# Patient Record
Sex: MALE | Race: WHITE | NOT HISPANIC OR LATINO | Employment: OTHER | URBAN - METROPOLITAN AREA
[De-identification: names, ages, dates, MRNs, and addresses within clinical notes are randomized per-mention and may not be internally consistent; named-entity substitution may affect disease eponyms.]

---

## 2017-01-09 ENCOUNTER — APPOINTMENT (EMERGENCY)
Dept: CT IMAGING | Facility: HOSPITAL | Age: 42
End: 2017-01-09
Payer: MEDICARE

## 2017-01-09 ENCOUNTER — HOSPITAL ENCOUNTER (EMERGENCY)
Facility: HOSPITAL | Age: 42
Discharge: HOME | End: 2017-01-09
Payer: MEDICARE

## 2017-01-09 VITALS
BODY MASS INDEX: 35.57 KG/M2 | OXYGEN SATURATION: 99 % | SYSTOLIC BLOOD PRESSURE: 170 MMHG | WEIGHT: 255 LBS | HEART RATE: 81 BPM | DIASTOLIC BLOOD PRESSURE: 79 MMHG | TEMPERATURE: 98.1 F | RESPIRATION RATE: 18 BRPM

## 2017-01-09 DIAGNOSIS — R19.7 DIARRHEA: ICD-10-CM

## 2017-01-09 DIAGNOSIS — R10.9 ABDOMINAL PAIN: Primary | ICD-10-CM

## 2017-01-09 LAB
ALBUMIN SERPL BCP-MCNC: 3.9 G/DL (ref 3.5–5)
ALP SERPL-CCNC: 49 U/L (ref 46–116)
ALT SERPL W P-5'-P-CCNC: 23 U/L (ref 12–78)
ANION GAP SERPL CALCULATED.3IONS-SCNC: 6 MMOL/L (ref 4–13)
AST SERPL W P-5'-P-CCNC: 12 U/L (ref 5–45)
BASOPHILS # BLD MANUAL: 0 THOUSAND/UL (ref 0–0.1)
BASOPHILS NFR MAR MANUAL: 0 % (ref 0–1)
BILIRUB SERPL-MCNC: 0.4 MG/DL (ref 0.2–1)
BILIRUB UR QL STRIP: NEGATIVE
BUN SERPL-MCNC: 13 MG/DL (ref 5–25)
CALCIUM SERPL-MCNC: 9.3 MG/DL (ref 8.3–10.1)
CHLORIDE SERPL-SCNC: 101 MMOL/L (ref 100–108)
CLARITY UR: CLEAR
CO2 SERPL-SCNC: 32 MMOL/L (ref 21–32)
COLOR UR: YELLOW
CREAT SERPL-MCNC: 1.02 MG/DL (ref 0.6–1.3)
EOSINOPHIL # BLD MANUAL: 0 THOUSAND/UL (ref 0–0.4)
EOSINOPHIL NFR BLD MANUAL: 0 % (ref 0–6)
ERYTHROCYTE [DISTWIDTH] IN BLOOD BY AUTOMATED COUNT: 12.6 % (ref 11.6–15.1)
GFR SERPL CREATININE-BSD FRML MDRD: >60 ML/MIN/1.73SQ M
GLUCOSE SERPL-MCNC: 93 MG/DL (ref 65–140)
GLUCOSE UR STRIP-MCNC: NEGATIVE MG/DL
HCT VFR BLD AUTO: 45.6 % (ref 36.5–49.3)
HGB BLD-MCNC: 15.5 G/DL (ref 12–17)
HGB UR QL STRIP.AUTO: NEGATIVE
KETONES UR STRIP-MCNC: NEGATIVE MG/DL
LEUKOCYTE ESTERASE UR QL STRIP: NEGATIVE
LIPASE SERPL-CCNC: 93 U/L (ref 73–393)
LYMPHOCYTES # BLD AUTO: 27 % (ref 14–44)
LYMPHOCYTES # BLD AUTO: 4.18 THOUSAND/UL (ref 0.6–4.47)
MCH RBC QN AUTO: 29.5 PG (ref 26.8–34.3)
MCHC RBC AUTO-ENTMCNC: 34 G/DL (ref 31.4–37.4)
MCV RBC AUTO: 87 FL (ref 82–98)
MONOCYTES # BLD AUTO: 0.77 THOUSAND/UL (ref 0–1.22)
MONOCYTES NFR BLD: 5 % (ref 4–12)
NEUTROPHILS # BLD MANUAL: 10.06 THOUSAND/UL (ref 1.85–7.62)
NEUTS BAND NFR BLD MANUAL: 1 % (ref 0–8)
NEUTS SEG NFR BLD AUTO: 64 % (ref 43–75)
NITRITE UR QL STRIP: NEGATIVE
PH UR STRIP.AUTO: 6 [PH] (ref 4.5–8)
PLATELET # BLD AUTO: 328 THOUSANDS/UL (ref 149–390)
PLATELET BLD QL SMEAR: ADEQUATE
PMV BLD AUTO: 8.8 FL (ref 8.9–12.7)
POTASSIUM SERPL-SCNC: 3.5 MMOL/L (ref 3.5–5.3)
PROT SERPL-MCNC: 8.7 G/DL (ref 6.4–8.2)
PROT UR STRIP-MCNC: NEGATIVE MG/DL
RBC # BLD AUTO: 5.26 MILLION/UL (ref 3.88–5.62)
RBC MORPH BLD: PRESENT
SODIUM SERPL-SCNC: 139 MMOL/L (ref 136–145)
SP GR UR STRIP.AUTO: 1.02 (ref 1–1.03)
TOTAL CELLS COUNTED SPEC: 100
UROBILINOGEN UR QL STRIP.AUTO: 0.2 E.U./DL
VARIANT LYMPHS # BLD AUTO: 3 %
WBC # BLD AUTO: 15.47 THOUSAND/UL (ref 4.31–10.16)

## 2017-01-09 PROCEDURE — 74177 CT ABD & PELVIS W/CONTRAST: CPT

## 2017-01-09 PROCEDURE — 87046 STOOL CULTR AEROBIC BACT EA: CPT | Performed by: PHYSICIAN ASSISTANT

## 2017-01-09 PROCEDURE — 87015 SPECIMEN INFECT AGNT CONCNTJ: CPT | Performed by: PHYSICIAN ASSISTANT

## 2017-01-09 PROCEDURE — 85007 BL SMEAR W/DIFF WBC COUNT: CPT | Performed by: PHYSICIAN ASSISTANT

## 2017-01-09 PROCEDURE — 80053 COMPREHEN METABOLIC PANEL: CPT | Performed by: PHYSICIAN ASSISTANT

## 2017-01-09 PROCEDURE — 96361 HYDRATE IV INFUSION ADD-ON: CPT

## 2017-01-09 PROCEDURE — 81003 URINALYSIS AUTO W/O SCOPE: CPT | Performed by: PHYSICIAN ASSISTANT

## 2017-01-09 PROCEDURE — 87045 FECES CULTURE AEROBIC BACT: CPT | Performed by: PHYSICIAN ASSISTANT

## 2017-01-09 PROCEDURE — 99284 EMERGENCY DEPT VISIT MOD MDM: CPT

## 2017-01-09 PROCEDURE — 96360 HYDRATION IV INFUSION INIT: CPT

## 2017-01-09 PROCEDURE — 87899 AGENT NOS ASSAY W/OPTIC: CPT | Performed by: PHYSICIAN ASSISTANT

## 2017-01-09 PROCEDURE — 85027 COMPLETE CBC AUTOMATED: CPT | Performed by: PHYSICIAN ASSISTANT

## 2017-01-09 PROCEDURE — 36415 COLL VENOUS BLD VENIPUNCTURE: CPT | Performed by: PHYSICIAN ASSISTANT

## 2017-01-09 PROCEDURE — 83690 ASSAY OF LIPASE: CPT | Performed by: PHYSICIAN ASSISTANT

## 2017-01-09 RX ADMIN — SODIUM CHLORIDE 1000 ML: 0.9 INJECTION, SOLUTION INTRAVENOUS at 18:10

## 2017-01-09 RX ADMIN — IOHEXOL 100 ML: 350 INJECTION, SOLUTION INTRAVENOUS at 18:49

## 2017-01-11 LAB
BACTERIA STL CULT: NORMAL
BACTERIA STL CULT: NORMAL

## 2017-01-16 ENCOUNTER — APPOINTMENT (EMERGENCY)
Dept: RADIOLOGY | Facility: HOSPITAL | Age: 42
End: 2017-01-16
Payer: MEDICARE

## 2017-01-16 ENCOUNTER — HOSPITAL ENCOUNTER (EMERGENCY)
Facility: HOSPITAL | Age: 42
Discharge: HOME | End: 2017-01-16
Admitting: EMERGENCY MEDICINE
Payer: MEDICARE

## 2017-01-16 VITALS
DIASTOLIC BLOOD PRESSURE: 80 MMHG | RESPIRATION RATE: 20 BRPM | BODY MASS INDEX: 35.7 KG/M2 | SYSTOLIC BLOOD PRESSURE: 156 MMHG | HEIGHT: 71 IN | WEIGHT: 255 LBS | OXYGEN SATURATION: 92 % | HEART RATE: 91 BPM | TEMPERATURE: 99.2 F
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
## 2017-01-16 DIAGNOSIS — S70.01XA CONTUSION OF RIGHT HIP: ICD-10-CM

## 2017-01-16 DIAGNOSIS — S93.401A SPRAIN OF RIGHT ANKLE: ICD-10-CM

## 2017-01-16 DIAGNOSIS — S83.91XA SPRAIN OF RIGHT KNEE: ICD-10-CM

## 2017-01-16 DIAGNOSIS — G89.29 CHRONIC LOW BACK PAIN: ICD-10-CM

## 2017-01-16 DIAGNOSIS — M54.50 CHRONIC LOW BACK PAIN: ICD-10-CM

## 2017-01-16 PROCEDURE — 73610 X-RAY EXAM OF ANKLE: CPT

## 2017-01-16 PROCEDURE — 96372 THER/PROPH/DIAG INJ SC/IM: CPT

## 2017-01-16 PROCEDURE — 73564 X-RAY EXAM KNEE 4 OR MORE: CPT

## 2017-01-16 PROCEDURE — 99283 EMERGENCY DEPT VISIT LOW MDM: CPT

## 2017-01-16 PROCEDURE — 73502 X-RAY EXAM HIP UNI 2-3 VIEWS: CPT

## 2017-01-16 RX ORDER — CYCLOBENZAPRINE HCL 10 MG
10 TABLET ORAL ONCE
Status: COMPLETED | OUTPATIENT
Start: 2017-01-16 | End: 2017-01-16

## 2017-01-16 RX ORDER — KETOROLAC TROMETHAMINE 30 MG/ML
30 INJECTION, SOLUTION INTRAMUSCULAR; INTRAVENOUS ONCE
Status: COMPLETED | OUTPATIENT
Start: 2017-01-16 | End: 2017-01-16

## 2017-01-16 RX ADMIN — KETOROLAC TROMETHAMINE 30 MG: 30 INJECTION, SOLUTION INTRAMUSCULAR at 13:31

## 2017-01-16 RX ADMIN — CYCLOBENZAPRINE HYDROCHLORIDE 10 MG: 10 TABLET, FILM COATED ORAL at 13:31

## 2017-02-09 ENCOUNTER — TRANSCRIBE ORDERS (OUTPATIENT)
Dept: ADMINISTRATIVE | Facility: HOSPITAL | Age: 42
End: 2017-02-09

## 2017-02-09 DIAGNOSIS — M54.16 LUMBAR RADICULOPATHY: Primary | ICD-10-CM

## 2017-02-15 ENCOUNTER — HOSPITAL ENCOUNTER (OUTPATIENT)
Dept: CT IMAGING | Facility: HOSPITAL | Age: 42
Discharge: HOME | End: 2017-02-15
Payer: MEDICARE

## 2017-02-15 DIAGNOSIS — M54.16 LUMBAR RADICULOPATHY: ICD-10-CM

## 2017-02-15 PROCEDURE — 72131 CT LUMBAR SPINE W/O DYE: CPT

## 2017-04-21 ENCOUNTER — TRANSCRIBE ORDERS (OUTPATIENT)
Dept: SLEEP CENTER | Facility: CLINIC | Age: 42
End: 2017-04-21

## 2017-04-21 ENCOUNTER — HOSPITAL ENCOUNTER (OUTPATIENT)
Dept: SLEEP CENTER | Facility: CLINIC | Age: 42
Discharge: HOME | End: 2017-04-21
Payer: MEDICARE

## 2017-04-21 DIAGNOSIS — G47.33 OBSTRUCTIVE SLEEP APNEA (ADULT) (PEDIATRIC): ICD-10-CM

## 2017-04-21 DIAGNOSIS — G47.33 OBSTRUCTIVE SLEEP APNEA (ADULT) (PEDIATRIC): Primary | ICD-10-CM

## 2017-05-08 ENCOUNTER — TRANSCRIBE ORDERS (OUTPATIENT)
Dept: ADMINISTRATIVE | Facility: HOSPITAL | Age: 42
End: 2017-05-08

## 2017-05-08 DIAGNOSIS — M54.14 THORACIC RADICULOPATHY: Primary | ICD-10-CM

## 2017-05-15 ENCOUNTER — HOSPITAL ENCOUNTER (OUTPATIENT)
Dept: CT IMAGING | Facility: HOSPITAL | Age: 42
Discharge: HOME | End: 2017-05-15
Payer: MEDICARE

## 2017-05-15 DIAGNOSIS — M54.14 THORACIC RADICULOPATHY: ICD-10-CM

## 2017-05-15 PROCEDURE — 72128 CT CHEST SPINE W/O DYE: CPT

## 2017-05-31 ENCOUNTER — TRANSCRIBE ORDERS (OUTPATIENT)
Dept: ADMINISTRATIVE | Facility: HOSPITAL | Age: 42
End: 2017-05-31

## 2017-05-31 DIAGNOSIS — R10.9 ABDOMINAL PAIN, UNSPECIFIED SITE: Primary | ICD-10-CM

## 2017-06-09 ENCOUNTER — HOSPITAL ENCOUNTER (OUTPATIENT)
Dept: RADIOLOGY | Facility: HOSPITAL | Age: 42
Discharge: HOME | End: 2017-06-09
Attending: INTERNAL MEDICINE
Payer: MEDICARE

## 2017-06-09 ENCOUNTER — HOSPITAL ENCOUNTER (OUTPATIENT)
Dept: ULTRASOUND IMAGING | Facility: HOSPITAL | Age: 42
Discharge: HOME | End: 2017-06-09
Attending: INTERNAL MEDICINE
Payer: MEDICARE

## 2017-06-09 DIAGNOSIS — R10.9 ABDOMINAL PAIN, UNSPECIFIED SITE: ICD-10-CM

## 2017-06-09 PROCEDURE — 74246 X-RAY XM UPR GI TRC 2CNTRST: CPT

## 2017-06-09 PROCEDURE — 76705 ECHO EXAM OF ABDOMEN: CPT

## 2017-07-12 ENCOUNTER — TRANSCRIBE ORDERS (OUTPATIENT)
Dept: ADMINISTRATIVE | Facility: HOSPITAL | Age: 42
End: 2017-07-12

## 2017-07-12 DIAGNOSIS — R10.9 ABDOMINAL PAIN, UNSPECIFIED SITE: Primary | ICD-10-CM

## 2017-07-24 ENCOUNTER — HOSPITAL ENCOUNTER (OUTPATIENT)
Dept: NUCLEAR MEDICINE | Facility: HOSPITAL | Age: 42
Discharge: HOME | End: 2017-07-24
Attending: INTERNAL MEDICINE
Payer: MEDICARE

## 2017-07-24 DIAGNOSIS — R10.9 ABDOMINAL PAIN, UNSPECIFIED SITE: ICD-10-CM

## 2017-07-24 PROCEDURE — 78264 GASTRIC EMPTYING IMG STUDY: CPT

## 2017-07-24 PROCEDURE — A9541 TC99M SULFUR COLLOID: HCPCS

## 2017-08-06 ENCOUNTER — HOSPITAL ENCOUNTER (EMERGENCY)
Facility: HOSPITAL | Age: 42
Discharge: HOME | End: 2017-08-06
Attending: EMERGENCY MEDICINE
Payer: MEDICARE

## 2017-08-06 ENCOUNTER — APPOINTMENT (EMERGENCY)
Dept: RADIOLOGY | Facility: HOSPITAL | Age: 42
End: 2017-08-06
Payer: MEDICARE

## 2017-08-06 VITALS
HEIGHT: 72 IN | DIASTOLIC BLOOD PRESSURE: 78 MMHG | OXYGEN SATURATION: 96 % | TEMPERATURE: 99.8 F | BODY MASS INDEX: 34.54 KG/M2 | RESPIRATION RATE: 18 BRPM | HEART RATE: 93 BPM | SYSTOLIC BLOOD PRESSURE: 126 MMHG | WEIGHT: 255 LBS

## 2017-08-06 DIAGNOSIS — S63.501A RIGHT WRIST SPRAIN: Primary | ICD-10-CM

## 2017-08-06 PROCEDURE — 99283 EMERGENCY DEPT VISIT LOW MDM: CPT

## 2017-08-06 PROCEDURE — 73110 X-RAY EXAM OF WRIST: CPT

## 2017-08-06 RX ORDER — TIZANIDINE 2 MG/1
4 TABLET ORAL 4 TIMES DAILY PRN
COMMUNITY
End: 2019-08-05 | Stop reason: SDUPTHER

## 2017-08-06 RX ORDER — LEVETIRACETAM 500 MG/1
500 TABLET ORAL
COMMUNITY
End: 2018-03-02 | Stop reason: DRUGHIGH

## 2017-11-03 ENCOUNTER — HOSPITAL ENCOUNTER (OUTPATIENT)
Dept: SLEEP CENTER | Facility: CLINIC | Age: 42
Discharge: HOME | End: 2017-11-03
Payer: MEDICARE

## 2017-11-03 ENCOUNTER — TRANSCRIBE ORDERS (OUTPATIENT)
Dept: SLEEP CENTER | Facility: CLINIC | Age: 42
End: 2017-11-03

## 2017-11-03 DIAGNOSIS — G47.33 OBSTRUCTIVE SLEEP APNEA SYNDROME: Primary | ICD-10-CM

## 2017-11-03 DIAGNOSIS — G47.33 OBSTRUCTIVE SLEEP APNEA: ICD-10-CM

## 2017-11-04 NOTE — PROGRESS NOTES
Progress Note - Sleep Center   Nathan Wilkerson 46 y o  male MRN: [id number]        Follow Up Evaluation / Problem:     1  Obstructive sleep apnea  2  Episodic episodes of altered consciousness  3  History of psoriatic arthritis  4  History of ankylosing spondylitis  5  Episodes of altered behavior with uncontrolled rate  6  History of multiple head trauma with concussion      HPI: Nathan Wilkerson is a 46y o  year old male  He returns to the Sleep [address] [place] [address] for re-evaluation of obstructive sleep apnea and recurrent episodes of altered consciousness and altered behavior  He has had an abnormal EEG in the past   He has a working diagnosis of possible epilepsy  This may account for some of his episodic behavioral changes  At his last visit he was started on 500 mg of levetiracetam to see whether or not some of this activity could be suppressed  He found that the medication was fairly effective however he felt as though he was in a fog and somewhat sleepy following morning  He reduce the dose from 500 to 250 mg  At this dose E had continued suppression of abnormal behavior activity and no evidence of fogginess upon awakening  ROS: A comprehensive review of systems revealed continued complaints of generalized pain due to diffuse arthritic degeneration  Historical Information     Past History Since Last Sleep Center Visit:     As noted above his previous episodes of abnormal behavior have improved significantly using 250 mg of levetiracetam   He was recently started on a new biologic medication for treatment of his psoriatic arthritis 1  This seems to have improved his pain and stiffness to some degree  He feels that he was obtaining better pain control using Remicade      ALLERGIES  Available on medication sheet in chart     MEDICATION  Available on medication sheet in chart    OBJECTIVE    Vital signs areavailable in patient's chart    PAP Pressure:  Nasal CPAP set to deliver 7 cm of water  DME Provider: [facility]    Physical Exam: No significant changes since previous examination      ASSESSMENT / PLAN    Assessment:   1  Obstructive sleep apnea  2  Episodes of altered consciousness and altered behavior  3  History of ankylosing spondylitis  4  History of psoriatic arthritis  5  History of multiple head trauma with concussion  6  Possible epilepsy    Plan:  1  Continue levetiracetam at 250 mg prior to bed  2  Continue CPAP at 8 cm of water  3  Provide prescription for new CPAP supplies over the next 12 months    Counseling / Coordination of Care  Total clinic time spent today 25 minutes  Greater than 50% of total time was spent with The patient and / or family counseling and / or coordination of care  A description of the counseling and / or coordination of care: We spent a significant amount of time talking about his episodes of altered behavior  Some of these may be motion lead based however some have seemed rather unusual in the past   Episodes of rage and altered consciousness seem to have improved using small doses of levetiracetam   It is not clear however this may be further indication that some of these are more physiological than psychological   He is obviously somewhat depressed due to his disabilities and this likely plays a part in some of his underlying anger and frustration  I will provide him with a prescription so that he might receive new CPAP supplies over the next 12 months  He will return in 1 year for routine re-evaluation  I have asked him to continue using levetiracetam as noted above  He will contact me at the Sleep [address] [place] [address] if he experiences any new difficulties  Armani Burdick DO    Board Certified in [place] [employer]

## 2018-03-02 DIAGNOSIS — G40.209 PARTIAL SYMPTOMATIC EPILEPSY WITH COMPLEX PARTIAL SEIZURES, NOT INTRACTABLE, WITHOUT STATUS EPILEPTICUS (HCC): Primary | ICD-10-CM

## 2018-03-02 RX ORDER — LEVETIRACETAM 250 MG/1
TABLET ORAL
Qty: 30 TABLET | Refills: 3 | Status: SHIPPED | OUTPATIENT
Start: 2018-03-02 | End: 2018-06-04 | Stop reason: SDUPTHER

## 2018-06-04 ENCOUNTER — TELEPHONE (OUTPATIENT)
Dept: SLEEP CENTER | Facility: CLINIC | Age: 43
End: 2018-06-04

## 2018-06-04 ENCOUNTER — OFFICE VISIT (OUTPATIENT)
Dept: SLEEP CENTER | Facility: CLINIC | Age: 43
End: 2018-06-04
Payer: MEDICARE

## 2018-06-04 VITALS
WEIGHT: 269 LBS | BODY MASS INDEX: 36.44 KG/M2 | SYSTOLIC BLOOD PRESSURE: 124 MMHG | HEART RATE: 98 BPM | HEIGHT: 72 IN | DIASTOLIC BLOOD PRESSURE: 78 MMHG

## 2018-06-04 DIAGNOSIS — Z99.89 OBSTRUCTIVE SLEEP APNEA ON CPAP: ICD-10-CM

## 2018-06-04 DIAGNOSIS — G40.209 PARTIAL SYMPTOMATIC EPILEPSY WITH COMPLEX PARTIAL SEIZURES, NOT INTRACTABLE, WITHOUT STATUS EPILEPTICUS (HCC): ICD-10-CM

## 2018-06-04 DIAGNOSIS — G47.11 IDIOPATHIC HYPERSOMNIA: Primary | ICD-10-CM

## 2018-06-04 DIAGNOSIS — G47.33 OBSTRUCTIVE SLEEP APNEA ON CPAP: ICD-10-CM

## 2018-06-04 PROCEDURE — 99214 OFFICE O/P EST MOD 30 MIN: CPT | Performed by: NURSE PRACTITIONER

## 2018-06-04 RX ORDER — LEVETIRACETAM 250 MG/1
TABLET ORAL
Qty: 90 TABLET | Refills: 1 | Status: SHIPPED | OUTPATIENT
Start: 2018-06-04 | End: 2019-06-12

## 2018-06-04 RX ORDER — GABAPENTIN 300 MG/1
CAPSULE ORAL
COMMUNITY
Start: 2011-09-09 | End: 2021-05-28 | Stop reason: ALTCHOICE

## 2018-06-04 NOTE — PROGRESS NOTES
Progress Note - [address] 38 y o  male   :1975, MRN: [id number]  2018          Follow Up Evaluation / Problem:     Obstructive Sleep Apnea  Idiopathic hypersomnia  Episodic episodes of altered consciousness and altered behavior  History of multiple head trauma with concussion        HPI: Kristie Elizabeth is a 38y o  year old male  He presents today with his mother for follow up of obstructive sleep apnea and recurrent episodes of altered consciousness and altered behavior  He continues to take leviteracitam with good results  He states that he has not experienced "blanking out" and altered behavior symptoms since taking the medication  He feels much less foggy the following morning on the lower dose        Review of Systems      Genitourinary none   Cardiology palpitations/fluttering feeling in the chest   Gastrointestinal none   Neurology need to move extremities, muscle weakness, numbness/tingling of an extremity, forgetfulness, poor concentration or confusion,  and balance problems   Constitutional fatigue and excessive sweating at night   Integumentary none   Psychiatry none   Musculoskeletal joint pain, muscle aches, back pain, legs twitching/jerking, sciatica and leg cramps   Pulmonary shortness of breath with activity   ENT throat clearing and ringing in ears   Endocrine none   Hematological none         Current Outpatient Prescriptions:     fluticasone (VERAMYST) 27 5 MCG/SPRAY nasal spray, into each nostril, Disp: , Rfl:     levETIRAcetam (KEPPRA) 250 mg tablet, Take before bedtime, Disp: 90 tablet, Rfl: 1    oxyCODONE (OXYCONTIN) 20 mg 12 hr tablet, 10 mg OxyCONTIN 20 MG TB12 take 1 Po TID  Refills: 0  Active , Disp: , Rfl:     oxyCODONE (ROXICODONE) 30 MG immediate release tablet, 15 mg 4 (four) times a day OxyCODONE HCl TABS (15 mg) take 1 tab po qid  Refills: 0  Active , Disp: , Rfl:     tiZANidine (ZANAFLEX) 2 mg tablet, Take 4 mg by mouth 4 (four) times a day as needed for muscle spasms, Disp: , Rfl:     gabapentin (NEURONTIN) 300 mg capsule, Take by mouth, Disp: , Rfl:     [place] Sleepiness Scale  Sitting and reading: Moderate chance of dozing  Watching TV: Moderate chance of dozing  Sitting, inactive in a public place (e g  a theatre or a meeting): Slight chance of dozing  As a passenger in a car for an hour without a break: Slight chance of dozing  Lying down to rest in the afternoon when circumstances permit: High chance of dozing  Sitting and talking to someone: Slight chance of dozing  Sitting quietly after a lunch without alcohol: Slight chance of dozing  In a car, while stopped for a few minutes in traffic: Would never doze  Total score: 11              Vitals:    06/04/18 1300   BP: 124/78   Pulse: 98   Weight: 122 kg (269 lb)   Height: 5' 11 5" (1 816 m)       Body mass index is 36 99 kg/m²  EPWORTH SLEEPINESS SCORE  Total score: 11      Past History Since Last Sleep Center Visit:     Since his last visit, he continues to have improvement in his abnormal behavior symptoms while taking 250mg of leviteracitam   He uses his CPAP every night, which improves his daytime wakefulness  He continues to see rheumatology and pain management for his chronic pain conditions  The review of systems and following portions of the patient's history were reviewed and updated as appropriate: allergies, current medications, past family history, past medical history, past social history, past surgical history, and problem list     OBJECTIVE    PAP Pressure: Nasal CPAP set to deliver 8 cm of water pressure  DME Provider:  [facility]    Physical Exam:     General Appearance:   Alert, cooperative, no distress, appears stated age, obese     Head:   Normocephalic, without obvious abnormality, atraumatic     Eyes:   PERRL, conjunctiva/corneas clear, EOM's intact          Nose:  Nares normal, septum midline, no drainage or sinus tenderness           Throat:  Lips, teeth and gums normal; tongue normal size and  shape and midline mucosa moist and redundant bilaterally, uvula thick and only partially visible, tonsils not visualized, Mallampati class 4       Neck:  Supple, symmetrical, trachea midline, no adenopathy; Thyroid: No enlargement, tenderness or nodules; no carotid bruit or JVD     Lungs:      Clear to auscultation bilaterally, respirations unlabored     Heart:   Regular rate and rhythm, S1 and S2 normal, no murmur, rub or gallop       Extremities:  Extremities normal, atraumatic, no cyanosis or edema     Pulses:  2+ and symmetric all extremities     Skin:  Skin color, texture, turgor normal, no rashes or lesions       Neurologic:  CNII-XII intact  Normal strength, sensation throughout       ASSESSMENT / PLAN    1  Idiopathic hypersomnia     2  Partial symptomatic epilepsy with complex partial seizures, not intractable, without status epilepticus (HCC)  levETIRAcetam (KEPPRA) 250 mg tablet   3  Obstructive sleep apnea on CPAP  PAP DME Resupply/Reorder           Counseling / Coordination of Care  Total clinic time spent today 25 minutes  Greater than 50% of total time was spent with the patient and / or family counseling and / or coordination of care  A description of the counseling / coordination of care:     Impressions, Prognosis, Instructions for management, Risks and benefits of treatment, Patient and family education, Risk factor reductions and Importance of compliance with treatment    Today I reviewed the patient's compliance data  he has been able to use the equipment 100% of all days recorded  Average usage was 4 or more hours 96 7% of all days recorded  The estimated AHI is 1 2 abnormal breathing events per hour  Response to treatment has been good  He will continue using this equipment at the settings noted above for the next 12 months  We will continue to reevaluate his CPAP usage and effectiveness   I have asked him to contact the [facility] if he encounters any difficulties prior to that time  He will continue leviteracitam 250mg at bedtime  He will report any changes in his behavior or alterations in consciousness  He will follow up in 6 months  The following instructions have been given to the patient today:    Patient Instructions   1  Continue use of CPAP equipment nightly  2  Continue to clean your equipment as discussed  3  Contact the [facility] with any questions or concerns prior to her next visit as needed  4    Schedule visit for follow-up in 1 year        Lola Penn, [address] [place] [address]

## 2018-06-29 DIAGNOSIS — G40.209 PARTIAL SYMPTOMATIC EPILEPSY WITH COMPLEX PARTIAL SEIZURES, NOT INTRACTABLE, WITHOUT STATUS EPILEPTICUS (HCC): ICD-10-CM

## 2018-06-29 RX ORDER — LEVETIRACETAM 250 MG/1
TABLET ORAL
Qty: 30 TABLET | Refills: 3 | Status: SHIPPED | OUTPATIENT
Start: 2018-06-29 | End: 2018-10-20 | Stop reason: SDUPTHER

## 2018-08-21 DIAGNOSIS — G47.33 OBSTRUCTIVE SLEEP APNEA: Primary | ICD-10-CM

## 2018-10-17 ENCOUNTER — TRANSCRIBE ORDERS (OUTPATIENT)
Dept: ADMINISTRATIVE | Facility: HOSPITAL | Age: 43
End: 2018-10-17

## 2018-10-17 ENCOUNTER — HOSPITAL ENCOUNTER (OUTPATIENT)
Dept: RADIOLOGY | Facility: HOSPITAL | Age: 43
Discharge: HOME | End: 2018-10-17
Payer: MEDICARE

## 2018-10-17 DIAGNOSIS — M25.552 LEFT HIP PAIN: ICD-10-CM

## 2018-10-17 DIAGNOSIS — M25.551 RIGHT HIP PAIN: ICD-10-CM

## 2018-10-17 DIAGNOSIS — M25.551 RIGHT HIP PAIN: Primary | ICD-10-CM

## 2018-10-17 PROCEDURE — 73502 X-RAY EXAM HIP UNI 2-3 VIEWS: CPT

## 2018-10-20 DIAGNOSIS — G40.209 PARTIAL SYMPTOMATIC EPILEPSY WITH COMPLEX PARTIAL SEIZURES, NOT INTRACTABLE, WITHOUT STATUS EPILEPTICUS (HCC): ICD-10-CM

## 2018-10-23 RX ORDER — LEVETIRACETAM 250 MG/1
TABLET ORAL
Qty: 30 TABLET | Refills: 3 | Status: SHIPPED | OUTPATIENT
Start: 2018-10-23 | End: 2019-01-08 | Stop reason: SDUPTHER

## 2018-12-20 ENCOUNTER — OFFICE VISIT (OUTPATIENT)
Dept: SLEEP CENTER | Facility: CLINIC | Age: 43
End: 2018-12-20
Payer: MEDICARE

## 2018-12-20 VITALS
WEIGHT: 255 LBS | BODY MASS INDEX: 34.54 KG/M2 | DIASTOLIC BLOOD PRESSURE: 80 MMHG | SYSTOLIC BLOOD PRESSURE: 118 MMHG | HEART RATE: 76 BPM | HEIGHT: 72 IN | OXYGEN SATURATION: 97 %

## 2018-12-20 DIAGNOSIS — G47.11 IDIOPATHIC HYPERSOMNIA: ICD-10-CM

## 2018-12-20 DIAGNOSIS — G47.411 NARCOLEPSY WITH CATAPLEXY: Primary | ICD-10-CM

## 2018-12-20 DIAGNOSIS — G47.33 OBSTRUCTIVE SLEEP APNEA ON CPAP: ICD-10-CM

## 2018-12-20 DIAGNOSIS — G40.209 PARTIAL SYMPTOMATIC EPILEPSY WITH COMPLEX PARTIAL SEIZURES, NOT INTRACTABLE, WITHOUT STATUS EPILEPTICUS (HCC): ICD-10-CM

## 2018-12-20 DIAGNOSIS — Z99.89 OBSTRUCTIVE SLEEP APNEA ON CPAP: ICD-10-CM

## 2018-12-20 PROBLEM — G89.29 CHRONIC PAIN: Status: ACTIVE | Noted: 2018-12-20

## 2018-12-20 PROCEDURE — 99215 OFFICE O/P EST HI 40 MIN: CPT | Performed by: PSYCHIATRY & NEUROLOGY

## 2018-12-20 RX ORDER — LINACLOTIDE 145 UG/1
CAPSULE, GELATIN COATED ORAL
Refills: 0 | COMMUNITY
Start: 2018-12-11

## 2018-12-20 RX ORDER — VENLAFAXINE HYDROCHLORIDE 37.5 MG/1
37.5 CAPSULE, EXTENDED RELEASE ORAL DAILY
Qty: 30 CAPSULE | Refills: 1 | Status: SHIPPED | OUTPATIENT
Start: 2018-12-20 | End: 2019-02-15 | Stop reason: SDUPTHER

## 2018-12-20 RX ORDER — OXYCODONE HYDROCHLORIDE 5 MG/1
5 TABLET ORAL 2 TIMES DAILY PRN
Refills: 0 | COMMUNITY
Start: 2018-12-11 | End: 2021-05-28 | Stop reason: ALTCHOICE

## 2018-12-20 RX ORDER — AMLODIPINE BESYLATE 2.5 MG/1
TABLET ORAL
COMMUNITY
Start: 2018-10-22 | End: 2021-05-28 | Stop reason: ALTCHOICE

## 2018-12-20 RX ORDER — CLONIDINE HYDROCHLORIDE 0.1 MG/1
TABLET ORAL
Refills: 0 | COMMUNITY
Start: 2018-12-05 | End: 2021-05-28 | Stop reason: ALTCHOICE

## 2018-12-20 NOTE — PROGRESS NOTES
Progress Note - [address] 41 y o  male   :1975, MRN: [id number]  2018          Follow Up Evaluation / Problem:     Obstructive Sleep Apnea  Narcolepsy with Cataplexy  Obesity  Episodic episodes of altered consciousness  History of psoriatic arthritis  History of ankylosing spondylitis  Episodes of altered behavior with uncontrolled rate   History of multiple head trauma with concussion        HPI: Lilia Mcconnell is a 41y o  year old male  He has had a long history of multiple illnesses  He was initially seen for treatment of obstructive sleep apnea and was later found to have episodes of altered behavior which were classified as partial seizures  He was then diagnosed with psoriatic arthritis and ankylosing spondylitis          Current Outpatient Prescriptions:     amLODIPine (NORVASC) 2 5 mg tablet, , Disp: , Rfl:     fluticasone (VERAMYST) 27 5 MCG/SPRAY nasal spray, into each nostril, Disp: , Rfl:     levETIRAcetam (KEPPRA) 250 mg tablet, Take before bedtime, Disp: 90 tablet, Rfl: 1    levETIRAcetam (KEPPRA) 250 mg tablet, TAKE 1 TABLET BY MOUTH AT BEDTIME, Disp: 30 tablet, Rfl: 3    LINZESS 145 MCG CAPS, 1 (one) Capsule daily on an empty stomach, at least 30 mn before first meal, Disp: , Rfl: 0    oxyCODONE (ROXICODONE) 5 mg immediate release tablet, Take 5 mg by mouth 2 (two) times a day as needed, Disp: , Rfl: 0    tiZANidine (ZANAFLEX) 2 mg tablet, Take 4 mg by mouth 4 (four) times a day as needed for muscle spasms, Disp: , Rfl:     cloNIDine (CATAPRES) 0 1 mg tablet, 1 (one) Tablet three times daily, as needed, Disp: , Rfl: 0    gabapentin (NEURONTIN) 300 mg capsule, Take by mouth, Disp: , Rfl:     oxyCODONE (OXYCONTIN) 20 mg 12 hr tablet, 10 mg OxyCONTIN 20 MG TB12 take 1 Po TID  Refills: 0  Active , Disp: , Rfl:     oxyCODONE (ROXICODONE) 30 MG immediate release tablet, 15 mg 4 (four) times a day OxyCODONE HCl TABS (15 mg) take 1 tab po qid  Refills: 0  Active , Disp: , Rfl:     venlafaxine (EFFEXOR-XR) 37 5 mg 24 hr capsule, Take 1 capsule (37 5 mg total) by mouth daily, Disp: 30 capsule, Rfl: 1    [place] Sleepiness Scale  Sitting and reading: Moderate chance of dozing  Watching TV: Moderate chance of dozing  Sitting, inactive in a public place (e g  a theatre or a meeting): Slight chance of dozing  As a passenger in a car for an hour without a break: Slight chance of dozing  Lying down to rest in the afternoon when circumstances permit: Moderate chance of dozing  Sitting and talking to someone: Moderate chance of dozing  Sitting quietly after a lunch without alcohol: Slight chance of dozing  In a car, while stopped for a few minutes in traffic: Would never doze  Total score: 11              Vitals:    12/20/18 1200   BP: 118/80   Pulse: 76   SpO2: 97%   Weight: 116 kg (255 lb)   Height: 5' 11 5" (1 816 m)       Body mass index is 35 07 kg/m²  EPWORTH SLEEPINESS SCORE  Total score: 11      Past History Since Last Sleep Center Visit:     He reports that he is using nasal CPAP at this time and feel somewhat better when he is using it on a regular basis  He is also taking levetiracetam 250 mg for the last 18 months  He was initially started on 500 mg however this was decreased to 250 mg when he described morning fogginess which seemed to disappear at a lower dose the medications  He now rarely has any alteration of consciousness      The review of systems and following portions of the patient's history were reviewed and updated as appropriate: allergies, current medications, past family history, past medical history, past social history, past surgical history, and problem list     Review of Systems      Genitourinary none   Cardiology none   Gastrointestinal none   Neurology need to move extremities, muscle weakness, numbness/tingling of an extremity, loss of consciousness, forgetfulness, poor concentration or confusion, , difficulty with memory and balance problems   Constitutional fatigue   Integumentary none   Psychiatry none   Musculoskeletal joint pain, muscle aches, back pain, legs twitching/jerking, sciatica and leg cramps   Pulmonary none   ENT none   Endocrine none   Hematological none       OBJECTIVE    PAP Pressure: Nasal CPAP set to deliver 7 cm of water pressure  DME Provider: [employer]    Physical Exam:     General Appearance:   Alert, cooperative, no distress, appears stated age, obese     Head:   Normocephalic, without obvious abnormality, atraumatic     Eyes:   PERRL, conjunctiva/corneas clear, EOM's intact          Nose:  Nares normal, septum midline, no drainage or sinus tenderness     Neck:  Supple, symmetrical, trachea midline, no adenopathy; Thyroid: No enlargement, tenderness or nodules; no carotid bruit or JVD     Lungs:      Clear to auscultation bilaterally, respirations unlabored     Heart:   Regular rate and rhythm, S1 and S2 normal, no murmur, rub or gallop       Extremities:  Extremities normal, atraumatic, no cyanosis or edema     Pulses:  2+ and symmetric all extremities     Skin:  Skin color, texture, turgor normal, no rashes or lesions       Neurologic:  CNII-XII intact  Normal strength, sensation throughout       ASSESSMENT / PLAN    1  Narcolepsy with cataplexy  venlafaxine (EFFEXOR-XR) 37 5 mg 24 hr capsule   2  Obstructive sleep apnea on CPAP     3  Partial symptomatic epilepsy with complex partial seizures, not intractable, without status epilepticus ([facility] [address] )     4  Idiopathic hypersomnia             Counseling / Coordination of Care  Total clinic time spent today 40 minutes  Greater than 50% of total time was spent with the patient and / or family counseling and / or coordination of care       A description of the counseling / coordination of care:     Impressions, Prognosis, Instructions for management, Risks and benefits of treatment, Patient and family education, Risk factor reductions and Importance of compliance with treatment    The following instructions have been given to the patient today:    Patient Instructions   1  Continue levetiracetam 250 mg each evening  2  Add venlafaxine XR 37 5 mg each morning  3  Return to Sleep [address] [place] [address] in 6 months for routine re-evaluation  4  Contact [facility] Disorder Center with a report of progression after starting venlafaxine  5  Contact [facility] Disorder Center if any other problems arise prior to that time     He has had some episodes of intermittent weakness in his face neck and arms possibly consistent with cataplexy  He has also begun to experience hypnagogic hallucinations and episodes consistent with sleep paralysis  It is possible that these are secondary to a disease such as narcolepsy  In order to suppress some of this unusual activity I have asked him to start taking venlafaxine XR 37 5 mg each morning  He will report back to the Sleep [address] [place] [address] in approximately 2 weeks with his progress       DavidForest Vanegas

## 2018-12-20 NOTE — PATIENT INSTRUCTIONS
1  Continue levetiracetam 250 mg each evening  2  Add venlafaxine XR 37 5 mg each morning  3  Return to Sleep [address] [place] [address] in 6 months for routine re-evaluation  4  Contact [facility] Center with a report of progression after starting venlafaxine  5   Contact [facility] Center if any other problems arise prior to that time

## 2018-12-20 NOTE — PROGRESS NOTES
Review of Systems      Genitourinary none   Cardiology none   Gastrointestinal none   Neurology need to move extremities, muscle weakness, numbness/tingling of an extremity, loss of consciousness, forgetfulness, poor concentration or confusion, , difficulty with memory and balance problems   Constitutional fatigue   Integumentary none   Psychiatry none   Musculoskeletal joint pain, muscle aches, back pain, legs twitching/jerking, sciatica and leg cramps   Pulmonary none   ENT none   Endocrine none   Hematological none

## 2019-01-08 ENCOUNTER — HOSPITAL ENCOUNTER (EMERGENCY)
Facility: HOSPITAL | Age: 44
Discharge: HOME | End: 2019-01-08
Attending: EMERGENCY MEDICINE
Payer: MEDICARE

## 2019-01-08 ENCOUNTER — APPOINTMENT (EMERGENCY)
Dept: CT IMAGING | Facility: HOSPITAL | Age: 44
End: 2019-01-08
Payer: MEDICARE

## 2019-01-08 VITALS
HEIGHT: 72 IN | OXYGEN SATURATION: 98 % | RESPIRATION RATE: 20 BRPM | DIASTOLIC BLOOD PRESSURE: 87 MMHG | WEIGHT: 255 LBS | TEMPERATURE: 98.3 F | BODY MASS INDEX: 34.54 KG/M2 | SYSTOLIC BLOOD PRESSURE: 149 MMHG | HEART RATE: 80 BPM

## 2019-01-08 DIAGNOSIS — M54.50 LOW BACK PAIN: ICD-10-CM
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
## 2019-01-08 PROCEDURE — 99284 EMERGENCY DEPT VISIT MOD MDM: CPT

## 2019-01-08 PROCEDURE — 96372 THER/PROPH/DIAG INJ SC/IM: CPT

## 2019-01-08 PROCEDURE — 72131 CT LUMBAR SPINE W/O DYE: CPT

## 2019-01-08 RX ORDER — HYDROCODONE BITARTRATE AND ACETAMINOPHEN 7.5; 325 MG/1; MG/1
1 TABLET ORAL EVERY 8 HOURS PRN
COMMUNITY
End: 2021-05-28 | Stop reason: ALTCHOICE

## 2019-01-08 RX ORDER — KETOROLAC TROMETHAMINE 30 MG/ML
30 INJECTION, SOLUTION INTRAMUSCULAR; INTRAVENOUS ONCE
Status: COMPLETED | OUTPATIENT
Start: 2019-01-08 | End: 2019-01-08

## 2019-01-08 RX ORDER — LIDOCAINE 50 MG/G
1 PATCH TOPICAL ONCE
Status: DISCONTINUED | OUTPATIENT
Start: 2019-01-08 | End: 2019-01-08 | Stop reason: HOSPADM

## 2019-01-08 RX ORDER — CYCLOBENZAPRINE HCL 10 MG
10 TABLET ORAL ONCE
Status: COMPLETED | OUTPATIENT
Start: 2019-01-08 | End: 2019-01-08

## 2019-01-08 RX ADMIN — KETOROLAC TROMETHAMINE 30 MG: 30 INJECTION, SOLUTION INTRAMUSCULAR; INTRAVENOUS at 17:50

## 2019-01-08 RX ADMIN — LIDOCAINE 1 PATCH: 50 PATCH TOPICAL at 19:25

## 2019-01-08 RX ADMIN — CYCLOBENZAPRINE HYDROCHLORIDE 10 MG: 10 TABLET, FILM COATED ORAL at 17:50

## 2019-01-09 NOTE — DISCHARGE INSTRUCTIONS
Continue your current medications  Ice to back x 2 days, then heating pad  Follow up with pain management for recheck  Back Pain   AMBULATORY CARE:   Back pain  is common  You may feel sore or stiff on one or both sides of your back  The pain may spread to your buttocks or thighs  Back pain may be caused by an injury, lack of exercise, or obesity  Repeated bending, lifting, twisting, or lifting heavy items can also cause back pain  Seek immediate care for the following symptoms:   · Pain, numbness, or weakness in one or both legs    · Pain that is so severe, you cannot walk    · Unable to control your urine or bowel movements    · Severe back pain with chest pain    · Severe back pain, nausea, and vomiting    · Severe back pain that spreads to your side or genital area  Contact your healthcare provider if:   · You have back pain that does not get better with rest and pain medicine  · You have a fever  · You have pain that worsens when you are on your back or when you rest     · You have pain that worsens when you cough or sneeze  · You lose weight without trying  · You have questions or concerns about your condition or care  Treatment for back pain  may include any of the following:  · NSAIDs , such as ibuprofen, help decrease swelling, pain, and fever  This medicine is available with or without a doctor's order  NSAIDs can cause stomach bleeding or kidney problems in certain people  If you take blood thinner medicine, always ask your healthcare provider if NSAIDs are safe for you  Always read the medicine label and follow directions  · Acetaminophen  decreases pain  It is available without a doctor's order  Ask how much to take and how often to take it  Follow directions  Acetaminophen can cause liver damage if not taken correctly  · Prescription pain medicine  may be given  Ask your healthcare provider how to take this medicine safely    Manage your back pain:   · Apply ice  on your back for 15 to 20 minutes every hour or as directed  Use an ice pack, or put crushed ice in a plastic bag  Cover it with a towel  Ice helps decrease swelling and pain  · Apply heat  on your back for 20 to 30 minutes every 2 hours for as many days as directed  Heat helps decrease pain and muscle spasms  You can alternate ice and heat  · Stay active  as much as you can without causing more pain  Bed rest could make your back pain worse  Avoid heavy lifting until your pain is gone  Follow up with your healthcare provider as directed:  Write down your questions so you remember to ask them during your visits  © 2017 [address]  Information is for End User's use only and may not be sold, redistributed or otherwise used for commercial purposes  All illustrations and images included in CareNotes® are the copyrighted property of [employer]  or Jesse Escobar  The above information is an  only  It is not intended as medical advice for individual conditions or treatments  Talk to your doctor, nurse or pharmacist before following any medical regimen to see if it is safe and effective for you

## 2019-01-31 ENCOUNTER — DOCUMENTATION (OUTPATIENT)
Dept: SLEEP CENTER | Facility: CLINIC | Age: 44
End: 2019-01-31

## 2019-01-31 ENCOUNTER — TELEPHONE (OUTPATIENT)
Dept: SLEEP CENTER | Facility: CLINIC | Age: 44
End: 2019-01-31

## 2019-01-31 NOTE — TELEPHONE ENCOUNTER
Pt called, states Dr Husam Burns is asking for a letter from Dr Ramon Phillips that it is ok for pt to take oxycodone  Can fax to [phone]  Pt also wanted Dr Ramon Phillips to know that the venlafaxine is making a world of difference!

## 2019-02-04 ENCOUNTER — HOSPITAL ENCOUNTER (OUTPATIENT)
Dept: RADIOLOGY | Facility: HOSPITAL | Age: 44
Discharge: HOME | End: 2019-02-04
Payer: MEDICARE

## 2019-02-04 ENCOUNTER — TRANSCRIBE ORDERS (OUTPATIENT)
Dept: ADMINISTRATIVE | Facility: HOSPITAL | Age: 44
End: 2019-02-04

## 2019-02-04 DIAGNOSIS — M53.3 DISORDER OF SACRUM: Primary | ICD-10-CM

## 2019-02-04 DIAGNOSIS — M53.3 DISORDER OF SACRUM: ICD-10-CM

## 2019-02-04 PROCEDURE — 72220 X-RAY EXAM SACRUM TAILBONE: CPT

## 2019-02-06 ENCOUNTER — TELEPHONE (OUTPATIENT)
Dept: SLEEP CENTER | Facility: CLINIC | Age: 44
End: 2019-02-06

## 2019-02-06 NOTE — TELEPHONE ENCOUNTER
Patient called asking for letter from Dr Kenyatta Daniel to his pain management physician   Letter is in EPIC will fax copy to Dr Lynetta Hamman at [phone]

## 2019-02-14 ENCOUNTER — TELEPHONE (OUTPATIENT)
Dept: SLEEP CENTER | Facility: CLINIC | Age: 44
End: 2019-02-14

## 2019-02-14 NOTE — TELEPHONE ENCOUNTER
Patient called, states Dr Meg Sykes is requesting that the letter allowing patient to take oxycodone needs to mention patients diagnosis of narcolepsy with cataplexy

## 2019-02-15 DIAGNOSIS — G47.411 NARCOLEPSY WITH CATAPLEXY: ICD-10-CM

## 2019-02-15 DIAGNOSIS — G40.209 PARTIAL SYMPTOMATIC EPILEPSY WITH COMPLEX PARTIAL SEIZURES, NOT INTRACTABLE, WITHOUT STATUS EPILEPTICUS (HCC): ICD-10-CM

## 2019-02-15 NOTE — TELEPHONE ENCOUNTER
Note sent to Dr Catracho Torres approving the usse of oxycodone in this patient  Advised to watch for signs of increased levels of daytime sleepiness

## 2019-02-18 RX ORDER — LEVETIRACETAM 250 MG/1
TABLET ORAL
Qty: 30 TABLET | Refills: 3 | Status: SHIPPED | OUTPATIENT
Start: 2019-02-18 | End: 2019-06-12

## 2019-02-18 RX ORDER — VENLAFAXINE HYDROCHLORIDE 37.5 MG/1
CAPSULE, EXTENDED RELEASE ORAL
Qty: 30 CAPSULE | Refills: 1 | Status: SHIPPED | OUTPATIENT
Start: 2019-02-18 | End: 2019-04-14 | Stop reason: SDUPTHER

## 2019-02-19 ENCOUNTER — TRANSCRIBE ORDERS (OUTPATIENT)
Dept: NEUROSURGERY | Facility: CLINIC | Age: 44
End: 2019-02-19

## 2019-02-19 DIAGNOSIS — G89.4 CHRONIC PAIN SYNDROME: Primary | ICD-10-CM

## 2019-02-22 ENCOUNTER — CONSULT (OUTPATIENT)
Dept: NEUROSURGERY | Facility: CLINIC | Age: 44
End: 2019-02-22
Payer: MEDICARE

## 2019-02-22 VITALS
SYSTOLIC BLOOD PRESSURE: 140 MMHG | HEIGHT: 72 IN | TEMPERATURE: 98.8 F | RESPIRATION RATE: 16 BRPM | DIASTOLIC BLOOD PRESSURE: 90 MMHG | HEART RATE: 96 BPM | BODY MASS INDEX: 34.4 KG/M2 | WEIGHT: 254 LBS

## 2019-02-22 DIAGNOSIS — G89.4 CHRONIC PAIN SYNDROME: ICD-10-CM

## 2019-02-22 PROCEDURE — 99203 OFFICE O/P NEW LOW 30 MIN: CPT | Performed by: PHYSICIAN ASSISTANT

## 2019-02-22 NOTE — PATIENT INSTRUCTIONS
Continue follow with pain management, Dr Molly Robertson and per his protocol  Continue to contact [facility] should you have issues with pain control/ coverage      Further follow-up with Neurosurgery on an as-needed basis

## 2019-02-22 NOTE — PROGRESS NOTES
Assessment/Plan:    Very pleasant 51-year-old male, presents with complaint of "warm sensation, burning sensation, painful sensation" at the site of his spinal cord stimulator generator  There is no recent imaging for review today  He has history of "thoracic spinal cord stimulator placement with right buttock IPG", 5/15/12 by Dr Purinma Estrada  He reports he has had the symptoms for 2 or 3 years infrequently (2 or 3 times with monthly, very short duration) however he has a history of fall 1/8/19) down stairs), with a change in character of the symptoms, frequency and duration  Specifically reports with the generator turned on he has a burning, painful sensation generator site, when the generator was turned off this decreases to about 3/4 of his level, and with the generator battery dead this symptoms completely resolved  He he reports he has never contacted a [address] [place] [address] Street, relative to this issue  He reports following the fall there was significant ecchymosis ("black and blue") about the area of the generator  He reports overall the SCS is effective in controlling his leg symptoms, burning, tingling and sharp pain generally very effectively  He continues to follow regularly with pain management, Dr Ranjan Gamboa and receives epidural steroid injections typically 3-4 times yearly times more than 4-5 years  He has met with the representative from [facility] today and after extensive interrogation there are no faults identified in the wires and or the IPG, and he is having the unit reprogrammed  The patient understands to continue to contact [facility] and his pain management provider, for further issues of this nature  Further follow-up with Neurosurgery will be on an as needed basis  These findings, impressions and recommendations are reviewed in great detail with the patient, he expressed understanding and agreement, his questions were answered completely and to his satisfaction  Diagnoses and all orders for this visit:    Chronic pain syndrome  -     Ambulatory referral to Neurosurgery          No follow-ups on file  Subjective:      Patient ID: Lilia Mcconnell is a 41 y o  male  HPI    The following portions of the patient's history were reviewed and updated as appropriate: allergies, current medications, past family history, past medical history, past social history, past surgical history and problem list     Review of Systems   Constitutional: Positive for chills, fatigue and fever  HENT: Positive for tinnitus (here and there)  Negative for ear pain, nosebleeds and sore throat  Eyes: Negative  Respiratory: Negative  Cardiovascular: Negative  Gastrointestinal: Negative  Endocrine: Negative  Genitourinary: Negative  Musculoskeletal: Positive for back pain (whole back), gait problem (uses cane), myalgias, neck pain and neck stiffness  Negative for arthralgias  Skin: Negative  Allergic/Immunologic: Negative  Neurological: Positive for seizures (seizure like activities), speech difficulty (slurring/getting words wrong), weakness (legs), numbness (both legs) and headaches (from the neck pain)  Negative for dizziness and tremors  Hematological: Negative  Psychiatric/Behavioral: Negative  Objective:    Physical Exam   Constitutional: He is oriented to person, place, and time  He appears well-developed and well-nourished  HENT:   Head: Normocephalic and atraumatic  Eyes: Pupils are equal, round, and reactive to light  EOM are normal    Cardiovascular: Normal rate  Pulmonary/Chest: Effort normal and breath sounds normal    Neurological: He is alert and oriented to person, place, and time  Skin: Skin is warm and dry  Psychiatric: He has a normal mood and affect  Neurologic Exam     Mental Status   Oriented to person, place, and time       Cranial Nerves     CN III, IV, VI   Pupils are equal, round, and reactive to light   Extraocular motions are normal

## 2019-02-22 NOTE — LETTER
February 22, 2019     Cosette Lent, MD [address] [place] [address]    Patient: Basia Cortez   YOB: 1975   Date of Visit: 2/22/2019       Dear Dr Lynette Ruelas: Thank you for referring Martinez Connor to me for evaluation  Below are my notes for this consultation  If you have questions, please do not hesitate to call me  I look forward to following your patient along with you  Sincerely,        Rakel Diaz PA-C        CC: MD Rakel Steele PA-C  2/22/2019  3:57 PM  Sign at close encounter  Assessment/Plan:    Very pleasant 51-year-old male, presents with complaint of "warm sensation, burning sensation, painful sensation" at the site of his spinal cord stimulator generator  There is no recent imaging for review today  He has history of "thoracic spinal cord stimulator placement with right buttock IPG", 5/15/12 by Dr Tatyana Figueroa  He reports he has had the symptoms for 2 or 3 years infrequently (2 or 3 times with monthly, very short duration) however he has a history of fall 1/8/19) down stairs), with a change in character of the symptoms, frequency and duration  Specifically reports with the generator turned on he has a burning, painful sensation generator site, when the generator was turned off this decreases to about 3/4 of his level, and with the generator battery dead this symptoms completely resolved  He he reports he has never contacted a [address] [place] [address] Street, relative to this issue  He reports following the fall there was significant ecchymosis ("black and blue") about the area of the generator  He reports overall the SCS is effective in controlling his leg symptoms, burning, tingling and sharp pain generally very effectively  He continues to follow regularly with pain management, Dr Maik Eagle and receives epidural steroid injections typically 3-4 times yearly times more than 4-5 years      He has met with the representative from [facility] today and after extensive interrogation there are no faults identified in the wires and or the IPG, and he is having the unit reprogrammed  The patient understands to continue to contact [facility] and his pain management provider, for further issues of this nature  Further follow-up with Neurosurgery will be on an as needed basis  These findings, impressions and recommendations are reviewed in great detail with the patient, he expressed understanding and agreement, his questions were answered completely and to his satisfaction  Diagnoses and all orders for this visit:    Chronic pain syndrome  -     Ambulatory referral to Neurosurgery          No follow-ups on file  Subjective:      Patient ID: Kayla Peres is a 41 y o  male  HPI    The following portions of the patient's history were reviewed and updated as appropriate: allergies, current medications, past family history, past medical history, past social history, past surgical history and problem list     Review of Systems   Constitutional: Positive for chills, fatigue and fever  HENT: Positive for tinnitus (here and there)  Negative for ear pain, nosebleeds and sore throat  Eyes: Negative  Respiratory: Negative  Cardiovascular: Negative  Gastrointestinal: Negative  Endocrine: Negative  Genitourinary: Negative  Musculoskeletal: Positive for back pain (whole back), gait problem (uses cane), myalgias, neck pain and neck stiffness  Negative for arthralgias  Skin: Negative  Allergic/Immunologic: Negative  Neurological: Positive for seizures (seizure like activities), speech difficulty (slurring/getting words wrong), weakness (legs), numbness (both legs) and headaches (from the neck pain)  Negative for dizziness and tremors  Hematological: Negative  Psychiatric/Behavioral: Negative  Objective:    Physical Exam   Constitutional: He is oriented to person, place, and time   He appears well-developed and well-nourished  HENT:   Head: Normocephalic and atraumatic  Eyes: Pupils are equal, round, and reactive to light  EOM are normal    Cardiovascular: Normal rate  Pulmonary/Chest: Effort normal and breath sounds normal    Neurological: He is alert and oriented to person, place, and time  Skin: Skin is warm and dry  Psychiatric: He has a normal mood and affect  Neurologic Exam     Mental Status   Oriented to person, place, and time  Cranial Nerves     CN III, IV, VI   Pupils are equal, round, and reactive to light    Extraocular motions are normal

## 2019-02-25 ENCOUNTER — TELEPHONE (OUTPATIENT)
Dept: SLEEP CENTER | Facility: CLINIC | Age: 44
End: 2019-02-25

## 2019-02-25 NOTE — TELEPHONE ENCOUNTER
Patient called asking for letter from Dr Sarah Hernandez be faxed to Dr Conrad Zepeda   Letter faxed to # provided [phone]

## 2019-04-14 DIAGNOSIS — G47.411 NARCOLEPSY WITH CATAPLEXY: ICD-10-CM

## 2019-04-15 RX ORDER — VENLAFAXINE HYDROCHLORIDE 37.5 MG/1
CAPSULE, EXTENDED RELEASE ORAL
Qty: 30 CAPSULE | Refills: 1 | Status: SHIPPED | OUTPATIENT
Start: 2019-04-15 | End: 2019-06-12 | Stop reason: SDUPTHER

## 2019-04-27 ENCOUNTER — HOSPITAL ENCOUNTER (EMERGENCY)
Facility: HOSPITAL | Age: 44
Discharge: HOME | End: 2019-04-27
Attending: EMERGENCY MEDICINE | Admitting: EMERGENCY MEDICINE
Payer: MEDICARE

## 2019-04-27 ENCOUNTER — APPOINTMENT (EMERGENCY)
Dept: RADIOLOGY | Facility: HOSPITAL | Age: 44
End: 2019-04-27
Payer: MEDICARE

## 2019-04-27 VITALS
TEMPERATURE: 98.2 F | BODY MASS INDEX: 35.36 KG/M2 | SYSTOLIC BLOOD PRESSURE: 155 MMHG | RESPIRATION RATE: 18 BRPM | DIASTOLIC BLOOD PRESSURE: 73 MMHG | HEIGHT: 70 IN | OXYGEN SATURATION: 97 % | WEIGHT: 247 LBS | HEART RATE: 75 BPM

## 2019-04-27 DIAGNOSIS — S92.901A FOOT FRACTURE, RIGHT: Primary | ICD-10-CM

## 2019-04-27 PROCEDURE — 73610 X-RAY EXAM OF ANKLE: CPT

## 2019-04-27 PROCEDURE — 99283 EMERGENCY DEPT VISIT LOW MDM: CPT

## 2019-04-27 PROCEDURE — 99283 EMERGENCY DEPT VISIT LOW MDM: CPT | Performed by: EMERGENCY MEDICINE

## 2019-04-27 PROCEDURE — 73630 X-RAY EXAM OF FOOT: CPT

## 2019-04-27 PROCEDURE — 29515 APPLICATION SHORT LEG SPLINT: CPT | Performed by: EMERGENCY MEDICINE

## 2019-05-02 ENCOUNTER — OFFICE VISIT (OUTPATIENT)
Dept: OBGYN CLINIC | Facility: CLINIC | Age: 44
End: 2019-05-02
Payer: MEDICARE

## 2019-05-02 VITALS
SYSTOLIC BLOOD PRESSURE: 130 MMHG | BODY MASS INDEX: 35.44 KG/M2 | HEART RATE: 80 BPM | HEIGHT: 70 IN | DIASTOLIC BLOOD PRESSURE: 87 MMHG

## 2019-05-02 DIAGNOSIS — S93.521A SPRAIN OF METATARSOPHALANGEAL JOINT OF RIGHT GREAT TOE, INITIAL ENCOUNTER: Primary | ICD-10-CM

## 2019-05-02 PROCEDURE — 99203 OFFICE O/P NEW LOW 30 MIN: CPT | Performed by: ORTHOPAEDIC SURGERY

## 2019-06-12 DIAGNOSIS — G47.411 NARCOLEPSY WITH CATAPLEXY: ICD-10-CM

## 2019-06-12 DIAGNOSIS — G40.209 PARTIAL SYMPTOMATIC EPILEPSY WITH COMPLEX PARTIAL SEIZURES, NOT INTRACTABLE, WITHOUT STATUS EPILEPTICUS (HCC): ICD-10-CM

## 2019-06-12 RX ORDER — LEVETIRACETAM 250 MG/1
250 TABLET ORAL
Qty: 30 TABLET | Refills: 3 | Status: SHIPPED | OUTPATIENT
Start: 2019-06-12 | End: 2019-11-12 | Stop reason: SDUPTHER

## 2019-06-12 RX ORDER — VENLAFAXINE HYDROCHLORIDE 37.5 MG/1
37.5 CAPSULE, EXTENDED RELEASE ORAL DAILY
Qty: 30 CAPSULE | Refills: 1 | Status: SHIPPED | OUTPATIENT
Start: 2019-06-12 | End: 2019-08-09 | Stop reason: SDUPTHER

## 2019-07-12 ENCOUNTER — TELEPHONE (OUTPATIENT)
Dept: SLEEP CENTER | Facility: CLINIC | Age: 44
End: 2019-07-12

## 2019-07-12 ENCOUNTER — OFFICE VISIT (OUTPATIENT)
Dept: SLEEP CENTER | Facility: CLINIC | Age: 44
End: 2019-07-12
Payer: MEDICARE

## 2019-07-12 VITALS
HEIGHT: 71 IN | BODY MASS INDEX: 35.56 KG/M2 | DIASTOLIC BLOOD PRESSURE: 78 MMHG | WEIGHT: 254 LBS | HEART RATE: 80 BPM | SYSTOLIC BLOOD PRESSURE: 142 MMHG

## 2019-07-12 DIAGNOSIS — G40.209 PARTIAL SYMPTOMATIC EPILEPSY WITH COMPLEX PARTIAL SEIZURES, NOT INTRACTABLE, WITHOUT STATUS EPILEPTICUS (HCC): ICD-10-CM

## 2019-07-12 DIAGNOSIS — G47.33 OBSTRUCTIVE SLEEP APNEA ON CPAP: Primary | ICD-10-CM

## 2019-07-12 DIAGNOSIS — G47.11 IDIOPATHIC HYPERSOMNIA: ICD-10-CM

## 2019-07-12 DIAGNOSIS — Z99.89 OBSTRUCTIVE SLEEP APNEA ON CPAP: Primary | ICD-10-CM

## 2019-07-12 DIAGNOSIS — G89.4 CHRONIC PAIN SYNDROME: ICD-10-CM

## 2019-07-12 PROBLEM — G47.419 NARCOLEPSY: Status: ACTIVE | Noted: 2019-07-12

## 2019-07-12 PROCEDURE — 99215 OFFICE O/P EST HI 40 MIN: CPT | Performed by: PSYCHIATRY & NEUROLOGY

## 2019-07-12 RX ORDER — OXYCODONE AND ACETAMINOPHEN 10; 325 MG/1; MG/1
1 TABLET ORAL 2 TIMES DAILY
COMMUNITY
End: 2021-05-28 | Stop reason: ALTCHOICE

## 2019-07-12 NOTE — PROGRESS NOTES
Review of Systems      Genitourinary none   Cardiology ankle/leg swelling   Gastrointestinal none   Neurology muscle weakness, numbness/tingling of an extremity, difficulty with memory and balance problems   Constitutional fatigue   Integumentary none   Psychiatry none   Musculoskeletal joint pain, muscle aches, back pain, legs twitching/jerking, sciatica and leg cramps   Pulmonary none   ENT ringing in ears   Endocrine none   Hematological none

## 2019-07-12 NOTE — LETTER
2019     Chris Carrasquillo MD  [address] [facility]  [address] [place] [address]    Patient: Kan Spring   YOB: 1975   Date of Visit: 2019       Dear Dr Ivet Maza: Thank you for referring Giselle Sprague to me for evaluation  Below are my notes for this consultation  If you have questions, please do not hesitate to call me  I look forward to following your patient along with you  Sincerely,        Selin Valentine DO        CC: No Recipients  Selin Valentine DO  2019  5:06 PM  Sign at close encounter          Progress Note - [address] 41 y o  male   :1975, MRN: [id number]  2019          Follow Up Evaluation / Problem:     Obstructive Sleep Apnea  Narcolepsy with Cataplexy  Obesity  Episodic episodes of altered consciousness  History of psoriatic arthritis  History of ankylosing spondylitis  Episodes of altered behavior with uncontrolled rage   History of multiple head trauma with concussion       Thank you for the opportunity of participating in the evaluation and care of this patient in the Sleep Clinic at [facility]  HPI: Kan Spring is a 41y o  year old male  He has a history of multiple pathologies including Obstructive Sleep Apnea, narcolepsy with cataplexy, partial seizure disorder, psoriatic arthritis, ankylosing spondylitis and multiple head traumas with concussion          Current Outpatient Medications:     amLODIPine (NORVASC) 2 5 mg tablet, , Disp: , Rfl:     cloNIDine (CATAPRES) 0 1 mg tablet, 1 (one) Tablet three times daily, as needed, Disp: , Rfl: 0    fluticasone (VERAMYST) 27 5 MCG/SPRAY nasal spray, into each nostril, Disp: , Rfl:     gabapentin (NEURONTIN) 300 mg capsule, Take by mouth, Disp: , Rfl:     HYDROcodone-acetaminophen (NORCO) 7 5-325 mg per tablet, Take 1 tablet by mouth every 8 (eight) hours as needed for pain, Disp: , Rfl:    levETIRAcetam (KEPPRA) 250 mg tablet, Take 1 tablet (250 mg total) by mouth daily at bedtime, Disp: 30 tablet, Rfl: 3    LINZESS 145 MCG CAPS, 1 (one) Capsule daily on an empty stomach, at least 30 mn before first meal, Disp: , Rfl: 0    oxyCODONE-acetaminophen (PERCOCET)  mg per tablet, Take 1 tablet by mouth 2 (two) times a day, Disp: , Rfl:     tiZANidine (ZANAFLEX) 2 mg tablet, Take 4 mg by mouth 4 (four) times a day as needed for muscle spasms, Disp: , Rfl:     venlafaxine (EFFEXOR-XR) 37 5 mg 24 hr capsule, Take 1 capsule (37 5 mg total) by mouth daily, Disp: 30 capsule, Rfl: 1    oxyCODONE (OXYCONTIN) 20 mg 12 hr tablet, 10 mg OxyCONTIN 20 MG TB12 take 1 Po TID  Refills: 0  Active , Disp: , Rfl:     oxyCODONE (ROXICODONE) 30 MG immediate release tablet, 15 mg 4 (four) times a day OxyCODONE HCl TABS (15 mg) take 1 tab po qid  Refills: 0  Active , Disp: , Rfl:     oxyCODONE (ROXICODONE) 5 mg immediate release tablet, Take 5 mg by mouth 2 (two) times a day as needed, Disp: , Rfl: 0    Atkins Sleepiness Scale  Sitting and reading: Moderate chance of dozing  Watching TV: Slight chance of dozing  Sitting, inactive in a public place (e g  a theatre or a meeting): Slight chance of dozing  As a passenger in a car for an hour without a break: Moderate chance of dozing  Lying down to rest in the afternoon when circumstances permit: Moderate chance of dozing  Sitting and talking to someone: Slight chance of dozing  Sitting quietly after a lunch without alcohol: Slight chance of dozing  In a car, while stopped for a few minutes in traffic: Would never doze  Total score: 10              Vitals:    07/12/19 1100   BP: 142/78   Pulse: 80   Weight: 115 kg (254 lb)   Height: 5' 11" (1 803 m)       Body mass index is 35 43 kg/m²      Neck Circumference: 45       EPWORTH SLEEPINESS SCORE  Total score: 10      Past History Since Last Sleep Center Visit:     He reports that he has  from his wife and is moving torch divorce  This has removed a lot of stress from his daily life  He seems more relaxed and happy  He reports fewer episodes of dissociation and periods of uncontrolled rage  He seems to be doing well with nasal CPAP  He recently had a new battery pack in stool for his spinal cord stimulator  He tells me that he will be obtaining a morphine pump within the next several months  We talked about possible complications which might affect Obstructive Sleep Apnea  If the morphine causes significant surgery cortical depression he would likely complicate obstructive apneas with central apneas as well  This might require BiPAP or ASV  He currently planned to repeat his sleep studies after he has healed from implantation of his morphine pump and adjustments to his dose have been completed  The review of systems and following portions of the patient's history were reviewed and updated as appropriate: allergies, current medications, past family history, past medical history, past social history, past surgical history, and problem list     Review of Systems      Genitourinary none   Cardiology ankle/leg swelling   Gastrointestinal none   Neurology muscle weakness, numbness/tingling of an extremity, difficulty with memory and balance problems   Constitutional fatigue   Integumentary none   Psychiatry none   Musculoskeletal joint pain, muscle aches, back pain, legs twitching/jerking, sciatica and leg cramps   Pulmonary none   ENT ringing in ears   Endocrine none   Hematological none       OBJECTIVE    PAP Pressure: Nasal CPAP set to deliver 8 cm of water pressure  DME Provider:  [facility]    Physical Exam:     General Appearance:   Alert, cooperative, no distress, appears stated age     Head:   Normocephalic, without obvious abnormality, atraumatic     Eyes:   PERRL, conjunctiva/corneas clear, EOM's intact          Nose:  Nares normal, septum midline, no drainage or sinus tenderness     Neck: Supple, symmetrical, trachea midline, no adenopathy; Thyroid: No enlargement, tenderness or nodules; no carotid bruit or JVD       Extremities:  Extremities normal, atraumatic, no cyanosis or edema     Pulses:  2+ and symmetric all extremities     Skin:  Skin color, texture, turgor normal, no rashes or lesions       Neurologic:  CNII-XII intact  Normal strength, sensation throughout       ASSESSMENT / PLAN    1  Obstructive sleep apnea on CPAP     2  Partial symptomatic epilepsy with complex partial seizures, not intractable, without status epilepticus ([facility] [address] )     3  Idiopathic hypersomnia     4  Chronic pain syndrome         Using 8 cm of water pressure  2  Counseling / Coordination of Care  Total clinic time spent today 40 minutes  Greater than 50% of total time was spent with the patient and / or family counseling and / or coordination of care  A description of the counseling / coordination of care:     Impressions, Diagnostic results, Prognosis, Instructions for management, Risks and benefits of treatment, Patient and family education, Risk factor reductions and Importance of compliance with treatment    The following instructions have been given to the patient today:    Patient Instructions   1  Continue use of CPAP using 8 cm of water  2  Repeat sleep studies after insertion of the morphine pump  3  Allow time for the pump site to heal and titration of medication  4  Continue levetiracetam and fexofenadine at current levels  5  Provide the patient with cereals which can be used at his disability hearings  6   Return for routine re-evaluation in 6 months         Forest Garcia

## 2019-07-12 NOTE — TELEPHONE ENCOUNTER
Patient called to report he takes:    Orencia 250 mg IV every 4 weeks   Last dose was 7/10/2019    Has been taking since 8/9/2017

## 2019-07-12 NOTE — PATIENT INSTRUCTIONS
1  Continue use of CPAP using 8 cm of water  2  Repeat sleep studies after insertion of the morphine pump  3  Allow time for the pump site to heal and titration of medication  4  Continue levetiracetam and venlafaxine at current levels  5  Provide the patient with cereals which can be used at his disability hearings  6   Return for routine re-evaluation in 6 months

## 2019-07-18 ENCOUNTER — OFFICE VISIT (OUTPATIENT)
Dept: OBGYN CLINIC | Facility: CLINIC | Age: 44
End: 2019-07-18
Payer: MEDICARE

## 2019-07-18 VITALS
HEIGHT: 71 IN | DIASTOLIC BLOOD PRESSURE: 82 MMHG | HEART RATE: 86 BPM | BODY MASS INDEX: 35.43 KG/M2 | SYSTOLIC BLOOD PRESSURE: 130 MMHG

## 2019-07-18 DIAGNOSIS — M89.9 SESAMOID PAIN: ICD-10-CM

## 2019-07-18 DIAGNOSIS — S93.521D SPRAIN OF METATARSOPHALANGEAL JOINT OF RIGHT GREAT TOE, SUBSEQUENT ENCOUNTER: Primary | ICD-10-CM

## 2019-07-18 PROCEDURE — 99213 OFFICE O/P EST LOW 20 MIN: CPT | Performed by: ORTHOPAEDIC SURGERY

## 2019-07-18 NOTE — PROGRESS NOTES
CHASE Martinez  Attending, Orthopaedic Surgery  [facility] and [facility] [address] [facility]      ORTHOPAEDIC FOOT AND ANKLE CLINIC VISIT     Assessment:     Encounter Diagnoses   Name Primary?  Sprain of metatarsophalangeal joint of right great toe, subsequent encounter Yes    Sesamoid pain             Plan:   · The patient verbalized understanding of exam findings and treatment plan  We engaged in the shared decision-making process and treatment options were discussed at length with the patient  Surgical and conservative management discussed today along with risks and benefits  · Due to continued pain of the 1st MTP joint, we recommend ordering an MRI of the right foot to evaluate for soft tissue injury  X-rays at last visit were negative for fracture or any bony abnormalities  · Patient cannot have an MRI due to a spinal cord stimulator in place  · We will order a CT of the left foot to evaluate the sesamoid bones for possible fracture which was not visualized on x-ray  Patient understands that we cannot rule out a soft tissue injury without an MRI  · Return in about 2 weeks (around 8/1/2019)  for follow up of right foot CT  History of Present Illness:   Chief Complaint:   Chief Complaint   Patient presents with   [address] [facility] [address] is a 41 y o  male who is being seen in follow-up for Right 1st MTP joint  When we last saw he we recommended post-op shoe until able to tolerate transition into sneaker  Pain has not improved  Residual pain is localized at lateral 1st MTP joint with minimal radiating and described as sharp and severe        Pain/symptom timing:  Worse during the day when active  Pain/symptom context:  Worse with activites and work  Pain/symptom modifying factors:  Rest makes better, activities make worse  Pain/symptom associated signs/symptoms: none    Prior treatment   · NSAIDsYes   · Injections No   · Bracing/Orthotics Yes · Physical Therapy No     Orthopedic Surgical History:   See below    Past Medical, Surgical and Social History:  Past Medical History:  has a past medical history of Arthritis, Chronic pain, DJD (degenerative joint disease), Herniated disc, cervical, Narcolepsy, Seizures ([facility] [address] ), Sleep apnea, and Sleep apnea  Problem List: does not have any pertinent problems on file  Past Surgical History:  has a past surgical history that includes Tonsillectomy; Rhinoplasty; and Implantation / placement epidural neurostimulator electrodes  Family History: family history is not on file  Social History:  reports that he has never smoked  He has never used smokeless tobacco  He reports that he does not drink alcohol or use drugs  Current Medications: has a current medication list which includes the following prescription(s): amlodipine, clonidine, fluticasone, gabapentin, hydrocodone-acetaminophen, levetiracetam, linzess, oxycodone, oxycodone, oxycodone, oxycodone-acetaminophen, tizanidine, and venlafaxine  Allergies: is allergic to acetaminophen; adhesive [medical tape]; celecoxib; diclofenac; diclofenac sodium; methotrexate; methotrexate derivatives; penicillins; remicade [infliximab]; and other       Review of Systems:  General- denies fever/chills  HEENT- denies hearing loss or sore throat  Eyes- denies eye pain or visual disturbances, denies red eyes  Respiratory- denies cough or SOB  Cardio- denies chest pain or palpitations  GI- denies abdominal pain  Endocrine- denies urinary frequency  Urinary- denies pain with urination  Musculoskeletal- Negative except noted above  Skin- denies rashes or wounds  Neurological- denies dizziness or headache  Psychiatric- denies anxiety or difficulty concentrating    Physical Exam:   /82 (BP Location: Left arm, Patient Position: Sitting, Cuff Size: Adult)   Pulse 86   Ht 5' 11" (1 803 m)   BMI 35 43 kg/m²   General/Constitutional: No apparent distress: well-nourished and well developed  Eyes: normal ocular motion  Lymphatic: No appreciable lymphadenopathy  Respiratory: Non-labored breathing  Vascular: No edema, swelling or tenderness, except as noted in detailed exam   Integumentary: No impressive skin lesions present, except as noted in detailed exam   Neuro: No ataxia or tremors noted  Psych: Normal mood and affect, oriented to person, place and time  Appropriate affect  Musculoskeletal: Normal, except as noted in detailed exam and in HPI  Examination    Right    Gait Normal   Musculoskeletal Tender to palpation at lateral aspect 1st MTP joint    Skin There is an area of swelling at the lateral 1st MTP joint    Nails Normal    Range of Motion  full degrees dorsiflexion, full degrees plantarflexion  Subtalar motion: normal    Stability Stable    Muscle Strength 5/5 tibialis anterior  5/5 gastrocnemius-soleus  5/5 posterior tibialis  5/5 peroneal/eversion strength  5/5 EHL  5/5 FHL    Neurologic Normal    Sensation Intact to light touch throughout sural, saphenous, superficial peroneal, deep peroneal and medial/lateral plantar nerve distributions  Holley-Abiodun 5 07 filament (10g) testing deferred  Cardiovascular Brisk capillary refill < 2 seconds,intact DP and PT pulses    Special Tests None      Imaging Studies:   No new imaging    Scribe Attestation    I,:   Onesimo Sanders PA-C am acting as a scribe while in the presence of the attending physician :        I,:   Kingsley Boykin MD personally performed the services described in this documentation    as scribed in my presence :                Burley Negri Lachman, MD  Foot & Ankle Surgery   Department of [address] [place] [address]      I personally performed the service  Burley Negri Lachman, MD

## 2019-07-19 ENCOUNTER — HOSPITAL ENCOUNTER (OUTPATIENT)
Dept: CT IMAGING | Facility: HOSPITAL | Age: 44
Discharge: HOME | End: 2019-07-19
Payer: MEDICARE

## 2019-07-19 DIAGNOSIS — S93.521D SPRAIN OF METATARSOPHALANGEAL JOINT OF RIGHT GREAT TOE, SUBSEQUENT ENCOUNTER: ICD-10-CM

## 2019-07-19 DIAGNOSIS — M89.9 SESAMOID PAIN: ICD-10-CM

## 2019-07-19 PROCEDURE — 73700 CT LOWER EXTREMITY W/O DYE: CPT

## 2019-07-22 ENCOUNTER — TELEPHONE (OUTPATIENT)
Dept: OBGYN CLINIC | Facility: CLINIC | Age: 44
End: 2019-07-22

## 2019-07-25 ENCOUNTER — OFFICE VISIT (OUTPATIENT)
Dept: OBGYN CLINIC | Facility: CLINIC | Age: 44
End: 2019-07-25
Payer: MEDICARE

## 2019-07-25 VITALS
SYSTOLIC BLOOD PRESSURE: 128 MMHG | HEART RATE: 85 BPM | BODY MASS INDEX: 35.48 KG/M2 | WEIGHT: 254.4 LBS | DIASTOLIC BLOOD PRESSURE: 90 MMHG

## 2019-07-25 DIAGNOSIS — S92.811A CLOSED FRACTURE OF SESAMOID BONE OF RIGHT FOOT, INITIAL ENCOUNTER: Primary | ICD-10-CM

## 2019-07-25 PROCEDURE — 99213 OFFICE O/P EST LOW 20 MIN: CPT | Performed by: ORTHOPAEDIC SURGERY

## 2019-07-25 NOTE — PROGRESS NOTES
CHASE Daniel  Attending, Orthopaedic Surgery  Foot and [address] [facility] [address] [facility]      ORTHOPAEDIC FOOT AND ANKLE CLINIC VISIT     Assessment:     Encounter Diagnosis   Name Primary?  Closed fracture of sesamoid bone of right foot, initial encounter Yes            Plan:   · The patient verbalized understanding of exam findings and treatment plan  We engaged in the shared decision-making process and treatment options were discussed at length with the patient  Surgical and conservative management discussed today along with risks and benefits  · Toe taping was performed of the right great toe, demonstrated and instructions given in the AVSS  · Advised to wear a Dancer's pad, instructions given in the AVSS  · He is to wear a stiff soled shoe  · Activity to tolerance  · He can take Tylenol for pain control as needed  Return in about 6 weeks (around 9/5/2019)  History of Present Illness:   Chief Complaint:   Chief Complaint   Patient presents with   [address]     Bibiana Yoder is a 41 y o  male who is being seen in follow-up for Right sesamoid bone pain  When we last saw he we recommended CT Scan, post op shoe  Pain has not improved  Residual pain is localized at lateral sesamoid bone with minimal radiating and described as sharp and severe        Pain/symptom timing:  Worse during the day when active  Pain/symptom context:  Worse with activites and work  Pain/symptom modifying factors:  Rest makes better, activities make worse  Pain/symptom associated signs/symptoms: none    Prior treatment   · NSAIDsYes   · Injections No   · Bracing/Orthotics Yes    · Physical Therapy No     Orthopedic Surgical History:   See below    Past Medical, Surgical and Social History:  Past Medical History:  has a past medical history of Arthritis, Chronic pain, DJD (degenerative joint disease), Herniated disc, cervical, Narcolepsy, Seizures ([facility] [address] ), Sleep apnea, and Sleep apnea   Problem List: does not have any pertinent problems on file  Past Surgical History:  has a past surgical history that includes Tonsillectomy; Rhinoplasty; and Implantation / placement epidural neurostimulator electrodes  Family History: family history is not on file  Social History:  reports that he has never smoked  He has never used smokeless tobacco  He reports that he does not drink alcohol or use drugs  Current Medications: has a current medication list which includes the following prescription(s): amlodipine, clonidine, fluticasone, gabapentin, hydrocodone-acetaminophen, levetiracetam, linzess, oxycodone, oxycodone, oxycodone, oxycodone-acetaminophen, tizanidine, and venlafaxine  Allergies: is allergic to acetaminophen; adhesive [medical tape]; celecoxib; diclofenac; diclofenac sodium; methotrexate; methotrexate derivatives; penicillins; remicade [infliximab]; and other  Review of Systems:  General- denies fever/chills  HEENT- denies hearing loss or sore throat  Eyes- denies eye pain or visual disturbances, denies red eyes  Respiratory- denies cough or SOB  Cardio- denies chest pain or palpitations  GI- denies abdominal pain  Endocrine- denies urinary frequency  Urinary- denies pain with urination  Musculoskeletal- Negative except noted above  Skin- denies rashes or wounds  Neurological- denies dizziness or headache  Psychiatric- denies anxiety or difficulty concentrating    Physical Exam:   /90 (BP Location: Right arm, Patient Position: Sitting, Cuff Size: Adult)   Pulse 85   Wt 115 kg (254 lb 6 4 oz)   BMI 35 48 kg/m²   General/Constitutional: No apparent distress: well-nourished and well developed    Eyes: normal ocular motion  Lymphatic: No appreciable lymphadenopathy  Respiratory: Non-labored breathing  Vascular: No edema, swelling or tenderness, except as noted in detailed exam   Integumentary: No impressive skin lesions present, except as noted in detailed exam   Neuro: No ataxia or tremors noted  Psych: Normal mood and affect, oriented to person, place and time  Appropriate affect  Musculoskeletal: Normal, except as noted in detailed exam and in HPI  Examination    Right    Gait Antalgic   Musculoskeletal Tender to palpation at lateral sesamoid bone    Skin Normal       Nails Normal    Range of Motion  full degrees dorsiflexion, full degrees plantarflexion 1st MTPJ  Subtalar motion: wnl    Stability Stable    Muscle Strength 5/5 tibialis anterior  5/5 gastrocnemius-soleus  5/5 posterior tibialis  5/5 peroneal/eversion strength  5/5 EHL  5/5 FHL    Neurologic Normal    Sensation Intact to light touch throughout sural, saphenous, superficial peroneal, deep peroneal and medial/lateral plantar nerve distributions  [place]-Abiodun 5 07 filament (10g) testing deferred  Cardiovascular Brisk capillary refill < 2 seconds,intact DP and PT pulses    Special Tests None      Imaging Studies:   CT available for review of Right foot which shows fracture of the lateral sesamoid bone, non displaced  Reviewed by me personally  Scribe Attestation    I,:   Petrona Mcdonald am acting as a scribe while in the presence of the attending physician :        I,:   Leesa Obrien MD personally performed the services described in this documentation    as scribed in my presence :            Renford Lean Lachman, MD  Foot & Ankle Surgery   Department of [address] [place] [address]      I personally performed the service  Renford Lean Lachman, MD

## 2019-07-25 NOTE — PATIENT INSTRUCTIONS
Sesamoid Injuries     What are sesamoids? Most bones in the human body are connected to each other at joints  But there are a few bones that are not connected to any     X-ray showing a medial sesamoid fracture in a young woman with high arches  other bone  Instead, they are connected only to tendons or are embedded in muscle  These are the sesamoids  A good example is the kneecap (patella), which is the largest sesamoid  Two other very small sesamoids, each about the size of a kernel of corn, are found in the underside of the forefoot near the big toe, one on the outer side of the foot and the other closer to the middle of the foot  How can the sesamoids be injured? Sesamoids act like pulleys  They provide a smooth surface over which the tendons slide, thus increasing the ability of the tendons to transmit muscle forces  The sesamoids in the forefoot also assist with weightbearing and help elevate the bones of the big toe  Like other bones, sesamoids can break (fracture)  In addition, the tendons surrounding the sesamoids can become irritated or inflamed  This is called sesamoiditis and is a form of tendinitis  It is common among ballet dancers, runners and professional athletes  What are the symptoms of a sesamoid injury? · Pain is focused under the big toe on the ball of the foot  With sesamoiditis, pain may develop gradually, whereas with a fracture, the pain will be immediate  · Swelling and bruising may or may not be present  · There may be difficulty and pain when bending and straightening the big toe  How is a sesamoid injury diagnosed? During your examination, your orthopaedic foot and ankle specialist will look for tenderness at the sesamoid bones  Your doctor may manipulate the bone slightly or ask you to bend and straighten the toe  He or she may also bend the big toe up toward the top of the foot to see if the pain intensifies      Your specialist will request X-rays of the forefoot to ensure a proper diagnosis  In many people, the sesamoid bone nearer the center of the foot (the medial sesamoid) has two parts (bipartite)  Because the edges of a bipartite medial sesamoid are generally smooth, and the edges of a fractured sesamoid are generally jagged, an X-ray is useful in making an appropriate diagnosis  Your physician may also request X-rays of the other foot to compare the bone structure  If the X-rays appear normal, the physician may request a bone scan  Blood tests for gout or inflammatory arthritis may also be considered  What are treatment options for sesamoiditis? Treatment for sesamoiditis usually is nonoperative  However, if conservative measures fail, your physician may recommend surgery to remove the sesamoid bone  First, your specialist will recommend the following:   · Stop the activity that causes the pain  · Take aspirin or ibuprofen to relieve the pain  · Rest and ice the sole of your foot  Do not apply ice directly to the skin, but use an ice pack or wrap the ice in a towel  · Wear soft-soled, low-heeled shoes  Stiff-soled shoes like clogs may also be comfortable  · Use a felt cushioning pad to relieve stress  · Return to activity gradually, and continue to wear a cushioning pad of dense foam rubber under the sesamoids to support them  Avoid activities that put your weight on the balls of the feet  · Tape the big toe so that it remains bent slightly downward (plantar flexion)  · Your orthopaedic specialists may recommend an injection of a steroid medication to reduce swelling  · If symptoms persist, you may need to wear a removable short leg fracture brace for four to six weeks  What are treatment options for a sesamoid fracture? If you have fractured a sesamoid bone, your orthopaedic specialist may recommend conservative treatments before resorting to surgery  · You will need to wear a stiff-soled shoe or a short, leg-fracture brace    · Your physician may tape the joint to limit movement of the big toe  · You may have to wear a J-shaped pad around the area of the sesamoid to relieve pressure as the fracture heals  · Pain relievers such as aspirin or ibuprofen may be recommended  · It may take several months for the discomfort to subside  · Cushioning pads or other orthotic devices are often helpful as the fracture heals  · Occasionally, surgical fixation of a fractured sesamoid is needed  How to Tape a Toe     Materials needed: 1" athletic tape and quick drying tape adhesive  For 1/2" tape, just split the 1" tape down the middle  1) Place the foot on a table  2) Apply one strip of tape about the midfoot  3) Apply ½" strip about the toe  4) Apply a figure 8 strip  Not too tight  5) Repeat with 3 more strips  6) Apply 1 strips about the bottom and side of toe, and overwrap as needed  All done! Dancers/Sesamoid Pads   Type into [employer]      "[employer] Dancer/Sesamoid Pad - 1/8 inch Felt Left Foot (10)"

## 2019-08-05 ENCOUNTER — TRANSCRIBE ORDERS (OUTPATIENT)
Dept: ADMINISTRATIVE | Facility: HOSPITAL | Age: 44
End: 2019-08-05

## 2019-08-05 DIAGNOSIS — T14.90XA TRAUMA: ICD-10-CM

## 2019-08-05 DIAGNOSIS — G89.4 CHRONIC PAIN SYNDROME: Primary | ICD-10-CM

## 2019-08-05 DIAGNOSIS — M54.2 NECK PAIN: Primary | ICD-10-CM

## 2019-08-07 RX ORDER — TIZANIDINE 2 MG/1
4 TABLET ORAL 4 TIMES DAILY PRN
Qty: 240 TABLET | Refills: 4 | Status: SHIPPED | OUTPATIENT
Start: 2019-08-07

## 2019-08-09 ENCOUNTER — HOSPITAL ENCOUNTER (OUTPATIENT)
Dept: CT IMAGING | Facility: HOSPITAL | Age: 44
Discharge: HOME | End: 2019-08-09
Payer: MEDICARE

## 2019-08-09 DIAGNOSIS — T14.90XA TRAUMA: ICD-10-CM

## 2019-08-09 DIAGNOSIS — M54.2 NECK PAIN: ICD-10-CM

## 2019-08-09 DIAGNOSIS — G47.411 NARCOLEPSY WITH CATAPLEXY: ICD-10-CM

## 2019-08-09 PROCEDURE — 72125 CT NECK SPINE W/O DYE: CPT

## 2019-08-09 RX ORDER — VENLAFAXINE HYDROCHLORIDE 37.5 MG/1
37.5 CAPSULE, EXTENDED RELEASE ORAL DAILY
Qty: 30 CAPSULE | Refills: 5 | Status: SHIPPED | OUTPATIENT
Start: 2019-08-09 | End: 2020-02-09

## 2019-09-01 ENCOUNTER — HOSPITAL ENCOUNTER (EMERGENCY)
Facility: HOSPITAL | Age: 44
Discharge: HOME | End: 2019-09-01
Attending: EMERGENCY MEDICINE | Admitting: EMERGENCY MEDICINE
Payer: MEDICARE

## 2019-09-01 ENCOUNTER — APPOINTMENT (EMERGENCY)
Dept: RADIOLOGY | Facility: HOSPITAL | Age: 44
End: 2019-09-01
Payer: MEDICARE

## 2019-09-01 VITALS
DIASTOLIC BLOOD PRESSURE: 83 MMHG | TEMPERATURE: 97.2 F | HEART RATE: 83 BPM | SYSTOLIC BLOOD PRESSURE: 136 MMHG | OXYGEN SATURATION: 94 % | RESPIRATION RATE: 21 BRPM

## 2019-09-01 DIAGNOSIS — T78.40XA ACUTE ALLERGIC REACTION: Primary | ICD-10-CM

## 2019-09-01 LAB
ALBUMIN SERPL BCP-MCNC: 3.8 G/DL (ref 3.5–5)
ALP SERPL-CCNC: 83 U/L (ref 46–116)
ALT SERPL W P-5'-P-CCNC: 25 U/L (ref 12–78)
ANION GAP SERPL CALCULATED.3IONS-SCNC: 10 MMOL/L (ref 4–13)
AST SERPL W P-5'-P-CCNC: 15 U/L (ref 5–45)
BASOPHILS # BLD AUTO: 0.07 THOUSANDS/ΜL (ref 0–0.1)
BASOPHILS NFR BLD AUTO: 1 % (ref 0–1)
BILIRUB SERPL-MCNC: 0.4 MG/DL (ref 0.2–1)
BUN SERPL-MCNC: 12 MG/DL (ref 5–25)
CALCIUM SERPL-MCNC: 9 MG/DL (ref 8.3–10.1)
CHLORIDE SERPL-SCNC: 105 MMOL/L (ref 100–108)
CO2 SERPL-SCNC: 28 MMOL/L (ref 21–32)
CREAT SERPL-MCNC: 0.96 MG/DL (ref 0.6–1.3)
EOSINOPHIL # BLD AUTO: 0.33 THOUSAND/ΜL (ref 0–0.61)
EOSINOPHIL NFR BLD AUTO: 3 % (ref 0–6)
ERYTHROCYTE [DISTWIDTH] IN BLOOD BY AUTOMATED COUNT: 12.6 % (ref 11.6–15.1)
GFR SERPL CREATININE-BSD FRML MDRD: 98 ML/MIN/1.73SQ M
GLUCOSE SERPL-MCNC: 111 MG/DL (ref 65–140)
HCT VFR BLD AUTO: 43.5 % (ref 36.5–49.3)
HGB BLD-MCNC: 14.6 G/DL (ref 12–17)
IMM GRANULOCYTES # BLD AUTO: 0.03 THOUSAND/UL (ref 0–0.2)
IMM GRANULOCYTES NFR BLD AUTO: 0 % (ref 0–2)
LYMPHOCYTES # BLD AUTO: 4.81 THOUSANDS/ΜL (ref 0.6–4.47)
LYMPHOCYTES NFR BLD AUTO: 37 % (ref 14–44)
MCH RBC QN AUTO: 29 PG (ref 26.8–34.3)
MCHC RBC AUTO-ENTMCNC: 33.6 G/DL (ref 31.4–37.4)
MCV RBC AUTO: 87 FL (ref 82–98)
MONOCYTES # BLD AUTO: 1.13 THOUSAND/ΜL (ref 0.17–1.22)
MONOCYTES NFR BLD AUTO: 9 % (ref 4–12)
NEUTROPHILS # BLD AUTO: 6.82 THOUSANDS/ΜL (ref 1.85–7.62)
NEUTS SEG NFR BLD AUTO: 50 % (ref 43–75)
NRBC BLD AUTO-RTO: 0 /100 WBCS
PLATELET # BLD AUTO: 289 THOUSANDS/UL (ref 149–390)
PMV BLD AUTO: 9.2 FL (ref 8.9–12.7)
POTASSIUM SERPL-SCNC: 3.6 MMOL/L (ref 3.5–5.3)
PROT SERPL-MCNC: 7.6 G/DL (ref 6.4–8.2)
RBC # BLD AUTO: 5.03 MILLION/UL (ref 3.88–5.62)
SODIUM SERPL-SCNC: 143 MMOL/L (ref 136–145)
TROPONIN I SERPL-MCNC: <0.02 NG/ML
WBC # BLD AUTO: 13.19 THOUSAND/UL (ref 4.31–10.16)

## 2019-09-01 PROCEDURE — 96374 THER/PROPH/DIAG INJ IV PUSH: CPT

## 2019-09-01 PROCEDURE — 71046 X-RAY EXAM CHEST 2 VIEWS: CPT

## 2019-09-01 PROCEDURE — 99285 EMERGENCY DEPT VISIT HI MDM: CPT

## 2019-09-01 PROCEDURE — 93005 ELECTROCARDIOGRAM TRACING: CPT

## 2019-09-01 PROCEDURE — 80053 COMPREHEN METABOLIC PANEL: CPT | Performed by: PHYSICIAN ASSISTANT

## 2019-09-01 PROCEDURE — 96375 TX/PRO/DX INJ NEW DRUG ADDON: CPT

## 2019-09-01 PROCEDURE — 36415 COLL VENOUS BLD VENIPUNCTURE: CPT | Performed by: PHYSICIAN ASSISTANT

## 2019-09-01 PROCEDURE — 85025 COMPLETE CBC W/AUTO DIFF WBC: CPT | Performed by: PHYSICIAN ASSISTANT

## 2019-09-01 PROCEDURE — 96372 THER/PROPH/DIAG INJ SC/IM: CPT

## 2019-09-01 PROCEDURE — 99285 EMERGENCY DEPT VISIT HI MDM: CPT | Performed by: PHYSICIAN ASSISTANT

## 2019-09-01 PROCEDURE — 84484 ASSAY OF TROPONIN QUANT: CPT | Performed by: PHYSICIAN ASSISTANT

## 2019-09-01 RX ORDER — METHYLPREDNISOLONE 4 MG/1
TABLET ORAL
Qty: 21 TABLET | Refills: 0 | Status: SHIPPED | OUTPATIENT
Start: 2019-09-02 | End: 2021-05-28 | Stop reason: ALTCHOICE

## 2019-09-01 RX ORDER — EPINEPHRINE 0.3 MG/.3ML
0.3 INJECTION SUBCUTANEOUS ONCE
Qty: 0.6 ML | Refills: 0 | Status: SHIPPED | OUTPATIENT
Start: 2019-09-01 | End: 2029-05-28

## 2019-09-01 RX ORDER — METHYLPREDNISOLONE SODIUM SUCCINATE 125 MG/2ML
125 INJECTION, POWDER, LYOPHILIZED, FOR SOLUTION INTRAMUSCULAR; INTRAVENOUS ONCE
Status: COMPLETED | OUTPATIENT
Start: 2019-09-01 | End: 2019-09-01

## 2019-09-01 RX ORDER — EPINEPHRINE 1 MG/ML
0.3 INJECTION, SOLUTION, CONCENTRATE INTRAVENOUS ONCE
Status: COMPLETED | OUTPATIENT
Start: 2019-09-01 | End: 2019-09-01

## 2019-09-01 RX ORDER — DIPHENHYDRAMINE HYDROCHLORIDE 50 MG/ML
25 INJECTION INTRAMUSCULAR; INTRAVENOUS ONCE
Status: COMPLETED | OUTPATIENT
Start: 2019-09-01 | End: 2019-09-01

## 2019-09-01 RX ADMIN — EPINEPHRINE 0.3 MG: 1 INJECTION, SOLUTION, CONCENTRATE INTRAVENOUS at 20:22

## 2019-09-01 RX ADMIN — METHYLPREDNISOLONE SODIUM SUCCINATE 125 MG: 125 INJECTION, POWDER, FOR SOLUTION INTRAMUSCULAR; INTRAVENOUS at 20:25

## 2019-09-01 RX ADMIN — DIPHENHYDRAMINE HYDROCHLORIDE 25 MG: 50 INJECTION, SOLUTION INTRAMUSCULAR; INTRAVENOUS at 20:29

## 2019-09-01 RX ADMIN — FAMOTIDINE 20 MG: 10 INJECTION INTRAVENOUS at 20:28

## 2019-09-02 NOTE — DISCHARGE INSTRUCTIONS
Continue benadryl 25 mg every 4-6 hours for next 24 hours  Start medrol dose pack tomorrow  Return to ER if symptoms worsen

## 2019-09-02 NOTE — ED NOTES
Pt states that his legs burn  Pt states swelling in throat and it feels "really dry"  Pt denies falls or trauma  Pt washed legs and sites of stings prior to coming in  Pt walked to bed and speaking in multi-word sentences       Laila Doe RN  09/01/19 2046

## 2019-09-03 ENCOUNTER — TELEPHONE (OUTPATIENT)
Dept: SLEEP CENTER | Facility: CLINIC | Age: 44
End: 2019-09-03

## 2019-09-03 LAB
ATRIAL RATE: 80 BPM
P AXIS: 36 DEGREES
PR INTERVAL: 148 MS
QRS AXIS: 47 DEGREES
QRSD INTERVAL: 84 MS
QT INTERVAL: 382 MS
QTC INTERVAL: 440 MS
T WAVE AXIS: 17 DEGREES
VENTRICULAR RATE: 80 BPM

## 2019-09-03 PROCEDURE — 93010 ELECTROCARDIOGRAM REPORT: CPT | Performed by: INTERNAL MEDICINE

## 2019-09-03 NOTE — TELEPHONE ENCOUNTER
Patient called and said he spoke with you at his last office visit about writing a letter for a PFA hearing to help with his court case, he said he never received anything so he was callign to see if there was any update on the letter   Thank you

## 2019-09-10 NOTE — TELEPHONE ENCOUNTER
Letter had been completed waiting for patient to decide if he will pick it up or have it mailed to home

## 2019-09-19 ENCOUNTER — OFFICE VISIT (OUTPATIENT)
Dept: OBGYN CLINIC | Facility: CLINIC | Age: 44
End: 2019-09-19
Payer: MEDICARE

## 2019-09-19 VITALS
DIASTOLIC BLOOD PRESSURE: 80 MMHG | BODY MASS INDEX: 35.48 KG/M2 | SYSTOLIC BLOOD PRESSURE: 130 MMHG | HEIGHT: 71 IN | HEART RATE: 86 BPM

## 2019-09-19 DIAGNOSIS — S92.811D CLOSED FRACTURE OF SESAMOID BONE OF RIGHT FOOT WITH ROUTINE HEALING, SUBSEQUENT ENCOUNTER: Primary | ICD-10-CM

## 2019-09-19 PROCEDURE — 99213 OFFICE O/P EST LOW 20 MIN: CPT | Performed by: ORTHOPAEDIC SURGERY

## 2019-09-19 NOTE — PROGRESS NOTES
CHASE Espinoza  Attending, Orthopaedic Surgery  Foot and [address] [facility] [address] [facility]      ORTHOPAEDIC FOOT AND ANKLE CLINIC VISIT     Assessment:     Encounter Diagnosis   Name Primary?  Closed fracture of sesamoid bone of right foot with routine healing, subsequent encounter Yes            Plan:   · The patient verbalized understanding of exam findings and treatment plan  We engaged in the shared decision-making process and treatment options were discussed at length with the patient  Surgical and conservative management discussed today along with risks and benefits  · Continue with toe taping and dancer's pad as instructed  · Discussed the importance of supportive shoe wear  · Activity as tolerated   · Recommend tylenol for pain control  · Return in about 2 months (around 11/19/2019)  with x-ray of the right foot to evaluate for AVN of the sesamoid  History of Present Illness:   Chief Complaint:   Chief Complaint   Patient presents with   Esha Mendez is a 41 y o  male who is being seen in follow-up for Right fibular sesamoid fracture  When we last saw he we recommended dancer's pad and toe taping  Pain has improved  Residual pain is localized at fibular sesamoid with minimal radiating and described as sharp and severe  He states his pain is now 4/10, compared to 7/10 max  He also reports the pain on the dorsal lateral aspect of the 1st MTP joint has almost completely resolved        Pain/symptom timing:  Worse during the day when active  Pain/symptom context:  Worse with activites and work  Pain/symptom modifying factors:  Rest makes better, activities make worse  Pain/symptom associated signs/symptoms: none    Prior treatment   · NSAIDsYes   · Injections No   · Bracing/Orthotics Yes  - toe taping and dancer's pad  · Physical Therapy No     Orthopedic Surgical History:   See below     Past Medical, Surgical and Social History:  Past Medical History:  has a past medical history of Arthritis, Chronic pain, DJD (degenerative joint disease), Herniated disc, cervical, Narcolepsy, Seizures ([address] ), Sleep apnea, and Sleep apnea  Problem List: does not have any pertinent problems on file  Past Surgical History:  has a past surgical history that includes Tonsillectomy; Rhinoplasty; and Implantation / placement epidural neurostimulator electrodes  Family History: family history is not on file  Social History:  reports that he has never smoked  He has never used smokeless tobacco  He reports that he does not drink alcohol or use drugs  Current Medications: has a current medication list which includes the following prescription(s): amlodipine, clonidine, fluticasone, gabapentin, hydrocodone-acetaminophen, levetiracetam, linzess, methylprednisolone, oxycodone, oxycodone, oxycodone, oxycodone-acetaminophen, tizanidine, venlafaxine, and epinephrine  Allergies: is allergic to acetaminophen; adhesive [medical tape]; celecoxib; diclofenac; diclofenac sodium; methotrexate; methotrexate derivatives; penicillins; remicade [infliximab]; and other  Review of Systems:  General- denies fever/chills  HEENT- denies hearing loss or sore throat  Eyes- denies eye pain or visual disturbances, denies red eyes  Respiratory- denies cough or SOB  Cardio- denies chest pain or palpitations  GI- denies abdominal pain  Endocrine- denies urinary frequency  Urinary- denies pain with urination  Musculoskeletal- Negative except noted above  Skin- denies rashes or wounds  Neurological- denies dizziness or headache  Psychiatric- denies anxiety or difficulty concentrating    Physical Exam:   /80 (BP Location: Left arm, Patient Position: Sitting, Cuff Size: Adult)   Pulse 86   Ht 5' 11" (1 803 m)   BMI 35 48 kg/m²   General/Constitutional: No apparent distress: well-nourished and well developed    Eyes: normal ocular motion  Lymphatic: No appreciable lymphadenopathy  Respiratory: Non-labored breathing  Vascular: No edema, swelling or tenderness, except as noted in detailed exam   Integumentary: No impressive skin lesions present, except as noted in detailed exam   Neuro: No ataxia or tremors noted  Psych: Normal mood and affect, oriented to person, place and time  Appropriate affect  Musculoskeletal: Normal, except as noted in detailed exam and in HPI  Examination    Right    Gait Normal   Musculoskeletal Tender to palpation at fibular sesamoid    Skin Normal       Nails Normal    Range of Motion  1st MTP joint: 50 degrees dorsiflexion, 20 degrees plantarflexion  Subtalar motion: normal    Stability Stable    Muscle Strength 5/5 tibialis anterior  5/5 gastrocnemius-soleus  5/5 posterior tibialis  5/5 peroneal/eversion strength  5/5 EHL  5/5 FHL    Neurologic Normal    Sensation Intact to light touch throughout sural, saphenous, superficial peroneal, deep peroneal and medial/lateral plantar nerve distributions  [place]-Abiodun 5 07 filament (10g) testing deferred  Cardiovascular Brisk capillary refill < 2 seconds,intact DP and PT pulses    Special Tests None      Imaging Studies:   No new imaging    Scribe Attestation    I,:   Zaid Osullivan PA-C am acting as a scribe while in the presence of the attending physician :        I,:   Joe Pichardo MD personally performed the services described in this documentation    as scribed in my presence :                Melda Fulling Lachman, MD  Foot & Ankle Surgery   Department [address]      I personally performed the service  Melda Fulling Lachman, MD

## 2019-11-12 DIAGNOSIS — G40.209 PARTIAL SYMPTOMATIC EPILEPSY WITH COMPLEX PARTIAL SEIZURES, NOT INTRACTABLE, WITHOUT STATUS EPILEPTICUS (HCC): ICD-10-CM

## 2019-11-12 RX ORDER — LEVETIRACETAM 250 MG/1
250 TABLET ORAL
Qty: 30 TABLET | Refills: 3 | Status: SHIPPED | OUTPATIENT
Start: 2019-11-12 | End: 2020-03-06

## 2019-11-19 DIAGNOSIS — G47.33 OBSTRUCTIVE SLEEP APNEA ON CPAP: Primary | ICD-10-CM

## 2019-11-19 DIAGNOSIS — Z99.89 OBSTRUCTIVE SLEEP APNEA ON CPAP: Primary | ICD-10-CM

## 2019-11-20 ENCOUNTER — TELEPHONE (OUTPATIENT)
Dept: SLEEP CENTER | Facility: CLINIC | Age: 44
End: 2019-11-20

## 2019-11-21 ENCOUNTER — APPOINTMENT (OUTPATIENT)
Dept: RADIOLOGY | Facility: CLINIC | Age: 44
End: 2019-11-21
Payer: MEDICARE

## 2019-11-21 ENCOUNTER — OFFICE VISIT (OUTPATIENT)
Dept: OBGYN CLINIC | Facility: CLINIC | Age: 44
End: 2019-11-21
Payer: MEDICARE

## 2019-11-21 VITALS
HEART RATE: 81 BPM | SYSTOLIC BLOOD PRESSURE: 149 MMHG | BODY MASS INDEX: 35.48 KG/M2 | DIASTOLIC BLOOD PRESSURE: 102 MMHG | HEIGHT: 71 IN

## 2019-11-21 DIAGNOSIS — S92.811A CLOSED FRACTURE OF SESAMOID BONE OF RIGHT FOOT, INITIAL ENCOUNTER: ICD-10-CM

## 2019-11-21 DIAGNOSIS — S92.811A CLOSED FRACTURE OF SESAMOID BONE OF RIGHT FOOT, INITIAL ENCOUNTER: Primary | ICD-10-CM

## 2019-11-21 PROCEDURE — 99213 OFFICE O/P EST LOW 20 MIN: CPT | Performed by: ORTHOPAEDIC SURGERY

## 2019-11-21 PROCEDURE — 73630 X-RAY EXAM OF FOOT: CPT

## 2019-11-21 NOTE — PROGRESS NOTES
CHASE Martinez  Attending, Orthopaedic Surgery  Foot and [address] [facility] [address] [facility]      ORTHOPAEDIC FOOT AND ANKLE CLINIC VISIT     Assessment:     Encounter Diagnosis   Name Primary?  Closed fracture of sesamoid bone of right foot, initial encounter Yes            Plan:   · The patient verbalized understanding of exam findings and treatment plan  We engaged in the shared decision-making process and treatment options were discussed at length with the patient  Surgical and conservative management discussed today along with risks and benefits  · He continues to improve  · We discussed excision of fibular sesamoid if necessary  He doesn't appear to be heading there  · Continue toe taping, supportive shoes, and J pad  · See back PRN      History of Present Illness:   Chief Complaint:   Chief Complaint   Patient presents with   Jyoti Jarrett is a 41 y o  male who is being seen in follow-up for Right fibular sesamoid fracture  When we last saw he we recommended transition to a sneaker, toe taping, J pad  Pain has improved  Residual pain is localized at sesamoid and is only occasional     Pain/symptom timing:  Worse during the day when active  Pain/symptom context:  Worse with activites and work  Pain/symptom modifying factors:  Rest makes better, activities make worse  Pain/symptom associated signs/symptoms: none    Prior treatment   · NSAIDsYes   · Injections No   · Bracing/Orthotics Yes    · Physical Therapy No     Orthopedic Surgical History:   See below    Past Medical, Surgical and Social History:  Past Medical History:  has a past medical history of Arthritis, Chronic pain, DJD (degenerative joint disease), Herniated disc, cervical, Narcolepsy, Seizures ([address] ), Sleep apnea, and Sleep apnea  Problem List: does not have any pertinent problems on file  Past Surgical History:  has a past surgical history that includes Tonsillectomy;  Rhinoplasty; and Implantation / placement epidural neurostimulator electrodes  Family History: family history is not on file  Social History:  reports that he has never smoked  He has never used smokeless tobacco  He reports that he does not drink alcohol or use drugs  Current Medications: has a current medication list which includes the following prescription(s): amlodipine, clonidine, fluticasone, gabapentin, hydrocodone-acetaminophen, levetiracetam, linzess, methylprednisolone, oxycodone, oxycodone, oxycodone, oxycodone-acetaminophen, tizanidine, venlafaxine, and epinephrine  Allergies: is allergic to acetaminophen; adhesive [medical tape]; celecoxib; diclofenac; diclofenac sodium; methotrexate; methotrexate derivatives; penicillins; remicade [infliximab]; and other  Review of Systems:  General- denies fever/chills  HEENT- denies hearing loss or sore throat  Eyes- denies eye pain or visual disturbances, denies red eyes  Respiratory- denies cough or SOB  Cardio- denies chest pain or palpitations  GI- denies abdominal pain  Endocrine- denies urinary frequency  Urinary- denies pain with urination  Musculoskeletal- Negative except noted above  Skin- denies rashes or wounds  Neurological- denies dizziness or headache  Psychiatric- denies anxiety or difficulty concentrating    Physical Exam:   BP (!) 149/102 (BP Location: Left arm, Patient Position: Sitting, Cuff Size: Adult)   Pulse 81   Ht 5' 11" (1 803 m)   BMI 35 48 kg/m²   General/Constitutional: No apparent distress: well-nourished and well developed  Eyes: normal ocular motion  Lymphatic: No appreciable lymphadenopathy  Respiratory: Non-labored breathing  Vascular: No edema, swelling or tenderness, except as noted in detailed exam   Integumentary: No impressive skin lesions present, except as noted in detailed exam   Neuro: No ataxia or tremors noted  Psych: Normal mood and affect, oriented to person, place and time  Appropriate affect    Musculoskeletal: Normal, except as noted in detailed exam and in HPI  Examination    Right    Gait Normal   Musculoskeletal Tender to palpation at fibular sesamoid    Skin Normal       Nails Normal    Range of Motion  50 degrees dorsiflexion, 30 degrees plantarflexion  Subtalar motion: normal    Stability Stable    Muscle Strength 5/5 tibialis anterior  5/5 gastrocnemius-soleus  5/5 posterior tibialis  5/5 peroneal/eversion strength  5/5 EHL  5/5 FHL    Neurologic Normal    Sensation Intact to light touch throughout sural, saphenous, superficial peroneal, deep peroneal and medial/lateral plantar nerve distributions  [place]-Abiodun 5 07 filament (10g) testing deferred  Cardiovascular Brisk capillary refill < 2 seconds,intact DP and PT pulses    Special Tests None      Imaging Studies:   No new imaging          James R Lachman, MD  Foot & Ankle Surgery   Department of [address] [place] [address]      I personally performed the service  Margene Maroon Lachman, MD

## 2020-02-05 DIAGNOSIS — G47.411 NARCOLEPSY WITH CATAPLEXY: ICD-10-CM

## 2020-02-09 RX ORDER — VENLAFAXINE HYDROCHLORIDE 37.5 MG/1
CAPSULE, EXTENDED RELEASE ORAL
Qty: 30 CAPSULE | Refills: 0 | Status: SHIPPED | OUTPATIENT
Start: 2020-02-09 | End: 2020-03-06

## 2020-02-17 RX ORDER — MORPHINE SULFATE 0.5 MG/ML
0.25 INJECTION, SOLUTION EPIDURAL; INTRATHECAL; INTRAVENOUS ONCE
Status: DISCONTINUED | OUTPATIENT
Start: 2020-02-18 | End: 2020-02-18 | Stop reason: HOSPADM

## 2020-02-18 ENCOUNTER — ANESTHESIA EVENT (OUTPATIENT)
Dept: OPERATING ROOM | Facility: HOSPITAL | Age: 45
Setting detail: OBSERVATION
End: 2020-02-18
Payer: MEDICARE

## 2020-02-18 ENCOUNTER — HOSPITAL ENCOUNTER (OUTPATIENT)
Facility: HOSPITAL | Age: 45
Setting detail: HOSPITAL OUTPATIENT SURGERY
Discharge: HOME | End: 2020-02-18
Attending: ANESTHESIOLOGY | Admitting: ANESTHESIOLOGY
Payer: MEDICARE

## 2020-02-18 ENCOUNTER — APPOINTMENT (OUTPATIENT)
Dept: RADIOLOGY | Facility: HOSPITAL | Age: 45
Setting detail: OBSERVATION
End: 2020-02-18
Attending: ANESTHESIOLOGY
Payer: MEDICARE

## 2020-02-18 VITALS
HEART RATE: 71 BPM | OXYGEN SATURATION: 98 % | DIASTOLIC BLOOD PRESSURE: 82 MMHG | SYSTOLIC BLOOD PRESSURE: 129 MMHG | TEMPERATURE: 97.8 F | RESPIRATION RATE: 16 BRPM

## 2020-02-18 PROBLEM — G89.29 CHRONIC PAIN: Status: ACTIVE | Noted: 2018-12-20

## 2020-02-18 PROBLEM — M47.816 SPONDYLOSIS OF LUMBAR SPINE: Status: ACTIVE | Noted: 2020-02-18

## 2020-02-18 PROBLEM — G40.209 PARTIAL SYMPTOMATIC EPILEPSY WITH COMPLEX PARTIAL SEIZURES, NOT INTRACTABLE, WITHOUT STATUS EPILEPTICUS (CMS/HCC): Status: ACTIVE | Noted: 2018-06-04

## 2020-02-18 PROBLEM — G47.33 OBSTRUCTIVE SLEEP APNEA ON CPAP: Status: ACTIVE | Noted: 2018-06-04

## 2020-02-18 PROBLEM — S92.811A CLOSED FRACTURE OF SESAMOID BONE OF RIGHT FOOT: Status: ACTIVE | Noted: 2019-11-21

## 2020-02-18 PROBLEM — G47.419 NARCOLEPSY: Status: ACTIVE | Noted: 2019-07-12

## 2020-02-18 PROCEDURE — 25000000 HC PHARMACY GENERAL: Performed by: ANESTHESIOLOGY

## 2020-02-18 PROCEDURE — 00HU03Z INSERTION OF INFUSION DEVICE INTO SPINAL CANAL, OPEN APPROACH: ICD-10-PCS | Performed by: ANESTHESIOLOGY

## 2020-02-18 PROCEDURE — 27200000 HC STERILE SUPPLY: Performed by: ANESTHESIOLOGY

## 2020-02-18 PROCEDURE — 63600000 HC DRUGS/DETAIL CODE: Performed by: NURSE ANESTHETIST, CERTIFIED REGISTERED

## 2020-02-18 PROCEDURE — G0378 HOSPITAL OBSERVATION PER HR: HCPCS

## 2020-02-18 PROCEDURE — C1751 CATH, INF, PER/CENT/MIDLINE: HCPCS | Performed by: ANESTHESIOLOGY

## 2020-02-18 PROCEDURE — 71000002 HC PACU PHASE 2 INITIAL 30MIN: Performed by: ANESTHESIOLOGY

## 2020-02-18 PROCEDURE — 71000012 HC PACU PHASE 2 EA ADDL MIN: Performed by: ANESTHESIOLOGY

## 2020-02-18 PROCEDURE — 71000011 HC PACU PHASE 1 EA ADDL MIN: Performed by: ANESTHESIOLOGY

## 2020-02-18 PROCEDURE — 37000002 HC ANESTHESIA MAC: Performed by: ANESTHESIOLOGY

## 2020-02-18 PROCEDURE — 25800000 HC PHARMACY IV SOLUTIONS: Performed by: ANESTHESIOLOGY

## 2020-02-18 PROCEDURE — 36000003 HC OR LEVEL 3 INITIAL 30MIN: Performed by: ANESTHESIOLOGY

## 2020-02-18 PROCEDURE — 36000013 HC OR LEVEL 3 EA ADDL MIN: Performed by: ANESTHESIOLOGY

## 2020-02-18 PROCEDURE — 71000001 HC PACU PHASE 1 INITIAL 30MIN: Performed by: ANESTHESIOLOGY

## 2020-02-18 PROCEDURE — 63600000 HC DRUGS/DETAIL CODE: Performed by: ANESTHESIOLOGY

## 2020-02-18 DEVICE — IMPLANTABLE DEVICE: Type: IMPLANTABLE DEVICE | Site: SPINE LUMBAR | Status: FUNCTIONAL

## 2020-02-18 RX ORDER — SODIUM CHLORIDE, SODIUM LACTATE, POTASSIUM CHLORIDE, CALCIUM CHLORIDE 600; 310; 30; 20 MG/100ML; MG/100ML; MG/100ML; MG/100ML
INJECTION, SOLUTION INTRAVENOUS CONTINUOUS
Status: DISCONTINUED | OUTPATIENT
Start: 2020-02-18 | End: 2020-02-18 | Stop reason: HOSPADM

## 2020-02-18 RX ORDER — CLINDAMYCIN PHOSPHATE 600 MG/50ML
INJECTION, SOLUTION INTRAVENOUS
Status: COMPLETED
Start: 2020-02-18 | End: 2020-02-18

## 2020-02-18 RX ORDER — TIZANIDINE 4 MG/1
4 TABLET ORAL 3 TIMES DAILY
Status: DISCONTINUED | OUTPATIENT
Start: 2020-02-18 | End: 2020-02-18 | Stop reason: HOSPADM

## 2020-02-18 RX ORDER — FENTANYL CITRATE 50 UG/ML
INJECTION, SOLUTION INTRAMUSCULAR; INTRAVENOUS AS NEEDED
Status: DISCONTINUED | OUTPATIENT
Start: 2020-02-18 | End: 2020-02-18 | Stop reason: SURG

## 2020-02-18 RX ORDER — VENLAFAXINE 37.5 MG/1
37.5 TABLET ORAL 2 TIMES DAILY WITH MEALS
Qty: 60 TABLET | Refills: 0 | Status: SHIPPED
Start: 2020-02-18 | End: 2020-02-18 | Stop reason: HOSPADM

## 2020-02-18 RX ORDER — OXYCODONE HYDROCHLORIDE 5 MG/1
5 TABLET ORAL EVERY 6 HOURS PRN
Status: DISCONTINUED | OUTPATIENT
Start: 2020-02-18 | End: 2020-02-18 | Stop reason: HOSPADM

## 2020-02-18 RX ORDER — TIZANIDINE 4 MG/1
4 TABLET ORAL 3 TIMES DAILY
Qty: 42 TABLET | Refills: 0 | Status: SHIPPED
Start: 2020-02-18 | End: 2020-02-18 | Stop reason: HOSPADM

## 2020-02-18 RX ORDER — HYDROMORPHONE HYDROCHLORIDE 1 MG/ML
0.5 INJECTION, SOLUTION INTRAMUSCULAR; INTRAVENOUS; SUBCUTANEOUS
Status: DISCONTINUED | OUTPATIENT
Start: 2020-02-18 | End: 2020-02-18 | Stop reason: HOSPADM

## 2020-02-18 RX ORDER — LIDOCAINE HYDROCHLORIDE 10 MG/ML
INJECTION, SOLUTION INFILTRATION; PERINEURAL AS NEEDED
Status: DISCONTINUED | OUTPATIENT
Start: 2020-02-18 | End: 2020-02-18 | Stop reason: HOSPADM

## 2020-02-18 RX ORDER — LEVETIRACETAM 250 MG/1
250 TABLET ORAL 2 TIMES DAILY
Qty: 60 TABLET | Refills: 0 | Status: SHIPPED | OUTPATIENT
Start: 2020-02-18 | End: 2020-11-03 | Stop reason: ENTERED-IN-ERROR

## 2020-02-18 RX ORDER — FENTANYL CITRATE 50 UG/ML
50 INJECTION, SOLUTION INTRAMUSCULAR; INTRAVENOUS
Status: DISCONTINUED | OUTPATIENT
Start: 2020-02-18 | End: 2020-02-18 | Stop reason: HOSPADM

## 2020-02-18 RX ORDER — SODIUM CHLORIDE 9 MG/ML
INJECTION, SOLUTION INTRAVENOUS CONTINUOUS
Status: DISCONTINUED | OUTPATIENT
Start: 2020-02-18 | End: 2020-02-18 | Stop reason: HOSPADM

## 2020-02-18 RX ORDER — MORPHINE SULFATE 0.5 MG/ML
INJECTION, SOLUTION EPIDURAL; INTRATHECAL; INTRAVENOUS AS NEEDED
Status: DISCONTINUED | OUTPATIENT
Start: 2020-02-18 | End: 2020-02-18 | Stop reason: HOSPADM

## 2020-02-18 RX ORDER — LEVETIRACETAM 250 MG/1
250 TABLET ORAL 2 TIMES DAILY
Status: DISCONTINUED | OUTPATIENT
Start: 2020-02-18 | End: 2020-02-18 | Stop reason: HOSPADM

## 2020-02-18 RX ORDER — PROPOFOL 200MG/20ML
SYRINGE (ML) INTRAVENOUS AS NEEDED
Status: DISCONTINUED | OUTPATIENT
Start: 2020-02-18 | End: 2020-02-18 | Stop reason: SURG

## 2020-02-18 RX ORDER — LEVETIRACETAM 250 MG/1
250 TABLET ORAL 2 TIMES DAILY
Qty: 60 TABLET | Refills: 0 | Status: SHIPPED
Start: 2020-02-18 | End: 2020-02-18

## 2020-02-18 RX ORDER — CLINDAMYCIN PHOSPHATE 600 MG/50ML
600 INJECTION, SOLUTION INTRAVENOUS ONCE
Status: COMPLETED | OUTPATIENT
Start: 2020-02-18 | End: 2020-02-18

## 2020-02-18 RX ORDER — LEVETIRACETAM 100 MG/ML
250 SOLUTION ORAL 2 TIMES DAILY
Status: DISCONTINUED | OUTPATIENT
Start: 2020-02-18 | End: 2020-02-18

## 2020-02-18 RX ORDER — VENLAFAXINE 37.5 MG/1
37.5 TABLET ORAL 2 TIMES DAILY WITH MEALS
Status: DISCONTINUED | OUTPATIENT
Start: 2020-02-18 | End: 2020-02-18 | Stop reason: HOSPADM

## 2020-02-18 RX ORDER — MIDAZOLAM HYDROCHLORIDE 2 MG/2ML
INJECTION, SOLUTION INTRAMUSCULAR; INTRAVENOUS AS NEEDED
Status: DISCONTINUED | OUTPATIENT
Start: 2020-02-18 | End: 2020-02-18 | Stop reason: SURG

## 2020-02-18 RX ORDER — DEXTROSE 50 % IN WATER (D50W) INTRAVENOUS SYRINGE
25 AS NEEDED
Status: DISCONTINUED | OUTPATIENT
Start: 2020-02-18 | End: 2020-02-18 | Stop reason: HOSPADM

## 2020-02-18 RX ORDER — DEXTROSE 40 %
15-30 GEL (GRAM) ORAL AS NEEDED
Status: DISCONTINUED | OUTPATIENT
Start: 2020-02-18 | End: 2020-02-18 | Stop reason: HOSPADM

## 2020-02-18 RX ORDER — IBUPROFEN 200 MG
16-32 TABLET ORAL AS NEEDED
Status: DISCONTINUED | OUTPATIENT
Start: 2020-02-18 | End: 2020-02-18 | Stop reason: HOSPADM

## 2020-02-18 RX ADMIN — PROPOFOL 10 MG: 10 INJECTION, EMULSION INTRAVENOUS at 08:19

## 2020-02-18 RX ADMIN — MIDAZOLAM HYDROCHLORIDE 2 MG: 1 INJECTION, SOLUTION INTRAMUSCULAR; INTRAVENOUS at 08:03

## 2020-02-18 RX ADMIN — PROPOFOL 10 MG: 10 INJECTION, EMULSION INTRAVENOUS at 08:22

## 2020-02-18 RX ADMIN — FENTANYL CITRATE 50 MCG: 50 INJECTION, SOLUTION INTRAMUSCULAR; INTRAVENOUS at 08:15

## 2020-02-18 RX ADMIN — PROPOFOL 10 MG: 10 INJECTION, EMULSION INTRAVENOUS at 08:12

## 2020-02-18 RX ADMIN — FENTANYL CITRATE 50 MCG: 50 INJECTION, SOLUTION INTRAMUSCULAR; INTRAVENOUS at 08:19

## 2020-02-18 RX ADMIN — PROPOFOL 10 MG: 10 INJECTION, EMULSION INTRAVENOUS at 08:25

## 2020-02-18 RX ADMIN — MIDAZOLAM HYDROCHLORIDE 2 MG: 1 INJECTION, SOLUTION INTRAMUSCULAR; INTRAVENOUS at 07:56

## 2020-02-18 RX ADMIN — PROPOFOL 10 MG: 10 INJECTION, EMULSION INTRAVENOUS at 08:16

## 2020-02-18 RX ADMIN — CLINDAMYCIN IN 5 PERCENT DEXTROSE 600 MG: 12 INJECTION, SOLUTION INTRAVENOUS at 07:54

## 2020-02-18 RX ADMIN — SODIUM CHLORIDE: 900 INJECTION, SOLUTION INTRAVENOUS at 07:58

## 2020-02-18 RX ADMIN — PROPOFOL 10 MG: 10 INJECTION, EMULSION INTRAVENOUS at 08:07

## 2020-02-18 ASSESSMENT — PAIN SCALES - GENERAL: PAIN_LEVEL: 1

## 2020-02-18 NOTE — PERIOPERATIVE NURSING NOTE
Pt OOB ambulating without dizziness. States pain in leg in much improved from preop. Discharge instructions given to and reviewed with pt and mom with good understanding verbalized. Back injection site clean.

## 2020-02-18 NOTE — PERIOPERATIVE NURSING NOTE
Dr. Atkins in to evaluated pt after pt ambulated in [place]. Pt states pain is more than 50% relieved. Catheter removed by Dr. Atkins. Site clean without drainage. Bandaid applied.

## 2020-02-18 NOTE — ANESTHESIA POSTPROCEDURE EVALUATION
Patient: Bassam Craig    Procedure Summary     Date:  02/18/20 Room / Location:  [facility] OR [address] [place] [address] OR [facility]    Anesthesia Start:  0758 Anesthesia Stop:  0842    Procedure:  IT Pain Pump Trial [employer] (N/A ) Diagnosis:       Spondylosis of lumbosacral region, unspecified spinal osteoarthritis complication status      Post laminectomy syndrome      (Lumbosacral Spondylosis, Post Laminectomy)    Surgeon:  Omero Atkins MD Responsible Provider:  Aristides Laurent MD    Anesthesia Type:  MAC ASA Status:  2          Anesthesia Type: MAC  PACU Vitals  2/18/2020 0837 - 2/18/2020 0937      2/18/2020  0845 2/18/2020  0846 2/18/2020  0850 2/18/2020  0855    BP:  --  --  132/83  --    Temp:  --  36.3 °C (97.3 °F)  --  --    Pulse:  74  --  67  68    Resp:  (!) 67  --  (!) 50  (!) 55    SpO2:  95 %  --  --  --              2/18/2020  0900 2/18/2020  0910 2/18/2020  0915 2/18/2020  0920    BP:  131/81  129/82  --  124/78    Temp:  --  --  --  --    Pulse:  62  62  66  63    Resp:  (!) 42  (!) 46  (!) 55  (!) 45    SpO2:  95 %  --  --  --              2/18/2020  0924 2/18/2020  0931          BP:  --  131/78      Temp:  36.3 °C (97.3 °F)  36.3 °C (97.3 °F)      Pulse:  --  76      Resp:  --  16      SpO2:  --  95 %              Anesthesia Post Evaluation    Pain score: 1  Pain management: adequate  Patient location during evaluation: PACU  Patient participation: complete - patient participated  Level of consciousness: awake and alert  Cardiovascular status: acceptable  Airway Patency: adequate  Respiratory status: acceptable and nasal cannula  Hydration status: acceptable  Anesthetic complications: no

## 2020-02-18 NOTE — ANESTHESIA PREPROCEDURE EVALUATION
Relevant Problems   MUSCULOSKELETAL   (+) Spondylosis of lumbar spine       Anesthesia ROS/MED HX      Pulmonary    Sleep apnea and CPAP Compliant  Cardiovascular   ECG reviewed  Endo/Other  Body Habitus: Obese       Past Surgical History:   Procedure Laterality Date   • RHINOPLASTY     • SPINAL CORD STIMULATOR IMPLANT     • TONSILLECTOMY         Physical Exam    Airway   Mallampati: IV   TM distance: >3 FB   Neck ROM: full  Cardiovascular    Rhythm: regular   Rate: normalPulmonary    clear to auscultation        Anesthesia Plan    Plan: MAC     Airway: natural airway / supplemental oxygen       patient did not smoke on day of surgery  ASA 2  Blood Products:   Use of Blood Products Discussed: No   Anesthetic plan and risks discussed with: patient  Induction:    intravenous   Parents Present: No  Postop Plan:   Patient Disposition: phase II then home   Pain Management: IV analgesics

## 2020-02-18 NOTE — DISCHARGE INSTRUCTIONS
Omero Atkins M.D.  [address]  [place] [address]  [phone]  Fax: [phone]    Patient name: Bassam Craig  Date: [unfilled]    Post Injection Evaluation    Medial branch block  Spinal nerve block  Facet joint injection  Selective epidural  Sacroiliac joint injection  Sacrococcygeal joint injection  Hip joint injection  Shoulder joint injection  Costrotranverse joint injection  Costrovertebral joint injection    The following information needs to be completed and return to our office after 1 week.     You may be sore from the needles so when rating your pain,  Concentrate on your regular pain and not on any soreness from the needle injection site.    Stay awake the first six (6) hours after the injection to rate your pain.    Bring this sheet with you on your next office visit    PLEASE READ AND [facility] INDICATED    Percentage of regular pain gone while at [facility] ([facility])    0%  10%   20%   30%   40%   50%   60%   70%   80%   90% 99%   100%    PAIN RELIEF    Percentage of regular pain gone in 1 hour ([facility])    0%  10%   20%   30%   40%   50%   60%   70%   80%   90% 99%   100%    PAIN RELIEF    Percentage of regular pain gone In 2 hours ([facility])    0%  10%   20%   30%   40%   50%   60%   70%   80%   90% 99%   100%    PAIN RELIEF    Percentage of regular pain gone in 3 hours ([facility])    0%  10%   20%   30%   40%   50%   60%   70%   80%   90% 99%   100%    PAIN RELIEF          Percentage of pain gone in 4 hours ([facility])    0%  10%   20%   30%   40%   50%   60%   70%   80%   90% 99%   100%    PAIN RELIEF      Percentage of pain gone in 5 hours ([facility])    0%  10%   20%   30%   40%   50%   60%   70%   80%   90% 99%   100%    PAIN RELIEF    Percentage of pain gone in 6 hours ([facility])    0%  10%   20%   30%   40%   50%   60%   70%   80%   90% 99%   100%    PAIN RELIEF    Percentage of pain gone in 3 Days ([facility])    0%  10%   20%   30%   40%   50%   60%    70%   80%   90% 99%   100%    PAIN RELIEF    Percentage of pain gone in 7Days ([facility])    0%  10%   20%   30%   40%   50%   60%   70%   80%   90% 99%   100%    PAIN RELIEF      How much has your pain, overall improved since beginning treatment?    _____________________________________________________________________    _____________________________________________________________________    _____________________________________________________________________    _____________________________________________________________________    _____________________________________________________________________          Complete this she can bring to your next appointment.                      [facility]    Omero Atkins M.D.  [address]  [address]  [place] [address]  [phone]  Fax: [phone]      Postprocedure instructions      1.  Rest the day of your procedure.  If you were given Valium prior to your procedure, you should not drive or operate heavy machinery the day of your procedure.  You may drive return to work and resume regular activities as tolerated.    2.  The injection site may be uncomfortable or numb for a few hours, this is expected.  However if symptoms persist for several days please call our office.  Occasionally, you may notice a slight increase in your pain after the injection.  This should improve within the next 24-48 hours.  If pain worsens or you develop fever or chills a couple of days to a couple weeks after, please call our office    3.  If you experience numbness in the leg or arm, this is the result of the local anesthetic.  This sensation may last for a few hours.  However, if symptoms persist for several days please call our office.    4. You may resume taking all of your medications, including aspirin, anti-inflammatory medications and blood thinners following your procedure.  If you have stop Coumadin, please contact the physician who is  prescribing the 4 instructions on restarting.    5.  If you have diabetes, it is important to monitor your blood sugar carefully following your procedure.  Elevation of blood sugars, and when steroids are administered.  If your blood sugar is over 300, call your primary care physician or our office for instructions.  If your blood sugar is over 400 and you are unable to contact your primary care physician or our office go to the nearest emergency room.    6.  After your injection, you may remove the Band-Aid or bandage in 24 hours, and you may take a shower or bath 6 hours post procedure.    7.  Please call our office if you have a fever greater than 101 °F, foul-smelling discharge from your wound, copious amounts of discharge from your wound, swelling or redness at the injection site, increased tenderness at the injection site, a new weakness and/or unimproved headache.    8.  If you have a headache that is positional, I.e. relieved with lying down on back, this could be related to the procedure.  These headaches should resolve with fluids and caffeine.  Please call our office if the headache persist.    9.  If you are having a discography study done, then you should not work for 48 hours after the study.  This should serve as your excuse.  After the discogram, often times you will have a CAT scan.  Please be sure to bring the images (either film or CD) to your follow-up.    10.  If you have not schedule a follow-up appointment please call our office and schedule your post procedure appointment.    11.  Please schedule follow-up visit to our office , 1 to 2 weeks

## 2020-02-18 NOTE — OP NOTE
[facility]  OPERATIVE REPORT      Date of Procedure: 20   : 1975  Patient Name:  Bassam Craig   Pre-op Diagnosis: Lumbar spondylosis  spine; postlaminectomy syndrome       Post-op Diagnosis: Same  Procedure: Insertion of Temporary intrathecal catheter with Fluoroscopic Guidance    Surgeon:  Omero Atkins MD  Anesthesia:  MAC  Indication:  for treatment of intractable pain not relieved by conservative measures     Details of Procedure:   The entire procedure was performed by me.  The patient was brought into the procedure room and placed in the prone position.   The patient’s back was prepped and draped in the usual sterile fashion.  All skin injection sites were anesthetized with 1% Lidocaine using a 25 gauge needle. The procedure needle was a Tuley needle. Fluoroscopy was utilized in the AP view to identify the appropriate interlaminar space.      A  Paramedian approach was taken.  The L23  to level was targeted.  A target point was chosen over the superior aspect of the inferior lamina.  The procedure needle was directed towards the chosen target point until the needle was in contact with bone.  The needle was then directed superiorly into the ligamentum flavum. Epidural space was found using continuous loss of resistance technique to normal saline.  There was a yulisa loss of resistance.  We advanced until CSF was identified.  No paresthesia was elicited final needle position was negative for aspiration.     An intrathecal catherter was threaded into the intrathecal space to the L23 to interspace. hemostasis was obtained Steri-Strips and 4 x 4 dressing was placed . the catheter was secured in place using steristrips and tegaderm.    The intrathecal catheter was injected with preservative-free morphine 0.075 mg    The patient tolerated the procedure without any difficulty and was taken to the recovery room in a stable condition.      SPECIMENS:  none    EBL:   none    ________________________________  Omero Atkins MD

## 2020-02-18 NOTE — PROGRESS NOTES
Patient noted greater than 50% relief with morphine 0.075 mg/day.  Eyes any new complaints or headaches.  Patient was ambulate for more than 5 minutes with less pain and sit with less pain.  He did not require any oral medications.    Physical exam  Alert oriented  Catheter DC'd tip intact  Ambulate with a cane    Post procedure day 1 with greater than 50% relief    Successful pump trial  Postop instructions given the patient will follow-up in the office      Omero Atkins MD

## 2020-02-18 NOTE — H&P
Admit history and Physical    Diagnosis: Spondylosis of lumbosacral region, unspecified spinal osteoarthritis complication status [M47.817]  Post laminectomy syndrome [M96.1].    HPI     Patient is a 44 y.o. male who presents with back and leg pain.  Constant throbbing worse with movement activity.  Pain is worse with sitting standing and walking.     Medical History:   Past Medical History:   Diagnosis Date   • MARCIE on CPAP    • Seizures (CMS/HCC)     2007  NO FINDING ON EVAL AT [facility]       Surgical History:   Past Surgical History:   Procedure Laterality Date   • RHINOPLASTY     • SPINAL CORD STIMULATOR IMPLANT     • TONSILLECTOMY         Social History:   Social History     Social History Narrative   • Not on file       Family History: History reviewed. No pertinent family history.    Allergies: Acetaminophen; Diclofenac; Methotrexate; and Penicillins    Home Medications:  •  abatacept (ORCENIA) IVPB, Infuse into a venous catheter every 28 (twentyeight) days.  •  levETIRAcetam, Take 250 mg by mouth daily.  •  oxyCODONE, Take 10 mg by mouth every 12 (twelve) hours.  •  tiZANidine, Take 4 mg by mouth every 6 (six) hours as needed for muscle spasms.  •  venlafaxine, Take 37.5 mg by mouth daily.    Current Medications:  •  morphine PF, 0.25 mg, intrathecal, Once    Review of Systems  All other systems reviewed and negative except as noted in the HPI.    Objective     Vital Signs for the last 24 hours:       Physicial Exam    General appearance: alert, appears stated age and cooperative  Head: normocephalic, without obvious abnormality, atraumatic  Eyes: conjunctivae/corneas clear. PERRL, EOM's intact. Fundi benign.  Ears: normal TM's and external ear canals both ears  Nose: Nares normal. Septum midline. Mucosa normal. No drainage or sinus tenderness.  Throat: lips, mucosa, and tongue normal; teeth and gums normal  Neck: no adenopathy, no carotid bruit, no JVD, supple, symmetrical, trachea midline and thyroid not  enlarged, symmetric, no tenderness/mass/nodules  Back: symmetric, no curvature. ROM normal. No CVA tenderness.  Well-healed incision.  Minimal spine tenderness  Lungs: clear to auscultation bilaterally  Chest wall: no tenderness  Heart: regular rate and rhythm, S1, S2 normal, no murmur, click, rub or gallop  Abdomen: soft, non-tender; bowel sounds normal; no masses, no organomegaly  Genitalia: normal  Rectal: deferred  Extremities: extremities normal, warm and well-perfused; no cyanosis, clubbing, or edema and Homans sign is negative, no sign of DVT  Pulses: 2+ and symmetric  Skin: Skin color, texture, turgor normal. No rashes or lesions  Lymph nodes: Cervical, supraclavicular, and axillary nodes normal.  Neurologic: Alert and oriented X 3, normal strength and tone. Normal symmetric reflexes. Normal coordination and gait  5 out of 5 L      Labs  I have reviewed the patient's labs.  Current labs are within normal limits.    Imaging  I have independently reviewed the patient's Imaging. Current imaging findings are within normal limits.    Assessment/Plan     Code Status: No Order    Postlaminectomy syndrome   lumbar spondylosis      1.  Pump trial  2.  Observation for use of intrathecal morphine and concerns of respiratory depression and sedation

## 2020-02-18 NOTE — ANESTHESIOLOGIST PRE-PROCEDURE ATTESTATION
Pre-Procedure Patient Identification:  I am the Primary Anesthesiologist and have identified the patient on 02/18/20 at 7:37 AM.   I have confirmed the following procedure(s) IT Pain Pump Trial Medtronic will be performed by the following surgeon/proceduralist Omero Atkins MD.

## 2020-02-18 NOTE — UM PHYSICIAN REVIEW NOTE
Utilization Secondary Review Note      Patient Name: Bassam Craig      MRN: [id number]  Insurance: MEDICARE  Admission date: 2/18/2020  Initial order:    Observation  Planned admission: Yes  Post Discharge Review: Yes            Outpatient services are appropriate for this 44 y.o. year old male who underwent IT Pain Pump Trial.     Ann Truong,   2/18/2020

## 2020-02-18 NOTE — PERIOPERATIVE NURSING NOTE
Back catheter site clean. Pt with c/o pain 3/10 in back. Stated he had some mild generalized itching, now resolved. No nausea or respiratory symptoms. Med lock removed. Instructions reinforced to keep detailed pain log at home.

## 2020-02-18 NOTE — ANESTHESIOLOGIST PRE-PROCEDURE ATTESTATION
Pre-Procedure Patient Identification:  I am the Primary Anesthesiologist and have identified the patient on 02/18/20 at 7:39 AM.   I have confirmed the following procedure(s) IT Pain Pump Trial Medtr[employer] will be performed by the following surgeon/proceduralist Omero Atkins MD.

## 2020-03-06 DIAGNOSIS — G47.411 NARCOLEPSY WITH CATAPLEXY: ICD-10-CM

## 2020-03-06 DIAGNOSIS — G40.209 PARTIAL SYMPTOMATIC EPILEPSY WITH COMPLEX PARTIAL SEIZURES, NOT INTRACTABLE, WITHOUT STATUS EPILEPTICUS (HCC): ICD-10-CM

## 2020-03-06 RX ORDER — VENLAFAXINE HYDROCHLORIDE 37.5 MG/1
CAPSULE, EXTENDED RELEASE ORAL
Qty: 30 CAPSULE | Refills: 0 | Status: SHIPPED | OUTPATIENT
Start: 2020-03-06 | End: 2020-04-07

## 2020-03-06 RX ORDER — LEVETIRACETAM 250 MG/1
TABLET ORAL
Qty: 30 TABLET | Refills: 0 | Status: SHIPPED | OUTPATIENT
Start: 2020-03-06 | End: 2020-05-15

## 2020-03-13 ENCOUNTER — HOSPITAL ENCOUNTER (OUTPATIENT)
Facility: HOSPITAL | Age: 45
Setting detail: HOSPITAL OUTPATIENT SURGERY
End: 2020-03-13
Attending: ANESTHESIOLOGY | Admitting: ANESTHESIOLOGY
Payer: MEDICARE

## 2020-04-06 DIAGNOSIS — G47.411 NARCOLEPSY WITH CATAPLEXY: ICD-10-CM

## 2020-04-07 RX ORDER — VENLAFAXINE HYDROCHLORIDE 37.5 MG/1
CAPSULE, EXTENDED RELEASE ORAL
Qty: 30 CAPSULE | Refills: 0 | Status: SHIPPED | OUTPATIENT
Start: 2020-04-07 | End: 2020-05-15

## 2020-05-08 DIAGNOSIS — G40.209 PARTIAL SYMPTOMATIC EPILEPSY WITH COMPLEX PARTIAL SEIZURES, NOT INTRACTABLE, WITHOUT STATUS EPILEPTICUS (HCC): ICD-10-CM

## 2020-05-08 DIAGNOSIS — G47.411 NARCOLEPSY WITH CATAPLEXY: ICD-10-CM

## 2020-05-15 RX ORDER — VENLAFAXINE HYDROCHLORIDE 37.5 MG/1
CAPSULE, EXTENDED RELEASE ORAL
Qty: 30 CAPSULE | Refills: 0 | Status: SHIPPED | OUTPATIENT
Start: 2020-05-15 | End: 2020-05-19

## 2020-05-15 RX ORDER — LEVETIRACETAM 250 MG/1
TABLET ORAL
Qty: 30 TABLET | Refills: 0 | Status: SHIPPED | OUTPATIENT
Start: 2020-05-15 | End: 2020-06-04 | Stop reason: SDUPTHER

## 2020-05-18 ENCOUNTER — TELEPHONE (OUTPATIENT)
Dept: SLEEP CENTER | Facility: CLINIC | Age: 45
End: 2020-05-18

## 2020-05-18 DIAGNOSIS — G47.411 NARCOLEPSY WITH CATAPLEXY: ICD-10-CM

## 2020-05-19 RX ORDER — VENLAFAXINE HYDROCHLORIDE 37.5 MG/1
CAPSULE, EXTENDED RELEASE ORAL
Qty: 30 CAPSULE | Refills: 0 | Status: SHIPPED | OUTPATIENT
Start: 2020-05-19 | End: 2020-06-04 | Stop reason: SDUPTHER

## 2020-06-04 DIAGNOSIS — G47.411 NARCOLEPSY WITH CATAPLEXY: ICD-10-CM

## 2020-06-04 DIAGNOSIS — G40.209 PARTIAL SYMPTOMATIC EPILEPSY WITH COMPLEX PARTIAL SEIZURES, NOT INTRACTABLE, WITHOUT STATUS EPILEPTICUS (HCC): ICD-10-CM

## 2020-06-05 RX ORDER — LEVETIRACETAM 250 MG/1
250 TABLET ORAL
Qty: 30 TABLET | Refills: 0 | Status: SHIPPED | OUTPATIENT
Start: 2020-06-05 | End: 2020-06-23 | Stop reason: SDUPTHER

## 2020-06-05 RX ORDER — VENLAFAXINE HYDROCHLORIDE 37.5 MG/1
37.5 CAPSULE, EXTENDED RELEASE ORAL DAILY
Qty: 30 CAPSULE | Refills: 0 | Status: SHIPPED | OUTPATIENT
Start: 2020-06-05 | End: 2020-06-23 | Stop reason: SDUPTHER

## 2020-06-23 ENCOUNTER — OFFICE VISIT (OUTPATIENT)
Dept: SLEEP CENTER | Facility: CLINIC | Age: 45
End: 2020-06-23
Payer: MEDICARE

## 2020-06-23 VITALS
SYSTOLIC BLOOD PRESSURE: 122 MMHG | DIASTOLIC BLOOD PRESSURE: 80 MMHG | HEART RATE: 87 BPM | HEIGHT: 72 IN | BODY MASS INDEX: 33.67 KG/M2 | WEIGHT: 248.6 LBS

## 2020-06-23 DIAGNOSIS — G47.411 NARCOLEPSY WITH CATAPLEXY: ICD-10-CM

## 2020-06-23 DIAGNOSIS — G47.33 OBSTRUCTIVE SLEEP APNEA ON CPAP: Primary | ICD-10-CM

## 2020-06-23 DIAGNOSIS — G40.209 PARTIAL SYMPTOMATIC EPILEPSY WITH COMPLEX PARTIAL SEIZURES, NOT INTRACTABLE, WITHOUT STATUS EPILEPTICUS (HCC): ICD-10-CM

## 2020-06-23 DIAGNOSIS — Z99.89 OBSTRUCTIVE SLEEP APNEA ON CPAP: Primary | ICD-10-CM

## 2020-06-23 PROCEDURE — 99214 OFFICE O/P EST MOD 30 MIN: CPT | Performed by: PSYCHIATRY & NEUROLOGY

## 2020-06-23 RX ORDER — LEVETIRACETAM 250 MG/1
250 TABLET ORAL
Qty: 30 TABLET | Refills: 5 | Status: SHIPPED | OUTPATIENT
Start: 2020-06-23 | End: 2020-12-29 | Stop reason: SDUPTHER

## 2020-06-23 RX ORDER — VENLAFAXINE HYDROCHLORIDE 37.5 MG/1
37.5 CAPSULE, EXTENDED RELEASE ORAL DAILY
Qty: 30 CAPSULE | Refills: 5 | Status: SHIPPED | OUTPATIENT
Start: 2020-06-23 | End: 2020-12-29 | Stop reason: SDUPTHER

## 2020-06-24 ENCOUNTER — TELEPHONE (OUTPATIENT)
Dept: SLEEP CENTER | Facility: CLINIC | Age: 45
End: 2020-06-24

## 2020-07-02 ENCOUNTER — HOSPITAL ENCOUNTER (OUTPATIENT)
Dept: RADIOLOGY | Facility: HOSPITAL | Age: 45
Discharge: HOME | End: 2020-07-02
Payer: MEDICARE

## 2020-07-02 ENCOUNTER — TRANSCRIBE ORDERS (OUTPATIENT)
Dept: ADMINISTRATIVE | Facility: HOSPITAL | Age: 45
End: 2020-07-02

## 2020-07-02 DIAGNOSIS — M54.2 CERVICALGIA: Primary | ICD-10-CM

## 2020-07-02 DIAGNOSIS — M54.2 CERVICALGIA: ICD-10-CM

## 2020-07-02 PROCEDURE — 72050 X-RAY EXAM NECK SPINE 4/5VWS: CPT

## 2020-11-02 ENCOUNTER — APPOINTMENT (OUTPATIENT)
Dept: PREADMISSION TESTING | Facility: HOSPITAL | Age: 45
End: 2020-11-02
Attending: ANESTHESIOLOGY
Payer: MEDICARE

## 2020-11-02 ENCOUNTER — APPOINTMENT (OUTPATIENT)
Dept: LAB | Facility: HOSPITAL | Age: 45
End: 2020-11-02
Attending: ANESTHESIOLOGY
Payer: MEDICARE

## 2020-11-02 ENCOUNTER — TRANSCRIBE ORDERS (OUTPATIENT)
Dept: PREADMISSION TESTING | Facility: HOSPITAL | Age: 45
End: 2020-11-02

## 2020-11-02 DIAGNOSIS — Z01.818 ENCOUNTER FOR OTHER PREPROCEDURAL EXAMINATION: Primary | ICD-10-CM

## 2020-11-02 DIAGNOSIS — Z01.818 ENCOUNTER FOR OTHER PREPROCEDURAL EXAMINATION: ICD-10-CM

## 2020-11-02 PROCEDURE — U0003 INFECTIOUS AGENT DETECTION BY NUCLEIC ACID (DNA OR RNA); SEVERE ACUTE RESPIRATORY SYNDROME CORONAVIRUS 2 (SARS-COV-2) (CORONAVIRUS DISEASE [COVID-19]), AMPLIFIED PROBE TECHNIQUE, MAKING USE OF HIGH THROUGHPUT TECHNOLOGIES AS DESCRIBED BY CMS-2020-01-R: HCPCS

## 2020-11-02 PROCEDURE — 87081 CULTURE SCREEN ONLY: CPT

## 2020-11-03 LAB — SARS-COV-2 RNA RESP QL NAA+PROBE: NOT DETECTED

## 2020-11-04 ENCOUNTER — TRANSCRIBE ORDERS (OUTPATIENT)
Dept: ADMINISTRATIVE | Facility: HOSPITAL | Age: 45
End: 2020-11-04

## 2020-11-04 ENCOUNTER — HOSPITAL ENCOUNTER (OUTPATIENT)
Dept: RADIOLOGY | Facility: HOSPITAL | Age: 45
Discharge: HOME | End: 2020-11-04
Payer: MEDICARE

## 2020-11-04 DIAGNOSIS — M25.512 ACUTE PAIN OF LEFT SHOULDER: ICD-10-CM

## 2020-11-04 DIAGNOSIS — M25.512 ACUTE PAIN OF LEFT SHOULDER: Primary | ICD-10-CM

## 2020-11-04 LAB — MICROORGANISM SPEC CULT: NORMAL

## 2020-11-04 PROCEDURE — 73030 X-RAY EXAM OF SHOULDER: CPT

## 2020-11-05 ENCOUNTER — ANESTHESIA EVENT (OUTPATIENT)
Dept: OPERATING ROOM | Facility: HOSPITAL | Age: 45
Setting detail: HOSPITAL OUTPATIENT SURGERY
End: 2020-11-05
Payer: MEDICARE

## 2020-11-05 RX ORDER — MORPHINE SULFATE 1 MG/ML
40 INJECTION, SOLUTION EPIDURAL; INTRATHECAL; INTRAVENOUS ONCE
Status: COMPLETED | OUTPATIENT
Start: 2020-11-06 | End: 2020-11-06

## 2020-11-06 ENCOUNTER — APPOINTMENT (OUTPATIENT)
Dept: RADIOLOGY | Facility: HOSPITAL | Age: 45
Setting detail: HOSPITAL OUTPATIENT SURGERY
End: 2020-11-06
Attending: ANESTHESIOLOGY
Payer: MEDICARE

## 2020-11-06 ENCOUNTER — ANESTHESIA (OUTPATIENT)
Dept: OPERATING ROOM | Facility: HOSPITAL | Age: 45
Setting detail: HOSPITAL OUTPATIENT SURGERY
End: 2020-11-06
Payer: MEDICARE

## 2020-11-06 ENCOUNTER — HOSPITAL ENCOUNTER (OUTPATIENT)
Facility: HOSPITAL | Age: 45
Setting detail: HOSPITAL OUTPATIENT SURGERY
Discharge: HOME | End: 2020-11-06
Attending: ANESTHESIOLOGY | Admitting: ANESTHESIOLOGY
Payer: MEDICARE

## 2020-11-06 VITALS
BODY MASS INDEX: 32.2 KG/M2 | OXYGEN SATURATION: 96 % | DIASTOLIC BLOOD PRESSURE: 82 MMHG | SYSTOLIC BLOOD PRESSURE: 134 MMHG | WEIGHT: 230 LBS | HEIGHT: 71 IN | RESPIRATION RATE: 16 BRPM | HEART RATE: 73 BPM | TEMPERATURE: 97.1 F

## 2020-11-06 PROCEDURE — 71000012 HC PACU PHASE 2 EA ADDL MIN: Performed by: ANESTHESIOLOGY

## 2020-11-06 PROCEDURE — C1772 INFUSION PUMP, PROGRAMMABLE: HCPCS | Performed by: ANESTHESIOLOGY

## 2020-11-06 PROCEDURE — 25000000 HC PHARMACY GENERAL: Performed by: ANESTHESIOLOGY

## 2020-11-06 PROCEDURE — 37000002 HC ANESTHESIA MAC: Performed by: ANESTHESIOLOGY

## 2020-11-06 PROCEDURE — 00HU03Z INSERTION OF INFUSION DEVICE INTO SPINAL CANAL, OPEN APPROACH: ICD-10-PCS | Performed by: ANESTHESIOLOGY

## 2020-11-06 PROCEDURE — C1755 CATHETER, INTRASPINAL: HCPCS | Performed by: ANESTHESIOLOGY

## 2020-11-06 PROCEDURE — 0JH70VZ INSERTION OF INFUSION PUMP INTO BACK SUBCUTANEOUS TISSUE AND FASCIA, OPEN APPROACH: ICD-10-PCS | Performed by: ANESTHESIOLOGY

## 2020-11-06 PROCEDURE — 63600000 HC DRUGS/DETAIL CODE: Performed by: ANESTHESIOLOGY

## 2020-11-06 PROCEDURE — 36100330 FL FLUOROSCOPY TECHNICAL ASSISTANCE

## 2020-11-06 PROCEDURE — 25800000 HC PHARMACY IV SOLUTIONS: Performed by: ANESTHESIOLOGY

## 2020-11-06 PROCEDURE — 36000013 HC OR LEVEL 3 EA ADDL MIN: Performed by: ANESTHESIOLOGY

## 2020-11-06 PROCEDURE — 71000011 HC PACU PHASE 1 EA ADDL MIN: Performed by: ANESTHESIOLOGY

## 2020-11-06 PROCEDURE — 36000003 HC OR LEVEL 3 INITIAL 30MIN: Performed by: ANESTHESIOLOGY

## 2020-11-06 PROCEDURE — 63700000 HC SELF-ADMINISTRABLE DRUG: Performed by: ANESTHESIOLOGY

## 2020-11-06 PROCEDURE — 71000001 HC PACU PHASE 1 INITIAL 30MIN: Performed by: ANESTHESIOLOGY

## 2020-11-06 PROCEDURE — 27200000 HC STERILE SUPPLY: Performed by: ANESTHESIOLOGY

## 2020-11-06 PROCEDURE — 71000002 HC PACU PHASE 2 INITIAL 30MIN: Performed by: ANESTHESIOLOGY

## 2020-11-06 DEVICE — IMPLANTABLE DEVICE: Type: IMPLANTABLE DEVICE | Site: ABDOMEN | Status: FUNCTIONAL

## 2020-11-06 DEVICE — PERSONAL THERAPY MANAGER: Type: IMPLANTABLE DEVICE | Site: ABDOMEN | Status: FUNCTIONAL

## 2020-11-06 DEVICE — PUMP SYNCHROMED II: Type: IMPLANTABLE DEVICE | Site: ABDOMEN | Status: FUNCTIONAL

## 2020-11-06 RX ORDER — LIDOCAINE HYDROCHLORIDE 10 MG/ML
INJECTION, SOLUTION EPIDURAL; INFILTRATION; INTRACAUDAL; PERINEURAL AS NEEDED
Status: DISCONTINUED | OUTPATIENT
Start: 2020-11-06 | End: 2020-11-06 | Stop reason: SURG

## 2020-11-06 RX ORDER — SODIUM CHLORIDE, SODIUM GLUCONATE, SODIUM ACETATE, POTASSIUM CHLORIDE AND MAGNESIUM CHLORIDE 30; 37; 368; 526; 502 MG/100ML; MG/100ML; MG/100ML; MG/100ML; MG/100ML
INJECTION, SOLUTION INTRAVENOUS CONTINUOUS PRN
Status: DISCONTINUED | OUTPATIENT
Start: 2020-11-06 | End: 2020-11-06 | Stop reason: SURG

## 2020-11-06 RX ORDER — CLINDAMYCIN PHOSPHATE 600 MG/50ML
INJECTION, SOLUTION INTRAVENOUS
Status: COMPLETED
Start: 2020-11-06 | End: 2020-11-06

## 2020-11-06 RX ORDER — ROCURONIUM BROMIDE 10 MG/ML
INJECTION, SOLUTION INTRAVENOUS AS NEEDED
Status: DISCONTINUED | OUTPATIENT
Start: 2020-11-06 | End: 2020-11-06 | Stop reason: SURG

## 2020-11-06 RX ORDER — FENTANYL CITRATE 50 UG/ML
INJECTION, SOLUTION INTRAMUSCULAR; INTRAVENOUS AS NEEDED
Status: DISCONTINUED | OUTPATIENT
Start: 2020-11-06 | End: 2020-11-06 | Stop reason: SURG

## 2020-11-06 RX ORDER — CLINDAMYCIN PHOSPHATE 600 MG/50ML
600 INJECTION, SOLUTION INTRAVENOUS ONCE
Status: COMPLETED | OUTPATIENT
Start: 2020-11-06 | End: 2020-11-06

## 2020-11-06 RX ORDER — SODIUM CHLORIDE 9 MG/ML
INJECTION, SOLUTION INTRAVENOUS CONTINUOUS PRN
Status: DISCONTINUED | OUTPATIENT
Start: 2020-11-06 | End: 2020-11-06 | Stop reason: SURG

## 2020-11-06 RX ORDER — EPHEDRINE SULFATE/0.9% NACL/PF 50 MG/5 ML
SYRINGE (ML) INTRAVENOUS AS NEEDED
Status: DISCONTINUED | OUTPATIENT
Start: 2020-11-06 | End: 2020-11-06 | Stop reason: SURG

## 2020-11-06 RX ORDER — ONDANSETRON HYDROCHLORIDE 2 MG/ML
INJECTION, SOLUTION INTRAVENOUS AS NEEDED
Status: DISCONTINUED | OUTPATIENT
Start: 2020-11-06 | End: 2020-11-06 | Stop reason: SURG

## 2020-11-06 RX ORDER — FENTANYL CITRATE 50 UG/ML
50 INJECTION, SOLUTION INTRAMUSCULAR; INTRAVENOUS
Status: DISCONTINUED | OUTPATIENT
Start: 2020-11-06 | End: 2020-11-06 | Stop reason: HOSPADM

## 2020-11-06 RX ORDER — BUPIVACAINE HYDROCHLORIDE 2.5 MG/ML
INJECTION, SOLUTION EPIDURAL; INFILTRATION; INTRACAUDAL AS NEEDED
Status: DISCONTINUED | OUTPATIENT
Start: 2020-11-06 | End: 2020-11-06 | Stop reason: HOSPADM

## 2020-11-06 RX ORDER — DEXAMETHASONE SODIUM PHOSPHATE 4 MG/ML
INJECTION, SOLUTION INTRA-ARTICULAR; INTRALESIONAL; INTRAMUSCULAR; INTRAVENOUS; SOFT TISSUE AS NEEDED
Status: DISCONTINUED | OUTPATIENT
Start: 2020-11-06 | End: 2020-11-06 | Stop reason: SURG

## 2020-11-06 RX ORDER — PROPOFOL 200MG/20ML
SYRINGE (ML) INTRAVENOUS AS NEEDED
Status: DISCONTINUED | OUTPATIENT
Start: 2020-11-06 | End: 2020-11-06 | Stop reason: SURG

## 2020-11-06 RX ORDER — ONDANSETRON HYDROCHLORIDE 2 MG/ML
4 INJECTION, SOLUTION INTRAVENOUS
Status: DISCONTINUED | OUTPATIENT
Start: 2020-11-06 | End: 2020-11-06 | Stop reason: HOSPADM

## 2020-11-06 RX ORDER — LIDOCAINE HYDROCHLORIDE AND EPINEPHRINE 10; 10 UG/ML; MG/ML
INJECTION, SOLUTION INFILTRATION; PERINEURAL AS NEEDED
Status: DISCONTINUED | OUTPATIENT
Start: 2020-11-06 | End: 2020-11-06 | Stop reason: HOSPADM

## 2020-11-06 RX ORDER — MIDAZOLAM HYDROCHLORIDE 2 MG/2ML
INJECTION, SOLUTION INTRAMUSCULAR; INTRAVENOUS AS NEEDED
Status: DISCONTINUED | OUTPATIENT
Start: 2020-11-06 | End: 2020-11-06 | Stop reason: SURG

## 2020-11-06 RX ADMIN — PROPOFOL 200 MG: 10 INJECTION, EMULSION INTRAVENOUS at 07:40

## 2020-11-06 RX ADMIN — MIDAZOLAM HYDROCHLORIDE 2 MG: 1 INJECTION, SOLUTION INTRAMUSCULAR; INTRAVENOUS at 07:31

## 2020-11-06 RX ADMIN — SODIUM CHLORIDE, SODIUM GLUCONATE, SODIUM ACETATE, POTASSIUM CHLORIDE AND MAGNESIUM CHLORIDE: 526; 502; 368; 37; 30 INJECTION, SOLUTION INTRAVENOUS at 08:43

## 2020-11-06 RX ADMIN — ONDANSETRON 4 MG: 2 INJECTION INTRAMUSCULAR; INTRAVENOUS at 08:43

## 2020-11-06 RX ADMIN — FENTANYL CITRATE 100 MCG: 50 INJECTION, SOLUTION INTRAMUSCULAR; INTRAVENOUS at 07:40

## 2020-11-06 RX ADMIN — ROCURONIUM BROMIDE 50 MG: 10 INJECTION, SOLUTION INTRAVENOUS at 07:40

## 2020-11-06 RX ADMIN — LIDOCAINE HYDROCHLORIDE 5 ML: 10 INJECTION, SOLUTION EPIDURAL; INFILTRATION; INTRACAUDAL; PERINEURAL at 07:40

## 2020-11-06 RX ADMIN — DEXAMETHASONE SODIUM PHOSPHATE 4 MG: 4 INJECTION, SOLUTION INTRA-ARTICULAR; INTRALESIONAL; INTRAMUSCULAR; INTRAVENOUS; SOFT TISSUE at 07:40

## 2020-11-06 RX ADMIN — Medication 10 MG: at 08:22

## 2020-11-06 RX ADMIN — SODIUM CHLORIDE: 900 INJECTION, SOLUTION INTRAVENOUS at 07:31

## 2020-11-06 RX ADMIN — CLINDAMYCIN IN 5 PERCENT DEXTROSE 600 MG: 12 INJECTION, SOLUTION INTRAVENOUS at 07:45

## 2020-11-06 RX ADMIN — Medication 10 MG: at 08:17

## 2020-11-06 RX ADMIN — Medication 10 MG: at 08:38

## 2020-11-06 ASSESSMENT — PAIN - FUNCTIONAL ASSESSMENT
PAIN_FUNCTIONAL_ASSESSMENT: 0-10
PAIN_FUNCTIONAL_ASSESSMENT: 0-10

## 2020-11-06 NOTE — ANESTHESIOLOGIST PRE-PROCEDURE ATTESTATION
Pre-Procedure Patient Identification:  I am the Primary Anesthesiologist and have identified the patient on 11/06/20 at 7:07 AM.   I have confirmed the following procedure(s) IT Pain Pump Implant Medtronic will be performed by the following surgeon/proceduralist Omero Atkins MD.

## 2020-11-06 NOTE — ANESTHESIA POSTPROCEDURE EVALUATION
Patient: Bassam Craig    Procedure Summary     Date: 11/06/20 Room / Location: [facility] OR [address] [place] [address] OR [facility]    Anesthesia Start: 0731 Anesthesia Stop: 0905    Procedure: IT Pain Pump Implant Medtronic (N/A Abdomen) Diagnosis:       Post laminectomy syndrome      Spondylosis of lumbosacral region, unspecified spinal osteoarthritis complication status      (Post laminectomy syndrome, Lumbosacral spondylosis)    Surgeon: Omero Atkins MD Responsible Provider: Bre Atkins MD    Anesthesia Type: MAC ASA Status: 2          Anesthesia Type: MAC  PACU Vitals  11/6/2020 0901 - 11/6/2020 1001      11/6/2020  0904 11/6/2020  0915 11/6/2020  0920 11/6/2020  0930    BP:  (!) 143/76  (!) 149/86  (!) 149/85  (!) 149/87    Temp:  36.1 °C (97 °F)  --  --  --    Pulse:  90  85  80  79    Resp:  14  14  17  15    SpO2:  --  100 %  100 %  100 %              11/6/2020  0940 11/6/2020  0949          BP:  (!) 144/79  (!) 152/85      Temp:  36.1 °C (97 °F)  36.1 °C (97 °F)      Pulse:  79  75      Resp:  17  16      SpO2:  98 %  99 %              Anesthesia Post Evaluation    Pain management: adequate  Patient location during evaluation: PACU  Patient participation: complete - patient participated  Level of consciousness: awake and alert  Cardiovascular status: acceptable  Airway Patency: adequate  Respiratory status: acceptable  Hydration status: acceptable  Anesthetic complications: no

## 2020-11-06 NOTE — OP NOTE
[facility]  OPERATIVE REPORT      Date of Procedure: 2020   : 1975  Patient Name: Bassam Craig  Pre-op Diagnosis: Lumbar spondylosis; postlaminectomy syndrome     Post-op Diagnosis: Same  Procedure: Implantation intrathecal pump and intrathecal catheter with Fluoroscopic Guidance    Surgeon:  Omero Atkins MD  Anesthesia:             General anesthesia  Indication:  for treatment of intractable pain not relieved by conservative measures     Details of Procedure:   The entire procedure was performed by me.  Patient underwent general anesthesia intubated.  the patient was brought into the procedure room and placed in the decubitus position right side down position.   The patient’s back was prepped and draped in the usual sterile fashion.  All skin injection sites were anesthetized with 1% Lidocaine using a 25 gauge needle. Fluoroscopy was utilized in the AP view to identify the appropriate interlaminar space.  4 cm incision made slightly off midline on the left    A  Paramedian approach was taken.  The L1-2 level was targeted.  A target point was chosen over the superior aspect of the inferior lamina.  The spinal needle was directed towards the chosen target point until the needle was in contact with bone.  The needle was then directed superiorly into the ligamentum flavum.There was a yulisa loss of resistance.  We advanced until CSF was identified.  No paresthesia was elicited final needle position.  FreeFlow CSF was identified    An intrathecal catheter was threaded to the top of T9.  The needle move and the catheter was anchored with deployment of the v wing anchor.  The only anchor was tied to the paraspinal fascia lesion also cut 2 points.  FreeFlow CSF was was identified.  Attention made the abdomen slightly medial to the anterior axilla and below the 12th rib.  Incision was made of 5 cm and a horizontal fashion.  Dissected proximally 1 inch subcutaneous tissue  and pocket was created inferiorly.  Tunneling violet place in the back incision and tunneled subcutaneously between the 12th rib and iliac crest to the abdominal incision.  The piece of catheter was trimmed.  The catheter trimmed is 4.5 cm priming volume is 0.382 cc the sutures to connect was placed on a pump with a click dissertation is also placed on the catheter with a click prior to this FreeFlow CSF is identified out of catheter.  Pump was placed with tip identified at the 10 loculation the patient in 1 surgical be tied to the abdominal fascia layer excess cath was placed behind the pump.  Antibiotic irrigation used for both sites closure was made made of 3 layers of 3-0 Vicryl and  staples for the skin.  The adaptic  dressing and abdominal binder was placed.  I performed.  Motor and sensation intact afterwards    Still with morphine 1 mg/cc start a dose of 0.1 mg/day the low reservoir alarm date is 11/17/2021        The patient tolerated the procedure without any difficulty and was taken to the recovery room in a stable condition.     SPECIMENS:  None    Medtronics 8780; Medtronics TH 9 0 T0 1; 863 7-40 cc  EBL:   10 cc    Implant equipment; Medtronics pump and catheter    ________________________________  Omero Atkins MD

## 2020-11-06 NOTE — PERIOPERATIVE NURSING NOTE
Dr Atkins aware of patients status, ok for dc home.  [facility], all belongings returned, mother phoned for

## 2020-11-06 NOTE — PERIOPERATIVE NURSING NOTE
Pt ambulated to bathroom with assist and voided without difficulty.  Pt c/o discomfort at abd surgical site, denies back pain, denies any numbness or weakness in extrem x4.  Discharge instructions reviewed, verbalized understanding.

## 2020-11-06 NOTE — DISCHARGE INSTRUCTIONS
[facility]    Omero Atkins M.D.  [address]  [place] [address]  [phone]  Fax: [phone]    Omero Atkins M.D.       SPINAL CORD PUMP STIMULATOR IMPLANT Discharge Instructions    Case of your Incisions  Infection occurs 4% of the time after spinal cord stimulator surgery.  Follow these instructions carefully to minimize the risk of infection.    1. Removal of the outer bandages 24 hours after surgery.  (The white strips of tape on the incisions will fall off on their own)    2. Take a shower after you remove the bandages.  (Do not scrub the wounds.  Pat dry.)    3. Inspect incisions daily  (Slight drainage on the first day is common.  Place a dry gauze dressing on the wound if needed)    4. Wear the abdominal binder (corset) for the first week.  (Wear the binder over a clean T-shirt to keep wound dry)    5. Do not  Apply any lotion, cream, or ointment to the incisions  Soak in any body of water (pool, tube, ocean, lake)    6. Call your doctor if:  These symptoms may indicate a problem that needs immediate attention.               Significant drainage from the wound               Marked redness or swelling at incisions               You experience fever, chills               New numbness or weakness in your legs               New loss of bladder or bowel control               New difficulty passing urine               New headache, much worse when standing up               New increasing back or leg pain    Activity:  Activity restrictions are aimed at reducing the risk of stimulator wire migration.  Migration can lead to loss of stimulation in the areas of pain.  Lead migration may require additional surgery to correct.  The risk of lead migration is highest in the first 6 weeks following surgery.    1. Take it easy until you are seen in the office in 1 week.  2.  Activities to avoid:       Bending, twisting or overhead movements       High impact activities  (running, jumping, sports)       Sexual activity    Work:  At minimum, 1 week off is appropriate for most occupations.   Labor-intensive occupations may require up to 8 weeks of modified duty.    Driving:  Limit driving or being in the car until seen in office.  Turn off your stimulator before driving for 4 weeks.    Diet: No restrictions.  Resume your regular diet.    Aspirin:  Resume aspirin 7 days unless instructed otherwise.    Other blood thinners:  Specific instructions regarding the other blood thinners will be provided for you.   If not please do not restart until you clarify with your doctor.    Follow-up appointment:   If appointment was not made at the end of your operation, please call the office to schedule an appointment in 10 days to 2 weeks

## 2020-11-06 NOTE — ANESTHESIA PREPROCEDURE EVALUATION
Relevant Problems   MUSCULOSKELETAL   (+) Spondylosis of lumbar spine      NEUROLOGY   (+) Partial symptomatic epilepsy with complex partial seizures, not intractable, without status epilepticus (CMS/HCC)      RESPIRATORY SYSTEM   (+) Obstructive sleep apnea on CPAP       Anesthesia ROS/MED HX    Anesthesia History    Previous anesthetics  Pulmonary    Sleep apnea and CPAP Compliant  Neuro/Psych    seizures  Cardiovascular   Covid19 Test Reviewed and ECG reviewed   Normal ECG    Musculoskeletal  Chronic musculoskeletal pain  Endo/Other  Body Habitus: Obese  ROS/MED HX Comments:    Musculoskeletal: Ankylosing spondylitis        Past Surgical History:   Procedure Laterality Date   • RHINOPLASTY     • SPINAL CORD STIMULATOR IMPLANT     • TONSILLECTOMY         Physical Exam    Airway   Mallampati: II   TM distance: >3 FB   Neck ROM: full  Cardiovascular - normal   Rhythm: regular   Rate: normalPulmonary - normal   clear to auscultation  Dental    Teeth Problems: caps        Anesthesia Plan    Plan: general    Technique: MAC     Airway: natural airway / supplemental oxygen   ASA 2  Anesthetic plan and risks discussed with: patient  Induction:    intravenous   Postop Plan:   Patient Disposition: phase II then home   Pain Management: IV analgesics

## 2020-12-29 ENCOUNTER — OFFICE VISIT (OUTPATIENT)
Dept: SLEEP CENTER | Facility: CLINIC | Age: 45
End: 2020-12-29
Payer: MEDICARE

## 2020-12-29 VITALS
HEIGHT: 72 IN | DIASTOLIC BLOOD PRESSURE: 84 MMHG | WEIGHT: 236.4 LBS | BODY MASS INDEX: 32.02 KG/M2 | HEART RATE: 87 BPM | SYSTOLIC BLOOD PRESSURE: 120 MMHG

## 2020-12-29 DIAGNOSIS — G47.411 NARCOLEPSY WITH CATAPLEXY: ICD-10-CM

## 2020-12-29 DIAGNOSIS — G47.33 OBSTRUCTIVE SLEEP APNEA ON CPAP: Primary | ICD-10-CM

## 2020-12-29 DIAGNOSIS — G40.209 PARTIAL SYMPTOMATIC EPILEPSY WITH COMPLEX PARTIAL SEIZURES, NOT INTRACTABLE, WITHOUT STATUS EPILEPTICUS (HCC): ICD-10-CM

## 2020-12-29 DIAGNOSIS — Z99.89 OBSTRUCTIVE SLEEP APNEA ON CPAP: Primary | ICD-10-CM

## 2020-12-29 DIAGNOSIS — G89.4 CHRONIC PAIN SYNDROME: ICD-10-CM

## 2020-12-29 PROCEDURE — 99215 OFFICE O/P EST HI 40 MIN: CPT | Performed by: PSYCHIATRY & NEUROLOGY

## 2020-12-29 RX ORDER — LEVETIRACETAM 250 MG/1
250 TABLET ORAL DAILY
Qty: 30 TABLET | Refills: 5 | Status: SHIPPED | OUTPATIENT
Start: 2020-12-29 | End: 2021-05-28 | Stop reason: SDUPTHER

## 2020-12-29 RX ORDER — FLUTICASONE PROPIONATE 50 MCG
1 SPRAY, SUSPENSION (ML) NASAL 2 TIMES DAILY
COMMUNITY

## 2020-12-29 RX ORDER — VENLAFAXINE 37.5 MG/1
37.5 TABLET ORAL DAILY
Qty: 30 TABLET | Refills: 5 | Status: SHIPPED | OUTPATIENT
Start: 2020-12-29 | End: 2021-05-28 | Stop reason: SDUPTHER

## 2020-12-29 RX ORDER — VENLAFAXINE 37.5 MG/1
37.5 TABLET ORAL DAILY
COMMUNITY
End: 2020-12-29 | Stop reason: SDUPTHER

## 2020-12-29 RX ORDER — TIZANIDINE 4 MG/1
2 TABLET ORAL EVERY 6 HOURS PRN
COMMUNITY
End: 2020-12-29 | Stop reason: DRUGHIGH

## 2020-12-29 RX ORDER — LEVETIRACETAM 250 MG/1
250 TABLET ORAL DAILY
COMMUNITY
End: 2020-12-29 | Stop reason: SDUPTHER

## 2020-12-30 ENCOUNTER — TELEPHONE (OUTPATIENT)
Dept: SLEEP CENTER | Facility: CLINIC | Age: 45
End: 2020-12-30

## 2021-01-15 ENCOUNTER — TELEPHONE (OUTPATIENT)
Dept: SLEEP CENTER | Facility: CLINIC | Age: 46
End: 2021-01-15

## 2021-01-15 NOTE — TELEPHONE ENCOUNTER
Patient left message, states he had stopped his PAP, and he is feeling the same  The only difference is that when he was using his PAP he would wake up "hot", now when he is not using his PAP, he wakes up in "sweats"  He is not sure if this is something that needs to be addressed  Please advise

## 2021-02-01 NOTE — TELEPHONE ENCOUNTER
Recurrent events of apnea through thr night can be associated with nighttime sweating  If there is not evidence of abnormal breathing or loud snoring he may have to adjust his sleeping environment  Ask him how he would feel about having a Home Sleep Test  That will give us a good idea of whether he still has STEPHANIE

## 2021-02-05 DIAGNOSIS — G47.33 OBSTRUCTIVE SLEEP APNEA ON CPAP: Primary | ICD-10-CM

## 2021-02-05 DIAGNOSIS — Z99.89 OBSTRUCTIVE SLEEP APNEA ON CPAP: Primary | ICD-10-CM

## 2021-02-05 NOTE — TELEPHONE ENCOUNTER
I spoke with patient, advised above  He is interested in completing home sleep study  Dr Floresita Longoria, can you please place order?

## 2021-02-08 NOTE — TELEPHONE ENCOUNTER
Spoke with patient, home study ordered [facility]

## 2021-03-10 ENCOUNTER — HOSPITAL ENCOUNTER (OUTPATIENT)
Dept: SLEEP CENTER | Facility: CLINIC | Age: 46
Discharge: HOME | End: 2021-03-10
Payer: MEDICARE

## 2021-03-10 DIAGNOSIS — Z99.89 OBSTRUCTIVE SLEEP APNEA ON CPAP: ICD-10-CM

## 2021-03-10 DIAGNOSIS — G47.33 OBSTRUCTIVE SLEEP APNEA ON CPAP: ICD-10-CM

## 2021-03-10 PROCEDURE — G0399 HOME SLEEP TEST/TYPE 3 PORTA: HCPCS

## 2021-03-11 NOTE — PROGRESS NOTES
Home Sleep Study Documentation    Pre-Sleep Home Study:    Set-up and instructions performed by:  ALISA Rider    Technician performed demonstration for Patient: yes    Return demonstration performed by Patient: yes    Written instructions provided to Patient: yes    Patient signed consent form: yes        Post-Sleep Home Study:    Additional comments by Patient: none    Home Sleep Study Failed:no:    Failure reason: N/A    Reported or Detected: N/A    Scored by: PAVEL Boyd RPSGT

## 2021-03-12 ENCOUNTER — TELEPHONE (OUTPATIENT)
Dept: SLEEP CENTER | Facility: CLINIC | Age: 46
End: 2021-03-12

## 2021-03-16 NOTE — TELEPHONE ENCOUNTER
Solis Schwartz, CRNP  You 19 hours ago (1:21 PM)     Mild sleep apnea was noted on the home sleep study  He had 6 7 events per hour during the home study and only 1 6 per hour on his last CPAP compliance from December   Continued use of PAP therapy is recommended  Constantino Bergchure is a current supply order   Recommend making sure supplies are new and restarting therapy with CPAP at 8cm, as it was previously set  Mehnaz Can may consider decreasing the tubing heat to 0   The decrease in heat may allow some condensation to build up in the tubing, but he could decrease the humidity of the tubing to 3 or 4, which should help with that   If he is not sure how to change these settings on his equipment, he can call [place] and they can assist him     Thanks,  Campbell Hernandez

## 2021-03-23 NOTE — TELEPHONE ENCOUNTER
Spoke with patient, advised Farida's recommendations      Patient starts he will start to use CPAP again, scheduled compliance follow up for 5/28/2021

## 2021-05-03 NOTE — ED PROVIDER NOTES
History  Chief Complaint   Patient presents with    Bee Sting     "I went to take my nighttime pill and I couldn't swallow it"  Pt states stung by bees about a half hour ago   "never been stung before "    Chest Pain     Pt states some chest pain follow bee sting  Patient is a 42 y/o M that presents to the ED after being stung by a bee 1 hour ago  He states he tried to swallow his night time meds, but had difficulty and feels like his throat is swollen  He does not have a rash, but states he has burning and itching to his legs and left arm  He states he was stung to both legs and left arm  He applied baking soda and vinegar to the stings  He does have an epi pen at home, but did not use it  He states he was never stung by a bee before  Patient also c/o chest pain that started 20 minutes ago  He states it is a sharp stabbing pain, no radiation of pain  History provided by:  Patient  Insect Bite   Contact animal:  Insect  Location:  Leg and shoulder/arm  Shoulder/arm injury location:  L arm  Leg injury location:  L lower leg and R lower leg  Time since incident:  1 hour  Pain details:     Quality:  Burning    Severity:  Mild    Timing:  Constant  Incident location:  Outside  Tetanus status:  Up to date  Relieved by:  Nothing  Worsened by:  Nothing  Ineffective treatments:  None tried  Associated symptoms: swelling    Associated symptoms: no fever, no numbness and no rash        Prior to Admission Medications   Prescriptions Last Dose Informant Patient Reported? Taking?    HYDROcodone-acetaminophen (NORCO) 7 5-325 mg per tablet   Yes No   Sig: Take 1 tablet by mouth every 8 (eight) hours as needed for pain   LINZESS 145 MCG CAPS Past Week at Unknown time  Yes Yes   Si (one) Capsule daily on an empty stomach, at least 30 mn before first meal   amLODIPine (NORVASC) 2 5 mg tablet Not Taking at Unknown time  Yes No   cloNIDine (CATAPRES) 0 1 mg tablet   Yes No   Si (one) Tablet three times Patient states that [facility] told her that she would need the office to call them at [phone] for the PA to be approved faster for the [address]  Please advise daily, as needed   fluticasone (VERAMYST) 27 5 MCG/SPRAY nasal spray   Yes No   Sig: into each nostril   gabapentin (NEURONTIN) 300 mg capsule   Yes No   Sig: Take by mouth   levETIRAcetam (KEPPRA) 250 mg tablet 8/31/2019 at Unknown time  No Yes   Sig: Take 1 tablet (250 mg total) by mouth daily at bedtime   oxyCODONE (OXYCONTIN) 20 mg 12 hr tablet 9/1/2019 at Unknown time  Yes Yes   Sig: 10 mg OxyCONTIN 20 MG TB12 take 1 Po TID  Refills: 0  Active    oxyCODONE (ROXICODONE) 30 MG immediate release tablet   Yes No   Sig: 15 mg 4 (four) times a day OxyCODONE HCl TABS (15 mg) take 1 tab po qid  Refills: 0  Active    oxyCODONE (ROXICODONE) 5 mg immediate release tablet   Yes No   Sig: Take 5 mg by mouth 2 (two) times a day as needed   oxyCODONE-acetaminophen (PERCOCET)  mg per tablet   Yes No   Sig: Take 1 tablet by mouth 2 (two) times a day   tiZANidine (ZANAFLEX) 2 mg tablet 9/1/2019 at Unknown time  No Yes   Sig: Take 2 tablets (4 mg total) by mouth 4 (four) times a day as needed for muscle spasms   venlafaxine (EFFEXOR-XR) 37 5 mg 24 hr capsule 9/1/2019 at Unknown time  No Yes   Sig: Take 1 capsule (37 5 mg total) by mouth daily      Facility-Administered Medications: None       Past Medical History:   Diagnosis Date    Arthritis     Chronic pain     DJD (degenerative joint disease)     Herniated disc, cervical     Narcolepsy     Seizures (HCC)     Sleep apnea     Sleep apnea        Past Surgical History:   Procedure Laterality Date    IMPLANTATION / PLACEMENT EPIDURAL NEUROSTIMULATOR ELECTRODES      RHINOPLASTY      TONSILLECTOMY         History reviewed  No pertinent family history  I have reviewed and agree with the history as documented  Social History     Tobacco Use    Smoking status: Never Smoker    Smokeless tobacco: Never Used   Substance Use Topics    Alcohol use: No    Drug use: No        Review of Systems   Constitutional: Negative for chills and fever     HENT: Positive for trouble swallowing  Negative for facial swelling and voice change  Respiratory: Positive for shortness of breath  Negative for cough  Cardiovascular: Negative for chest pain and leg swelling  Gastrointestinal: Negative for abdominal pain  Skin: Negative for color change and rash  Neurological: Negative for dizziness, weakness and numbness  Psychiatric/Behavioral: Negative for confusion  All other systems reviewed and are negative  Physical Exam  Physical Exam   Constitutional: He is oriented to person, place, and time  He appears well-developed and well-nourished  He is cooperative  He does not appear ill  No distress  HENT:   Head: Normocephalic and atraumatic  Right Ear: Hearing normal    Left Ear: Hearing normal    Nose: Nose normal    Mouth/Throat: Oropharynx is clear and moist  Uvula swelling present  Tongue swelling present  Eyes: Conjunctivae are normal    Neck: Normal range of motion  Cardiovascular: Normal rate, regular rhythm and normal heart sounds  No murmur heard  Pulmonary/Chest: Effort normal  He has decreased breath sounds  He has no wheezes  He has no rhonchi  He has no rales  Musculoskeletal: Normal range of motion  He exhibits no edema or deformity  Neurological: He is alert and oriented to person, place, and time  He has normal strength  No sensory deficit  Skin: Skin is warm and dry  No rash noted  He is not diaphoretic  No pallor  Psychiatric: He has a normal mood and affect  His speech is normal  Cognition and memory are normal    Nursing note and vitals reviewed        Vital Signs  ED Triage Vitals [09/01/19 2010]   Temperature Pulse Respirations Blood Pressure SpO2   (!) 97 2 °F (36 2 °C) 86 16 156/87 98 %      Temp Source Heart Rate Source Patient Position - Orthostatic VS BP Location FiO2 (%)   Tympanic Monitor Lying Left arm --      Pain Score       9           Vitals:    09/01/19 2010   BP: 156/87   Pulse: 86   Patient Position - Orthostatic VS: Lying         Visual Acuity      ED Medications  Medications   EPINEPHrine PF (ADRENALIN) 1 mg/mL injection 0 3 mg (0 3 mg Intramuscular Given 9/1/19 2022)   diphenhydrAMINE (BENADRYL) injection 25 mg (25 mg Intravenous Given 9/1/19 2029)   famotidine (PEPCID) injection 20 mg (20 mg Intravenous Given 9/1/19 2028)   methylPREDNISolone sodium succinate (Solu-MEDROL) injection 125 mg (125 mg Intravenous Given 9/1/19 2025)       Diagnostic Studies  Results Reviewed     Procedure Component Value Units Date/Time    Troponin I [[id number]]  (Normal) Collected:  09/01/19 2030    Lab Status:  Final result Specimen:  Blood from Arm, Left Updated:  09/01/19 2100     Troponin I <0 02 ng/mL     Comprehensive metabolic panel [[id number]] Collected:  09/01/19 2030    Lab Status:  Final result Specimen:  Blood from Arm, Left Updated:  09/01/19 2058     Sodium 143 mmol/L      Potassium 3 6 mmol/L      Chloride 105 mmol/L      CO2 28 mmol/L      ANION GAP 10 mmol/L      BUN 12 mg/dL      Creatinine 0 96 mg/dL      Glucose 111 mg/dL      Calcium 9 0 mg/dL      AST 15 U/L      ALT 25 U/L      Alkaline Phosphatase 83 U/L      Total Protein 7 6 g/dL      Albumin 3 8 g/dL      Total Bilirubin 0 40 mg/dL      eGFR 98 ml/min/1 73sq m     Narrative:       [place] guidelines for Chronic Kidney Disease (CKD):     Stage 1 with normal or high GFR (GFR > 90 mL/min/1 73 square meters)    Stage 2 Mild CKD (GFR = 60-89 mL/min/1 73 square meters)    Stage 3A Moderate CKD (GFR = 45-59 mL/min/1 73 square meters)    Stage 3B Moderate CKD (GFR = 30-44 mL/min/1 73 square meters)    Stage 4 Severe CKD (GFR = 15-29 mL/min/1 73 square meters)    Stage 5 End Stage CKD (GFR <15 mL/min/1 73 square meters)  Note: GFR calculation is accurate only with a steady state creatinine    CBC and differential [[id number]]  (Abnormal) Collected:  09/01/19 2030    Lab Status:  Final result Specimen:  Blood from Arm, Left Updated:  09/01/19 2041 WBC 13 19 Thousand/uL      RBC 5 03 Million/uL      Hemoglobin 14 6 g/dL      Hematocrit 43 5 %      MCV 87 fL      MCH 29 0 pg      MCHC 33 6 g/dL      RDW 12 6 %      MPV 9 2 fL      Platelets 356 Thousands/uL      nRBC 0 /100 WBCs      Neutrophils Relative 50 %      Immat GRANS % 0 %      Lymphocytes Relative 37 %      Monocytes Relative 9 %      Eosinophils Relative 3 %      Basophils Relative 1 %      Neutrophils Absolute 6 82 Thousands/µL      Immature Grans Absolute 0 03 Thousand/uL      Lymphocytes Absolute 4 81 Thousands/µL      Monocytes Absolute 1 13 Thousand/µL      Eosinophils Absolute 0 33 Thousand/µL      Basophils Absolute 0 07 Thousands/µL                  XR chest 2 views   ED Interpretation by Octavio Renee PA-C (09/01 2143)   No acute abnormality  Procedures  ECG 12 Lead Documentation Only  Date/Time: 9/1/2019 8:26 PM  Performed by: Octavio Renee PA-C  Authorized by: Octavio Renee PA-C     Indications / Diagnosis:  Chest pain  ECG reviewed by me, the ED Provider: yes    Patient location:  ED  Previous ECG:     Previous ECG:  Compared to current    Similarity:  No change  Rate:     ECG rate:  80  Rhythm:     Rhythm: sinus rhythm    Conduction:     Conduction: normal    ST segments:     ST segments:  Normal  T waves:     T waves: normal             ED Course  ED Course as of Sep 01 2155   Sun Sep 01, 2019   2150 Patient feeling better  Lungs CTA, swelling improved               HEART Risk Score      Most Recent Value   History  0 Filed at: 09/01/2019 2106   ECG  0 Filed at: 09/01/2019 2106   Age  0 Filed at: 09/01/2019 2106   Risk Factors  1 Filed at: 09/01/2019 2106   Troponin  0 Filed at: 09/01/2019 2106   Heart Score Risk Calculator   History  0 Filed at: 09/01/2019 2106   ECG  0 Filed at: 09/01/2019 2106   Age  0 Filed at: 09/01/2019 2106   Risk Factors  1 Filed at: 09/01/2019 2106   Troponin  0 Filed at: 09/01/2019 2106   HEART Score  1 Filed at: 09/01/2019 2106   HEART Score  1 Filed at: 09/01/2019 2106                            [facility]  Number of Diagnoses or Management Options  Acute allergic reaction: new and does not require workup     Amount and/or Complexity of Data Reviewed  Clinical lab tests: ordered and reviewed    Patient Progress  Patient progress: improved      Disposition  Final diagnoses:   Acute allergic reaction     Time reflects when diagnosis was documented in both MDM as applicable and the Disposition within this note     Time User Action Codes Description Comment    9/1/2019  9:52 PM Chad Hall  [id number] Acute allergic reaction       ED Disposition     ED Disposition Condition Date/Time Comment    Discharge Stable Sun Sep 1, 2019  9:52 PM Edanna Martin discharge to home/self care  Follow-up Information     Follow up With Specialties Details Why Terrell Moya MD  Call in 1 day For recheck [id number]  [facility] [place] [phone]            Patient's Medications   Discharge Prescriptions    EPINEPHRINE (EPIPEN) 0 3 MG/0 3 ML SOAJ    Inject 0 3 mL (0 3 mg total) into a muscle once for 1 dose       Start Date: 9/1/2019  End Date: 9/1/2019       Order Dose: 0 3 mg       Quantity: 0 6 mL    Refills: 0    METHYLPREDNISOLONE (MEDROL) 4 MG TABLET    Medrol dose pack:  Take as directed  Start Date: 9/2/2019  End Date: --       Order Dose: --       Quantity: 21 tablet    Refills: 0     No discharge procedures on file      ED Provider  Electronically Signed by           Keshia Holcomb PA-C  09/01/19 6639

## 2021-05-19 ENCOUNTER — TRANSCRIBE ORDERS (OUTPATIENT)
Dept: PHYSICAL THERAPY | Facility: CLINIC | Age: 46
End: 2021-05-19

## 2021-05-19 ENCOUNTER — EVALUATION (OUTPATIENT)
Dept: PHYSICAL THERAPY | Facility: CLINIC | Age: 46
End: 2021-05-19
Payer: MEDICARE

## 2021-05-19 DIAGNOSIS — G89.29 CHRONIC BILATERAL LOW BACK PAIN WITH BILATERAL SCIATICA: Primary | ICD-10-CM

## 2021-05-19 DIAGNOSIS — M54.42 CHRONIC BILATERAL LOW BACK PAIN WITH BILATERAL SCIATICA: Primary | ICD-10-CM

## 2021-05-19 DIAGNOSIS — M54.41 CHRONIC BILATERAL LOW BACK PAIN WITH BILATERAL SCIATICA: Primary | ICD-10-CM

## 2021-05-19 PROCEDURE — 97162 PT EVAL MOD COMPLEX 30 MIN: CPT | Performed by: PHYSICAL THERAPIST

## 2021-05-19 NOTE — LETTER
May 19, 2021    Leigh Moran MD  [address] [facility] [address]    Patient: Rafiq Paige   YOB: 1975   Date of Visit: 2021     Encounter Diagnosis     ICD-10-CM    1  Chronic bilateral low back pain with bilateral sciatica  M54 42     M54 41     G89 29        Dear Dr Leonel Huggins: Thank you for your recent referral of Rafiq Paige  Please review the attached evaluation summary from Asher's recent visit  Please verify that you agree with the plan of care by signing the attached order  If you have any questions or concerns, please do not hesitate to call  I sincerely appreciate the opportunity to share in the care of one of your patients and hope to have another opportunity to work with you in the near future  Sincerely,    Tari Vazquez, PT      Referring Provider:      I certify that I have read the below Plan of Care and certify the need for these services furnished under this plan of treatment while under my care  Leigh Moran MD  [address] [place] [address]  Via Fax: [phone]          PT Evaluation     Today's date: 2021  Patient name: Rafiq Paige  : 1975  MRN: [id number]  Referring provider: Matthew Chairez MD  Dx:   Encounter Diagnosis     ICD-10-CM    1  Chronic bilateral low back pain with bilateral sciatica  M54 42     M54 41     G89 29        Start Time: 1445  Stop Time: 1530  Total time in clinic (min): 45 minutes    Assessment/Plan     This patient presents w/ chronic back pain / BLE pain  He exhibits decreased trunk and hip ROM, decreased core strength, impaired function due to pain  Significant co-morbidities:  psoriatic arthritis, chronic pain  This patient is a good candidate for skilled physical therapy to reduce pain and improve function    Interventions:  manual therapy, ROM, stretching, strengthening, gait and balance training, therapeutic activities, neuromuscular re-ed and instruction in HEP  Frequency and duration: 2 x/week for  6-8 weeks    STGs: 4 weeks  1  Increase trunk flexion and extension AROM by 25-50%  2  Decrease pain 2 levels  3  Increase AROM R hip flexion to 110 degrees  4  Increase AROM R hip extension to -10 degrees                    LTGs: 6-8 weeks  1  Independent HEP  2  Increase core strength:  (-) [place]'s sign  3  Increase FOTO score to predicted level  4  Able to perform usual daily activities with pain level 0-3/10          Subjective  This is a 39 y o  male who reports onset of symptoms January, 2008  Was sitting in a movie seat that broke  States that he has herniated discs in the c-spine and L-spine  States that he was unable to have therapy until now due to complications w/ psoriatic arthritis and ankylosing spondylitis  Has morphine pump implanted November 6, 2020  Current symptoms:  Stiffness, random numbness/ pain throughout lower back and LE's - entire leg, bilateral, c/o neck pain  Aggravating factors: any prolonged position or activity  Relieving factors:  Change in position or activity  Previous treatment: PT, massage, chiropractic but nothing recent    Dx tests:  X-rays - per patient calcification, and misalignment of lower spine  Occupation:  Not employed  Leisure: walks dog    Patients goal: increase flexibility in the spine, make it easier to put shoes on,  things from the floor    Objective  Pain scale: 3-5/10    Trunk ROM     Flexion AROM   Min limitation           Extension  AROM   Severe limitation          R lateral flexion AROM        WNL     L lateral flexion AROM         WNL                     (+) [place]'s sign    Special tests:    PSLR  R 60 degrees (-);  L 60 degrees (-)    Hip Scour (-)    Neural tension tests:  Slump: increased neural tension R    Palpation:   slight tenderness B lumbar paraspinals/ QL; (-) pain w/ lumbar PA's; no midline spine tenderness    Reflexes:   Patella unable to elicit   Achilles R unable  To elicit; L 2+    Sensation:  LT intact BLE    Hip                   R  AROM  L      R  PROM   L    R  Strength  L  Flexion 90           111 107          112 5/5 5/5   Extension -20         -10  5/5 5/5   Abd   5/5 5/5   Add   5/5 5/5    ER   5/5 5/5   IR   5/5 4/5     Knee   AROM  PROM  Strength  Flexion   4+/5      4+/5   Extension   5/5 5/5     Ankle   AROM  PROM  Strength  DF   5/5 5/5   PF      Inversion   5/5 5/5   Eversion   5/5 5/5                  Precautions: none  PMH: patient  reports ankylosing spondylitis, psoriatic arthritis, narcolepsy, siezures, morphine pump, dorsal column stimulator    Daily Treatment Record    Manuals 5/19                                                                Neuro Re-Ed             Pelvic tilts AROM seated             Quadruped hip hip on pad             Bird dog w/ pad             TB anti-rotation                                                    Ther Ex             Cat camel             Seated flexion stretch             HS stretch seated             Hip flexor stretch - tatyana CEBALLOS knee to chest w/ pball                                                    Ther Activity                                       Gait Training                                       Modalities

## 2021-05-19 NOTE — PROGRESS NOTES
PT Evaluation     Today's date: 2021  Patient name: Irina Newton  : 1975  MRN: [id number]  Referring provider: Kizzy Garcia MD  Dx:   Encounter Diagnosis     ICD-10-CM    1  Chronic bilateral low back pain with bilateral sciatica  M54 42     M54 41     G89 29        Start Time: 1445  Stop Time: 1530  Total time in clinic (min): 45 minutes    Assessment/Plan     This patient presents w/ chronic back pain / BLE pain  He exhibits decreased trunk and hip ROM, decreased core strength, impaired function due to pain  Significant co-morbidities:  psoriatic arthritis, chronic pain  This patient is a good candidate for skilled physical therapy to reduce pain and improve function  Interventions:  manual therapy, ROM, stretching, strengthening, gait and balance training, therapeutic activities, neuromuscular re-ed and instruction in HEP  Frequency and duration: 2 x/week for  6-8 weeks    STGs: 4 weeks  1  Increase trunk flexion and extension AROM by 25-50%  2  Decrease pain 2 levels  3  Increase AROM R hip flexion to 110 degrees  4  Increase AROM R hip extension to -10 degrees                    LTGs: 6-8 weeks  1  Independent HEP  2  Increase core strength:  (-) [place]'s sign  3  Increase FOTO score to predicted level  4  Able to perform usual daily activities with pain level 0-3/10          Subjective  This is a 39 y o  male who reports onset of symptoms   Was sitting in a movie seat that broke  States that he has herniated discs in the c-spine and L-spine  States that he was unable to have therapy until now due to complications w/ psoriatic arthritis and ankylosing spondylitis   Has morphine pump implanted 2020  Current symptoms:  Stiffness, random numbness/ pain throughout lower back and LE's - entire leg, bilateral, c/o neck pain  Aggravating factors: any prolonged position or activity  Relieving factors:  Change in position or activity  Previous treatment: PT, massage, chiropractic but nothing recent    Dx tests:  X-rays - per patient calcification, and misalignment of lower spine  Occupation:  Not employed  Leisure: walks dog    Patients goal: increase flexibility in the spine, make it easier to put shoes on,  things from the floor    Objective  Pain scale: 3-5/10    Trunk ROM     Flexion AROM   Min limitation           Extension  AROM   Severe limitation          R lateral flexion AROM        WNL     L lateral flexion AROM         WNL                     (+) [place]'s sign    Special tests:    PSLR  R 60 degrees (-);  L 60 degrees (-)    Hip Scour (-)    Neural tension tests:  Slump: increased neural tension R    Palpation:   slight tenderness B lumbar paraspinals/ QL; (-) pain w/ lumbar PA's; no midline spine tenderness    Reflexes:   Patella unable to elicit   Achilles R unable  To elicit; L 2+    Sensation:  LT intact BLE    Hip                   R  AROM  L      R  PROM   L    R  Strength  L  Flexion 90           111 107          112 5/5           5/5   Extension -20         -10  5/5           5/5   Abd   5/5           5/5   Add   5/5          5/5    ER   5/5          5/5   IR   5/5          4/5     Knee   AROM  PROM  Strength  Flexion   4+/5      4+/5   Extension   5/5        5/5     Ankle   AROM  PROM  Strength  DF   5/5         5/5   PF      Inversion   5/5         5/5   Eversion   5/5         5/5                  Precautions: none  PMH: patient  reports ankylosing spondylitis, psoriatic arthritis, narcolepsy, siezures, morphine pump, dorsal column stimulator    Daily Treatment Record    Manuals 5/19                                                                Neuro Re-Ed             Pelvic tilts AROM seated             Quadruped hip hip on pad             Bird dog w/ pad             TB anti-rotation                                                    Ther Ex             Cat camel             Seated flexion stretch             HS stretch seated             Hip flexor stretch - tatyana             B knee to chest w/ pball                                                    Ther Activity                                       Gait Training                                       Modalities

## 2021-05-24 ENCOUNTER — OFFICE VISIT (OUTPATIENT)
Dept: PHYSICAL THERAPY | Facility: CLINIC | Age: 46
End: 2021-05-24
Payer: MEDICARE

## 2021-05-24 DIAGNOSIS — M54.41 CHRONIC BILATERAL LOW BACK PAIN WITH BILATERAL SCIATICA: Primary | ICD-10-CM

## 2021-05-24 DIAGNOSIS — M54.42 CHRONIC BILATERAL LOW BACK PAIN WITH BILATERAL SCIATICA: Primary | ICD-10-CM

## 2021-05-24 DIAGNOSIS — G89.29 CHRONIC BILATERAL LOW BACK PAIN WITH BILATERAL SCIATICA: Primary | ICD-10-CM

## 2021-05-24 PROCEDURE — 97110 THERAPEUTIC EXERCISES: CPT | Performed by: PHYSICAL THERAPIST

## 2021-05-24 NOTE — PROGRESS NOTES
Daily Note     Today's date: 2021  Patient name: Malina Benz  : 1975  MRN: [id number]  Referring provider: Anam Mayes MD  Dx:   Encounter Diagnosis     ICD-10-CM    1  Chronic bilateral low back pain with bilateral sciatica  M54 42     M54 41     G89 29        Start Time: 1217  Stop Time: 1300  Total time in clinic (min): 43 minutes    Subjective: reportts no change; back always hurts      Objective: See treatment diary below      Assessment: Initiated exercises this visit  Tolerated treatment fair  Stopped seated flexion stretch p 5 reps because "it was screwing with my head"  Otherwise performed all exercises without complaints  Will continue to monitor exercise tolerance  Patient would benefit from continued PT      Plan: Continue per plan of care    progress as tolerated to increase lumbar flexibility, work on functional mobility such as ability to bend,  objects     Precautions: none  PMH: patient  reports ankylosing spondylitis, psoriatic arthritis, narcolepsy, siezures, morphine pump, dorsal column stimulator    Daily Treatment Record    Manuals                                                                Neuro Re-Ed             Pelvic tilts AROM seated  10x           Quadruped hip hip on pad             Bird dog w/ pad             TB anti-rotation  BTB 20x           1/2 kneel to full kneel                                       Ther Ex             bike  8'           Cat camel             Seated flexion stretch  5x10"           HS stretch seated  1 min ea           Hip flexor stretch - tatyana  30"x3 ea           B knee to chest              Mini squat             lunge                          Ther Activity                                       Gait Training                                       Modalities

## 2021-05-26 ENCOUNTER — OFFICE VISIT (OUTPATIENT)
Dept: PHYSICAL THERAPY | Facility: CLINIC | Age: 46
End: 2021-05-26
Payer: MEDICARE

## 2021-05-26 DIAGNOSIS — G89.29 CHRONIC BILATERAL LOW BACK PAIN WITH BILATERAL SCIATICA: Primary | ICD-10-CM

## 2021-05-26 DIAGNOSIS — M54.41 CHRONIC BILATERAL LOW BACK PAIN WITH BILATERAL SCIATICA: Primary | ICD-10-CM

## 2021-05-26 DIAGNOSIS — M54.42 CHRONIC BILATERAL LOW BACK PAIN WITH BILATERAL SCIATICA: Primary | ICD-10-CM

## 2021-05-26 PROCEDURE — 97110 THERAPEUTIC EXERCISES: CPT | Performed by: PHYSICAL THERAPIST

## 2021-05-26 PROCEDURE — 97112 NEUROMUSCULAR REEDUCATION: CPT | Performed by: PHYSICAL THERAPIST

## 2021-05-26 NOTE — PROGRESS NOTES
Daily Note     Today's date: 2021  Patient name: Christian Frank  : 1975  MRN: [id number]  Referring provider: Beryle Ide, MD  Dx:   Encounter Diagnosis     ICD-10-CM    1  Chronic bilateral low back pain with bilateral sciatica  M54 42     M54 41     G89 29        Start Time: 1100  Stop Time: 1143  Total time in clinic (min): 43 minutes    Subjective: reports no changes; states his legs are feeling wierd today      Objective: See treatment diary below      Assessment:  Tolerated treatment fair  Attempted squats w/ KB  But was unable to hip hinge and maintain stable lumbar spine  Attempted hip hinge in sitting and patient exhibited good form which he was able to carry over th sit<-> stand  Very challenged by full kneel to 1/2 kneel  Patient would benefit from continued PT      Plan: Continue per plan of care    progress as tolerated to increase lumbar flexibility, work on functional mobility such as ability to bend,  objects     Precautions: none  PMH: patient  reports ankylosing spondylitis, psoriatic arthritis, narcolepsy, siezures, morphine pump, dorsal column stimulator  Your Access Code: [email or web address]   POC expires:   Daily Treatment Record    Manuals                                                               Neuro Re-Ed             Pelvic tilts AROM seated  10x 15x          Quadruped hip hip on pad             Bird dog w/ pad             TB anti-rotation  BTB 20x BTB 10x2          1/2 kneel to full kneel   5x ea          Hip hinge seated   10x          Sit<-> stand w/ hip hinge   10x          Ther Ex             bike  8' 8'          Cat camel             Seated flexion stretch  5x10"           HS stretch seated  1 min ea 1 min ea          Hip flexor stretch - tatyana  30"x3 ea 30"x3          B knee to chest              Mini squat             lunge                          Ther Activity                                       Gait Training Modalities

## 2021-05-28 ENCOUNTER — OFFICE VISIT (OUTPATIENT)
Dept: SLEEP CENTER | Facility: CLINIC | Age: 46
End: 2021-05-28
Payer: MEDICARE

## 2021-05-28 VITALS
BODY MASS INDEX: 31.69 KG/M2 | HEIGHT: 72 IN | WEIGHT: 234 LBS | SYSTOLIC BLOOD PRESSURE: 126 MMHG | DIASTOLIC BLOOD PRESSURE: 88 MMHG

## 2021-05-28 DIAGNOSIS — G47.33 OBSTRUCTIVE SLEEP APNEA TREATED WITH CONTINUOUS POSITIVE AIRWAY PRESSURE (CPAP): Primary | ICD-10-CM

## 2021-05-28 DIAGNOSIS — Z99.89 OBSTRUCTIVE SLEEP APNEA TREATED WITH CONTINUOUS POSITIVE AIRWAY PRESSURE (CPAP): Primary | ICD-10-CM

## 2021-05-28 DIAGNOSIS — G47.411 NARCOLEPSY WITH CATAPLEXY: ICD-10-CM

## 2021-05-28 DIAGNOSIS — G40.209 PARTIAL SYMPTOMATIC EPILEPSY WITH COMPLEX PARTIAL SEIZURES, NOT INTRACTABLE, WITHOUT STATUS EPILEPTICUS (HCC): ICD-10-CM

## 2021-05-28 PROCEDURE — 99214 OFFICE O/P EST MOD 30 MIN: CPT | Performed by: NURSE PRACTITIONER

## 2021-05-28 RX ORDER — LEVETIRACETAM 250 MG/1
250 TABLET ORAL DAILY
Qty: 30 TABLET | Refills: 5 | Status: SHIPPED | OUTPATIENT
Start: 2021-05-28 | End: 2021-12-04 | Stop reason: SDUPTHER

## 2021-05-28 RX ORDER — VENLAFAXINE 37.5 MG/1
37.5 TABLET ORAL DAILY
Qty: 30 TABLET | Refills: 5 | Status: SHIPPED | OUTPATIENT
Start: 2021-05-28 | End: 2021-12-04 | Stop reason: SDUPTHER

## 2021-05-28 RX ORDER — TIZANIDINE 2 MG/1
4 TABLET ORAL
COMMUNITY
End: 2021-05-28 | Stop reason: SDUPTHER

## 2021-05-28 RX ORDER — NEEDLES, SAFETY 22GX1 1/2"
NEEDLE, DISPOSABLE MISCELLANEOUS
COMMUNITY
Start: 2021-05-19

## 2021-05-28 RX ORDER — MORPHINE SULFATE 15 MG/1
TABLET ORAL
COMMUNITY
End: 2021-05-28 | Stop reason: ALTCHOICE

## 2021-05-28 NOTE — PATIENT INSTRUCTIONS
1   Continue use of CPAP equipment nightly  2  Continue to clean your equipment, as discussed  3  Contact the Sleep [address] [place] [address] with any questions or concerns prior to your next visit, as needed  4  Schedule visit for follow-up in 6 months  5  Continue venlafaxine daily  6  Continue levetiracetam daily  7  Schedule an appointment with neurology regarding seizure-like activity      Nursing Support:  When: Monday through Friday 7A-5PM except holidays  Where: Our direct line is [phone]  If you are having a true emergency please call 911  In the event that the line is busy or it is after hours please leave a voice message and we will return your call  Please speak clearly, leaving your full name, birth date, best number to reach you and the reason for your call  Medication refills: We will need the name of the medication, the dosage, the ordering provider, whether you get a 30 or 90 day refill, and the pharmacy name and address  Medications will be ordered by the provider only  Nurses cannot call in prescriptions  Please allow 7 days for medication refills  Physician requested updates: If your provider requested that you call with an update after starting medication, please be ready to provide us the medication and dosage, what time you take your medication, the time you attempt to fall asleep, time you fall asleep, when you wake up, and what time you get out of bed  Sleep Study Results: We will contact you with sleep study results and/or next steps after the physician has reviewed your testing

## 2021-05-28 NOTE — PROGRESS NOTES
Progress Note - [address] 39 y o  male   :1975, MRN: [id number]  2021          Follow Up Evaluation / Problem:  Mild Obstructive Sleep Apnea  Reported hypersomnia  Episodic episodes of altered consciousness and altered behavior  Possible complex partial seizures  History of multiple head trauma with concussion        HPI: Nunu Dasilva is a 39y o  year old male  He presents today for follow up of obstructive sleep apnea and recurrent episodes of altered consciousness and altered behavior, as well as reported hypersomnia and possible narcolepsy with cataplexy, which is suspect - never confirmed by testing  He has followed with Dr Ramon Phillips for many years  Symptoms began suddenly in  with disabling hypersomnolence that began when he returned home from vacation in [place], experiencing severe fatigue and headache  He also began with sleep paralysis and hypnagogic hallucinations  He had a diagnostic sleep study with MSLT at [facility] in   The study did not indicate narcolepsy  He was empirically started on provigil 200mg daily, however, he felt more sleepy and intoxicated when taking it  He later had the same response to Adderall XR and Vyvanse  He then had evaluation at [facility] with Dr Latia Schumacher in   Diagnostic study with MSLT was completed  No STEPHANIE was noted (AHI 3 3 and no PLMs)  Sleep latency during MSLT was 12 3 with no SoREMs  He was started on selegeline, which did not help with reported hypersomnia  A repeat diagnostic study was done in  with AHI of 5 9, increasing to 10,3 in REM sleep  There were 17 central apneas noted with 22 hypopneas  He began treatment with CPAP with minimal response to daytime sleepiness  At a later visit with Dr Ramon Phillips in , the patient described episodes of uncontrolled rage/anger with threatening behaviors  There was some reported memory loss with these behavioral events    A CT scan of the brain was done and was normal   MRI was not possible, due to implanted spinal cord stimulator  There was some reported abnormal EEG as well  There are no results available, however, the patient relates a description of  continuous EEG monitoring  In 2017, the patient was started on Keppra 500mg, later decreased to 250mg due to fogginess associated at the higher dose, which was relieved with decrease to 250mg  He reported that the outbursts of anger and uncontrolled behaviors improved with use of Keppra 250mg  He continues this medication  In 2018, he noted symptoms of intermittent weakness in his face, neck and arms, including difficulty getting words out and speech sounding like he was intoxicated when he felt that he was speaking normally  He also had an increase in sleep paralysis and hypnagogic hallucinations at this time  He was started on effexor 37 5mg daily  Symptoms improved with the use of Effexor  He continues this medication  In 2020, he had a morphine pump inserted for chronic pain from ankylosing spondylitis  Since having the pump inserted, his hypersomnia has improved as well  In March 2021, he had a repeat home sleep study to validate the need for CPAP  Study indicates ongoing STEPHANIE with ARIC of 6 7 and oxygen genesis of 84%  He reported worsening of sleepiness when he did not use the CPAP for 2 weeks      Review of Systems      Genitourinary none   Cardiology none   Gastrointestinal none   Neurology need to move extremities, muscle weakness, numbness/tingling of an extremity, forgetfulness, poor concentration or confusion, , difficulty with memory and balance problems   Constitutional fatigue and weight change   Integumentary none   Psychiatry none   Musculoskeletal joint pain, muscle aches and back pain   Pulmonary none   ENT none   Endocrine none   Hematological none       Current Outpatient Medications:     B-D TB SYRINGE 1CC/27GX1/2" 27G X 1/2" 1 ML MISC, USE THREE WEEKLY FOR ALLERGY INJECTIONS, Disp: , Rfl:     EPINEPHrine (EPIPEN) 0 3 mg/0 3 mL SOAJ, Inject 0 3 mL (0 3 mg total) into a muscle once for 1 dose (Patient taking differently: Inject 0 3 mg into a muscle as needed ), Disp: 0 6 mL, Rfl: 0    fluticasone (FLONASE) 50 mcg/act nasal spray, 2 sprays into each nostril 2 (two) times a day, Disp: , Rfl:     Insulin Syringe-Needle U-100 27 5G X 5/8" 2 ML MISC, 3 syringes weekly for allergy injections, Disp: , Rfl:     levETIRAcetam (KEPPRA) 250 mg tablet, Take 1 tablet (250 mg total) by mouth daily, Disp: 30 tablet, Rfl: 5    LINZESS 145 MCG CAPS, 1 (one) Capsule daily on an empty stomach, at least 30 mn before first meal, Disp: , Rfl: 0    morphine epidural, by Epidural route continuous, Disp: , Rfl:     tiZANidine (ZANAFLEX) 2 mg tablet, Take 2 tablets (4 mg total) by mouth 4 (four) times a day as needed for muscle spasms (Patient taking differently: Take 4 mg by mouth as needed for muscle spasms ), Disp: 240 tablet, Rfl: 4    venlafaxine (EFFEXOR) 37 5 mg tablet, Take 1 tablet (37 5 mg total) by mouth daily, Disp: 30 tablet, Rfl: 5    [place] Sleepiness Scale  Sitting and reading: Slight chance of dozing  Watching TV: Slight chance of dozing  Sitting, inactive in a public place (e g  a theatre or a meeting): Slight chance of dozing  As a passenger in a car for an hour without a break: Slight chance of dozing  Lying down to rest in the afternoon when circumstances permit: Moderate chance of dozing  Sitting and talking to someone: Slight chance of dozing  Sitting quietly after a lunch without alcohol: Slight chance of dozing  In a car, while stopped for a few minutes in traffic: Would never doze  Total score: 8              Vitals:    05/28/21 1128   BP: 126/88   Weight: 106 kg (234 lb)   Height: 5' 11 75" (1 822 m)       Body mass index is 31 96 kg/m²              EPWORTH SLEEPINESS SCORE  Total score: 8      Past History Since Last Sleep Center Visit:   Since his last visit, he has continued to have improvement in his chronic pain with the morphine pain pump  He feels that his daytime wakefulness has improved with the use of the pump as well  He feels symptoms consistent with cataplexy are controlled with the use of venlafaxine  He reports that his anger and behavioral outbursts with memory loss are improved with Keppra  He experiences "fireworks or explosions when I close my eyes to go to sleep"  He reports having these symptoms for many years, including prior to treatment with Keppra and venlafaxine, which occurred rarely  He feels symptoms have increased and he is unsure if this may be due to the change in darkness in his room at night since he has moved away from the city  He uses his CPAP every night, which he feels improves his daytime wakefulness  He continues to see rheumatology and pain management for his chronic pain conditions  The review of systems and following portions of the patient's history were reviewed and updated as appropriate: allergies, current medications, past family history, past medical history, past social history, past surgical history, and problem list     OBJECTIVE  Set up date:  10/27/2015  PAP Pressure: Nasal CPAP set to deliver 8 cm of water pressure   Mask type:  Nasal pillows  DME Provider: [employer]    Physical Exam:     General Appearance:   Alert, cooperative, no distress, appears stated age, obese     Head:   Normocephalic, without obvious abnormality, atraumatic     Eyes:   PERRL, conjunctiva/corneas clear, EOM's intact          Nose:  Nares normal, septum midline, no drainage or sinus tenderness           Throat:  Lips, teeth and gums normal; tongue normal size and  shape and midline mucosa moist and redundant bilaterally, uvula thick and only partially visible, tonsils not visualized, Mallampati class 4       Neck:  Supple, symmetrical, trachea midline, no adenopathy;  Thyroid: No enlargement, tenderness or nodules; no carotid bruit or JVD     Lungs:      Clear to auscultation bilaterally, respirations unlabored     Heart:   Regular rate and rhythm, S1 and S2 normal, no murmur, rub or gallop       Extremities:  Extremities normal, atraumatic, no cyanosis or edema       Skin:  Skin color, texture, turgor normal, no rashes or lesions       Neurologic:  No focal deficits noted       ASSESSMENT / PLAN    1  Obstructive sleep apnea treated with continuous positive airway pressure (CPAP)  PAP DME Resupply/Reorder   2  Narcolepsy with cataplexy  venlafaxine (EFFEXOR) 37 5 mg tablet   3  Partial symptomatic epilepsy with complex partial seizures, not intractable, without status epilepticus (HCC)  levETIRAcetam (KEPPRA) 250 mg tablet    Levetiracetam level    Ambulatory referral to Neurology       Counseling / Coordination of Care  Total clinic time spent today 35 minutes  Greater than 50% of total time was spent with the patient and / or family counseling and / or coordination of care  A description of the counseling / coordination of care:     Impressions, Prognosis, Instructions for management, Risks and benefits of treatment, Patient and family education, Risk factor reductions and Importance of compliance with treatment    Today I reviewed the patient's compliance data  he has been able to use the equipment 100% of all days recorded  Average usage was 4 or more hours 100% of all days recorded  The estimated AHI is 2 1 abnormal breathing events per hour  Response to treatment has been good  A prescription for supplies has been provided for the next year  He will continue using this equipment at the settings noted above for the next 6 months  We will continue to reevaluate his CPAP usage and effectiveness  I have asked him to contact the [facility] if he encounters any difficulties prior to that time  Improvement in wakefulness and energy levels may be related to treatment with continuous morphine    He may have a paradoxical response to opiates, causing wakefulness instead of somnolence, since he has had a past paradoxical reaction to stimulants causing increase in sleepiness instead of wakefulness  The etiology of visual symptoms is unclear  Due to his history of possible partial complex seizures, which have not been formally evaluated for several years, he was referred to neurology for further evaluation  It is unclear whether the Keppra is treating a seizure disorder or possibly undiagnosed bipolar disorder  He will continue leviteracitam 250mg at bedtime for now, until evaluation with neurology is able to be completed  He will report any changes in his behavior or alterations in consciousness  He has had good results with the use of venlafaxine daily to treat symptoms possibly related to cataplexy and will continue at this dose  The following instructions have been given to the patient today:    Patient Instructions   1  Continue use of CPAP equipment nightly  2  Continue to clean your equipment, as discussed  3  Contact the Sleep [address] [place] [address] with any questions or concerns prior to your next visit, as needed  4  Schedule visit for follow-up in 6 months  5  Continue venlafaxine daily  6  Continue levetiracetam daily  7  Schedule an appointment with neurology regarding seizure-like activity      Nursing Support:  When: Monday through Friday 7A-5PM except holidays  Where: Our direct line is [phone]  If you are having a true emergency please call 911  In the event that the line is busy or it is after hours please leave a voice message and we will return your call  Please speak clearly, leaving your full name, birth date, best number to reach you and the reason for your call  Medication refills: We will need the name of the medication, the dosage, the ordering provider, whether you get a 30 or 90 day refill, and the pharmacy name and address    Medications will be ordered by the provider only   Nurses cannot call in prescriptions  Please allow 7 days for medication refills  Physician requested updates: If your provider requested that you call with an update after starting medication, please be ready to provide us the medication and dosage, what time you take your medication, the time you attempt to fall asleep, time you fall asleep, when you wake up, and what time you get out of bed  Sleep Study Results: We will contact you with sleep study results and/or next steps after the physician has reviewed your testing        Remy Morgan, [address] [facility]

## 2021-06-01 ENCOUNTER — TELEPHONE (OUTPATIENT)
Dept: SLEEP CENTER | Facility: CLINIC | Age: 46
End: 2021-06-01

## 2021-06-02 ENCOUNTER — OFFICE VISIT (OUTPATIENT)
Dept: PHYSICAL THERAPY | Facility: CLINIC | Age: 46
End: 2021-06-02
Payer: MEDICARE

## 2021-06-02 DIAGNOSIS — M54.41 CHRONIC BILATERAL LOW BACK PAIN WITH BILATERAL SCIATICA: Primary | ICD-10-CM

## 2021-06-02 DIAGNOSIS — G89.29 CHRONIC BILATERAL LOW BACK PAIN WITH BILATERAL SCIATICA: Primary | ICD-10-CM

## 2021-06-02 DIAGNOSIS — M54.42 CHRONIC BILATERAL LOW BACK PAIN WITH BILATERAL SCIATICA: Primary | ICD-10-CM

## 2021-06-02 PROCEDURE — 97110 THERAPEUTIC EXERCISES: CPT | Performed by: PHYSICAL THERAPIST

## 2021-06-02 PROCEDURE — 97140 MANUAL THERAPY 1/> REGIONS: CPT | Performed by: PHYSICAL THERAPIST

## 2021-06-02 NOTE — PROGRESS NOTES
Daily Note     Today's date: 2021  Patient name: Rafiq Paige  : 1975  MRN: [id number]  Referring provider: Matthew Chairez MD  Dx:   Encounter Diagnosis     ICD-10-CM    1  Chronic bilateral low back pain with bilateral sciatica  M54 42     M54 41     G89 29        Start Time: 1217  Stop Time: 1304  Total time in clinic (min): 47 minutes    Subjective: feeling stiff; the cold weather has been bothering him      Objective: See treatment diary below      Assessment:  Tolerated treatment well but fatigues easily  Improvement noted in ability to maintain stable lumbar spine with hip hinge exercises  States that his back felt like it re-aligned after doing hip flexor stretch in Andriette Presume position  Patient would benefit from continued PT      Plan: Continue per plan of care    progress as tolerated to increase lumbar flexibility, work on functional mobility such as ability to bend,  objects     Precautions: none  PMH: patient  reports ankylosing spondylitis, psoriatic arthritis, narcolepsy, siezures, morphine pump, dorsal column stimulator  Your Access Code: [email or web address]   POC expires:   Daily Treatment Record    Manuals  6                                                             Neuro Re-Ed             Pelvic tilts AROM seated  10x 15x 15x         Quadruped hip hike hip on pad    10xea         Bird dog w/ pad             TB anti-rotation  BTB 20x BTB 10x2 BTB 10x2         1/2 kneel to full kneel   5x ea 10x         Hip hinge seated   10x 10x         Sit<-> stand w/ hip hinge   10x 10x         Ther Ex             bike  8' 8' 8'         Cat camel             Seated flexion stretch  5x10"  10x10"         HS stretch seated  1 min ea 1 min ea 1 min ea         Hip flexor stretch - tatyana  30"x3 ea 30"x3 30"x3         LTR    15x         B knee to chest              Mini squat w/ hip hinge    10x5"         lunge             QL stretch seated - L    20"x3         Ther Activity Gait Training                                       Modalities

## 2021-06-03 ENCOUNTER — TELEPHONE (OUTPATIENT)
Dept: SLEEP CENTER | Facility: CLINIC | Age: 46
End: 2021-06-03

## 2021-06-03 LAB — LEVETIRACETAM SERPL-MCNC: 3.1 MCG/ML (ref 12–46)

## 2021-06-04 ENCOUNTER — OFFICE VISIT (OUTPATIENT)
Dept: PHYSICAL THERAPY | Facility: CLINIC | Age: 46
End: 2021-06-04
Payer: MEDICARE

## 2021-06-04 DIAGNOSIS — M54.42 CHRONIC BILATERAL LOW BACK PAIN WITH BILATERAL SCIATICA: Primary | ICD-10-CM

## 2021-06-04 DIAGNOSIS — G89.29 CHRONIC BILATERAL LOW BACK PAIN WITH BILATERAL SCIATICA: Primary | ICD-10-CM

## 2021-06-04 DIAGNOSIS — M54.41 CHRONIC BILATERAL LOW BACK PAIN WITH BILATERAL SCIATICA: Primary | ICD-10-CM

## 2021-06-04 PROCEDURE — 97110 THERAPEUTIC EXERCISES: CPT

## 2021-06-04 PROCEDURE — 97112 NEUROMUSCULAR REEDUCATION: CPT

## 2021-06-04 NOTE — PROGRESS NOTES
Daily Note     Today's date: 2021  Patient name: Héctor Barcenas  : 1975  MRN: [id number]  Referring provider: Vidal Curry MD  Dx:   Encounter Diagnosis     ICD-10-CM    1  Chronic bilateral low back pain with bilateral sciatica  M54 42     M54 41     G89 29                   Subjective: Pt reports his LB is still bothering him  Objective: See treatment diary below      Assessment:  Pt presented to PT session with an antalgic gait  Pt tolerated below TE with fair tolerance and technique  Patient would benefit from continued PT      Plan: Continue per plan of care    progress as tolerated to increase lumbar flexibility, work on functional mobility such as ability to bend,  objects     Precautions: none  PMH: patient  reports ankylosing spondylitis, psoriatic arthritis, narcolepsy, siezures, morphine pump, dorsal column stimulator  Your Access Code: [email or web address]   POC expires:   Daily Treatment Record    Manuals                                                             Neuro Re-Ed             Pelvic tilts AROM seated  10x 15x 15x 15x        Quadruped hip hike hip on pad    10xea 10 xea        Bird dog w/ pad             TB anti-rotation  BTB 20x BTB 10x2 BTB 10x2         1/2 kneel to full kneel   5x ea 10x 10x        Hip hinge seated   10x 10x 10x        Sit<-> stand w/ hip hinge   10x 10x 10x        Ther Ex             bike  8' 8' 8' 8'        Cat camel             Seated flexion stretch  5x10"  10x10" 10x 10"         HS stretch seated  1 min ea 1 min ea 1 min ea 1min ea        Hip flexor stretch - tatyana  30"x3 ea 30"x3 30"x3         LTR    15x 15x        B knee to chest              Mini squat w/ hip hinge    10x5" 10x5"        lunge             QL stretch seated - L    20"x3 20" 3        Ther Activity                                       Gait Training                                       Modalities

## 2021-06-07 ENCOUNTER — OFFICE VISIT (OUTPATIENT)
Dept: PHYSICAL THERAPY | Facility: CLINIC | Age: 46
End: 2021-06-07
Payer: MEDICARE

## 2021-06-07 DIAGNOSIS — G89.29 CHRONIC BILATERAL LOW BACK PAIN WITH BILATERAL SCIATICA: Primary | ICD-10-CM

## 2021-06-07 DIAGNOSIS — M54.41 CHRONIC BILATERAL LOW BACK PAIN WITH BILATERAL SCIATICA: Primary | ICD-10-CM

## 2021-06-07 DIAGNOSIS — M54.42 CHRONIC BILATERAL LOW BACK PAIN WITH BILATERAL SCIATICA: Primary | ICD-10-CM

## 2021-06-07 PROCEDURE — 97112 NEUROMUSCULAR REEDUCATION: CPT

## 2021-06-07 PROCEDURE — 97110 THERAPEUTIC EXERCISES: CPT

## 2021-06-07 NOTE — PROGRESS NOTES
Daily Note     Today's date: 2021  Patient name: Lazara Jose  : 1975  MRN: [id number]  Referring provider: Cinthia Morton MD  Dx:   Encounter Diagnosis     ICD-10-CM    1  Chronic bilateral low back pain with bilateral sciatica  M54 42     M54 41     G89 29        Start Time: 1100  Stop Time: 1140  Total time in clinic (min): 40 minutes    Subjective: Patient notes that his hips and back still bother him  Improvements with his hip  Minor improvements with back  Objective: See treatment diary below    Assessment: Patient still with an antalgic gait  Demonstrated fair hip hinge during mini squats  Verbal cueing required to increase hip hinge  Responding fairly to therapy thus far  Plan: Continue per plan of care    progress as tolerated to increase lumbar flexibility, work on functional mobility such as ability to bend,  objects     Precautions: none  PMH: patient  reports ankylosing spondylitis, psoriatic arthritis, narcolepsy, siezures, morphine pump, dorsal column stimulator  Your Access Code: [email or web address]   POC expires:   Daily Treatment Record    Manuals                                                            Neuro Re-Ed             Pelvic tilts AROM seated  10x 15x 15x 15x 15x       Quadruped hip hike hip on pad    10xea 10 xea 10x ea       Bird dog w/ pad             TB anti-rotation  BTB 20x BTB 10x2 BTB 10x2         1/2 kneel to full kneel   5x ea 10x 10x 10x       Hip hinge seated   10x 10x 10x 10x       Sit<-> stand w/ hip hinge   10x 10x 10x 10x       Ther Ex             bike  8' 8' 8' 8' 8'       Cat camel             Seated flexion stretch  5x10"  10x10" 10x 10"  10  x10"       HS stretch seated  1 min ea 1 min ea 1 min ea 1min ea 1 min ea       Hip flexor stretch - tatyana  30"x3 ea 30"x3 30"x3         LTR    15x 15x 15x       B knee to chest              Mini squat w/ hip hinge    10x5" 10x5" 10x5"       lunge             QL stretch seated - L 20"x3 20" 3 20"x3       Ther Activity                                       Gait Training                                       Modalities

## 2021-06-09 ENCOUNTER — OFFICE VISIT (OUTPATIENT)
Dept: PHYSICAL THERAPY | Facility: CLINIC | Age: 46
End: 2021-06-09
Payer: MEDICARE

## 2021-06-09 DIAGNOSIS — M54.42 CHRONIC BILATERAL LOW BACK PAIN WITH BILATERAL SCIATICA: Primary | ICD-10-CM

## 2021-06-09 DIAGNOSIS — M54.41 CHRONIC BILATERAL LOW BACK PAIN WITH BILATERAL SCIATICA: Primary | ICD-10-CM

## 2021-06-09 DIAGNOSIS — G89.29 CHRONIC BILATERAL LOW BACK PAIN WITH BILATERAL SCIATICA: Primary | ICD-10-CM

## 2021-06-09 PROCEDURE — 97112 NEUROMUSCULAR REEDUCATION: CPT

## 2021-06-09 PROCEDURE — 97110 THERAPEUTIC EXERCISES: CPT

## 2021-06-09 NOTE — PROGRESS NOTES
Daily Note     Today's date: 2021  Patient name: Mary Lou Vance  : 1975  MRN: [id number]  Referring provider: Noé Lopez MD  Dx:   Encounter Diagnosis     ICD-10-CM    1  Chronic bilateral low back pain with bilateral sciatica  M54 42     M54 41     G89 29                   Subjective: Patient notes that his hips and back still bother him  Objective: See treatment diary below    Assessment: Patient still with an antalgic gait pre and post PT session  Fair tolerance with below TE  Xavier Servando Plan: Continue per plan of care    progress as tolerated to increase lumbar flexibility, work on functional mobility such as ability to bend,  objects     Precautions: none  PMH: patient  reports ankylosing spondylitis, psoriatic arthritis, narcolepsy, siezures, morphine pump, dorsal column stimulator  Your Access Code: [email or web address]   POC expires:   Daily Treatment Record    Manuals                                                           Neuro Re-Ed             Pelvic tilts AROM seated  10x 15x 15x 15x 15x 15x      Quadruped hip hike hip on pad    10xea 10 xea 10x ea 10x ea      Bird dog w/ pad             TB anti-rotation  BTB 20x BTB 10x2 BTB 10x2         1/2 kneel to full kneel   5x ea 10x 10x 10x 10x      Hip hinge seated   10x 10x 10x 10x 10x      Sit<-> stand w/ hip hinge   10x 10x 10x 10x 10x      Ther Ex             bike  8' 8' 8' 8' 8' 8'      Cat camel             Seated flexion stretch  5x10"  10x10" 10x 10"  10  x10" 10x10"       HS stretch seated  1 min ea 1 min ea 1 min ea 1min ea 1 min ea 1min      Hip flexor stretch - tatyana  30"x3 ea 30"x3 30"x3         LTR    15x 15x 15x 15x      B knee to chest              Mini squat w/ hip hinge    10x5" 10x5" 10x5" 10x5"      lunge             QL stretch seated - L    20"x3 20" 3 20"x3 20" 3      Ther Activity                                       Gait Training                                       Modalities

## 2021-06-14 ENCOUNTER — OFFICE VISIT (OUTPATIENT)
Dept: PHYSICAL THERAPY | Facility: CLINIC | Age: 46
End: 2021-06-14
Payer: MEDICARE

## 2021-06-14 DIAGNOSIS — M54.42 CHRONIC BILATERAL LOW BACK PAIN WITH BILATERAL SCIATICA: Primary | ICD-10-CM

## 2021-06-14 DIAGNOSIS — M54.41 CHRONIC BILATERAL LOW BACK PAIN WITH BILATERAL SCIATICA: Primary | ICD-10-CM

## 2021-06-14 DIAGNOSIS — G89.29 CHRONIC BILATERAL LOW BACK PAIN WITH BILATERAL SCIATICA: Primary | ICD-10-CM

## 2021-06-14 PROCEDURE — 97112 NEUROMUSCULAR REEDUCATION: CPT | Performed by: PHYSICAL THERAPIST

## 2021-06-14 PROCEDURE — 97110 THERAPEUTIC EXERCISES: CPT | Performed by: PHYSICAL THERAPIST

## 2021-06-14 NOTE — PROGRESS NOTES
Daily Note     Today's date: 2021  Patient name: Renato Hallman  : 1975  MRN: [id number]  Referring provider: Zora Guerrero MD  Dx:   Encounter Diagnosis     ICD-10-CM    1  Chronic bilateral low back pain with bilateral sciatica  M54 42     M54 41     G89 29                   Subjective: patient offers no new complaints      Objective: See treatment diary below      Assessment: patient tolerated treatment well  Continues to require cuing for hip hinging with fair carryover  He requires cuing for proper technique  He would benefit  From continuing physical therapy  Plan: Continue per plan of care        Precautions: none  PMH: patient  reports ankylosing spondylitis, psoriatic arthritis, narcolepsy, siezures, morphine pump, dorsal column stimulator  Your Access Code: [email or web address]   POC expires:   Daily Treatment Record    Manuals                                                          Neuro Re-Ed             Pelvic tilts AROM seated  10x 15x 15x 15x 15x 15x 15x     Quadruped hip hike hip on pad    10xea 10 xea 10x ea 10x ea 10x ea     Bird dog w/ pad             TB anti-rotation  BTB 20x BTB 10x2 BTB 10x2         1/2 kneel to full kneel   5x ea 10x 10x 10x 10x 10x ea     Hip hinge seated   10x 10x 10x 10x 10x 10x     Sit<-> stand w/ hip hinge   10x 10x 10x 10x 10x 10x     Ther Ex             bike  8' 8' 8' 8' 8' 8' 8'     Cat camel             Seated flexion stretch  5x10"  10x10" 10x 10"  10  x10" 10x10"  10x10"     HS stretch seated  1 min ea 1 min ea 1 min ea 1min ea 1 min ea 1min 1 min ea     Hip flexor stretch - tatyana  30"x3 ea 30"x3 30"x3         LTR    15x 15x 15x 15x 15x     B knee to chest              Mini squat w/ hip hinge    10x5" 10x5" 10x5" 10x5" 10x5"     lunge             QL stretch seated - L    20"x3 20" 3 20"x3 20" 3 30" x3     Ther Activity                                       Gait Training                                       Modalities

## 2021-06-16 ENCOUNTER — OFFICE VISIT (OUTPATIENT)
Dept: PHYSICAL THERAPY | Facility: CLINIC | Age: 46
End: 2021-06-16
Payer: MEDICARE

## 2021-06-16 DIAGNOSIS — M54.41 CHRONIC BILATERAL LOW BACK PAIN WITH BILATERAL SCIATICA: Primary | ICD-10-CM

## 2021-06-16 DIAGNOSIS — G89.29 CHRONIC BILATERAL LOW BACK PAIN WITH BILATERAL SCIATICA: Primary | ICD-10-CM

## 2021-06-16 DIAGNOSIS — M54.42 CHRONIC BILATERAL LOW BACK PAIN WITH BILATERAL SCIATICA: Primary | ICD-10-CM

## 2021-06-16 PROCEDURE — 97110 THERAPEUTIC EXERCISES: CPT | Performed by: PHYSICAL THERAPIST

## 2021-06-16 NOTE — LETTER
2021    Flornece Monterroso, [address] [address]    Patient: Shyam Martinez   YOB: 1975   Date of Visit: 2021     Encounter Diagnosis     ICD-10-CM    1  Chronic bilateral low back pain with bilateral sciatica  M54 42 PT plan of care cert/re-cert    P57 43     U73 42        Dear Dr Apurva Dale: Thank you for your recent referral of Shyam Martinez  Please review the attached evaluation summary from Asher's recent visit  Please verify that you agree with the plan of care by signing the attached order  If you have any questions or concerns, please do not hesitate to call  I sincerely appreciate the opportunity to share in the care of one of your patients and hope to have another opportunity to work with you in the near future  Sincerely,    Gwendolyn Li, PT      Referring Provider:      I certify that I have read the below Plan of Care and certify the need for these services furnished under this plan of treatment while under my care  Florence Monterroso MD  [address] [id number]  Via Fax: [phone]          PT Re-Evaluation     Today's date: 2021  Patient name: Shyam Martinez  : 1975  MRN: [id number]  Referring provider: Moise Bee MD  Dx:   Encounter Diagnosis     ICD-10-CM    1  Chronic bilateral low back pain with bilateral sciatica  M54 42     M54 41     G89 29        Start Time: 1216  Stop Time: 1257  Total time in clinic (min): 41 minutes    Assessment/Plan     This patient demonstrates improvement since beginning therapy; Range of Motion has increased and strength and function are improving  Patient is progressing toward LTG's and will benefit from continued therapy to reduce pain and restore function  Interventions to include manual therapy, ROM, stretching, strengthening, therapeutic activities, neuromuscular re-ed and instruction in HEP      Frequency: 2 times weekly  Duration:  4-6 weeks    STGs: 4 weeks  1  Increase trunk flexion and extension AROM by 25-50% - not yet met  2  Decrease pain 2 levels - not yet met  -  MET  3  Increase AROM R hip flexion to 110 degrees - MET  4  Increase AROM R hip extension to -10 degrees  - MET                    LTGs: 6-8 weeks  1  Independent HEP - partially met  2  Increase core strength:  (-) [place]'s sign - MET  3  Increase FOTO score to predicted level -   4  Able to perform usual daily activities with pain level 0-3/10 - not yet met          Subjective  This is a 39 y o  male who reports onset of symptoms January, 2008  Was sitting in a movie seat that broke  States that he has herniated discs in the c-spine and L-spine  States that he was unable to have therapy until now due to complications w/ psoriatic arthritis and ankylosing spondylitis  Has morphine pump implanted November 6, 2020  Current symptoms: feels he is making progress but states that with doing more he is getting more spasms in his back; hsi hips are no longer hurting; states he is doing home exercises every other day as daily is too much   Aggravating factors: any prolonged position or activity  Relieving factors:  Change in position or activity  Previous treatment: PT, massage, chiropractic but nothing recent    Dx tests:  X-rays - per patient calcification, and misalignment of lower spine  Occupation:  Not employed  Leisure: walks dog    Patients goal: increase flexibility in the spine, make it easier to put shoes on,  things from the floor    Objective  Pain scale: 3-5/10    Trunk ROM     Flexion AROM   Mod limitation           Extension  AROM   Severe limitation          R lateral flexion AROM        WNL     L lateral flexion AROM         WNL      R rotation WNL     L rotation WNL * R sided back pain               (-) [place]'s sign    Special tests:    PSLR  R 58 degrees (-);  L 62 degrees (-)    Hip Scour (-)    Neural tension tests:  Slump: increased neural tension R        Hip                   R  AROM  L      R  PROM   L    R  Strength  L  Flexion 110         115 118          120 5/5           5/5   Extension -5            0  5/5           5/5   Abd   5/5           5/5   Add   5/5          5/5    ER   5/5          5/5   IR   5/5          5/5     Knee   AROM  PROM  Strength  Flexion   5/5         5/5   Extension   5/5        5/5     Ankle   AROM  PROM  Strength  DF   5/5         5/5   PF      Inversion   5/5         5/5   Eversion   5/5         5/5           Flowsheet Rows      Most Recent Value   PT/OT G-Codes   Current Score  53   Projected Score  47               Precautions: none  PMH: patient  reports ankylosing spondylitis, psoriatic arthritis, narcolepsy, siezures, morphine pump, dorsal column stimulator  Your Access Code: [email or web address]   POC expires: 7/14  Daily Treatment Record    Manuals 5/19 5/24 5/26 6/2 6/4 6/7 6/9 6/14 6/16                                                        Neuro Re-Ed             Pelvic tilts AROM seated  10x 15x 15x 15x 15x 15x 15x     Quadruped hip hike hip on pad    10xea 10 xea 10x ea 10x ea 10x ea 15x5" ea    Bird dog w/ pad         10xea    TB anti-rotation  BTB 20x BTB 10x2 BTB 10x2     BTB 20xea    1/2 kneel to full kneel   5x ea 10x 10x 10x 10x 10x ea 10xea    Hip hinge seated   10x 10x 10x 10x 10x 10x 10x    Sit<-> stand w/ hip hinge   10x 10x 10x 10x 10x 10x 10x    Ther Ex             bike  8' 8' 8' 8' 8' 8' 8' 8'    Cat camel             Seated flexion stretch  5x10"  10x10" 10x 10"  10  x10" 10x10"  10x10"     HS stretch seated  1 min ea 1 min ea 1 min ea 1min ea 1 min ea 1min 1 min ea     Hip flexor stretch - tatyana  30"x3 ea 30"x3 30"x3     1 min ea    LTR    15x 15x 15x 15x 15x     B knee to chest              Mini squat w/ hip hinge    10x5" 10x5" 10x5" 10x5" 10x5"     lunge             QL stretch seated - L    20"x3 20" 3 20"x3 20" 3 30" x3     Ther Activity Gait Training                                       Modalities

## 2021-06-16 NOTE — PROGRESS NOTES
PT Re-Evaluation     Today's date: 2021  Patient name: Ermelinda Klinefelter  : 1975  MRN: [id number]  Referring provider: Liliana Earl MD  Dx:   Encounter Diagnosis     ICD-10-CM    1  Chronic bilateral low back pain with bilateral sciatica  M54 42     M54 41     G89 29        Start Time: 1216  Stop Time: 1257  Total time in clinic (min): 41 minutes    Assessment/Plan     This patient demonstrates improvement since beginning therapy; Range of Motion has increased and strength and function are improving  Patient is progressing toward LTG's and will benefit from continued therapy to reduce pain and restore function  Interventions to include manual therapy, ROM, stretching, strengthening, therapeutic activities, neuromuscular re-ed and instruction in HEP  Frequency: 2 times weekly  Duration:  4-6 weeks    STGs: 4 weeks  1  Increase trunk flexion and extension AROM by 25-50% - not yet met  2  Decrease pain 2 levels - not yet met  -  MET  3  Increase AROM R hip flexion to 110 degrees - MET  4  Increase AROM R hip extension to -10 degrees  - MET                    LTGs: 6-8 weeks  1  Independent HEP - partially met  2  Increase core strength:  (-) [place]'s sign - MET  3  Increase FOTO score to predicted level -   4  Able to perform usual daily activities with pain level 0-3/10 - not yet met          Subjective  This is a 39 y o  male who reports onset of symptoms   Was sitting in a movie seat that broke  States that he has herniated discs in the c-spine and L-spine  States that he was unable to have therapy until now due to complications w/ psoriatic arthritis and ankylosing spondylitis   Has morphine pump implanted 2020  Current symptoms: feels he is making progress but states that with doing more he is getting more spasms in his back; hsi hips are no longer hurting; states he is doing home exercises every other day as daily is too much   Aggravating factors: any prolonged position or activity  Relieving factors:  Change in position or activity  Previous treatment: PT, massage, chiropractic but nothing recent    Dx tests:  X-rays - per patient calcification, and misalignment of lower spine  Occupation:  Not employed  Leisure: walks dog    Patients goal: increase flexibility in the spine, make it easier to put shoes on,  things from the floor    Objective  Pain scale: 3-5/10    Trunk ROM     Flexion AROM   Mod limitation           Extension  AROM   Severe limitation          R lateral flexion AROM        WNL     L lateral flexion AROM         WNL      R rotation WNL     L rotation WNL * R sided back pain               (-) [place]'s sign    Special tests:    PSLR  R 58 degrees (-);  L 62 degrees (-)    Hip Scour (-)    Neural tension tests:  Slump: increased neural tension R        Hip                   R  AROM  L      R  PROM   L    R  Strength  L  Flexion 110         115 118          120 5/5           5/5   Extension -5            0  5/5 5/5   Abd   5/5 5/5   Add   5/5 5/5    ER   5/5 5/5   IR   5/5 5/5     Knee   AROM  PROM  Strength  Flexion   5/5 5/5   Extension   5/5 5/5     Ankle   AROM  PROM  Strength  DF   5/5 5/5   PF      Inversion   5/5 5/5   Eversion   5/5 5/5           Flowsheet Rows      Most Recent Value   PT/OT G-Codes   Current Score  53   Projected Score  47               Precautions: none  PMH: patient  reports ankylosing spondylitis, psoriatic arthritis, narcolepsy, siezures, morphine pump, dorsal column stimulator  Your Access Code: [email or web address]   POC expires: 7/14  Daily Treatment Record    Manuals 5/19 5/24 5/26 6/2 6/4 6/7 6/9 6/14 6/16                                                        Neuro Re-Ed             Pelvic tilts AROM seated  10x 15x 15x 15x 15x 15x 15x     Quadruped hip hike hip on pad    10xea 10 xea 10x ea 10x ea 10x ea 15x5" ea    Bird dog w/ pad         10xea    TB anti-rotation  BTB 20x BTB 10x2 BTB 10x2     BTB 20xea    1/2 kneel to full kneel   5x ea 10x 10x 10x 10x 10x ea 10xea    Hip hinge seated   10x 10x 10x 10x 10x 10x 10x    Sit<-> stand w/ hip hinge   10x 10x 10x 10x 10x 10x 10x    Ther Ex             bike  8' 8' 8' 8' 8' 8' 8' 8'    Cat camel             Seated flexion stretch  5x10"  10x10" 10x 10"  10  x10" 10x10"  10x10"     HS stretch seated  1 min ea 1 min ea 1 min ea 1min ea 1 min ea 1min 1 min ea     Hip flexor stretch - tatyana  30"x3 ea 30"x3 30"x3     1 min ea    LTR    15x 15x 15x 15x 15x     B knee to chest              Mini squat w/ hip hinge    10x5" 10x5" 10x5" 10x5" 10x5"     lunge             QL stretch seated - L    20"x3 20" 3 20"x3 20" 3 30" x3     Ther Activity                                       Gait Training                                       Modalities

## 2021-06-21 ENCOUNTER — OFFICE VISIT (OUTPATIENT)
Dept: PHYSICAL THERAPY | Facility: CLINIC | Age: 46
End: 2021-06-21
Payer: MEDICARE

## 2021-06-21 DIAGNOSIS — M54.42 CHRONIC BILATERAL LOW BACK PAIN WITH BILATERAL SCIATICA: Primary | ICD-10-CM

## 2021-06-21 DIAGNOSIS — M54.41 CHRONIC BILATERAL LOW BACK PAIN WITH BILATERAL SCIATICA: Primary | ICD-10-CM

## 2021-06-21 DIAGNOSIS — G89.29 CHRONIC BILATERAL LOW BACK PAIN WITH BILATERAL SCIATICA: Primary | ICD-10-CM

## 2021-06-21 PROCEDURE — 97112 NEUROMUSCULAR REEDUCATION: CPT

## 2021-06-21 PROCEDURE — 97110 THERAPEUTIC EXERCISES: CPT

## 2021-06-21 NOTE — PROGRESS NOTES
Daily Note     Today's date: 2021  Patient name: Reema Zarate  : 1975  MRN: [id number]  Referring provider: Janifer Felty, MD  Dx:   Encounter Diagnosis     ICD-10-CM    1  Chronic bilateral low back pain with bilateral sciatica  M54 42     M54 41     G89 29                   Subjective: Pt states his back has been feeling a bit better  Objective: See treatment diary below      Assessment: Tolerated treatment well with no increase in pian and min to moderate fatigue  Pt demonstrates improved core stability seen with bird dogs  Pt required VC with anti-rotation to maintain good position  Pt had decreased pain with seated forward flexion  Patient would benefit from continued PT      Plan: Continue per plan of care        Precautions: none  PMH: patient  reports ankylosing spondylitis, psoriatic arthritis, narcolepsy, siezures, morphine pump, dorsal column stimulator  Your Access Code: [email or web address]   POC expires:   Daily Treatment Record    Manuals                                                         Neuro Re-Ed             Pelvic tilts AROM seated  10x 15x 15x 15x 15x 15x 15x     Quadruped hip hike hip on pad    10xea 10 xea 10x ea 10x ea 10x ea 15x5" ea 15 x 5"    Bird dog w/ pad         10xea 10xea    TB anti-rotation  BTB 20x BTB 10x2 BTB 10x2     BTB 20xea BTB 20xea    1/2 kneel to full kneel   5x ea 10x 10x 10x 10x 10x ea 10xea 10x ea    Hip hinge seated   10x 10x 10x 10x 10x 10x 10x 10x    Sit<-> stand w/ hip hinge   10x 10x 10x 10x 10x 10x 10x 10x    Ther Ex             bike  8' 8' 8' 8' 8' 8' 8' 8' 8'   Cat camel             Seated flexion stretch  5x10"  10x10" 10x 10"  10  x10" 10x10"  10x10"     HS stretch seated  1 min ea 1 min ea 1 min ea 1min ea 1 min ea 1min 1 min ea  30" x 3    Hip flexor stretch - tatyana  30"x3 ea 30"x3 30"x3     1 min ea    LTR    15x 15x 15x 15x 15x     B knee to chest              Mini squat w/ hip hinge    10x5" 10x5" 10x5" 10x5" 10x5"     lunge             QL stretch seated - L    20"x3 20" 3 20"x3 20" 3 30" x3  30" x 3    Ther Activity                                       Gait Training                                       Modalities

## 2021-06-23 ENCOUNTER — EVALUATION (OUTPATIENT)
Dept: PHYSICAL THERAPY | Facility: CLINIC | Age: 46
End: 2021-06-23
Payer: MEDICARE

## 2021-06-23 DIAGNOSIS — M54.41 CHRONIC BILATERAL LOW BACK PAIN WITH BILATERAL SCIATICA: Primary | ICD-10-CM

## 2021-06-23 DIAGNOSIS — M54.42 CHRONIC BILATERAL LOW BACK PAIN WITH BILATERAL SCIATICA: Primary | ICD-10-CM

## 2021-06-23 DIAGNOSIS — G89.29 CHRONIC BILATERAL LOW BACK PAIN WITH BILATERAL SCIATICA: Primary | ICD-10-CM

## 2021-06-23 PROCEDURE — 97110 THERAPEUTIC EXERCISES: CPT | Performed by: PHYSICAL THERAPIST

## 2021-06-23 NOTE — PROGRESS NOTES
Daily Note     Today's date: 2021  Patient name: Nunu Dasilva  : 1975  MRN: [id number]  Referring provider: Michael Dick MD  Dx:   Encounter Diagnosis     ICD-10-CM    1  Chronic bilateral low back pain with bilateral sciatica  M54 42     M54 41     G89 29        Start Time: 1216  Stop Time: 1304  Total time in clinic (min): 48 minutes    Subjective: Pt states his back feels stiff      Objective: See treatment diary below      Assessment: Tolerated treatment well but required short break after doing quadruped exercises  Demonstrates significant improvement in postural control, good form w/ squats - able to add resistance today  Patient would benefit from continued PT      Plan: Continue per plan of care        Precautions: none  PMH: patient  reports ankylosing spondylitis, psoriatic arthritis, narcolepsy, siezures, morphine pump, dorsal column stimulator  Your Access Code: [email or web address]   POC expires:   Daily Treatment Record    Manuals                                                                 Neuro Re-Ed             Pelvic tilts AROM seated             Quadruped hip hike hip on pad 10x10"            Bird dog w/ pad 15xea            TB anti-rotation BTB 20x            1/2 kneel to full kneel 10xea                                      Ther Ex             bike 8 min                         Seated flexion stretch 10x10" pball            HS stretch seated 30"x3            Hip flexor stretch - tatyana nv            LTR                          Mini squat w/ hip hinge 15x 10# kb            sindhu nv            QL stretch seated - L 30"x3            Ther Activity                                       Gait Training                                       Modalities

## 2021-06-28 ENCOUNTER — OFFICE VISIT (OUTPATIENT)
Dept: PHYSICAL THERAPY | Facility: CLINIC | Age: 46
End: 2021-06-28
Payer: MEDICARE

## 2021-06-28 DIAGNOSIS — G89.29 CHRONIC BILATERAL LOW BACK PAIN WITH BILATERAL SCIATICA: Primary | ICD-10-CM

## 2021-06-28 DIAGNOSIS — M54.42 CHRONIC BILATERAL LOW BACK PAIN WITH BILATERAL SCIATICA: Primary | ICD-10-CM

## 2021-06-28 DIAGNOSIS — M54.41 CHRONIC BILATERAL LOW BACK PAIN WITH BILATERAL SCIATICA: Primary | ICD-10-CM

## 2021-06-28 PROCEDURE — 97110 THERAPEUTIC EXERCISES: CPT | Performed by: PHYSICAL THERAPIST

## 2021-06-28 NOTE — PROGRESS NOTES
Daily Note     Today's date: 2021  Patient name: Luis Antonio Cabrera  : 1975  MRN: [id number]  Referring provider: Angelo Martines MD  Dx:   Encounter Diagnosis     ICD-10-CM    1  Chronic bilateral low back pain with bilateral sciatica  M54 42     M54 41     G89 29        Start Time: 1216  Stop Time: 1256  Total time in clinic (min): 40 minutes    Subjective: Pt reports he is getting an injection in his L shoulder tomorrow      Objective: See treatment diary below      Assessment:  Attempted to add lunges but had difficulty, pain, poor form  Otherwise tolerated treatment well  Patient would benefit from continued PT      Plan: Continue per plan of care    Active program w/ emphasis on strengthening and mobility     Precautions: none  PMH: patient  reports ankylosing spondylitis, psoriatic arthritis, narcolepsy, siezures, morphine pump, dorsal column stimulator  Your Access Code: [email or web address]   POC expires:   Daily Treatment Record    Manuals                                                                Neuro Re-Ed             Pelvic tilts AROM seated             Quadruped hip hike hip on pad 10x10" 10x10"           Bird dog w/ pad 15xea 15xea           TB anti-rotation BTB 20x BTB 20x           1/2 kneel to full kneel 10xea 10xea                                     Ther Ex             bike 8 min 8 min                        Seated flexion stretch 10x10" pball 10x10" no ball           HS stretch seated 30"x3 30"x3           Hip flexor stretch - tatyana nv            LTR                          Mini squat w/ hip hinge 15x 10# kb 15x10#kb           lunge nv 5x           QL stretch seated - L 30"x3 Hold shld pain           Ther Activity                                       Gait Training                                       Modalities

## 2021-06-30 ENCOUNTER — OFFICE VISIT (OUTPATIENT)
Dept: PHYSICAL THERAPY | Facility: CLINIC | Age: 46
End: 2021-06-30
Payer: MEDICARE

## 2021-06-30 DIAGNOSIS — M54.42 CHRONIC BILATERAL LOW BACK PAIN WITH BILATERAL SCIATICA: Primary | ICD-10-CM

## 2021-06-30 DIAGNOSIS — M54.41 CHRONIC BILATERAL LOW BACK PAIN WITH BILATERAL SCIATICA: Primary | ICD-10-CM

## 2021-06-30 DIAGNOSIS — G89.29 CHRONIC BILATERAL LOW BACK PAIN WITH BILATERAL SCIATICA: Primary | ICD-10-CM

## 2021-06-30 PROCEDURE — 97110 THERAPEUTIC EXERCISES: CPT | Performed by: PHYSICAL THERAPIST

## 2021-06-30 NOTE — PROGRESS NOTES
Daily Note     Today's date: 2021  Patient name: Tierra Chow  : 1975  MRN: [id number]  Referring provider: Brittany Rodríguez MD  Dx:   Encounter Diagnosis     ICD-10-CM    1  Chronic bilateral low back pain with bilateral sciatica  M54 42     M54 41     G89 29        Start Time: 1215  Stop Time: 1253  Total time in clinic (min): 38 minutes    Subjective: received injection in L shoulder - reports it feels better;  Back pain is staying in the 3-4/10 range      Objective: See treatment diary below      Assessment:    performed exercises a s noted below; tpolerated treatment well  Patient would benefit from continued PT      Plan: Continue per plan of care    Active program w/ emphasis on strengthening and mobility     Precautions: none  PMH: patient  reports ankylosing spondylitis, psoriatic arthritis, narcolepsy, siezures, morphine pump, dorsal column stimulator  Your Access Code: [email or web address]   POC expires:   Daily Treatment Record    Manuals                                                               Neuro Re-Ed             Pelvic tilts AROM seated             Quadruped hip hike hip on pad 10x10" 10x10" 10x10"          Bird dog w/ pad 15xea 15xea 15x          TB anti-rotation BTB 20x BTB 20x BTB 20x          1/2 kneel to full kneel 10xea 10xea 10x                                    Ther Ex             bike 8 min 8 min 8min                       Seated flexion stretch 10x10" pball 10x10" no ball 10x10"          HS stretch seated 30"x3 30"x3 3x30"          Hip flexor stretch - tatyana nv            LTR                          Mini squat w/ hip hinge 15x 10# kb 15x10#kb 15x 10#kb          lunge nv 5x hold          QL stretch seated - L 30"x3 Hold shld pain --          Ther Activity                                       Gait Training                                       Modalities

## 2021-07-07 ENCOUNTER — OFFICE VISIT (OUTPATIENT)
Dept: PHYSICAL THERAPY | Facility: CLINIC | Age: 46
End: 2021-07-07
Payer: MEDICARE

## 2021-07-07 DIAGNOSIS — M54.41 CHRONIC BILATERAL LOW BACK PAIN WITH BILATERAL SCIATICA: Primary | ICD-10-CM

## 2021-07-07 DIAGNOSIS — G89.29 CHRONIC BILATERAL LOW BACK PAIN WITH BILATERAL SCIATICA: Primary | ICD-10-CM

## 2021-07-07 DIAGNOSIS — M54.42 CHRONIC BILATERAL LOW BACK PAIN WITH BILATERAL SCIATICA: Primary | ICD-10-CM

## 2021-07-07 PROCEDURE — 97140 MANUAL THERAPY 1/> REGIONS: CPT | Performed by: PHYSICAL THERAPIST

## 2021-07-07 PROCEDURE — 97110 THERAPEUTIC EXERCISES: CPT | Performed by: PHYSICAL THERAPIST

## 2021-07-07 NOTE — PROGRESS NOTES
Daily Note     Today's date: 2021  Patient name: Nathan Wilkerson  : 1975  MRN: [id number]  Referring provider: Matthew Tavera MD  Dx:   Encounter Diagnosis     ICD-10-CM    1  Chronic bilateral low back pain with bilateral sciatica  M54 42     M54 41     G89 29        Start Time: 1219  Stop Time: 1301  Total time in clinic (min): 42 minutes    Subjective: back of Bs leg feel tight; unable to get rid of tightness with HS stretches      Objective: See treatment diary below      Assessment:   Trial of IASTM to reduce posterior thigh pain/ tightness;  tolerated treatment well; reported reduced thigh pain afterward  Patient would benefit from continued PT      Plan: Continue per plan of care    Active program w/ emphasis on strengthening and mobility     Precautions: none  PMH: patient  reports ankylosing spondylitis, psoriatic arthritis, narcolepsy, siezures, morphine pump, dorsal column stimulator  Your Access Code: [email or web address]   POC expires:   Daily Treatment Record    Manuals          IASTM B HS    JR                                                Neuro Re-Ed             Pelvic tilts AROM seated             Quadruped hip hike hip on pad 10x10" 10x10" 10x10" 10x10"         Bird dog w/ pad 15xea 15xea 15x 15x         TB anti-rotation BTB 20x BTB 20x BTB 20x          1/2 kneel to full kneel 10xea 10xea 10x 10x                                   Ther Ex             bike 8 min 8 min 8min 8 min                      Seated flexion stretch 10x10" pball 10x10" no ball 10x10"          HS stretch seated 30"x3 30"x3 3x30" 3x30"         Hip flexor stretch - tatyana nv            LTR             gastroc stretch    30"x3         Mini squat w/ hip hinge 15x 10# kb 15x10#kb 15x 10#kb          lunge nv 5x hold          QL stretch seated - L 30"x3 Hold shld pain --          Ther Activity                                       Gait Training                                       Modalities

## 2021-07-12 ENCOUNTER — OFFICE VISIT (OUTPATIENT)
Dept: PHYSICAL THERAPY | Facility: CLINIC | Age: 46
End: 2021-07-12
Payer: MEDICARE

## 2021-07-12 DIAGNOSIS — M54.42 CHRONIC BILATERAL LOW BACK PAIN WITH BILATERAL SCIATICA: Primary | ICD-10-CM

## 2021-07-12 DIAGNOSIS — G89.29 CHRONIC BILATERAL LOW BACK PAIN WITH BILATERAL SCIATICA: Primary | ICD-10-CM

## 2021-07-12 DIAGNOSIS — M54.41 CHRONIC BILATERAL LOW BACK PAIN WITH BILATERAL SCIATICA: Primary | ICD-10-CM

## 2021-07-12 PROCEDURE — 97140 MANUAL THERAPY 1/> REGIONS: CPT | Performed by: PHYSICAL THERAPIST

## 2021-07-12 PROCEDURE — 97110 THERAPEUTIC EXERCISES: CPT | Performed by: PHYSICAL THERAPIST

## 2021-07-12 NOTE — PROGRESS NOTES
Daily Note     Today's date: 2021  Patient name: Rocio Huynh  : 1975  MRN: [id number]  Referring provider: Aleksandar Angulo MD  Dx:   Encounter Diagnosis     ICD-10-CM    1  Chronic bilateral low back pain with bilateral sciatica  M54 42     M54 41     G89 29        Start Time: 1134  Stop Time: 1215  Total time in clinic (min): 41 minutes    Subjective: reports improvement in posterior thigh pain p IASTM lv; still having pain/ tightness below the knee - upper calf      Objective: See treatment diary below      Assessment:   Thigh pain has resolved isnc eIASTM lv; added treatment to proximal calf;  tolerated treatment well;  Patient would benefit from continued PT      Plan: Continue per plan of care    Active program w/ emphasis on strengthening and mobility     Precautions: none  PMH: patient  reports ankylosing spondylitis, psoriatic arthritis, narcolepsy, siezures, morphine pump, dorsal column stimulator  Your Access Code: [email or web address]   POC expires:   Daily Treatment Record    Manuals         IASTM B HS    JR         IASTM prox gastroc     JR                                  Neuro Re-Ed             Pelvic tilts AROM seated             Quadruped hip hike hip on pad 10x10" 10x10" 10x10" 10x10" 10x10"        Bird dog w/ pad 15xea 15xea 15x 15x 15x        TB anti-rotation BTB 20x BTB 20x BTB 20x  BTB 20x        1/2 kneel to full kneel 10xea 10xea 10x 10x 10x                                  Ther Ex             bike 8 min 8 min 8min 8 min 8 min                     Seated flexion stretch 10x10" pball 10x10" no ball 10x10"          HS stretch seated 30"x3 30"x3 3x30" 3x30"         Hip flexor stretch - tatyana nv            LTR             gastroc stretch    30"x3 30"x3        Mini squat w/ hip hinge 15x 10# kb 15x10#kb 15x 10#kb  15x 10"kb        lunge nv 5x hold          QL stretch seated - L 30"x3 Hold shld pain --          Ther Activity                                       Gait Training                                       Modalities

## 2021-07-14 ENCOUNTER — OFFICE VISIT (OUTPATIENT)
Dept: PHYSICAL THERAPY | Facility: CLINIC | Age: 46
End: 2021-07-14
Payer: MEDICARE

## 2021-07-14 DIAGNOSIS — M54.42 CHRONIC BILATERAL LOW BACK PAIN WITH BILATERAL SCIATICA: Primary | ICD-10-CM

## 2021-07-14 DIAGNOSIS — G89.29 CHRONIC BILATERAL LOW BACK PAIN WITH BILATERAL SCIATICA: Primary | ICD-10-CM

## 2021-07-14 DIAGNOSIS — M54.41 CHRONIC BILATERAL LOW BACK PAIN WITH BILATERAL SCIATICA: Primary | ICD-10-CM

## 2021-07-14 PROCEDURE — 97110 THERAPEUTIC EXERCISES: CPT | Performed by: PHYSICAL THERAPIST

## 2021-07-14 NOTE — PROGRESS NOTES
Daily Note     Today's date: 2021  Patient name: Gayle Martin  : 1975  MRN: [id number]  Referring provider: Debora Quinn MD  Dx:   Encounter Diagnosis     ICD-10-CM    1  Chronic bilateral low back pain with bilateral sciatica  M54 42     M54 41     G89 29        Start Time: 1134  Stop Time: 1214  Total time in clinic (min): 40 minutes    Subjective: states that his back is definitely getting better;  Pain level doesn't go above 5/10 but is affected by changes in weather  Feels he is getting stronger  Having problem with getting up from floor  States that his HS and calf pain has resolved  Objective: See treatment diary below      Assessment: Tolerated treatment well  Able to progress strengthening exercise as below  Patient would benefit from continued PT      Plan: Continue per plan of care   Focus on strengthening, functional mobility,      Precautions: none  PMH: patient  reports ankylosing spondylitis, psoriatic arthritis, narcolepsy, siezures, morphine pump, dorsal column stimulator  Your Access Code: [email or web address]   POC expires:   Daily Treatment Record    Manuals        IASTM B HS    JR         IASTM prox gastroc     JR                                  Neuro Re-Ed             Pelvic tilts AROM seated             Quadruped hip hike hip on pad 10x10" 10x10" 10x10" 10x10" 10x10" 10x10"       Bird dog w/ pad 15xea 15xea 15x 15x 15x 15x BTB       TB anti-rotation BTB 20x BTB 20x BTB 20x  BTB 20x BTB 20x       1/2 kneel to full kneel 10xea 10xea 10x 10x 10x 10x       Leg press - 6        45# 23x                    Ther Ex             bike 8 min 8 min 8min 8 min 8 min 8 min                    Seated flexion stretch 10x10" pball 10x10" no ball 10x10"   10x10"       HS stretch seated 30"x3 30"x3 3x30" 3x30"         Hip flexor stretch - tatyana nv            LTR             gastroc stretch    30"x3 30"x3        Mini squat w/ hip hinge 15x 10# kb 15x10#kb 15x 10#kb  15x 10#kb 10x2 15#       lunge nv 5x hold   Sta   Mini lunge 10xea       QL stretch seated - L 30"x3 Hold shld pain --          Ther Activity             Floor to stand                          Gait Training                                       Modalities

## 2021-07-19 ENCOUNTER — OFFICE VISIT (OUTPATIENT)
Dept: PHYSICAL THERAPY | Facility: CLINIC | Age: 46
End: 2021-07-19
Payer: MEDICARE

## 2021-07-19 DIAGNOSIS — M54.42 CHRONIC BILATERAL LOW BACK PAIN WITH BILATERAL SCIATICA: Primary | ICD-10-CM

## 2021-07-19 DIAGNOSIS — M54.41 CHRONIC BILATERAL LOW BACK PAIN WITH BILATERAL SCIATICA: Primary | ICD-10-CM

## 2021-07-19 DIAGNOSIS — G89.29 CHRONIC BILATERAL LOW BACK PAIN WITH BILATERAL SCIATICA: Primary | ICD-10-CM

## 2021-07-19 PROCEDURE — 97110 THERAPEUTIC EXERCISES: CPT | Performed by: PHYSICAL THERAPIST

## 2021-07-19 NOTE — PROGRESS NOTES
Daily Note     Today's date: 2021  Patient name: Ike Polo  : 1975  MRN: [id number]  Referring provider: Miguel Angel Sandhu MD  Dx:   Encounter Diagnosis     ICD-10-CM    1  Chronic bilateral low back pain with bilateral sciatica  M54 42     M54 41     G89 29        Start Time: 1132  Stop Time: 1219  Total time in clinic (min): 47 minutes    Subjective: not having leg pain anymore; back is sore but states that everything is finally loosening; reports taht he is able to cross his R leg to don shoes and socks easily but has trouble with the R      Objective: See treatment diary below      Assessment: Tolerated treatment well  Difficulty donning shoes/ socks on L is liekly due to ER is more limited on L vs R hip - added piriformis stretch    Patient would benefit from continued PT      Plan: Continue per plan of care  Focus on strengthening, functional mobility, RA NV       Precautions: none  PMH: patient  reports ankylosing spondylitis, psoriatic arthritis, narcolepsy, siezures, morphine pump, dorsal column stimulator  Your Access Code: [email or web address]   POC expires:   Daily Treatment Record    Manuals       IASTM B HS    JR         IASTM prox gastroc     JR                                  Neuro Re-Ed             Pelvic tilts AROM seated             Quadruped hip hike hip on pad 10x10" 10x10" 10x10" 10x10" 10x10" 10x10" 10x10"      Bird dog w/ pad 15xea 15xea 15x 15x 15x 15x BTB 15x BTB      TB anti-rotation BTB 20x BTB 20x BTB 20x  BTB 20x BTB 20x BTB 20x      1/2 kneel to full kneel 10xea 10xea 10x 10x 10x 10x 10xea      Leg press - 6        45# 23x 45# 25x                   Ther Ex             bike 8 min 8 min 8min 8 min 8 min 8 min 8 min                   Seated flexion stretch 10x10" pball 10x10" no ball 10x10"   10x10" 10x10"      HS stretch seated 30"x3 30"x3 3x30" 3x30"   3x30"      Piriformis stretch fig 4       30"x3      LTR             gastroc stretch    30"x3 30"x3        Mini squat w/ hip hinge 15x 10# kb 15x10#kb 15x 10#kb  15x 10#kb 10x2 15# 10x2 15#      lunge nv 5x hold   Sta   Mini lunge 10xea 10xea                   QL stretch seated - L 30"x3 Hold shld pain --    30"x3      Ther Activity             Floor to stand                          Gait Training                                       Modalities

## 2021-07-21 ENCOUNTER — OFFICE VISIT (OUTPATIENT)
Dept: PHYSICAL THERAPY | Facility: CLINIC | Age: 46
End: 2021-07-21
Payer: MEDICARE

## 2021-07-21 DIAGNOSIS — M54.41 CHRONIC BILATERAL LOW BACK PAIN WITH BILATERAL SCIATICA: Primary | ICD-10-CM

## 2021-07-21 DIAGNOSIS — M54.42 CHRONIC BILATERAL LOW BACK PAIN WITH BILATERAL SCIATICA: Primary | ICD-10-CM

## 2021-07-21 DIAGNOSIS — G89.29 CHRONIC BILATERAL LOW BACK PAIN WITH BILATERAL SCIATICA: Primary | ICD-10-CM

## 2021-07-21 PROCEDURE — 97110 THERAPEUTIC EXERCISES: CPT | Performed by: PHYSICAL THERAPIST

## 2021-07-21 NOTE — PROGRESS NOTES
PT Re-Evaluation  and PT Discharge    Today's date: 2021  Patient name: Brett Castillo  : 1975  MRN: [id number]  Referring provider: Lauren Enciso MD  Dx:   Encounter Diagnosis     ICD-10-CM    1  Chronic bilateral low back pain with bilateral sciatica  M54 42     M54 41     G89 29        Start Time: 1137  Stop Time: 1235  Total time in clinic (min): 58 minutes    Assessment/Plan     This patient demonstrates improvement since beginning therapy; he has met most of the goals which were set for him,  Is independent with hep and is ready for DC to self- directed program    STGs: 4 weeks  1  Increase trunk flexion and extension AROM by 25-50% - MET  2  Decrease pain 2 levels - not yet met  -  MET  3  Increase AROM R hip flexion to 110 degrees - MET  4  Increase AROM R hip extension to -10 degrees  - MET                    LTGs: 6-8 weeks  1  Independent HEP - MET  2  Increase core strength:  (-) Maynard's sign - MET  3  Increase FOTO score to predicted level -  MET  4  Able to perform usual daily activities with pain level 0-3/10 - not  met          Subjective  This is a 39 y o  male who reports onset of symptoms   Was sitting in a movie seat that broke  States that he has herniated discs in the c-spine and L-spine  Current symptoms: states that he felt stiff this morning when he awoke but notes that he is now able to move his back better than he has in a long time; hip symptoms have resolved; continues to has central low back pain; feels that it has improved and he will need to continue with the exercises; is able to don/ doff shoes and socks on R leg without difficulty, it is getting easier on L leg  Aggravating factors: any prolonged position or activity  Relieving factors:  Change in position or activity  Previous treatment: PT, massage, chiropractic but nothing recent    Dx tests:  X-rays - per patient calcification, and misalignment of lower spine  Occupation:  Not employed  Leisure: walks dog    Patients goal: increase flexibility in the spine, make it easier to put shoes on,  things from the floor    Objective  Pain scale: 3-5/10    Trunk ROM     Flexion AROM   WNL     Extension  AROM   Severe limitation          R lateral flexion AROM        WNL     L lateral flexion AROM         WNL      R rotation WNL     L rotation WNL      (-) Vernon's sign    Special tests:    PSLR  R 70 degrees (-);  L 60 degrees (-)    Hip Scour (-)      Hip                   R  AROM  L      R  PROM   L    R  Strength  L  Flexion 118         110 123          120 5/5           5/5   Extension 0                0  5/5           5/5   Abd   5/5           5/5   Add   5/5 5/5    ER 30             43 35             45 5/5          5/5   IR   5/5          5/5     Knee   AROM  PROM  Strength  Flexion   5/5 5/5   Extension   5/5 5/5     Ankle   AROM  PROM  Strength  DF   5/5 5/5   PF      Inversion   5/5 5/5   Eversion   5/5 5/5           Flowsheet Rows      Most Recent Value   PT/OT G-Codes   Current Score  53   Projected Score  47            Precautions: none  PMH: patient  reports ankylosing spondylitis, psoriatic arthritis, narcolepsy, siezures, morphine pump, dorsal column stimulator  Your Access Code: [email or web address]   POC expires: 7/28  Daily Treatment Record    Manuals 6/23 6/28 6/30 7/7 7/12 7/14 7/19 7/21     IASTM B HS    JR         IASTM prox gastroc     JR                                  Neuro Re-Ed             Pelvic tilts AROM seated             Quadruped hip hike hip on pad 10x10" 10x10" 10x10" 10x10" 10x10" 10x10" 10x10" 10x10"     Bird dog w/ pad 15xea 15xea 15x 15x 15x 15x BTB 15x BTB 15x BTB     TB anti-rotation BTB 20x BTB 20x BTB 20x  BTB 20x BTB 20x BTB 20x BTB 20x     1/2 kneel to full kneel 10xea 10xea 10x 10x 10x 10x 10xea 10xea     Leg press - 6        45# 23x 45# 25x 55# 30                  Ther Ex bike 8 min 8 min 8min 8 min 8 min 8 min 8 min 8min                  Seated flexion stretch 10x10" pball 10x10" no ball 10x10"   10x10" 10x10" 10x10"     HS stretch seated 30"x3 30"x3 3x30" 3x30"   3x30" 3x30"     Piriformis stretch fig 4       30"x3 3x30"     LTR             gastroc stretch    30"x3 30"x3        Mini squat w/ hip hinge 15x 10# kb 15x10#kb 15x 10#kb  15x 10#kb 10x2 15# 10x2 15# 10x2 15#     lunge nv 5x hold   Sta   Mini lunge 10xea 10xea 10xea                  QL stretch seated - L 30"x3 Hold shld pain --    30"x3 30"x3     Ther Activity             Floor to stand                          Gait Training                                       Modalities

## 2021-08-13 ENCOUNTER — HOSPITAL ENCOUNTER (EMERGENCY)
Facility: HOSPITAL | Age: 46
Discharge: HOME | End: 2021-08-13
Attending: EMERGENCY MEDICINE | Admitting: EMERGENCY MEDICINE
Payer: MEDICARE

## 2021-08-13 ENCOUNTER — APPOINTMENT (EMERGENCY)
Dept: RADIOLOGY | Facility: HOSPITAL | Age: 46
End: 2021-08-13
Payer: MEDICARE

## 2021-08-13 VITALS
SYSTOLIC BLOOD PRESSURE: 127 MMHG | RESPIRATION RATE: 18 BRPM | OXYGEN SATURATION: 100 % | TEMPERATURE: 97.4 F | HEART RATE: 113 BPM | WEIGHT: 230 LBS | BODY MASS INDEX: 31.41 KG/M2 | DIASTOLIC BLOOD PRESSURE: 87 MMHG

## 2021-08-13 DIAGNOSIS — E87.6 HYPOKALEMIA: ICD-10-CM

## 2021-08-13 DIAGNOSIS — R42 ORTHOSTATIC DIZZINESS: ICD-10-CM

## 2021-08-13 DIAGNOSIS — R42 VERTIGO: Primary | ICD-10-CM

## 2021-08-13 LAB
ALBUMIN SERPL BCP-MCNC: 4 G/DL (ref 3.5–5)
ALP SERPL-CCNC: 70 U/L (ref 46–116)
ALT SERPL W P-5'-P-CCNC: 29 U/L (ref 12–78)
ANION GAP SERPL CALCULATED.3IONS-SCNC: 7 MMOL/L (ref 4–13)
AST SERPL W P-5'-P-CCNC: 18 U/L (ref 5–45)
ATRIAL RATE: 92 BPM
BASOPHILS # BLD AUTO: 0.07 THOUSANDS/ΜL (ref 0–0.1)
BASOPHILS NFR BLD AUTO: 1 % (ref 0–1)
BILIRUB SERPL-MCNC: 0.3 MG/DL (ref 0.2–1)
BUN SERPL-MCNC: 13 MG/DL (ref 5–25)
CALCIUM SERPL-MCNC: 8.8 MG/DL (ref 8.3–10.1)
CHLORIDE SERPL-SCNC: 104 MMOL/L (ref 100–108)
CO2 SERPL-SCNC: 28 MMOL/L (ref 21–32)
CREAT SERPL-MCNC: 1.05 MG/DL (ref 0.6–1.3)
EOSINOPHIL # BLD AUTO: 0.01 THOUSAND/ΜL (ref 0–0.61)
EOSINOPHIL NFR BLD AUTO: 0 % (ref 0–6)
ERYTHROCYTE [DISTWIDTH] IN BLOOD BY AUTOMATED COUNT: 11.9 % (ref 11.6–15.1)
GFR SERPL CREATININE-BSD FRML MDRD: 87 ML/MIN/1.73SQ M
GLUCOSE SERPL-MCNC: 102 MG/DL (ref 65–140)
HCT VFR BLD AUTO: 47.3 % (ref 36.5–49.3)
HGB BLD-MCNC: 15.8 G/DL (ref 12–17)
IMM GRANULOCYTES # BLD AUTO: 0.01 THOUSAND/UL (ref 0–0.2)
IMM GRANULOCYTES NFR BLD AUTO: 0 % (ref 0–2)
LYMPHOCYTES # BLD AUTO: 2.35 THOUSANDS/ΜL (ref 0.6–4.47)
LYMPHOCYTES NFR BLD AUTO: 33 % (ref 14–44)
MCH RBC QN AUTO: 28.5 PG (ref 26.8–34.3)
MCHC RBC AUTO-ENTMCNC: 33.4 G/DL (ref 31.4–37.4)
MCV RBC AUTO: 85 FL (ref 82–98)
MONOCYTES # BLD AUTO: 1.13 THOUSAND/ΜL (ref 0.17–1.22)
MONOCYTES NFR BLD AUTO: 16 % (ref 4–12)
NEUTROPHILS # BLD AUTO: 3.66 THOUSANDS/ΜL (ref 1.85–7.62)
NEUTS SEG NFR BLD AUTO: 50 % (ref 43–75)
NRBC BLD AUTO-RTO: 0 /100 WBCS
P AXIS: 50 DEGREES
PLATELET # BLD AUTO: 241 THOUSANDS/UL (ref 149–390)
PMV BLD AUTO: 8.7 FL (ref 8.9–12.7)
POTASSIUM SERPL-SCNC: 3.4 MMOL/L (ref 3.5–5.3)
PR INTERVAL: 142 MS
PROT SERPL-MCNC: 8 G/DL (ref 6.4–8.2)
QRS AXIS: 57 DEGREES
QRSD INTERVAL: 86 MS
QT INTERVAL: 350 MS
QTC INTERVAL: 432 MS
RBC # BLD AUTO: 5.54 MILLION/UL (ref 3.88–5.62)
SODIUM SERPL-SCNC: 139 MMOL/L (ref 136–145)
T WAVE AXIS: 21 DEGREES
TROPONIN I SERPL-MCNC: <0.02 NG/ML
VENTRICULAR RATE: 92 BPM
WBC # BLD AUTO: 7.23 THOUSAND/UL (ref 4.31–10.16)

## 2021-08-13 PROCEDURE — 80053 COMPREHEN METABOLIC PANEL: CPT

## 2021-08-13 PROCEDURE — 93005 ELECTROCARDIOGRAM TRACING: CPT

## 2021-08-13 PROCEDURE — 84484 ASSAY OF TROPONIN QUANT: CPT

## 2021-08-13 PROCEDURE — 85025 COMPLETE CBC W/AUTO DIFF WBC: CPT

## 2021-08-13 PROCEDURE — 99284 EMERGENCY DEPT VISIT MOD MDM: CPT

## 2021-08-13 PROCEDURE — 80177 DRUG SCRN QUAN LEVETIRACETAM: CPT

## 2021-08-13 PROCEDURE — 71045 X-RAY EXAM CHEST 1 VIEW: CPT

## 2021-08-13 PROCEDURE — 99284 EMERGENCY DEPT VISIT MOD MDM: CPT | Performed by: EMERGENCY MEDICINE

## 2021-08-13 PROCEDURE — 96360 HYDRATION IV INFUSION INIT: CPT

## 2021-08-13 PROCEDURE — 93010 ELECTROCARDIOGRAM REPORT: CPT | Performed by: INTERNAL MEDICINE

## 2021-08-13 PROCEDURE — 36415 COLL VENOUS BLD VENIPUNCTURE: CPT

## 2021-08-13 RX ORDER — POTASSIUM CHLORIDE 20 MEQ/1
20 TABLET, EXTENDED RELEASE ORAL ONCE
Status: COMPLETED | OUTPATIENT
Start: 2021-08-13 | End: 2021-08-13

## 2021-08-13 RX ADMIN — POTASSIUM CHLORIDE 20 MEQ: 1500 TABLET, EXTENDED RELEASE ORAL at 13:50

## 2021-08-13 RX ADMIN — SODIUM CHLORIDE 1000 ML: 0.9 INJECTION, SOLUTION INTRAVENOUS at 13:49

## 2021-08-13 NOTE — DISCHARGE INSTRUCTIONS
Continue present medications  Keep well hydrated  Tell your PCP and your neurologist how you are doing  Let them know that you were here and we did some testing today

## 2021-08-13 NOTE — ED PROVIDER NOTES
tablet (250 mg total) by mouth daily   tiZANidine (ZANAFLEX) 2 mg tablet   No No   Sig: Take 2 tablets (4 mg total) by mouth 4 (four) times a day as needed for muscle spasms   Patient taking differently: Take 4 mg by mouth as needed for muscle spasms    venlafaxine (EFFEXOR) 37 5 mg tablet   No No   Sig: Take 1 tablet (37 5 mg total) by mouth daily      Facility-Administered Medications: None       Past Medical History:   Diagnosis Date    [facility]osing [facility]ondylitis ([facility])     Arthritis     Chronic pain     DJD (degenerative joint disease)     Herniated disc, cervical     Narcolepsy     Psoriatic arthritis ([facility])     Seizures ([facility])     Sleep apnea     Sleep apnea        Past Surgical History:   Procedure Laterality Date    IMPLANTATION / PLACEMENT EPIDURAL NEUROSTIMULATOR ELECTRODES      RHINOPLASTY      TONSILLECTOMY         Family History   Problem Relation Age of Onset    Cancer Mother     Diabetes Father     Cancer Father     Heart disease Father      I have reviewed and agree with the history as documented  E-Cigarette/Vaping     E-Cigarette/Vaping Substances    Nicotine No     THC No     CBD No     Flavoring No     Other No     Unknown No      Social History     Tobacco Use    Smoking status: Never Smoker    Smokeless tobacco: Never Used   Vaping Use    Vaping Use: Never assessed   Substance Use Topics    Alcohol use: No    Drug use: No       Review of Systems   Constitutional: Positive for activity change, fatigue and fever  Negative for appetite change, chills and diaphoresis  HENT: Negative  Eyes: Negative for photophobia and visual disturbance  Respiratory: Negative  Cardiovascular: Negative  Gastrointestinal: Positive for diarrhea, nausea and vomiting  Negative for abdominal pain and blood in stool  Genitourinary: Negative  Musculoskeletal: Negative  Skin: Negative  Neurological: Positive for dizziness and seizures   Negative for syncope, facial asymmetry, speech difficulty, numbness and headaches  Psychiatric/Behavioral: Positive for decreased concentration, dysphoric mood and sleep disturbance  Physical Exam  Physical Exam  Vitals and nursing note reviewed  Constitutional:       General: He is not in acute distress  Appearance: Normal appearance  He is well-developed  He is not ill-appearing or diaphoretic  HENT:      Head: Normocephalic and atraumatic  Right Ear: Tympanic membrane, ear canal and external ear normal       Left Ear: Tympanic membrane, ear canal and external ear normal       Nose: Nose normal       Mouth/Throat:      Mouth: Mucous membranes are moist       Pharynx: Oropharynx is clear  Eyes:      General: No scleral icterus  Conjunctiva/sclera: Conjunctivae normal       Pupils: Pupils are equal, round, and reactive to light  Neck:      Vascular: No JVD  Cardiovascular:      Rate and Rhythm: Normal rate and regular rhythm  Pulses: Normal pulses  Heart sounds: Normal heart sounds  No murmur heard  Pulmonary:      Effort: Pulmonary effort is normal       Breath sounds: Normal breath sounds  Abdominal:      General: Bowel sounds are normal       Palpations: Abdomen is soft  There is no mass  Tenderness: There is no abdominal tenderness  There is no guarding or rebound  Musculoskeletal:         General: No tenderness  Normal range of motion  Cervical back: Normal range of motion and neck supple  No tenderness  Right lower leg: No edema  Left lower leg: No edema  Lymphadenopathy:      Cervical: No cervical adenopathy  Skin:     General: Skin is warm and dry  Capillary Refill: Capillary refill takes less than 2 seconds  Findings: No rash  Neurological:      General: No focal deficit present  Mental Status: He is alert and oriented to person, place, and time  Mental status is at baseline  Cranial Nerves: No cranial nerve deficit  Sensory: No sensory deficit  Motor: No weakness  Coordination: Coordination normal       Gait: Gait normal       Deep Tendon Reflexes: Reflexes are normal and symmetric  Reflexes normal    Psychiatric:         Mood and Affect: Mood normal          Behavior: Behavior normal          Thought Content:  Thought content normal          Vital Signs  ED Triage Vitals   Temperature Pulse Respirations Blood Pressure SpO2   08/13/21 1229 08/13/21 1227 08/13/21 1227 08/13/21 1227 08/13/21 1227   (!) 97 4 °F (36 3 °C) 97 18 126/94 95 %      Temp Source Heart Rate Source Patient Position - Orthostatic VS BP Location FiO2 (%)   08/13/21 1229 08/13/21 1340 08/13/21 1227 08/13/21 1227 --   Temporal Monitor Lying Left arm       Pain Score       08/13/21 1227       No Pain           Vitals:    08/13/21 1340 08/13/21 1342 08/13/21 1343 08/13/21 1344   BP: 127/83 125/85 125/85 127/87   Pulse: 81 91 100 (!) 113   Patient Position - Orthostatic VS: Lying - Orthostatic VS Sitting - Orthostatic VS Standing - Orthostatic VS Standing for 3 minutes - Orthostatic VS         Visual Acuity      ED Medications  Medications   sodium chloride 0 9 % bolus 1,000 mL (0 mL Intravenous Stopped 8/13/21 1443)   potassium chloride (K-DUR,KLOR-CON) CR tablet 20 mEq (20 mEq Oral Given 8/13/21 1350)       Diagnostic Studies  Results Reviewed     Procedure Component Value Units Date/Time    Troponin I [[id number]]  (Normal) Collected: 08/13/21 1229    Lab Status: Final result Specimen: Blood from Arm, Right Updated: 08/13/21 1254     Troponin I <0 02 ng/mL     Comprehensive metabolic panel [[id number]]  (Abnormal) Collected: 08/13/21 1229    Lab Status: Final result Specimen: Blood from Arm, Right Updated: 08/13/21 1252     Sodium 139 mmol/L      Potassium 3 4 mmol/L      Chloride 104 mmol/L      CO2 28 mmol/L      ANION GAP 7 mmol/L      BUN 13 mg/dL      Creatinine 1 05 mg/dL      Glucose 102 mg/dL      Calcium 8 8 mg/dL      AST 18 U/L      ALT 29 U/L      Alkaline Phosphatase 70 U/L      Total Protein 8 0 g/dL      Albumin 4 0 g/dL      Total Bilirubin 0 30 mg/dL      eGFR 87 ml/min/1 73sq m     Narrative:      [place] guidelines for Chronic Kidney Disease (CKD):     Stage 1 with normal or high GFR (GFR > 90 mL/min/1 73 square meters)    Stage 2 Mild CKD (GFR = 60-89 mL/min/1 73 square meters)    Stage 3A Moderate CKD (GFR = 45-59 mL/min/1 73 square meters)    Stage 3B Moderate CKD (GFR = 30-44 mL/min/1 73 square meters)    Stage 4 Severe CKD (GFR = 15-29 mL/min/1 73 square meters)    Stage 5 End Stage CKD (GFR <15 mL/min/1 73 square meters)  Note: GFR calculation is accurate only with a steady state creatinine    CBC and differential [[id number]]  (Abnormal) Collected: 08/13/21 1229    Lab Status: Final result Specimen: Blood from Arm, Right Updated: 08/13/21 1235     WBC 7 23 Thousand/uL      RBC 5 54 Million/uL      Hemoglobin 15 8 g/dL      Hematocrit 47 3 %      MCV 85 fL      MCH 28 5 pg      MCHC 33 4 g/dL      RDW 11 9 %      MPV 8 7 fL      Platelets 647 Thousands/uL      nRBC 0 /100 WBCs      Neutrophils Relative 50 %      Immat GRANS % 0 %      Lymphocytes Relative 33 %      Monocytes Relative 16 %      Eosinophils Relative 0 %      Basophils Relative 1 %      Neutrophils Absolute 3 66 Thousands/µL      Immature Grans Absolute 0 01 Thousand/uL      Lymphocytes Absolute 2 35 Thousands/µL      Monocytes Absolute 1 13 Thousand/µL      Eosinophils Absolute 0 01 Thousand/µL      Basophils Absolute 0 07 Thousands/µL     Levetiracetam level [[id number]] Collected: 08/13/21 1229    Lab Status: In process Specimen: Blood from Arm, Right Updated: 08/13/21 1233                 XR chest 1 view portable   Final Result by Slime Lim MD (08/13 1301)   No acute cardiopulmonary disease        Findings are stable            Workstation performed: [id number]                    Procedures  ECG 12 Lead Documentation Only    Date/Time: 8/13/2021 12:38 PM  Performed by: Apolonia Duvall DO  Authorized by: Apolonia Duvall DO     ECG reviewed by me, the ED Provider: yes    Patient location:  ED  Previous ECG:     Previous ECG:  Compared to current    Similarity:  No change    Comparison to cardiac monitor: Yes    Interpretation:     Interpretation: normal               ED Course                                           MDM  Number of Diagnoses or Management Options  Hypokalemia: new and requires workup  Orthostatic dizziness: new and requires workup  Vertigo: new and requires workup  Diagnosis management comments: Middle age male with multiple past medical diagnoses presents with acute onset of improving vertigo  Found to have hypokalemia and some orthostatic symptoms  Improved with IV fluids and potassium supplementation  No evidence for a significant disease process currently  Instructed follow-up with PCP and Neurology  Amount and/or Complexity of Data Reviewed  Clinical lab tests: ordered and reviewed  Tests in the radiology section of CPT®: ordered and reviewed  Review and summarize past medical records: yes  Independent visualization of images, tracings, or specimens: yes        Disposition  Final diagnoses:   Vertigo   Orthostatic dizziness   Hypokalemia     Time reflects when diagnosis was documented in both MDM as applicable and the Disposition within this note     Time User Action Codes Description Comment    8/13/2021  1:49 PM Eron Contreras Add [R42] Vertigo     8/13/2021  1:50 PM Lizbeth FOWLER Add [R42] Orthostatic dizziness     8/13/2021  1:50 PM Eron Contreras Add [E87 6] Hypokalemia       ED Disposition     ED Disposition Condition Date/Time Comment    Discharge Stable Fri Aug 13, 2021  1:49 PM Ike Polo discharge to home/self care              Follow-up Information     Follow up With Specialties Details Why Contact Info    Madelaine Saavedra DO Family Medicine Call  As needed [address]  [address] [place] [address] [employer] [id number]  [phone]      your neurologist  Call  As needed           Discharge Medication List as of 8/13/2021  1:54 PM      CONTINUE these medications which have NOT CHANGED    Details   B-D TB SYRINGE 1CC/27GX1/2" 27G X 1/2" 1 ML MISC USE THREE WEEKLY FOR ALLERGY INJECTIONS, Historical Med      EPINEPHrine (EPIPEN) 0 3 mg/0 3 mL SOAJ Inject 0 3 mL (0 3 mg total) into a muscle once for 1 dose, Starting Sun 9/1/2019, Normal      fluticasone (FLONASE) 50 mcg/act nasal spray 2 sprays into each nostril 2 (two) times a day, Historical Med      Insulin Syringe-Needle U-100 27 5G X 5/8" 2 ML MISC 3 syringes weekly for allergy injections, Historical Med      levETIRAcetam (KEPPRA) 250 mg tablet Take 1 tablet (250 mg total) by mouth daily, Starting Fri 5/28/2021, Normal      LINZESS 145 MCG CAPS 1 (one) Capsule daily on an empty stomach, at least 30 mn before first meal, Historical Med      tiZANidine (ZANAFLEX) 2 mg tablet Take 2 tablets (4 mg total) by mouth 4 (four) times a day as needed for muscle spasms, Starting Wed 8/7/2019, Normal      venlafaxine (EFFEXOR) 37 5 mg tablet Take 1 tablet (37 5 mg total) by mouth daily, Starting Fri 5/28/2021, Normal           No discharge procedures on file      PDMP Review     None          ED Provider  Electronically Signed by           Humera Abel DO  08/13/21 7240

## 2021-08-17 ENCOUNTER — HOSPITAL ENCOUNTER (EMERGENCY)
Facility: HOSPITAL | Age: 46
Discharge: HOME | End: 2021-08-17
Attending: EMERGENCY MEDICINE
Payer: MEDICARE

## 2021-08-17 ENCOUNTER — APPOINTMENT (EMERGENCY)
Dept: RADIOLOGY | Facility: HOSPITAL | Age: 46
End: 2021-08-17
Payer: MEDICARE

## 2021-08-17 VITALS
OXYGEN SATURATION: 95 % | BODY MASS INDEX: 31.15 KG/M2 | TEMPERATURE: 98 F | RESPIRATION RATE: 20 BRPM | DIASTOLIC BLOOD PRESSURE: 70 MMHG | WEIGHT: 230 LBS | SYSTOLIC BLOOD PRESSURE: 123 MMHG | HEIGHT: 72 IN | HEART RATE: 67 BPM

## 2021-08-17 DIAGNOSIS — R19.7 DIARRHEA: ICD-10-CM

## 2021-08-17 DIAGNOSIS — J12.82 PNEUMONIA DUE TO COVID-19 VIRUS: Primary | ICD-10-CM

## 2021-08-17 DIAGNOSIS — R11.0 NAUSEA: ICD-10-CM

## 2021-08-17 DIAGNOSIS — U07.1 PNEUMONIA DUE TO COVID-19 VIRUS: Primary | ICD-10-CM

## 2021-08-17 LAB
ALBUMIN SERPL BCP-MCNC: 3.3 G/DL (ref 3.5–5)
ALP SERPL-CCNC: 50 U/L (ref 46–116)
ALT SERPL W P-5'-P-CCNC: 24 U/L (ref 12–78)
ANION GAP SERPL CALCULATED.3IONS-SCNC: 8 MMOL/L (ref 4–13)
AST SERPL W P-5'-P-CCNC: 17 U/L (ref 5–45)
ATRIAL RATE: 74 BPM
BASOPHILS # BLD AUTO: 0.02 THOUSANDS/ΜL (ref 0–0.1)
BASOPHILS NFR BLD AUTO: 1 % (ref 0–1)
BILIRUB SERPL-MCNC: 0.3 MG/DL (ref 0.2–1)
BUN SERPL-MCNC: 10 MG/DL (ref 5–25)
CALCIUM ALBUM COR SERPL-MCNC: 8.5 MG/DL (ref 8.3–10.1)
CALCIUM SERPL-MCNC: 7.9 MG/DL (ref 8.3–10.1)
CHLORIDE SERPL-SCNC: 101 MMOL/L (ref 100–108)
CO2 SERPL-SCNC: 29 MMOL/L (ref 21–32)
CREAT SERPL-MCNC: 0.86 MG/DL (ref 0.6–1.3)
EOSINOPHIL # BLD AUTO: 0.01 THOUSAND/ΜL (ref 0–0.61)
EOSINOPHIL NFR BLD AUTO: 0 % (ref 0–6)
ERYTHROCYTE [DISTWIDTH] IN BLOOD BY AUTOMATED COUNT: 11.7 % (ref 11.6–15.1)
GFR SERPL CREATININE-BSD FRML MDRD: 106 ML/MIN/1.73SQ M
GLUCOSE SERPL-MCNC: 104 MG/DL (ref 65–140)
HCT VFR BLD AUTO: 41.4 % (ref 36.5–49.3)
HGB BLD-MCNC: 13.8 G/DL (ref 12–17)
IMM GRANULOCYTES # BLD AUTO: 0.01 THOUSAND/UL (ref 0–0.2)
IMM GRANULOCYTES NFR BLD AUTO: 0 % (ref 0–2)
LEVETIRACETAM SERPL-MCNC: 2.6 UG/ML (ref 10–40)
LYMPHOCYTES # BLD AUTO: 1.4 THOUSANDS/ΜL (ref 0.6–4.47)
LYMPHOCYTES NFR BLD AUTO: 33 % (ref 14–44)
MCH RBC QN AUTO: 28.2 PG (ref 26.8–34.3)
MCHC RBC AUTO-ENTMCNC: 33.3 G/DL (ref 31.4–37.4)
MCV RBC AUTO: 85 FL (ref 82–98)
MONOCYTES # BLD AUTO: 0.52 THOUSAND/ΜL (ref 0.17–1.22)
MONOCYTES NFR BLD AUTO: 12 % (ref 4–12)
NEUTROPHILS # BLD AUTO: 2.34 THOUSANDS/ΜL (ref 1.85–7.62)
NEUTS SEG NFR BLD AUTO: 54 % (ref 43–75)
NRBC BLD AUTO-RTO: 0 /100 WBCS
P AXIS: 16 DEGREES
PLATELET # BLD AUTO: 162 THOUSANDS/UL (ref 149–390)
PMV BLD AUTO: 9 FL (ref 8.9–12.7)
POTASSIUM SERPL-SCNC: 3.3 MMOL/L (ref 3.5–5.3)
PR INTERVAL: 146 MS
PROT SERPL-MCNC: 6.7 G/DL (ref 6.4–8.2)
QRS AXIS: 46 DEGREES
QRSD INTERVAL: 88 MS
QT INTERVAL: 410 MS
QTC INTERVAL: 455 MS
RBC # BLD AUTO: 4.9 MILLION/UL (ref 3.88–5.62)
SARS-COV-2 RNA RESP QL NAA+PROBE: POSITIVE
SODIUM SERPL-SCNC: 138 MMOL/L (ref 136–145)
T WAVE AXIS: 18 DEGREES
TROPONIN I SERPL-MCNC: <0.02 NG/ML
VENTRICULAR RATE: 74 BPM
WBC # BLD AUTO: 4.3 THOUSAND/UL (ref 4.31–10.16)

## 2021-08-17 PROCEDURE — 36415 COLL VENOUS BLD VENIPUNCTURE: CPT | Performed by: EMERGENCY MEDICINE

## 2021-08-17 PROCEDURE — 96374 THER/PROPH/DIAG INJ IV PUSH: CPT

## 2021-08-17 PROCEDURE — 71045 X-RAY EXAM CHEST 1 VIEW: CPT

## 2021-08-17 PROCEDURE — 96361 HYDRATE IV INFUSION ADD-ON: CPT

## 2021-08-17 PROCEDURE — 99285 EMERGENCY DEPT VISIT HI MDM: CPT | Performed by: EMERGENCY MEDICINE

## 2021-08-17 PROCEDURE — 96375 TX/PRO/DX INJ NEW DRUG ADDON: CPT

## 2021-08-17 PROCEDURE — 84484 ASSAY OF TROPONIN QUANT: CPT | Performed by: EMERGENCY MEDICINE

## 2021-08-17 PROCEDURE — 99284 EMERGENCY DEPT VISIT MOD MDM: CPT

## 2021-08-17 PROCEDURE — U0005 INFEC AGEN DETEC AMPLI PROBE: HCPCS | Performed by: EMERGENCY MEDICINE

## 2021-08-17 PROCEDURE — U0003 INFECTIOUS AGENT DETECTION BY NUCLEIC ACID (DNA OR RNA); SEVERE ACUTE RESPIRATORY SYNDROME CORONAVIRUS 2 (SARS-COV-2) (CORONAVIRUS DISEASE [COVID-19]), AMPLIFIED PROBE TECHNIQUE, MAKING USE OF HIGH THROUGHPUT TECHNOLOGIES AS DESCRIBED BY CMS-2020-01-R: HCPCS | Performed by: EMERGENCY MEDICINE

## 2021-08-17 PROCEDURE — 80053 COMPREHEN METABOLIC PANEL: CPT | Performed by: EMERGENCY MEDICINE

## 2021-08-17 PROCEDURE — 93005 ELECTROCARDIOGRAM TRACING: CPT

## 2021-08-17 PROCEDURE — 85025 COMPLETE CBC W/AUTO DIFF WBC: CPT | Performed by: EMERGENCY MEDICINE

## 2021-08-17 PROCEDURE — 93010 ELECTROCARDIOGRAM REPORT: CPT | Performed by: INTERNAL MEDICINE

## 2021-08-17 RX ORDER — ONDANSETRON 4 MG/1
4 TABLET, ORALLY DISINTEGRATING ORAL EVERY 6 HOURS PRN
Qty: 12 TABLET | Refills: 0 | Status: SHIPPED | OUTPATIENT
Start: 2021-08-17 | End: 2022-04-21

## 2021-08-17 RX ORDER — KETOROLAC TROMETHAMINE 30 MG/ML
15 INJECTION, SOLUTION INTRAMUSCULAR; INTRAVENOUS ONCE
Status: COMPLETED | OUTPATIENT
Start: 2021-08-17 | End: 2021-08-17

## 2021-08-17 RX ORDER — ONDANSETRON 2 MG/ML
4 INJECTION INTRAMUSCULAR; INTRAVENOUS ONCE
Status: COMPLETED | OUTPATIENT
Start: 2021-08-17 | End: 2021-08-17

## 2021-08-17 RX ORDER — DOXYCYCLINE HYCLATE 100 MG/1
100 CAPSULE ORAL ONCE
Status: COMPLETED | OUTPATIENT
Start: 2021-08-17 | End: 2021-08-17

## 2021-08-17 RX ORDER — DOXYCYCLINE HYCLATE 100 MG/1
100 CAPSULE ORAL EVERY 12 HOURS SCHEDULED
Qty: 10 CAPSULE | Refills: 0 | Status: SHIPPED | OUTPATIENT
Start: 2021-08-17 | End: 2021-08-22

## 2021-08-17 RX ADMIN — DOXYCYCLINE 100 MG: 100 CAPSULE ORAL at 19:19

## 2021-08-17 RX ADMIN — ONDANSETRON 4 MG: 2 INJECTION INTRAMUSCULAR; INTRAVENOUS at 17:54

## 2021-08-17 RX ADMIN — KETOROLAC TROMETHAMINE 15 MG: 30 INJECTION, SOLUTION INTRAMUSCULAR; INTRAVENOUS at 17:54

## 2021-08-17 RX ADMIN — SODIUM CHLORIDE 1000 ML: 0.9 INJECTION, SOLUTION INTRAVENOUS at 17:54

## 2021-08-17 NOTE — ED PROVIDER NOTES
History  Chief Complaint   Patient presents with    Fever - 9 weeks to 74 years     was seen here Friday and thought to have "viral vertigo", fever today tmax 101, diarrhea x2 today     39year old male presents for evaluation of sharp left-sided chest pain since 8 am this morning associated with shortness of breath that worsens with exertion  Patient states he has had sharp upper back pain as well which began yesterday with no exacerbating or alleviating factors  He states he has had fevers with Tmax 103F, but has not taken anything for them as he is allergic to tylenol and states that he was told not to take NSAIDs due to his morphine pain pump for his chronic pain  Patient denies cough or congestion  He has not been vaccinated for COVID  Patient had presented to this ED 1 week ago at which time he had been having worsening dizziness and concern for seizure  No further seizure-like activity  Patient has been nauseated with poor appetite, but no episodes of emesis  Patient has not been vaccinated against COVID  Fever - 9 weeks to 74 years  Max temp prior to arrival:  80 F  Severity:  Moderate  Onset quality:  Gradual  Duration:  1 week  Timing:  Constant  Progression:  Waxing and waning  Chronicity:  New  Relieved by:  None tried  Worsened by:  Nothing  Ineffective treatments:  None tried  Associated symptoms: chest pain, diarrhea (3-4 watery stools daily) and nausea    Associated symptoms: no congestion, no cough, no headaches, no myalgias, no rash, no sore throat and no vomiting        Prior to Admission Medications   Prescriptions Last Dose Informant Patient Reported? Taking?    B-D TB SYRINGE 1CC/27GX1/2" 27G X 1/2" 1 ML MISC   Yes No   Sig: USE THREE WEEKLY FOR ALLERGY INJECTIONS   EPINEPHrine (EPIPEN) 0 3 mg/0 3 mL SOAJ   No No   Sig: Inject 0 3 mL (0 3 mg total) into a muscle once for 1 dose   Patient taking differently: Inject 0 3 mg into a muscle as needed    Insulin Syringe-Needle U-100 27 5G X 5/8" 2 ML MISC   Yes No   Sig: 3 syringes weekly for allergy injections   LINZESS 145 MCG CAPS   Yes No   Si (one) Capsule daily on an empty stomach, at least 30 mn before first meal   fluticasone (FLONASE) 50 mcg/act nasal spray   Yes No   Si sprays into each nostril 2 (two) times a day   levETIRAcetam (KEPPRA) 250 mg tablet   No No   Sig: Take 1 tablet (250 mg total) by mouth daily   tiZANidine (ZANAFLEX) 2 mg tablet   No No   Sig: Take 2 tablets (4 mg total) by mouth 4 (four) times a day as needed for muscle spasms   Patient taking differently: Take 4 mg by mouth as needed for muscle spasms    venlafaxine (EFFEXOR) 37 5 mg tablet   No No   Sig: Take 1 tablet (37 5 mg total) by mouth daily      Facility-Administered Medications: None       Past Medical History:   Diagnosis Date    Ankylosing spondylitis (HCC)     Arthritis     Chronic pain     DJD (degenerative joint disease)     Herniated disc, cervical     Narcolepsy     Psoriatic arthritis (HCC)     [facility]izures [facility])     Sleep apnea     Sleep apnea        Past Surgical History:   Procedure Laterality Date    IMPLANTATION / PLACEMENT EPIDURAL NEUROSTIMULATOR ELECTRODES      RHINOPLASTY      TONSILLECTOMY         Family History   Problem Relation Age of Onset    Cancer Mother     Diabetes Father     Cancer Father     Heart disease Father      I have reviewed and agree with the history as documented  E-Cigarette/Vaping     E-Cigarette/Vaping Substances    Nicotine No     THC No     CBD No     Flavoring No     Other No     Unknown No      Social History     Tobacco Use    Smoking status: Never Smoker    Smokeless tobacco: Never Used   Vaping Use    Vaping Use: Never assessed   Substance Use Topics    Alcohol use: No    Drug use: No       Review of Systems   Constitutional: Positive for appetite change and fever  Negative for diaphoresis  HENT: Negative for congestion and sore throat  Respiratory: Positive for shortness of breath  Negative for cough  Cardiovascular: Positive for chest pain  Negative for leg swelling  Gastrointestinal: Positive for diarrhea (3-4 watery stools daily) and nausea  Negative for abdominal pain, constipation and vomiting  Musculoskeletal: Positive for back pain  Negative for myalgias  Skin: Negative for rash and wound  Neurological: Positive for dizziness  Negative for syncope and headaches  All other systems reviewed and are negative  Physical Exam  Physical Exam  Vitals and nursing note reviewed  Constitutional:       General: He is not in acute distress  Appearance: He is well-developed  He is not toxic-appearing or diaphoretic  HENT:      Head: Normocephalic and atraumatic  Right Ear: External ear normal       Left Ear: External ear normal       Nose: Nose normal    Eyes:      General: No scleral icterus  Cardiovascular:      Rate and Rhythm: Normal rate and regular rhythm  Heart sounds: Normal heart sounds  Pulmonary:      Effort: Pulmonary effort is normal  No respiratory distress  Breath sounds: Normal breath sounds  Abdominal:      General: There is no distension  Palpations: Abdomen is soft  Tenderness: There is no abdominal tenderness  Musculoskeletal:         General: No deformity  Normal range of motion  Skin:     Findings: No rash  Neurological:      General: No focal deficit present  Mental Status: He is alert        Gait: Gait normal    Psychiatric:         Mood and Affect: Mood normal          Vital Signs  ED Triage Vitals   Temperature Pulse Respirations Blood Pressure SpO2   08/17/21 1721 08/17/21 1721 08/17/21 1721 08/17/21 1721 08/17/21 1721   98 °F (36 7 °C) 80 18 141/80 96 %      Temp Source Heart Rate Source Patient Position - Orthostatic VS BP Location FiO2 (%)   08/17/21 1721 08/17/21 1721 08/17/21 1721 08/17/21 1721 --   Temporal Monitor Lying Right arm       Pain Score       08/17/21 1754       6           Vitals: 08/17/21 1721 08/17/21 1800 08/17/21 1830   BP: 141/80 129/75 123/70   Pulse: 80 70 67   Patient Position - Orthostatic VS: Lying Sitting Sitting         Visual Acuity      ED Medications  Medications   doxycycline hyclate (VIBRAMYCIN) capsule 100 mg (has no administration in time range)   sodium chloride 0 9 % bolus 1,000 mL (1,000 mL Intravenous New Bag 8/17/21 1754)   ketorolac (TORADOL) injection 15 mg (15 mg Intravenous Given 8/17/21 1754)   ondansetron (ZOFRAN) injection 4 mg (4 mg Intravenous Given 8/17/21 1754)       Diagnostic Studies  Results Reviewed     Procedure Component Value Units Date/Time    Novel Coronavirus (Covid-19),PCR [facility] - 2 Hour Stat [[id number]]  (Abnormal) Collected: 08/17/21 1753    Lab Status: Final result Specimen: Nares from Nose Updated: 08/17/21 1852     SARS-CoV-2 Positive    Narrative: The specimen collection materials, transport medium, and/or testing methodology utilized in the production of these test results have been proven to be reliable in a limited validation with an abbreviated program under the Emergency Utilization Authorization provided by the FDA  Testing reported as "Presumptive positive" will be confirmed with secondary testing to ensure result accuracy  Clinical caution and judgement should be used with the interpretation of these results with consideration of the clinical impression and other laboratory testing  Testing reported as "Positive" or "Negative" has been proven to be accurate according to standard laboratory validation requirements  All testing is performed with control materials showing appropriate reactivity at standard intervals        CBC and differential [[id number]]  (Abnormal) Collected: 08/17/21 1753    Lab Status: Final result Specimen: Blood from Arm, Left Updated: 08/17/21 1829     WBC 4 30 Thousand/uL      RBC 4 90 Million/uL      Hemoglobin 13 8 g/dL      Hematocrit 41 4 %      MCV 85 fL      MCH 28 2 pg      MCHC 33 3 g/dL      RDW 11 7 %      MPV 9 0 fL      Platelets 402 Thousands/uL      nRBC 0 /100 WBCs      Neutrophils Relative 54 %      Immat GRANS % 0 %      Lymphocytes Relative 33 %      Monocytes Relative 12 %      Eosinophils Relative 0 %      Basophils Relative 1 %      Neutrophils Absolute 2 34 Thousands/µL      Immature Grans Absolute 0 01 Thousand/uL      Lymphocytes Absolute 1 40 Thousands/µL      Monocytes Absolute 0 52 Thousand/µL      Eosinophils Absolute 0 01 Thousand/µL      Basophils Absolute 0 02 Thousands/µL     Troponin I [[id number]]  (Normal) Collected: 08/17/21 1753    Lab Status: Final result Specimen: Blood from Arm, Left Updated: 08/17/21 1829     Troponin I <0 02 ng/mL     Comprehensive metabolic panel [[id number]]  (Abnormal) Collected: 08/17/21 1753    Lab Status: Final result Specimen: Blood from Arm, Left Updated: 08/17/21 1827     Sodium 138 mmol/L      Potassium 3 3 mmol/L      Chloride 101 mmol/L      CO2 29 mmol/L      ANION GAP 8 mmol/L      BUN 10 mg/dL      Creatinine 0 86 mg/dL      Glucose 104 mg/dL      Calcium 7 9 mg/dL      Corrected Calcium 8 5 mg/dL      AST 17 U/L      ALT 24 U/L      Alkaline Phosphatase 50 U/L      Total Protein 6 7 g/dL      Albumin 3 3 g/dL      Total Bilirubin 0 30 mg/dL      eGFR 106 ml/min/1 73sq m     Narrative:      [place] guidelines for Chronic Kidney Disease (CKD):     Stage 1 with normal or high GFR (GFR > 90 mL/min/1 73 square meters)    Stage 2 Mild CKD (GFR = 60-89 mL/min/1 73 square meters)    Stage 3A Moderate CKD (GFR = 45-59 mL/min/1 73 square meters)    Stage 3B Moderate CKD (GFR = 30-44 mL/min/1 73 square meters)    Stage 4 Severe CKD (GFR = 15-29 mL/min/1 73 square meters)    Stage 5 End Stage CKD (GFR <15 mL/min/1 73 square meters)  Note: GFR calculation is accurate only with a steady state creatinine                 XR chest 1 view portable   ED Interpretation by Dani Garrett MD (08/17 1832)   Peripheral left lung opacities concerning for COVID pneumonia                 Procedures  ECG 12 Lead Documentation Only    Date/Time: 8/17/2021 6:01 PM  Performed by: Teddy Ford MD  Authorized by: Teddy Ford MD     Indications / Diagnosis:  Chest pain  ECG reviewed by me, the ED Provider: yes    Patient location:  ED  Previous ECG:     Previous ECG:  Compared to current    Comparison ECG info:  8/13/21 normal ekg    Similarity:  No change  Interpretation:     Interpretation: normal    Rate:     ECG rate:  74    ECG rate assessment: normal    Rhythm:     Rhythm: sinus rhythm    Ectopy:     Ectopy: none    QRS:     QRS axis:  Normal    QRS intervals:  Normal  Conduction:     Conduction: normal    ST segments:     ST segments:  Normal  T waves:     T waves: normal               ED Course                             SBIRT 20yo+      Most Recent Value   SBIRT (24 yo +)   In order to provide better care to our patients, we are screening all of our patients for alcohol and drug use  Would it be okay to ask you these screening questions? Unable to answer at this time Filed at: 08/17/2021 1851                    MDM  Number of Diagnoses or Management Options  Diarrhea: new and requires workup  Nausea: new and requires workup  Pneumonia due to COVID-19 virus: new and requires workup  Diagnosis management comments: 39year old male presents for evaluation of nausea, diarrhea and chest pain associated with shortness of breath  CXR consistent with COVID pneumonia  Patient started on doxycycline  Zofran for nausea  Virtual visit for follow up  Return precautions provided         Amount and/or Complexity of Data Reviewed  Clinical lab tests: ordered and reviewed  Tests in the radiology section of CPT®: ordered  Independent visualization of images, tracings, or specimens: yes    Patient Progress  Patient progress: stable      Disposition  Final diagnoses:   Pneumonia due to COVID-19 virus   Nausea   Diarrhea     Time reflects when diagnosis was documented in both MDM as applicable and the Disposition within this note     Time User Action Codes Description Comment    8/17/2021  7:06 PM Jeancarlos Michaels Add [U07 1,  J12 82] Pneumonia due to COVID-19 virus     8/17/2021  7:07 PM Donald FOWLER Add [R11 0] Nausea     8/17/2021  7:07 PM Jeancarlos Michaels Add [R19 7] Diarrhea       ED Disposition     ED Disposition Condition Date/Time Comment    Discharge Stable Tue Aug 17, 2021  7:07 PM Abdoulaye Rushing discharge to home/self care  Follow-up Information     Follow up With Specialties Details Why Contact Info Additional Information    Brittani Ortiz,  Family Medicine Schedule an appointment as soon as possible for a visit in 3 days please schedule a virtual visit for follow up [address] [facility] [place]  [phone]        [place] [address] Emergency Department Emergency Medicine Go to  If symptoms worsen, severe shortness of breath [address] [facility] Emergency Department, [address] [place] [address] 10          Patient's Medications   Discharge Prescriptions    DOXYCYCLINE HYCLATE (VIBRAMYCIN) 100 MG CAPSULE    Take 1 capsule (100 mg total) by mouth every 12 (twelve) hours for 5 days       Start Date: 8/17/2021 End Date: 8/22/2021       Order Dose: 100 mg       Quantity: 10 capsule    Refills: 0    ONDANSETRON (ZOFRAN-ODT) 4 MG DISINTEGRATING TABLET    Take 1 tablet (4 mg total) by mouth every 6 (six) hours as needed for nausea or vomiting       Start Date: 8/17/2021 End Date: --       Order Dose: 4 mg       Quantity: 12 tablet    Refills: 0     No discharge procedures on file      PDMP Review     None          ED Provider  Electronically Signed by           Jesica Thakkar MD  08/17/21 8938

## 2021-08-17 NOTE — DISCHARGE INSTRUCTIONS
COVID-19 (Coronavirus Disease 2019)   WHAT YOU NEED TO KNOW:   Coronavirus disease 2019 (COVID-19) is the disease caused by a coronavirus first discovered in December 2019  Coronaviruses generally cause upper respiratory (nose, throat, and lung) infections, such as a cold  The new virus spreads quickly and easily  The virus can be spread starting 2 days before symptoms even begin  The virus has also changed into several new forms (called variants) since it was discovered  The variants may be more contagious (easily spread) than the original form  Some may also cause more severe illness than others  It is important to follow local, national, and worldwide measures to protect yourself and others from infection  DISCHARGE INSTRUCTIONS:   If you think you or someone you know may be infected:  Do the following to protect others:  · If emergency care is needed,  tell the  about the possible infection, or call ahead and tell the emergency department  · Call a healthcare provider  for instructions if symptoms are mild  Anyone who may be infected should not  arrive without calling first  The provider will need to protect staff members and other patients  · The person who may be infected needs to wear a face covering  while getting medical care  This will help lower the risk of infecting others  Coverings are not used for anyone who is younger than 2 years, has breathing problems, or cannot remove it  The provider can give you instructions for anyone who cannot wear a covering  Call your local emergency number (911 in the [address] [place] [address],[address]) or an emergency department if:   · You have trouble breathing or shortness of breath at rest     · You have chest pain or pressure that lasts longer than 5 minutes  · You become confused or hard to wake  · Your lips or face are blue  · You have a fever of 104°F (40°C) or higher      Call your doctor if:   · You do not  have symptoms of COVID-19 but had close physical contact within 14 days with someone who tested positive  · You have questions or concerns about your condition or care  Medicines: You may need any of the following for mild symptoms:  · Decongestants  help reduce nasal congestion and help you breathe more easily  If you take decongestant pills, they may make you feel restless or cause problems with your sleep  Do not use decongestant sprays for more than a few days  · Cough suppressants  help reduce coughing  Ask your healthcare provider which type of cough medicine is best for you  · Acetaminophen  decreases pain and fever  It is available without a doctor's order  Ask how much to take and how often to take it  Follow directions  Read the labels of all other medicines you are using to see if they also contain acetaminophen, or ask your doctor or pharmacist  Acetaminophen can cause liver damage if not taken correctly  Do not use more than 4 grams (4,000 milligrams) total of acetaminophen in one day  · NSAIDs , such as ibuprofen, help decrease swelling, pain, and fever  NSAIDs can cause stomach bleeding or kidney problems in certain people  If you take blood thinner medicine, always ask your healthcare provider if NSAIDs are safe for you  Always read the medicine label and follow directions  · Take your medicine as directed  Contact your healthcare provider if you think your medicine is not helping or if you have side effects  Tell him or her if you are allergic to any medicine  Keep a list of the medicines, vitamins, and herbs you take  Include the amounts, and when and why you take them  Bring the list or the pill bottles to follow-up visits  Carry your medicine list with you in case of an emergency  How the 2019 coronavirus spreads: The following are ways the virus is thought to spread, but more information may be coming:  · Droplets are the main way all coronaviruses spread    The virus travels in droplets that form when a person talks, coughs, or sneezes  The droplets can also float in the air for minutes or hours  Infection happens when you breathe in the droplets or get them in your eyes or nose  Close personal contact with an infected person increases your risk for infection  This means being within 6 feet (2 meters) of the person for at least 15 minutes over 24 hours  · Person-to-person contact can spread the virus  For example, a person with the virus on his or her hands can spread it by shaking hands with someone  · The virus can stay on objects and surfaces for a short time  You may become infected by touching the object or surface and then touching your eyes or mouth  · An infected animal may be able to infect a person who touches it  This may happen at live markets or on a farm  Help lower the risk for COVID-19:  The best way to prevent infection is to avoid anyone who is infected, but this can be hard to do  An infected person can spread the virus before signs or symptoms begin, or even if signs or symptoms never develop  The following can help lower the risk for infection:      · Wash your hands often throughout the day  Use soap and water  Rub your soapy hands together, lacing your fingers, for at least 20 seconds  Rinse with warm, running water  Dry your hands with a clean towel or paper towel  Use hand  that contains alcohol if soap and water are not available  Teach children how to wash their hands and use hand   · Cover sneezes and coughs  Turn your face away and cover your mouth and nose with a tissue  Throw the tissue away  Use the bend of your arm if a tissue is not available  Then wash your hands well with soap and water or use hand   Teach children how to cover a cough or sneeze  · Wear a face covering (mask) around anyone who does not live in your home  Use a cloth covering with at least 2 layers   You can also create layers by putting a cloth covering over a disposable non-medical mask  Cover your mouth and your nose  The covering should fit snugly against the bridge of your nose  Securely fasten it under your chin and on the sides of your face  Do not  wear a plastic face shield instead of a covering  Continue social distancing and washing your hands often  A face covering is not a substitute for social distancing safety measures  · Follow worldwide, national, and local social distancing guidelines  Keep at least 6 feet (2 meters) between you and others  Also keep this distance from anyone who comes to your home, such as someone making a delivery  Wear a face covering while you are around others  You will need to wear a covering in restaurants, stores, and other public buildings  You will also need a covering on mass transit, such as a bus, subway, or airplane  Remember to use a covering made from thick material or wear 2 coverings together  · Make a habit of not touching your face  If you get the virus on your hands, you can transfer it to your eyes, nose, or mouth and become infected  You can also transfer it to objects, surfaces, or people  Do not touch your eyes, nose, or mouth without washing your hands first     · Clean and disinfect high-touch surfaces and objects often  Use disinfecting wipes, or make a solution of 4 teaspoons of bleach in 1 quart (4 cups) of water  Clean and disinfect even if you think no one living in or coming to your home is infected with the virus  · Ask about vaccines you may need  Get a COVID-19 vaccine when it is available to you  The current vaccines are given as a shot in 1 or 2 doses  Get the influenza (flu) vaccine as soon as recommended each year, usually starting in September or October  Get the pneumonia vaccine if recommended  Follow social distancing guidelines:  National and local social distancing rules vary  Rules may change over time as restrictions are lifted   Restrictions may return if an outbreak happens where you live  It is important to know and follow all current social distancing rules in your area  The following are general guidelines:  · Stay home if you are sick or think you may have COVID-19  It is important to stay home if you are waiting for a testing appointment or for test results  Even if you do not have symptoms, you can pass the virus to others  · Limit trips out of your home  Have food, medicines, and other supplies delivered and left at your door or other area, if possible  Plan trips out of your home so you make the fewest stops possible to limit close personal contact  Keep track of places you go  This will help contact tracers notify others if you become infected  · Avoid close physical contact with anyone who does not live in your home  Do not shake hands with, hug, or kiss a person as a greeting  If you must use public transportation (such as a bus or subway), try to sit or stand away from others  Only allow necessary people into your home  Wear your face covering, and remind others to wear a face covering  Remind them to wash their hands when they arrive and before they leave  Do not  let someone into your home or go to someone's home just to visit  Even if you both do not feel sick, the virus can pass from one of you to the other  · Avoid in-person gatherings and crowds  Gatherings or crowds of 10 or more individuals can cause the virus to spread  Avoid places such as [place], beaches, sporting events, and tourist attractions  For events such as parties, holiday meals, [employer] services, and conferences, attend virtually (on a computer), if possible  · Ask your healthcare provider for other ways to have appointments  Some providers offer phone, video, or other types of appointments  You may also be able to get prescriptions for a few months of your medicines at a time  · Stay safe if you must go out to work    Keep physical distance between you and other workers as much as possible  Follow your employer's rules so everyone stays safe  If you have COVID-19 and are recovering at home,  healthcare providers will give you specific instructions to follow  The following are general guidelines to remind you how to keep others safe until you are well:  · Wash your hands often  Use soap and water as much as possible  Use hand  that contains alcohol if soap and water are not available  Dry your hands with a clean towel or paper towel  Do not share towels with anyone  If you use paper towels, throw them away in a lined trash can kept in your room or area  Use a covered trash can, if possible  · Do not go out of your home unless it is necessary  Ask someone who is not infected to go out for groceries or supplies, or have them delivered  Do not go to your healthcare provider's office without an appointment  · Only have close physical contact with a person giving direct care, or a baby or child you must care for  Family members and friends should not visit you  If possible, stay in a separate area or room of your home if you live with others  No one should go into the area or room except to give you care  You can visit with others by phone, video chat, e-mail, or similar systems  · Wear a face covering while others are near you  This can help prevent droplets from spreading the virus when you talk, sneeze, or cough  Put the covering on before anyone comes into your room or area  Remind the person to cover his or her nose and mouth before coming in to provide care for you  · Do not share items  Do not share dishes, towels, or other items with anyone  Items need to be washed after you use them  · Protect your baby  Some newborns have tested positive for the virus  It is not known if they became infected before or after birth  The highest risk is when a  has close contact with an infected person   If you are pregnant or breastfeeding, talk to your healthcare provider or obstetrician about any concerns you have  He or she will tell you when to bring your baby in for check-ups and vaccines  He or she will also tell you what to do if you think your baby was infected with the coronavirus  Wash your hands and put on a clean face covering before you breastfeed or care for your baby  · Do not handle live animals unless it is necessary  Some animals, including pets, have been infected with the new coronavirus  Ask someone who is not infected to take care of your pet until you are well  If you must care for a pet, wear a face covering  Wash your hands before and after you give care  Talk to your healthcare provider about how to keep a service animal safe, if needed  · Follow directions from your healthcare provider for being around others after you recover  It is not known if or for how long a recovered person can pass the virus to others  Your provider may give you instructions, such as continuing social distancing and wearing a face covering  He or she will tell you when it is okay to be around others again  This may be 10 to 20 days after symptoms started or you had a positive test  Most symptoms will also need to be gone  Your provider will give you more information  Follow up with your doctor as directed:  Write down your questions so you remember to ask them during your visits  For more information:   · Centers for Disease Control and Prevention  [address] , [address]  Phone: 1- 824 - 851-1720  Web Address: [employer]     © [facility] 2021 Information is for End User's use only and may not be sold, redistributed or otherwise used for commercial purposes  All illustrations and images included in CareNotes® are the copyrighted property of [employer]  or [address] [place] [address] [facility]   The above information is an  only  It is not intended as medical advice for individual conditions or treatments   Talk to your doctor, nurse or pharmacist before following any medical regimen to see if it is safe and effective for you

## 2021-12-03 ENCOUNTER — OFFICE VISIT (OUTPATIENT)
Dept: SLEEP CENTER | Facility: CLINIC | Age: 46
End: 2021-12-03
Payer: MEDICARE

## 2021-12-03 VITALS
DIASTOLIC BLOOD PRESSURE: 80 MMHG | WEIGHT: 233 LBS | HEART RATE: 86 BPM | SYSTOLIC BLOOD PRESSURE: 120 MMHG | BODY MASS INDEX: 32.62 KG/M2 | OXYGEN SATURATION: 98 % | HEIGHT: 71 IN

## 2021-12-03 DIAGNOSIS — Z99.89 OBSTRUCTIVE SLEEP APNEA TREATED WITH CONTINUOUS POSITIVE AIRWAY PRESSURE (CPAP): Primary | ICD-10-CM

## 2021-12-03 DIAGNOSIS — G47.33 OBSTRUCTIVE SLEEP APNEA TREATED WITH CONTINUOUS POSITIVE AIRWAY PRESSURE (CPAP): Primary | ICD-10-CM

## 2021-12-03 DIAGNOSIS — G47.411 NARCOLEPSY WITH CATAPLEXY: ICD-10-CM

## 2021-12-03 DIAGNOSIS — G40.209 PARTIAL SYMPTOMATIC EPILEPSY WITH COMPLEX PARTIAL SEIZURES, NOT INTRACTABLE, WITHOUT STATUS EPILEPTICUS (HCC): ICD-10-CM

## 2021-12-03 PROCEDURE — 99214 OFFICE O/P EST MOD 30 MIN: CPT | Performed by: NURSE PRACTITIONER

## 2021-12-04 RX ORDER — LEVETIRACETAM 250 MG/1
250 TABLET ORAL DAILY
Qty: 30 TABLET | Refills: 5 | Status: SHIPPED | OUTPATIENT
Start: 2021-12-04 | End: 2022-04-21 | Stop reason: SDUPTHER

## 2021-12-04 RX ORDER — VENLAFAXINE 37.5 MG/1
37.5 TABLET ORAL DAILY
Qty: 30 TABLET | Refills: 5 | Status: SHIPPED | OUTPATIENT
Start: 2021-12-04 | End: 2022-06-07 | Stop reason: SDUPTHER

## 2021-12-06 ENCOUNTER — TELEPHONE (OUTPATIENT)
Dept: SLEEP CENTER | Facility: CLINIC | Age: 46
End: 2021-12-06

## 2021-12-20 ENCOUNTER — TELEPHONE (OUTPATIENT)
Dept: SLEEP CENTER | Facility: CLINIC | Age: 46
End: 2021-12-20

## 2022-04-06 ENCOUNTER — TELEPHONE (OUTPATIENT)
Dept: NEUROLOGY | Facility: CLINIC | Age: 47
End: 2022-04-06

## 2022-04-12 ENCOUNTER — TELEPHONE (OUTPATIENT)
Dept: NEUROLOGY | Facility: CLINIC | Age: 47
End: 2022-04-12

## 2022-04-12 NOTE — TELEPHONE ENCOUNTER
Left voicemail regarding confirming upcoming appointment  Informed pt of time/date/location  Instructed to call our office back for confirmation

## 2022-04-21 ENCOUNTER — CONSULT (OUTPATIENT)
Dept: NEUROLOGY | Facility: CLINIC | Age: 47
End: 2022-04-21
Payer: MEDICARE

## 2022-04-21 VITALS
RESPIRATION RATE: 18 BRPM | TEMPERATURE: 98.8 F | OXYGEN SATURATION: 98 % | BODY MASS INDEX: 33.05 KG/M2 | DIASTOLIC BLOOD PRESSURE: 86 MMHG | HEIGHT: 72 IN | WEIGHT: 244 LBS | HEART RATE: 81 BPM | SYSTOLIC BLOOD PRESSURE: 142 MMHG

## 2022-04-21 DIAGNOSIS — R56.9 SEIZURE-LIKE ACTIVITY (HCC): Primary | ICD-10-CM

## 2022-04-21 DIAGNOSIS — G40.209 PARTIAL SYMPTOMATIC EPILEPSY WITH COMPLEX PARTIAL SEIZURES, NOT INTRACTABLE, WITHOUT STATUS EPILEPTICUS (HCC): ICD-10-CM

## 2022-04-21 DIAGNOSIS — R29.818 TRANSIENT NEUROLOGICAL SYMPTOMS: ICD-10-CM

## 2022-04-21 PROCEDURE — 99214 OFFICE O/P EST MOD 30 MIN: CPT | Performed by: PSYCHIATRY & NEUROLOGY

## 2022-04-21 RX ORDER — LEVETIRACETAM 250 MG/1
250 TABLET ORAL DAILY
Qty: 30 TABLET | Refills: 5 | Status: SHIPPED | OUTPATIENT
Start: 2022-04-21

## 2022-04-21 RX ORDER — ABATACEPT 250 MG/15ML
1000 INJECTION, POWDER, LYOPHILIZED, FOR SOLUTION INTRAVENOUS
COMMUNITY

## 2022-04-21 NOTE — PROGRESS NOTES
Review of Systems   Constitutional: Positive for fatigue  Negative for appetite change and fever  HENT: Negative  Negative for hearing loss, tinnitus, trouble swallowing and voice change  Eyes: Negative  Negative for photophobia and pain  Respiratory: Negative  Negative for shortness of breath  Cardiovascular: Negative  Negative for palpitations  Gastrointestinal: Positive for constipation (at times)  Negative for nausea and vomiting  Endocrine: Negative  Negative for cold intolerance  Genitourinary: Positive for frequency and urgency  Negative for dysuria  Musculoskeletal: Positive for back pain (low back), gait problem (balance issues), neck pain and neck stiffness  Negative for myalgias  Skin: Negative  Negative for rash  Allergic/Immunologic: Negative  Neurological: Positive for numbness (bilateral arms)  Negative for dizziness, tremors, seizures, syncope, facial asymmetry, speech difficulty, weakness (arms), light-headedness and headaches  Hematological: Negative  Does not bruise/bleed easily  Psychiatric/Behavioral: Positive for sleep disturbance  Negative for confusion and hallucinations
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
## 2022-04-21 NOTE — PATIENT INSTRUCTIONS
Plan:   Recurrent seizure-like episodes of abnormal head rush sensation, nausea, with impaired speech, but no altered awareness, or convulsion  - unclear if epileptic in etiology  - continue with levetiracetam 250mg daily for the time being  - routine EEG study  - Epilepsy Monitoring Unit (EMU) referral for spell characterization    Continue with follow-up with sleep medicine regarding your Narcolepsy and obstructive sleep apnea    Follow-up in 3 months    Video Electroencephalopathy (VEEG) and the Epilepsy Monitoring Unit (EMU)    What is video EEG?  In video-EEG, you are video taped at the same time as your brain waves are recorded   This typically occurs in the hospital over a long period of time, often an average of 3-7 days   The doctor is able to review both the video and EEG at the same time to see exactly what your brain waves are doing while watching what your body is doing  Why do I need video-EEG? Typically the reasons to have a video-EEG study are to make a diagnosis, classify the type of seizures and epilepsy, and if medications do not prevent the seizures then offer an alternative therapy (pre-surgical evaluation)   Some people have events that look like an epileptic seizure (caused by electrical storm of the brain); but are in fact not due to abnormal electrical activity in the brain  Other causes include cardiac or vascular pathology, other neurological causes such as tics and movement disorders, or psychological / psychogenic nonepileptic events   There are also many different kinds of epileptic seizures  The video EEG will help classify the seizures and epilepsy syndrome to determine the best medications or treatments   Nearly a third of epilepsy patients are medically refractory (failed 2 or more appropriate anti-seizure medications) and epilepsy surgery may be an option for these patients   VEEG is used to evaluate where seizures start in the brain, determine if surgery may be possible and guide the specific surgical approach for patients who are found to be surgical candidates  What to expect in the Epilepsy Monitoring Unit (EMU)  The EMU is a specialized inpatient unit in the hospital specially designed for evaluation of seizure disorders  The unit is equipped with computer-based monitoring equipment, certified EEG technologists, trained nurses, and attending physicians trained in the management of epilepsy   In the EMU, seizures are recorded and specially reviewed so that a proper diagnosis can be made and treatment can be optimized   Each patient will have a private room and a private bathroom   Patients will be connected to video-EEG equipment continuously (24 hours a day)  o There is no video recording while in the restroom, but brain waves are recorded on the EEG at all times  o Although this can be bothersome, continuous monitoring at all times is necessary to make sure patients are safe and that the necessary information is collected   Because patients are connected to recording equipment, mobility is restricted to the patients room   Patients are not be able to take a shower or wash your hair while on EEG monitoring  o This would interfere with your EEG electrodes  o Washing with a basin or sink is permitted   Patients are asked to activate a push button when they experience an event and then state aloud what they are experiencing   Nurses will test the patient during an event to help the doctors see what is occurring   To increase the chance of capturing a seizure in a limited amount of time a variety of activation procedures are employed  o Sleep deprivation (no naps during the day and attempts to keep you awake all night as often as every other night)  o Photic stimulation and hyperventilation procedures  o Weaning or withdrawal of medications used to control seizures (the attending physician will discuss this with you)      You will need to have a peripheral IV placed in your hand or arm  This allows the doctors to give rescue medications in the event of a medical emergency while you are in the hospital  You also may be given a blood thinner in the form of daily subcutaneous injections to prevent deep venous thrombosis (DVT)   [facility] is a non-smoking facility, therefore smoking is not allowed   Chewing gum is not allowed, this creates artifact on the EEG   If you are a woman of childbearing age, you may be asked to take a urine pregnancy test    We strive to make the EMU as safe as possible  Throughout the world video EEG monitoring is the standard of care and thousands of patients have been admitted to epilepsy monitoring units and there are rare cases of complications due to severely difficult to control epilepsy  These have included seizure clusters, status epilepticus (seizures that do not stop with typical medications and advanced measures are required), injuries (fractures and head injury), and very rarely death  How long do patients stay in the EMU?  The length of time varies for each patient   Prepare to stay about 1 week (even though it may not last that long)  o Typically, patients stay between 3-7 days, but may stay more or less time in special circumstances  Things you can do to prepare for admission   Take a shower and wash your hair the night before or the morning of admission   Do not use any hair products such as gels, sprays, mousse, or hair weaves  o It is NOT necessary to cut your hair or shave your head for the test     Unless you received specific instructions from your physician, continue to take your medications as you normally do, including the morning of your admission   Bring all of your current medications in the original prescription bottles to the hospital (some specialty medications may not be available in the hospital)      Bring a complete list of your medical and surgical history   Bring your seizure calendar   Wear comfortable clothing or pajamas  o Button-down shirts and elastic-waist pants are preferred    o You can bring slippers or sneakers for when you are walking around the room   You can bring activities like computers, phones, cards, music, movies, etc  with you to keep you occupied  o Electronic devices cannot be plugged in while you are touching them (this interferes with the equipment), but can be charging on the other side of the room   If you need any medical devices (such as a CPAP for obstructive sleep apnea), please bring it to the hospital       The process for admission to the Epilepsy Monitoring Unit (EMU) was discussed with the patient at length  I explained to him that he would be admitted to the EMU in order to better characterize and localized the events he is having  I also explained that, during his EMU admission, medications will be tapered, patients are sleep deprived, and undergo other induction procedures (including hyperventilation, photic stimulation, exercise) in order to induce a seizure  This will allow me to safely gain additional information about the precise nature and localization of his events  I also explained to the patient that, although the average EMU stay is approximately 3-7 days, the length of stay may be shorter or longer, depending on the time needed to induce a seizure  Patients generally will have a peripheral IV, may be on telemetry, and have DVT prophylaxis that may include heparin or low-molecular weight heparin injections and venodynes  During the EMU, patients will be on continuous video EEG monitoring which may result in skin breakdown from the electrodes and chemicals used to keep the electrodes in place (usually treatment with antibacterial ointment is sufficient), and be restricted to the bed/room for prolonged periods of time    Due to the induction of seizures, the EMU is the best setting to offer safety and management of acute seizures, which cannot be monitored from the home setting  Risk of inducing seizures include injuries, death, recurrent seizures including status epilepticus and the requirement of rescue medications including use of anesthesia and mechanical ventilation to abort recurrent seizures

## 2022-04-22 ENCOUNTER — TELEPHONE (OUTPATIENT)
Dept: NEUROLOGY | Facility: CLINIC | Age: 47
End: 2022-04-22

## 2022-04-22 NOTE — TELEPHONE ENCOUNTER
After several attempts to download discs to patients chart, no success, I went to Kriss(Lead) explained issues and she said I should send discs to the Radiology dept at [facility] and request they please download discs to patients chart and return in addressed return envelope I provided

## 2022-04-22 NOTE — TELEPHONE ENCOUNTER
Discs given to Leny to take to the [facility] office to upload for Dr Girish Elias who is there today

## 2022-04-22 NOTE — TELEPHONE ENCOUNTER
Patient came into the [facility] office and dropped off 3 MRI disks for Dr Lyn Abts  Disks given to Dr Kenneth Salazar MA, Leonardo Elizalde  to be downloaded  Please mail disks to patient once downloaded

## 2022-05-06 ENCOUNTER — TELEPHONE (OUTPATIENT)
Dept: NEUROLOGY | Facility: CLINIC | Age: 47
End: 2022-05-06

## 2022-05-06 DIAGNOSIS — R56.9 SEIZURE-LIKE ACTIVITY (HCC): Primary | ICD-10-CM

## 2022-05-06 NOTE — TELEPHONE ENCOUNTER
EMU referral found in workqueue  Call placed to patient  Agreeable admission for 6/13/2022 8am  Added to EMU calendar  ADT21 entered  Checklist completed  Workqueue updated  EMU education mailed      Precert - EMU admission for 6/13/2022 8am

## 2022-05-10 ENCOUNTER — HOSPITAL ENCOUNTER (OUTPATIENT)
Dept: RADIOLOGY | Facility: HOSPITAL | Age: 47
Discharge: HOME | End: 2022-05-10
Payer: MEDICARE

## 2022-05-10 ENCOUNTER — HOSPITAL ENCOUNTER (OUTPATIENT)
Dept: CT IMAGING | Facility: HOSPITAL | Age: 47
Discharge: HOME | End: 2022-05-10
Payer: MEDICARE

## 2022-05-10 DIAGNOSIS — R52 PAIN: ICD-10-CM

## 2022-05-10 DIAGNOSIS — M47.812 CERVICAL SPONDYLOSIS WITHOUT MYELOPATHY: ICD-10-CM

## 2022-05-10 PROCEDURE — 72050 X-RAY EXAM NECK SPINE 4/5VWS: CPT

## 2022-05-10 PROCEDURE — 72125 CT NECK SPINE W/O DYE: CPT

## 2022-05-10 PROCEDURE — G1004 CDSM NDSC: HCPCS

## 2022-05-11 ENCOUNTER — HOSPITAL ENCOUNTER (OUTPATIENT)
Dept: NEUROLOGY | Facility: HOSPITAL | Age: 47
Discharge: HOME | End: 2022-05-11
Attending: PSYCHIATRY & NEUROLOGY
Payer: MEDICARE

## 2022-05-11 DIAGNOSIS — R56.9 SEIZURE-LIKE ACTIVITY (HCC): ICD-10-CM

## 2022-05-11 DIAGNOSIS — R29.818 TRANSIENT NEUROLOGICAL SYMPTOMS: ICD-10-CM

## 2022-05-11 PROCEDURE — 95816 EEG AWAKE AND DROWSY: CPT

## 2022-05-11 PROCEDURE — 95816 EEG AWAKE AND DROWSY: CPT | Performed by: PSYCHIATRY & NEUROLOGY

## 2022-06-07 ENCOUNTER — OFFICE VISIT (OUTPATIENT)
Dept: SLEEP CENTER | Facility: CLINIC | Age: 47
End: 2022-06-07
Payer: MEDICARE

## 2022-06-07 VITALS
BODY MASS INDEX: 34.3 KG/M2 | HEIGHT: 71 IN | HEART RATE: 73 BPM | DIASTOLIC BLOOD PRESSURE: 79 MMHG | SYSTOLIC BLOOD PRESSURE: 122 MMHG | WEIGHT: 245 LBS

## 2022-06-07 DIAGNOSIS — G47.33 OBSTRUCTIVE SLEEP APNEA TREATED WITH CONTINUOUS POSITIVE AIRWAY PRESSURE (CPAP): Primary | ICD-10-CM

## 2022-06-07 DIAGNOSIS — G47.411 CATAPLEXY: ICD-10-CM

## 2022-06-07 DIAGNOSIS — Z99.89 OBSTRUCTIVE SLEEP APNEA TREATED WITH CONTINUOUS POSITIVE AIRWAY PRESSURE (CPAP): Primary | ICD-10-CM

## 2022-06-07 PROCEDURE — 99214 OFFICE O/P EST MOD 30 MIN: CPT | Performed by: PSYCHIATRY & NEUROLOGY

## 2022-06-07 RX ORDER — VENLAFAXINE 37.5 MG/1
37.5 TABLET ORAL DAILY
Qty: 30 TABLET | Refills: 5 | Status: SHIPPED | OUTPATIENT
Start: 2022-06-07

## 2022-06-07 NOTE — ASSESSMENT & PLAN NOTE
He has had previous multiple sleep latency test which did not demonstrate narcolepsy  He has had description of muscle weakness that could be construed as cataplexy or cataplexy-like episodes  As venlafaxine has been effective in reducing this, I renewed the medication today  He tolerates it well without side effects

## 2022-06-07 NOTE — PROGRESS NOTES
1  Obstructive sleep apnea treated with continuous positive airway pressure (CPAP)  Assessment & Plan:  In reviewing his CPAP data, he is very consistent in using his CPAP machine and his treatment is beneficial and effective  No change is needed in his current settings  I renewed his supplies today and will follow up with him in 6 months    Orders:  -     PAP DME Resupply/Reorder    2  Cataplexy  -     venlafaxine (EFFEXOR) 37 5 mg tablet; Take 1 tablet (37 5 mg total) by mouth daily  He has had previous multiple sleep latency test which did not demonstrate narcolepsy  He has had description of muscle weakness that could be construed as cataplexy or cataplexy-like episodes  As venlafaxine has been effective in reducing this, I renewed the medication today  He tolerates it well without side effects  Subjective:      Patient ID: Meche Tarango is a 42 y o  male  Previous Sleep Hx-  He has followed with Dr Alvin Guo for many years  Symptoms began suddenly in 2007 with disabling hypersomnolence that began when he returned home from vacation in [place], experiencing severe fatigue and headache  He also began with sleep paralysis and hypnagogic hallucinations  He had a diagnostic sleep study with MSLT at [facility] in 2008  The study did not indicate narcolepsy  He was empirically started on provigil 200mg daily, however, he felt more sleepy and intoxicated when taking it  He later had the same response to Adderall XR and Vyvanse  He then had evaluation at [facility] with Dr Elisa Ying in 2008  Diagnostic study with MSLT was completed  No SETPHANIE was noted (AHI 3 3 and no PLMs)  Sleep latency during MSLT was 12 3 with no SoREMs  He was started on selegeline, which did not help with reported hypersomnia  A repeat diagnostic study was done in 2013 with AHI of 5 9, increasing to 10,3 in REM sleep  There were 17 central apneas noted with 22 hypopneas    He began treatment with CPAP with minimal response to daytime sleepiness  At a later visit with Dr Arsh Farr in 2016, the patient described episodes of uncontrolled rage/anger with threatening behaviors  There was some reported memory loss with these behavioral events  A CT scan of the brain was done and was normal   MRI was not possible, due to implanted spinal cord stimulator  There was some reported abnormal EEG as well  There are no results available, however, the patient relates a description of  continuous EEG monitoring  In 2017, the patient was started on Keppra 500mg, later decreased to 250mg due to fogginess associated at the higher dose, which was relieved with decrease to 250mg  He reported that the outbursts of anger and uncontrolled behaviors improved with use of Keppra 250mg  He continues this medication  In 2018, he noted symptoms of intermittent weakness in his face, neck and arms, including difficulty getting words out and speech sounding like he was intoxicated when he felt that he was speaking normally  He also had an increase in sleep paralysis and hypnagogic hallucinations at this time  He was started on effexor 37 5mg daily  Symptoms improved with the use of Effexor  He continues this medication  In 2020, he had a morphine pump inserted for chronic pain from ankylosing spondylitis  Since having the pump inserted, his hypersomnia has improved as well  In March 2021, he had a repeat home sleep study to validate the need for CPAP  Study indicates ongoing STEPHANIE with ARIC of 6 7 and oxygen genesis of 84%  He reported worsening of sleepiness when he did not use the CPAP for 2 weeks  Current Hx   Mr Thom Stanley returns in follow-up of STEPHANIE  He has been using CPAP each night  Since his last visit, he been seen by the epilepsy group and EMU admission is planned in the near future  He recently had a routine EEG which did not show any abnormalities  From a sleep standpoint, he is doing fairly well    He continues use his CPAP machine regularly and has no specific concerns today  He gets in bed 10 pm, takes less than an hour to up to 2 hours to fall asleep  He wakes 3-6 times a night  Awakens at 8-9 AM, may get out of bed 1 hour later    He is usually refreshed  Sometimes sleepy in the day  Sleepiness is worse 1-3 pm   Does not doze  May nap in the afternoon No drowsy driving     No dry mouth with PAP use  No nose bleed  Uses nasal saline gel  No nasal congestion  PAP data  Dreamstation APAP set at  8 cm h20  Average session 7 hr 44 min   AHI 1 1  Used 30/30 nights  Uses nasal pillows     Has had sleep paralysis once in the past 6 months, associated with sleep hallucinations  This started around 2007  He has not had cataplexy like episodes since starting venlafaxine 37 5 mg, taken 10-11 am    Would previously have weakness associated with emotion lasting 15 min to a few hours     Caffeine- no  Alcohol- no     PREVIOUS SLEEP TESTING  HST 3/2021  TESTING RESULTS:   The test results are from [facility]   The total time in bed (analysis time)   was 374 minutes  The patient had a total of 42 respiratory events made up of 3 obstructive   apneas, 0 central apneas, 0 mixed apneas and 39 hypopneas resulting in a   respiratory event index (ARIC) of 6 7        The lowest SpO2 recorded is 84% and 0 1% of the study was spent with   saturations less than 90%  The snore index was 19 5%  Impression  Mild obstructive sleep apnea - Limitations of home sleep testing   compromises accuracy         [place] Sleepiness Scale:     Sitting and reading: Slight chance of dozing  Watching TV: Slight chance of dozing  Sitting, inactive in a public place (e g  a theatre or a meeting): Slight chance of dozing  As a passenger in a car for an hour without a break: Slight chance of dozing  Lying down to rest in the afternoon when circumstances permit: Moderate chance of dozing  Sitting and talking to someone: Slight chance of dozing  Sitting quietly after a lunch without alcohol: Moderate chance of dozing  In a car, while stopped for a few minutes in traffic: Would never doze  Total score: 9     The following portions of the patient's history were reviewed and updated as appropriate: allergies, current medications, past family history, past medical history, past social history, past surgical history, and problem list     Review of Systems    Genitourinary need to urinate more than twice a night   Cardiology ankle/leg swelling   Gastrointestinal none   Neurology need to move extremities, muscle weakness, numbness/tingling of an extremity, forgetfulness, poor concentration or confusion,  and difficulty with memory   Constitutional fatigue   Integumentary none   Psychiatry none   Musculoskeletal joint pain, muscle aches, back pain, sciatica and leg cramps   Pulmonary none   ENT none   Endocrine none   Hematological none        Objective:        /79 (BP Location: Left arm, Patient Position: Sitting, Cuff Size: Large)   Pulse 73   Ht 5' 11" (1 803 m)   Wt 111 kg (245 lb)   BMI 34 17 kg/m²      Data reviewed-notes from Neurology, EEG report, CPAP data

## 2022-06-07 NOTE — PROGRESS NOTES
Review of Systems      Genitourinary need to urinate more than twice a night   Cardiology ankle/leg swelling   Gastrointestinal none   Neurology need to move extremities, muscle weakness, numbness/tingling of an extremity, forgetfulness, poor concentration or confusion,  and difficulty with memory   Constitutional fatigue   Integumentary none   Psychiatry none   Musculoskeletal joint pain, muscle aches, back pain, sciatica and leg cramps   Pulmonary none   ENT none   Endocrine none   Hematological none

## 2022-06-07 NOTE — ASSESSMENT & PLAN NOTE
In reviewing his CPAP data, he is very consistent in using his CPAP machine and his treatment is beneficial and effective  No change is needed in his current settings    I renewed his supplies today and will follow up with him in 6 months

## 2022-06-08 ENCOUNTER — TELEPHONE (OUTPATIENT)
Dept: SLEEP CENTER | Facility: CLINIC | Age: 47
End: 2022-06-08

## 2022-06-10 NOTE — TELEPHONE ENCOUNTER
Called patient, reminded him of EMU admission 6/13 am  Answered all his questions, patient verbalizes understanding of information  Reports they do not have any further questions or concerns at this time

## 2022-06-13 ENCOUNTER — EPISODE CHANGES (OUTPATIENT)
Dept: CASE MANAGEMENT | Facility: OTHER | Age: 47
End: 2022-06-13

## 2022-06-13 ENCOUNTER — APPOINTMENT (INPATIENT)
Dept: NEUROLOGY | Facility: CLINIC | Age: 47
DRG: 101 | End: 2022-06-13
Payer: MEDICARE

## 2022-06-13 ENCOUNTER — HOSPITAL ENCOUNTER (INPATIENT)
Facility: HOSPITAL | Age: 47
LOS: 6 days | Discharge: HOME | DRG: 101 | End: 2022-06-19
Attending: STUDENT IN AN ORGANIZED HEALTH CARE EDUCATION/TRAINING PROGRAM | Admitting: STUDENT IN AN ORGANIZED HEALTH CARE EDUCATION/TRAINING PROGRAM
Payer: MEDICARE

## 2022-06-13 LAB
ALBUMIN SERPL BCP-MCNC: 3.7 G/DL (ref 3.5–5)
ALP SERPL-CCNC: 80 U/L (ref 46–116)
ALT SERPL W P-5'-P-CCNC: 23 U/L (ref 12–78)
ANION GAP SERPL CALCULATED.3IONS-SCNC: 0 MMOL/L (ref 4–13)
AST SERPL W P-5'-P-CCNC: 14 U/L (ref 5–45)
BASOPHILS # BLD AUTO: 0.07 THOUSANDS/ΜL (ref 0–0.1)
BASOPHILS NFR BLD AUTO: 1 % (ref 0–1)
BILIRUB SERPL-MCNC: 0.44 MG/DL (ref 0.2–1)
BUN SERPL-MCNC: 16 MG/DL (ref 5–25)
CALCIUM SERPL-MCNC: 9 MG/DL (ref 8.3–10.1)
CHLORIDE SERPL-SCNC: 106 MMOL/L (ref 100–108)
CO2 SERPL-SCNC: 31 MMOL/L (ref 21–32)
CREAT SERPL-MCNC: 0.92 MG/DL (ref 0.6–1.3)
EOSINOPHIL # BLD AUTO: 0.3 THOUSAND/ΜL (ref 0–0.61)
EOSINOPHIL NFR BLD AUTO: 3 % (ref 0–6)
ERYTHROCYTE [DISTWIDTH] IN BLOOD BY AUTOMATED COUNT: 12.6 % (ref 11.6–15.1)
GFR SERPL CREATININE-BSD FRML MDRD: 100 ML/MIN/1.73SQ M
GLUCOSE SERPL-MCNC: 69 MG/DL (ref 65–140)
HCT VFR BLD AUTO: 45.2 % (ref 36.5–49.3)
HGB BLD-MCNC: 15 G/DL (ref 12–17)
IMM GRANULOCYTES # BLD AUTO: 0.03 THOUSAND/UL (ref 0–0.2)
IMM GRANULOCYTES NFR BLD AUTO: 0 % (ref 0–2)
LYMPHOCYTES # BLD AUTO: 3.9 THOUSANDS/ΜL (ref 0.6–4.47)
LYMPHOCYTES NFR BLD AUTO: 43 % (ref 14–44)
MCH RBC QN AUTO: 28.5 PG (ref 26.8–34.3)
MCHC RBC AUTO-ENTMCNC: 33.2 G/DL (ref 31.4–37.4)
MCV RBC AUTO: 86 FL (ref 82–98)
MONOCYTES # BLD AUTO: 0.71 THOUSAND/ΜL (ref 0.17–1.22)
MONOCYTES NFR BLD AUTO: 8 % (ref 4–12)
NEUTROPHILS # BLD AUTO: 4.02 THOUSANDS/ΜL (ref 1.85–7.62)
NEUTS SEG NFR BLD AUTO: 45 % (ref 43–75)
NRBC BLD AUTO-RTO: 0 /100 WBCS
PLATELET # BLD AUTO: 311 THOUSANDS/UL (ref 149–390)
PMV BLD AUTO: 8.7 FL (ref 8.9–12.7)
POTASSIUM SERPL-SCNC: 3.9 MMOL/L (ref 3.5–5.3)
PROT SERPL-MCNC: 7.9 G/DL (ref 6.4–8.2)
RBC # BLD AUTO: 5.26 MILLION/UL (ref 3.88–5.62)
SODIUM SERPL-SCNC: 137 MMOL/L (ref 136–145)
WBC # BLD AUTO: 9.03 THOUSAND/UL (ref 4.31–10.16)

## 2022-06-13 PROCEDURE — 95715 VEEG EA 12-26HR INTMT MNTR: CPT

## 2022-06-13 PROCEDURE — 95700 EEG CONT REC W/VID EEG TECH: CPT

## 2022-06-13 PROCEDURE — 85025 COMPLETE CBC W/AUTO DIFF WBC: CPT | Performed by: NURSE PRACTITIONER

## 2022-06-13 PROCEDURE — 80053 COMPREHEN METABOLIC PANEL: CPT | Performed by: NURSE PRACTITIONER

## 2022-06-13 PROCEDURE — 80177 DRUG SCRN QUAN LEVETIRACETAM: CPT | Performed by: NURSE PRACTITIONER

## 2022-06-13 PROCEDURE — 93005 ELECTROCARDIOGRAM TRACING: CPT

## 2022-06-13 PROCEDURE — 99223 1ST HOSP IP/OBS HIGH 75: CPT | Performed by: NURSE PRACTITIONER

## 2022-06-13 RX ORDER — FLUTICASONE PROPIONATE 50 MCG
1 SPRAY, SUSPENSION (ML) NASAL 2 TIMES DAILY
Status: DISCONTINUED | OUTPATIENT
Start: 2022-06-13 | End: 2022-06-19 | Stop reason: HOSPADM

## 2022-06-13 RX ORDER — TIZANIDINE 4 MG/1
4 TABLET ORAL 4 TIMES DAILY PRN
Status: DISCONTINUED | OUTPATIENT
Start: 2022-06-13 | End: 2022-06-19 | Stop reason: HOSPADM

## 2022-06-13 RX ORDER — ONDANSETRON 2 MG/ML
4 INJECTION INTRAMUSCULAR; INTRAVENOUS EVERY 6 HOURS PRN
Status: DISCONTINUED | OUTPATIENT
Start: 2022-06-13 | End: 2022-06-19 | Stop reason: HOSPADM

## 2022-06-13 RX ORDER — LORAZEPAM 2 MG/ML
2 INJECTION INTRAMUSCULAR EVERY 8 HOURS PRN
Status: DISCONTINUED | OUTPATIENT
Start: 2022-06-13 | End: 2022-06-19 | Stop reason: HOSPADM

## 2022-06-13 RX ORDER — VENLAFAXINE 37.5 MG/1
37.5 TABLET ORAL DAILY
Status: DISCONTINUED | OUTPATIENT
Start: 2022-06-13 | End: 2022-06-19 | Stop reason: HOSPADM

## 2022-06-13 RX ORDER — ENOXAPARIN SODIUM 100 MG/ML
40 INJECTION SUBCUTANEOUS
Status: DISCONTINUED | OUTPATIENT
Start: 2022-06-13 | End: 2022-06-19 | Stop reason: HOSPADM

## 2022-06-13 NOTE — PLAN OF CARE
Problem: MOBILITY - ADULT  Goal: Maintain or return to baseline ADL function  Description: INTERVENTIONS:  -  Assess patient's ability to carry out ADLs; assess patient's baseline for ADL function and identify physical deficits which impact ability to perform ADLs (bathing, care of mouth/teeth, toileting, grooming, dressing, etc )  - Assess/evaluate cause of self-care deficits   - Assess range of motion  - Assess patient's mobility; develop plan if impaired  - Assess patient's need for assistive devices and provide as appropriate  - Encourage maximum independence but intervene and supervise when necessary  - Involve family in performance of ADLs  - Assess for home care needs following discharge   - Consider OT consult to assist with ADL evaluation and planning for discharge  - Provide patient education as appropriate  Outcome: Progressing  Goal: Maintains/Returns to pre admission functional level  Description: INTERVENTIONS:  - Perform BMAT or MOVE assessment daily    - Set and communicate daily mobility goal to care team and patient/family/caregiver  - Collaborate with rehabilitation services on mobility goals if consulted  - Perform Range of Motion 3 times a day    - Dangle patient 3 times a day  - Stand patient 3 times a day  - Ambulate patient 3 times a day  - Out of bed to chair 3 times a day   - Out of bed for meals 3 times a day  - Out of bed for toileting  - Record patient progress and toleration of activity level   Outcome: Progressing     Problem: Potential for Falls  Goal: Patient will remain free of falls  Description: INTERVENTIONS:  -  Assess patient's ability to carry out ADLs; assess patient's baseline for ADL function and identify physical deficits which impact ability to perform ADLs (bathing, care of mouth/teeth, toileting, grooming, dressing, etc )  - Assess/evaluate cause of self-care deficits   - Assess range of motion  - Assess patient's mobility; develop plan if impaired  - Assess patient's need for assistive devices and provide as appropriate  - Encourage maximum independence but intervene and supervise when necessary  - Involve family in performance of ADLs  - Assess for home care needs following discharge   - Consider OT consult to assist with ADL evaluation and planning for discharge  - Provide patient education as appropriate  Outcome: Progressing     Problem: NEUROSENSORY - ADULT  Goal: Achieves stable or improved neurological status  Description: INTERVENTIONS  - Monitor and report changes in neurological status  - Monitor vital signs such as temperature, blood pressure, glucose, and any other labs ordered   - Monitor for seizure activity and implement precautions if appropriate      Outcome: Progressing  Goal: Remains free of injury related to seizures activity  Description: INTERVENTIONS  - Maintain airway, patient safety  and administer oxygen as ordered  - Monitor patient for seizure activity, document and report duration and description of seizure to physician/advanced practitioner  - If seizure occurs,  ensure patient safety during seizure  - Reorient patient post seizure  - Seizure pads on all 4 side rails  - Instruct patient/family to notify RN of any seizure activity including if an aura is experienced  - Instruct patient/family to call for assistance with activity based on nursing assessment  - Administer anti-seizure medications if ordered    Outcome: Progressing  Goal: Achieves maximal functionality and self care  Description: INTERVENTIONS  - Monitor swallowing and airway patency with patient fatigue and changes in neurological status  - Encourage and assist patient to increase activity and self care     - Encourage visually impaired, hearing impaired and aphasic patients to use assistive/communication devices  Outcome: Progressing     Problem: MUSCULOSKELETAL - ADULT  Goal: Maintain or return mobility to safest level of function  Description: INTERVENTIONS:  - Assess patient's ability to carry out ADLs; assess patient's baseline for ADL function and identify physical deficits which impact ability to perform ADLs (bathing, care of mouth/teeth, toileting, grooming, dressing, etc )  - Assess/evaluate cause of self-care deficits   - Assess range of motion  - Assess patient's mobility  - Assess patient's need for assistive devices and provide as appropriate  - Encourage maximum independence but intervene and supervise when necessary  - Involve family in performance of ADLs  - Assess for home care needs following discharge   - Consider OT consult to assist with ADL evaluation and planning for discharge  - Provide patient education as appropriate  Outcome: Progressing  Goal: Maintain proper alignment of affected body part  Description: INTERVENTIONS:  - Support, maintain and protect limb and body alignment  - Provide patient/ family with appropriate education  Outcome: Progressing     Problem: PAIN - ADULT  Goal: Verbalizes/displays adequate comfort level or baseline comfort level  Description: Interventions:  - Encourage patient to monitor pain and request assistance  - Assess pain using appropriate pain scale  - Administer analgesics based on type and severity of pain and evaluate response  - Implement non-pharmacological measures as appropriate and evaluate response  - Consider cultural and social influences on pain and pain management  - Notify physician/advanced practitioner if interventions unsuccessful or patient reports new pain  Outcome: Progressing     Problem: SAFETY ADULT  Goal: Maintain or return to baseline ADL function  Description: INTERVENTIONS:  -  Assess patient's ability to carry out ADLs; assess patient's baseline for ADL function and identify physical deficits which impact ability to perform ADLs (bathing, care of mouth/teeth, toileting, grooming, dressing, etc )  - Assess/evaluate cause of self-care deficits   - Assess range of motion  - Assess patient's mobility; develop plan if impaired  - Assess patient's need for assistive devices and provide as appropriate  - Encourage maximum independence but intervene and supervise when necessary  - Involve family in performance of ADLs  - Assess for home care needs following discharge   - Consider OT consult to assist with ADL evaluation and planning for discharge  - Provide patient education as appropriate  Outcome: Progressing  Goal: Maintains/Returns to pre admission functional level  Description: INTERVENTIONS:  - Perform BMAT or MOVE assessment daily    - Set and communicate daily mobility goal to care team and patient/family/caregiver  - Collaborate with rehabilitation services on mobility goals if consulted  - Perform Range of Motion 3 times a day    - Dangle patient 3 times a day  - Stand patient 3 times a day  - Ambulate patient 3 times a day  - Out of bed to chair 3 times a day   - Out of bed for meals 3 times a day  - Out of bed for toileting  - Record patient progress and toleration of activity level   Outcome: Progressing  Goal: Patient will remain free of falls  Description: INTERVENTIONS:  -  Assess patient's ability to carry out ADLs; assess patient's baseline for ADL function and identify physical deficits which impact ability to perform ADLs (bathing, care of mouth/teeth, toileting, grooming, dressing, etc )  - Assess/evaluate cause of self-care deficits   - Assess range of motion  - Assess patient's mobility; develop plan if impaired  - Assess patient's need for assistive devices and provide as appropriate  - Encourage maximum independence but intervene and supervise when necessary  - Involve family in performance of ADLs  - Assess for home care needs following discharge   - Consider OT consult to assist with ADL evaluation and planning for discharge  - Provide patient education as appropriate  Outcome: Progressing     Problem: DISCHARGE PLANNING  Goal: Discharge to home or other facility with appropriate resources  Description: INTERVENTIONS:  - Identify barriers to discharge w/patient and caregiver  - Arrange for needed discharge resources and transportation as appropriate  - Identify discharge learning needs (meds, wound care, etc )  - Arrange for interpretive services to assist at discharge as needed  - Refer to Case Management Department for coordinating discharge planning if the patient needs post-hospital services based on physician/advanced practitioner order or complex needs related to functional status, cognitive ability, or social support system  Outcome: Progressing     Problem: Knowledge Deficit  Goal: Patient/family/caregiver demonstrates understanding of disease process, treatment plan, medications, and discharge instructions  Description: Complete learning assessment and assess knowledge base    Interventions:  - Provide teaching at level of understanding  - Provide teaching via preferred learning methods  Outcome: Progressing
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
## 2022-06-14 ENCOUNTER — APPOINTMENT (INPATIENT)
Dept: NEUROLOGY | Facility: CLINIC | Age: 47
DRG: 101 | End: 2022-06-14
Payer: MEDICARE

## 2022-06-14 LAB
ATRIAL RATE: 65 BPM
ATRIAL RATE: 66 BPM
P AXIS: 16 DEGREES
P AXIS: 19 DEGREES
PR INTERVAL: 152 MS
PR INTERVAL: 168 MS
QRS AXIS: 45 DEGREES
QRS AXIS: 45 DEGREES
QRSD INTERVAL: 88 MS
QRSD INTERVAL: 90 MS
QT INTERVAL: 434 MS
QT INTERVAL: 440 MS
QTC INTERVAL: 454 MS
QTC INTERVAL: 457 MS
T WAVE AXIS: 20 DEGREES
T WAVE AXIS: 21 DEGREES
VENTRICULAR RATE: 65 BPM
VENTRICULAR RATE: 66 BPM

## 2022-06-14 PROCEDURE — 95715 VEEG EA 12-26HR INTMT MNTR: CPT

## 2022-06-14 PROCEDURE — 95720 EEG PHY/QHP EA INCR W/VEEG: CPT | Performed by: STUDENT IN AN ORGANIZED HEALTH CARE EDUCATION/TRAINING PROGRAM

## 2022-06-14 PROCEDURE — 99232 SBSQ HOSP IP/OBS MODERATE 35: CPT | Performed by: NURSE PRACTITIONER

## 2022-06-14 PROCEDURE — 93010 ELECTROCARDIOGRAM REPORT: CPT | Performed by: INTERNAL MEDICINE

## 2022-06-14 RX ADMIN — FLUTICASONE PROPIONATE 1 SPRAY: 50 SPRAY, METERED NASAL at 08:25

## 2022-06-14 RX ADMIN — ENOXAPARIN SODIUM 40 MG: 40 INJECTION SUBCUTANEOUS at 08:25

## 2022-06-14 RX ADMIN — LUBIPROSTONE 24 MCG: 24 CAPSULE, GELATIN COATED ORAL at 17:04

## 2022-06-14 RX ADMIN — FLUTICASONE PROPIONATE 1 SPRAY: 50 SPRAY, METERED NASAL at 17:05

## 2022-06-14 RX ADMIN — VENLAFAXINE 37.5 MG: 37.5 TABLET ORAL at 11:38

## 2022-06-14 NOTE — PLAN OF CARE
Problem: MOBILITY - ADULT  Goal: Maintain or return to baseline ADL function  Description: INTERVENTIONS:  -  Assess patient's ability to carry out ADLs; assess patient's baseline for ADL function and identify physical deficits which impact ability to perform ADLs (bathing, care of mouth/teeth, toileting, grooming, dressing, etc )  - Assess/evaluate cause of self-care deficits   - Assess range of motion  - Assess patient's mobility; develop plan if impaired  - Assess patient's need for assistive devices and provide as appropriate  - Encourage maximum independence but intervene and supervise when necessary  - Involve family in performance of ADLs  - Assess for home care needs following discharge   - Consider OT consult to assist with ADL evaluation and planning for discharge  - Provide patient education as appropriate  Outcome: Progressing  Goal: Maintains/Returns to pre admission functional level  Description: INTERVENTIONS:  - Perform BMAT or MOVE assessment daily    - Set and communicate daily mobility goal to care team and patient/family/caregiver  - Collaborate with rehabilitation services on mobility goals if consulted  - Perform Range of Motion 3 times a day    - Dangle patient 3 times a day  - Stand patient 3 times a day  - Ambulate patient 3 times a day  - Out of bed to chair 3 times a day   - Out of bed for meals 3 times a day  - Out of bed for toileting  - Record patient progress and toleration of activity level   Outcome: Progressing     Problem: Potential for Falls  Goal: Patient will remain free of falls  Description: INTERVENTIONS:  - Educate patient/family on patient safety including physical limitations  - Instruct patient to call for assistance with activity   - Consult OT/PT to assist with strengthening/mobility   - Keep Call bell within reach  - Keep bed low and locked with side rails adjusted as appropriate  - Keep care items and personal belongings within reach  - Initiate and maintain comfort rounds  - Make Fall Risk Sign visible to staff  - Offer Toileting every 2 Hours, in advance of need  - Initiate/Maintain bed alarm  - Obtain necessary fall risk management equipment: fall socks; standby assist while on EMU   - Apply yellow socks and bracelet for high fall risk patients  Outcome: Progressing     Problem: NEUROSENSORY - ADULT  Goal: Achieves stable or improved neurological status  Description: INTERVENTIONS  - Monitor and report changes in neurological status  - Monitor vital signs such as temperature, blood pressure, glucose, and any other labs ordered   - Monitor for seizure activity and implement precautions if appropriate      Outcome: Progressing  Goal: Remains free of injury related to seizures activity  Description: INTERVENTIONS  - Maintain airway, patient safety  and administer oxygen as ordered  - Monitor patient for seizure activity, document and report duration and description of seizure to physician/advanced practitioner  - If seizure occurs,  ensure patient safety during seizure  - Reorient patient post seizure  - Seizure pads on all 4 side rails  - Instruct patient/family to notify RN of any seizure activity including if an aura is experienced  - Instruct patient/family to call for assistance with activity based on nursing assessment  - Administer anti-seizure medications if ordered    Outcome: Progressing  Goal: Achieves maximal functionality and self care  Description: INTERVENTIONS  - Monitor swallowing and airway patency with patient fatigue and changes in neurological status  - Encourage and assist patient to increase activity and self care     - Encourage visually impaired, hearing impaired and aphasic patients to use assistive/communication devices  Outcome: Progressing     Problem: MUSCULOSKELETAL - ADULT  Goal: Maintain or return mobility to safest level of function  Description: INTERVENTIONS:  - Assess patient's ability to carry out ADLs; assess patient's baseline for ADL function and identify physical deficits which impact ability to perform ADLs (bathing, care of mouth/teeth, toileting, grooming, dressing, etc )  - Assess/evaluate cause of self-care deficits   - Assess range of motion  - Assess patient's mobility  - Assess patient's need for assistive devices and provide as appropriate  - Encourage maximum independence but intervene and supervise when necessary  - Involve family in performance of ADLs  - Assess for home care needs following discharge   - Consider OT consult to assist with ADL evaluation and planning for discharge  - Provide patient education as appropriate  Outcome: Progressing  Goal: Maintain proper alignment of affected body part  Description: INTERVENTIONS:  - Support, maintain and protect limb and body alignment  - Provide patient/ family with appropriate education  Outcome: Progressing     Problem: PAIN - ADULT  Goal: Verbalizes/displays adequate comfort level or baseline comfort level  Description: Interventions:  - Encourage patient to monitor pain and request assistance  - Assess pain using appropriate pain scale  - Administer analgesics based on type and severity of pain and evaluate response  - Implement non-pharmacological measures as appropriate and evaluate response  - Consider cultural and social influences on pain and pain management  - Notify physician/advanced practitioner if interventions unsuccessful or patient reports new pain  Outcome: Progressing     Problem: SAFETY ADULT  Goal: Maintain or return to baseline ADL function  Description: INTERVENTIONS:  -  Assess patient's ability to carry out ADLs; assess patient's baseline for ADL function and identify physical deficits which impact ability to perform ADLs (bathing, care of mouth/teeth, toileting, grooming, dressing, etc )  - Assess/evaluate cause of self-care deficits   - Assess range of motion  - Assess patient's mobility; develop plan if impaired  - Assess patient's need for assistive devices and provide as appropriate  - Encourage maximum independence but intervene and supervise when necessary  - Involve family in performance of ADLs  - Assess for home care needs following discharge   - Consider OT consult to assist with ADL evaluation and planning for discharge  - Provide patient education as appropriate  Outcome: Progressing  Goal: Maintains/Returns to pre admission functional level  Description: INTERVENTIONS:  - Perform BMAT or MOVE assessment daily    - Set and communicate daily mobility goal to care team and patient/family/caregiver  - Collaborate with rehabilitation services on mobility goals if consulted  - Perform Range of Motion 3 times a day    - Dangle patient 3 times a day  - Stand patient 3 times a day  - Ambulate patient 3 times a day  - Out of bed to chair 3 times a day   - Out of bed for meals 3 times a day  - Out of bed for toileting  - Record patient progress and toleration of activity level   Outcome: Progressing  Goal: Patient will remain free of falls  Description: INTERVENTIONS:  - Educate patient/family on patient safety including physical limitations  - Instruct patient to call for assistance with activity   - Consult OT/PT to assist with strengthening/mobility   - Keep Call bell within reach  - Keep bed low and locked with side rails adjusted as appropriate  - Keep care items and personal belongings within reach  - Initiate and maintain comfort rounds  - Make Fall Risk Sign visible to staff  - Offer Toileting every 2 Hours, in advance of need  - Initiate/Maintain bed alarm  - Obtain necessary fall risk management equipment: fall socks; standby assist while on EMU   - Apply yellow socks and bracelet for high fall risk patients  Outcome: Progressing     Problem: DISCHARGE PLANNING  Goal: Discharge to home or other facility with appropriate resources  Description: INTERVENTIONS:  - Identify barriers to discharge w/patient and caregiver  - Arrange for needed discharge resources and transportation as appropriate  - Identify discharge learning needs (meds, wound care, etc )  - Arrange for interpretive services to assist at discharge as needed  - Refer to Case Management Department for coordinating discharge planning if the patient needs post-hospital services based on physician/advanced practitioner order or complex needs related to functional status, cognitive ability, or social support system  Outcome: Progressing     Problem: Knowledge Deficit  Goal: Patient/family/caregiver demonstrates understanding of disease process, treatment plan, medications, and discharge instructions  Description: Complete learning assessment and assess knowledge base    Interventions:  - Provide teaching at level of understanding  - Provide teaching via preferred learning methods  Outcome: Progressing

## 2022-06-14 NOTE — PLAN OF CARE
Problem: NEUROSENSORY - ADULT  Goal: Achieves stable or improved neurological status  Description: INTERVENTIONS  - Monitor and report changes in neurological status  - Monitor vital signs such as temperature, blood pressure, glucose, and any other labs ordered   - Monitor for seizure activity and implement precautions if appropriate      Outcome: Progressing  Goal: Remains free of injury related to seizures activity  Description: INTERVENTIONS  - Maintain airway, patient safety  and administer oxygen as ordered  - Monitor patient for seizure activity, document and report duration and description of seizure to physician/advanced practitioner  - If seizure occurs,  ensure patient safety during seizure  - Reorient patient post seizure  - Seizure pads on all 4 side rails  - Instruct patient/family to notify RN of any seizure activity including if an aura is experienced  - Instruct patient/family to call for assistance with activity based on nursing assessment  - Administer anti-seizure medications if ordered    Outcome: Progressing  Goal: Achieves maximal functionality and self care  Description: INTERVENTIONS  - Monitor swallowing and airway patency with patient fatigue and changes in neurological status  - Encourage and assist patient to increase activity and self care     - Encourage visually impaired, hearing impaired and aphasic patients to use assistive/communication devices  Outcome: Progressing

## 2022-06-14 NOTE — CASE MANAGEMENT
Case Management Assessment & Discharge Planning Note    Patient name Jordon Records  Location [facility] 721/[facility] 385-52 MRN [id number]  : 1975 Date 2022       Current Admission Date: 2022  Current Admission Diagnosis:Seizure-like activity [facility])   Patient Active Problem List    Diagnosis Date Noted    Transient neurological symptoms 2022    Closed fracture of sesamoid bone of right foot 2019    Primary narcolepsy with cataplexy 2019    Chronic pain 2018    Seizure-like activity ([address] ) 2018    Idiopathic hypersomnia 2018    Obstructive sleep apnea treated with continuous positive airway pressure (CPAP) 2018    Neuropathy, peripheral 2015    Allergic rhinitis 09/15/2013    Fatigue 09/15/2013    Herniated cervical disc 09/15/2013    Obesity 11/15/2012    Psoriasis with arthropathy ([facility] ) 10/24/2012    Ankylosing spondylitis ([facility] ) 10/24/2012    Mixed hyperlipidemia 10/24/2012    DDD (degenerative disc disease), lumbosacral 2010      LOS (days): 1  Geometric Mean LOS (GMLOS) (days): 2 60  Days to GMLOS:1 2     OBJECTIVE:    Risk of Unplanned Readmission Score: 6 99         Current admission status: Inpatient       Preferred Pharmacy:   [address]  [address]  [facility] [address]  Phone: [phone] Fax: [phone]    Primary Care Provider: Juanis Gordon DO    Primary Insurance: MEDICARE  Secondary Insurance: STATE FARM INSURANCE    ASSESSMENT:  Donna 26 Proxies    There are no active Health Care Proxies on file  Patient Information  Admitted from[de-identified] Home  Mental Status: Alert  During Assessment patient was accompanied by: Not accompanied during assessment  Assessment information provided by[de-identified] Patient  Primary Caregiver: Self  Support Systems: Self  County of Residence: [address] [place] [address] do you live in?: [address] [facility] entry access options   Select all that apply : Stairs  Number of steps to enter home : 1  Type of Current Residence[place]  In the last 12 months, was there a time when you were not able to pay the mortgage or rent on time?: Patient refused  In the last 12 months, was there a time when you did not have a steady place to sleep or slept in a shelter (including now)?: Patient refused  Homeless/housing insecurity resource given?: N/A  Living Arrangements: Lives w/ Daughter  Is patient a ?: No    Activities of Daily Living Prior to Admission  Functional Status: Independent  Completes ADLs independently?: Yes  Ambulates independently?: Yes  Does patient use assisted devices?: Yes  Assisted Devices (DME) used: CPAP, Shower Chair  Does patient currently own DME?: Yes  What DME does the patient currently own?: CPAP, Shower Chair  Does patient have a history of Outpatient Therapy (PT/OT)?: No  Does the patient have a history of Short-Term Rehab?: Yes  Does patient have a history of HHC?: No  Does patient currently have [facility]?: No         Patient Information Continued  Income Source: SSI/SSD  Does patient have prescription coverage?: No  Food insecurity resource given?: N/A  Does patient receive dialysis treatments?: No  Does patient have a history of substance abuse?: No         Means of Transportation  Means of Transport to [facility][de-identified] Family transport        DISCHARGE DETAILS:    Discharge planning discussed with[de-identified] Cm met with pt introduced self, role and discharged process  Pt presented as alert during conversation  Pt reported lives with dtr in [facility] house and ramp accessible  pt IPTA  No need identified at the moment and mother to transport upon discharged                                                   CM reviewed d/c planning process including the following: identifying help at home, patient preference for d/c planning needs, Discharge Lounge, [facility] Meds to Bed program, availability of treatment team to discuss questions or concerns patient and/or family may have regarding understanding medications and recognizing signs and symptoms once discharged  CM also encouraged patient to follow up with all recommended appointments after discharge  Patient advised of importance for patient and family to participate in managing patients medical well being

## 2022-06-14 NOTE — PROGRESS NOTES
EMU Daily Progress Note   Latrice Brand 42 y o  male   : 1975  MRN: [id number]     Unit/Bed#: [facility] 721-01    Encounter: [id number]  -------------------------------------------------------------------------------------------------------------------  Interval History:    No events since admission  EEG thus far unrevealing  Off levetiracetam since admission  On  he does report that he is uncomfortable from the bed  Offered heating pad but is not interested at this time  Discussed OOB to chair today as this may help  Otherwise no complaints    He is agreeable to sleep deprivation tonight to provoke events  He will also undergo photic stimulation and hyperventilation  He does report this morning that on  he had three events where his C-PAP shut off  Afterwards he had an odd headache that felt almost like waves around his head  When he was aware, he would vomit with any oral intake  He was able to check and keep his pills down  About an hour after taking his levetiracetam he was feeling fine  There were no other significant symptoms but he e did report that he felt like this in the past after he thought that he may have had a seizure         Vital signs stable since admission  Labs on admission (levetiracetam level pending):   Latest Reference Range & Units 22 11:42   Sodium 136 - 145 mmol/L 137   Potassium 3 5 - 5 3 mmol/L 3 9   Chloride 100 - 108 mmol/L 106   CO2 21 - 32 mmol/L 31   Anion Gap 4 - 13 mmol/L 0 (L)   BUN 5 - 25 mg/dL 16   Creatinine 0 60 - 1 30 mg/dL 0 92 [1]   Glucose, Random 65 - 140 mg/dL 69 [2]   Calcium 8 3 - 10 1 mg/dL 9 0   AST 5 - 45 U/L 14 [3]   ALT 12 - 78 U/L 23 [4]   Alkaline Phosphatase 46 - 116 U/L 80   Total Protein 6 4 - 8 2 g/dL 7 9   Albumin 3 5 - 5 0 g/dL 3 7   TOTAL BILIRUBIN 0 20 - 1 00 mg/dL 0 44 [5]   eGFR ml/min/1 73sq m 100   WBC 4 31 - 10 16 Thousand/uL 9 03   Red Blood Cell Count 3 88 - 5 62 Million/uL 5 26   Hemoglobin 12 0 - 17 0 g/dL 15 0   HCT 36 5 - 49 3 % 45 2   MCV 82 - 98 fL 86   MCH 26 8 - 34 3 pg 28 5   MCHC 31 4 - 37 4 g/dL 33 2   RDW 11 6 - 15 1 % 12 6   Platelet Count 223 - 390 Thousands/uL 311   MPV 8 9 - 12 7 fL 8 7 (L)   nRBC /100 WBCs 0   Neutrophils % 43 - 75 % 45   Immat GRANS % 0 - 2 % 0   Lymphocytes Relative 14 - 44 % 43   Monocytes Relative 4 - 12 % 8   Eosinophils 0 - 6 % 3   Basophils Relative 0 - 1 % 1   Immature Grans Absolute 0 00 - 0 20 Thousand/uL 0 03   Absolute Neutrophils 1 85 - 7 62 Thousands/µL 4 02   Lymphocytes Absolute 0 60 - 4 47 Thousands/µL 3 90   Absolute Monocytes 0 17 - 1 22 Thousand/µL 0 71   Absolute Eosinophils 0 00 - 0 61 Thousand/µL 0 30   Basophils Absolute 0 00 - 0 10 Thousands/µL 0 07     -------------------------------------------------------------------------------------------------------------------  Past Medical history, surgical history, family history, and social history are unchanged from admission H&P     ROS:  A 12 system review of system was obtained and is unrevealing except as noted in interval history  All other review of systems negative   -------------------------------------------------------------------------------------------------------------------  Allergies:    Allergies   Allergen Reactions    Allyl Isothiocyanate Anaphylaxis    Bee Venom Anaphylaxis    Broccoli [Brassica Oleracea - Food Allergy] Anaphylaxis    Diclofenac Other (See Comments)     Pain in leg feels like I have a blood clot pt sttates    Methotrexate Palpitations     Heart palpatations    Mustard Seed - Food Allergy Anaphylaxis    Other Rash and Other (See Comments)     ADHESIVE TAPE  Skin edema redness    Acetaminophen Diarrhea and GI Intolerance    Adhesive [Medical Tape]     Aspirin GI Intolerance    Celecoxib Other (See Comments)     Muscle cramps  Darrius horses, swelling    Diclofenac Sodium      Bad stomach pains    Methotrexate Derivatives     Remicade [Infliximab]     Penicillins Rash Scheduled Meds:   Current Facility-Administered Medications   Medication Dose Route Frequency Provider Last Rate    enoxaparin  40 mg Subcutaneous Q24H [address] Angelica [employer], CRNP      fluticasone  1 spray Nasal BID Angelica Kaylee Rosendo, CRNP      LORazepam  2 mg Intravenous Q8H PRN Angelica Kaylee Rosendo, CRNP      lubiprostone  24 mcg Oral BID With Meals Angelica Kaylee Rosendo, CRNP      ondansetron  4 mg Intravenous Q6H PRN Angelica Kaylee Rosendo, CRNP      tiZANidine  4 mg Oral 4x Daily PRN Angelica Kaylee Rosendo, CRNP      venlafaxine  37 5 mg Oral Daily Angelica Kaylee Rosendo, CRNP       PRN Meds:   LORazepam    ondansetron    tiZANidine  -------------------------------------------------------------------------------------------------------------------  Physical Exam:  Vitals: Blood pressure 131/82, pulse (!) 47, temperature (!) 97 4 °F (36 3 °C), resp  rate 18, SpO2 99 %  No apparent distress  Appears well nourished  Mood appropriate for situation  Neurologic Exam  Mental status- alert and oriented to person, place, and time  Speech appropriate for conversation  Good attention and knowledge  Cranial Nerves- EOMS normal, facial muscles symmetric, speech normal, hearing intact to conversation  Motor- Moves all extremities freely  No tremor  Sensory-  not assessed this morning  DTRs- not assessed this morning  Gait- not assessed this morning  Coordination- not assessed this morning             -------------------------------------------------------------------------------------------------------------------  LAB & TEST RESULTS:  Recent Labs     06/13/22  1142   WBC 9 03   HGB 15 0   HCT 45 2        Recent Labs     06/13/22  1142   K 3 9      CO2 31   BUN 16   CREATININE 0 92   CALCIUM 9 0     Invalid input(s): CK  Recent Labs     06/13/22  1142   ALB 3 7   ALKPHOS 80   ALT 23   AST 14     No results for input(s): INR, PT, PTT in the last 72 hours     No results for input(s): PHART, NFX6DOB, PO2ART, HCO3 in the last 72 hours  Invalid input(s): BE  No results found for: PHENYTOIN, PHENOBARB, VALPROATE    -------------------------------------------------------------------------------------------------------------------  Assessment/Plan:  Nishant Dye is a 42 y o  right handed male with episodes concerning for seizure who is admitted to the Epilepsy Monitoring Unit for evaluation of epiosdes  He has had events for many years concerning for seizures, prior to admission, on levetiracetam 250 mg daily which is a low dose  The patient reports a 48 hour EEG in the past where he had many episodes of his "brain stopping many times per day " He does report that levetiracetam has helped with his events  Prior to admission, he was having multiple events per day concerning for seizure  He has been admitted to the EMU to help clarify diagnosis  Levetiracetam discontinued upon admission to provoke events concerning for seizure  He will undergo sleep deprivation tonight as well as photic stimulation and hyperventilation  If events are determined to be epileptic seizures, he will need medication adjustment  If events are found to be nonepileptic psychogenic events, he would benefit from CBT administered by a licensed psychologist  Events must be captured on video EEG to determine etiology       1)  Spells/Seizures:   1) Continue video EEG monitoring to capture and characterize events  2) Continue single lead EKG monitoring  Baseline EKG upon admission was normal    3) Medications:  Home AEDs include levetiracetam 250 mg daily  - 6/13 - d/c levetiracetam   - 6/14- continue off of levetiracetram  4) Additional diagnostic tests:  sleep deprive tonight  Photic stim/HV today  5) Seizure/fall/status epilepticus precautions  6)  Ativan 2 mg as needed for more than two complex partial seizures or 1 Generalized tonic clonic seizure in a 24 hour period       2)   Co morbidities:   1)cataplexy - continue with venlafaxine 37 5 mg daily  2) seasonal allergies- continue with flonase BID  3) psoriatic arthritis- stable at this time  Receiving orencia infusions monthly  4) Chronic pain (multiple herniated discs) - following with pain management  Morphine pump in place  Has spinal cord stimulator as well which is currently turned off    5) IBS/ Constipation - linzess PRN     3) DVT prophylaxis: Lovenox 40 mg daily  SCDs while in bed     Code status: FULL CODE     Total time spent: At least 20  minutes, with more than 50% spent counseling/coordinating care   In addition, the care of the patient was reviewed with nursing staff and EEG techs       -------------------------------------------------------------------------------------------------------------------  AVA Salgado        Date: 6/14/2022     Time: 11:29 AM   [facility]

## 2022-06-14 NOTE — PLAN OF CARE
Problem: MOBILITY - ADULT  Goal: Maintain or return to baseline ADL function  Description: INTERVENTIONS:  -  Assess patient's ability to carry out ADLs; assess patient's baseline for ADL function and identify physical deficits which impact ability to perform ADLs (bathing, care of mouth/teeth, toileting, grooming, dressing, etc )  - Assess/evaluate cause of self-care deficits   - Assess range of motion  - Assess patient's mobility; develop plan if impaired  - Assess patient's need for assistive devices and provide as appropriate  - Encourage maximum independence but intervene and supervise when necessary  - Involve family in performance of ADLs  - Assess for home care needs following discharge   - Consider OT consult to assist with ADL evaluation and planning for discharge  - Provide patient education as appropriate  Outcome: Progressing  Goal: Maintains/Returns to pre admission functional level  Description: INTERVENTIONS:  - Perform BMAT or MOVE assessment daily    - Set and communicate daily mobility goal to care team and patient/family/caregiver  - Collaborate with rehabilitation services on mobility goals if consulted  - Perform Range of Motion 3 times a day    - Dangle patient 3 times a day  - Stand patient 3 times a day  - Ambulate patient 3 times a day  - Out of bed to chair 3 times a day   - Out of bed for meals 3 times a day  - Out of bed for toileting  - Record patient progress and toleration of activity level   Outcome: Progressing     Problem: NEUROSENSORY - ADULT  Goal: Achieves stable or improved neurological status  Description: INTERVENTIONS  - Monitor and report changes in neurological status  - Monitor vital signs such as temperature, blood pressure, glucose, and any other labs ordered   - Monitor for seizure activity and implement precautions if appropriate      Outcome: Progressing  Goal: Remains free of injury related to seizures activity  Description: INTERVENTIONS  - Maintain airway, patient safety  and administer oxygen as ordered  - Monitor patient for seizure activity, document and report duration and description of seizure to physician/advanced practitioner  - If seizure occurs,  ensure patient safety during seizure  - Reorient patient post seizure  - Seizure pads on all 4 side rails  - Instruct patient/family to notify RN of any seizure activity including if an aura is experienced  - Instruct patient/family to call for assistance with activity based on nursing assessment  - Administer anti-seizure medications if ordered    Outcome: Progressing  Goal: Achieves maximal functionality and self care  Description: INTERVENTIONS  - Monitor swallowing and airway patency with patient fatigue and changes in neurological status  - Encourage and assist patient to increase activity and self care     - Encourage visually impaired, hearing impaired and aphasic patients to use assistive/communication devices  Outcome: Progressing

## 2022-06-15 ENCOUNTER — APPOINTMENT (INPATIENT)
Dept: NEUROLOGY | Facility: CLINIC | Age: 47
DRG: 101 | End: 2022-06-15
Payer: MEDICARE

## 2022-06-15 PROCEDURE — 95715 VEEG EA 12-26HR INTMT MNTR: CPT

## 2022-06-15 PROCEDURE — 99232 SBSQ HOSP IP/OBS MODERATE 35: CPT | Performed by: NURSE PRACTITIONER

## 2022-06-15 PROCEDURE — 95720 EEG PHY/QHP EA INCR W/VEEG: CPT | Performed by: STUDENT IN AN ORGANIZED HEALTH CARE EDUCATION/TRAINING PROGRAM

## 2022-06-15 RX ADMIN — FLUTICASONE PROPIONATE 1 SPRAY: 50 SPRAY, METERED NASAL at 08:39

## 2022-06-15 RX ADMIN — LUBIPROSTONE 24 MCG: 24 CAPSULE, GELATIN COATED ORAL at 08:39

## 2022-06-15 RX ADMIN — LUBIPROSTONE 24 MCG: 24 CAPSULE, GELATIN COATED ORAL at 18:05

## 2022-06-15 RX ADMIN — ENOXAPARIN SODIUM 40 MG: 40 INJECTION SUBCUTANEOUS at 08:39

## 2022-06-15 RX ADMIN — FLUTICASONE PROPIONATE 1 SPRAY: 50 SPRAY, METERED NASAL at 18:05

## 2022-06-15 RX ADMIN — VENLAFAXINE 37.5 MG: 37.5 TABLET ORAL at 08:39

## 2022-06-15 RX ADMIN — ONDANSETRON 4 MG: 2 INJECTION INTRAMUSCULAR; INTRAVENOUS at 14:29

## 2022-06-15 NOTE — PROGRESS NOTES
U Daily Progress Note   Jessica Galaviz 42 y o  male   : 1975  MRN: [id number]     Unit/Bed#: [facility] 721-01    Encounter: [id number]  -------------------------------------------------------------------------------------------------------------------  Interval History:    No events since admission  EEG thus far unrevealing  Off levetiracetam since admission  Sleep deprived overnight last night  Sleep as usual tonight  Photic/ HV yesterday and today  No complaints this morning  He did bring his video game system from home  Notes that this has provoked events in the past  He also has a video from a concert on his phone that he will watch that has provoked events in the past as well       Vital signs stable since admission  Labs on admission (levetiracetam level pending):   Latest Reference Range & Units 22 11:42   Sodium 136 - 145 mmol/L 137   Potassium 3 5 - 5 3 mmol/L 3 9   Chloride 100 - 108 mmol/L 106   CO2 21 - 32 mmol/L 31   Anion Gap 4 - 13 mmol/L 0 (L)   BUN 5 - 25 mg/dL 16   Creatinine 0 60 - 1 30 mg/dL 0 92 [1]   Glucose, Random 65 - 140 mg/dL 69 [2]   Calcium 8 3 - 10 1 mg/dL 9 0   AST 5 - 45 U/L 14 [3]   ALT 12 - 78 U/L 23 [4]   Alkaline Phosphatase 46 - 116 U/L 80   Total Protein 6 4 - 8 2 g/dL 7 9   Albumin 3 5 - 5 0 g/dL 3 7   TOTAL BILIRUBIN 0 20 - 1 00 mg/dL 0 44 [5]   eGFR ml/min/1 73sq m 100   WBC 4 31 - 10 16 Thousand/uL 9 03   Red Blood Cell Count 3 88 - 5 62 Million/uL 5 26   Hemoglobin 12 0 - 17 0 g/dL 15 0   HCT 36 5 - 49 3 % 45 2   MCV 82 - 98 fL 86   MCH 26 8 - 34 3 pg 28 5   MCHC 31 4 - 37 4 g/dL 33 2   RDW 11 6 - 15 1 % 12 6   Platelet Count 130 - 390 Thousands/uL 311   MPV 8 9 - 12 7 fL 8 7 (L)   nRBC /100 WBCs 0   Neutrophils % 43 - 75 % 45   Immat GRANS % 0 - 2 % 0   Lymphocytes Relative 14 - 44 % 43   Monocytes Relative 4 - 12 % 8   Eosinophils 0 - 6 % 3   Basophils Relative 0 - 1 % 1   Immature Grans Absolute 0 00 - 0 20 Thousand/uL 0 03   Absolute Neutrophils 1 85 - 7 62 Thousands/µL 4 02   Lymphocytes Absolute 0 60 - 4 47 Thousands/µL 3 90   Absolute Monocytes 0 17 - 1 22 Thousand/µL 0 71   Absolute Eosinophils 0 00 - 0 61 Thousand/µL 0 30   Basophils Absolute 0 00 - 0 10 Thousands/µL 0 07     -------------------------------------------------------------------------------------------------------------------  Past Medical history, surgical history, family history, and social history are unchanged from admission H&P     ROS:  A 12 system review of system was obtained and is unrevealing except as noted in interval history  All other review of systems negative   -------------------------------------------------------------------------------------------------------------------  Allergies:    Allergies   Allergen Reactions    Allyl Isothiocyanate Anaphylaxis    Bee Venom Anaphylaxis    Broccoli [Brassica Oleracea - Food Allergy] Anaphylaxis    Diclofenac Other (See Comments)     Pain in leg feels like I have a blood clot pt sttates    Methotrexate Palpitations     Heart palpatations    Mustard Seed - Food Allergy Anaphylaxis    Other Rash and Other (See Comments)     ADHESIVE TAPE  Skin edema redness    Acetaminophen Diarrhea and GI Intolerance    Adhesive [Medical Tape]     Aspirin GI Intolerance    Celecoxib Other (See Comments)     Muscle cramps  Darrius horses, swelling    Diclofenac Sodium      Bad stomach pains    Methotrexate Derivatives     Remicade [Infliximab]     Penicillins Rash      Scheduled Meds:   Current Facility-Administered Medications   Medication Dose Route Frequency Provider Last Rate    enoxaparin  40 mg Subcutaneous Q24H [facility] AVA Ferrell      fluticasone  1 spray Nasal BID AVA Ferrell      LORazepam  2 mg Intravenous Q8H PRN AVA Ferrell      lubiprostone  24 mcg Oral BID With Meals AVA Ferrell      ondansetron  4 mg Intravenous Q6H PRN AVA Batista  tiZANidine  4 mg Oral 4x Daily PRN AVA Ferrell      venlafaxine  37 5 mg Oral Daily AVA Ferrell       PRN Meds:   LORazepam    ondansetron    tiZANidine  -------------------------------------------------------------------------------------------------------------------  Physical Exam:  Vitals: Blood pressure 133/82, pulse 63, temperature 98 °F (36 7 °C), resp  rate 19, SpO2 97 %  No apparent distress  Appears well nourished  Mood appropriate for situation  Neurologic Exam  Mental status- alert and oriented to person, place, and time  Speech appropriate for conversation  Good attention and knowledge  Cranial Nerves- EOMS normal, facial muscles symmetric, speech normal, hearing intact to conversation  Motor- Moves all extremities freely  No tremor  Sensory-  not assessed this morning  DTRs- not assessed this morning  Gait- not assessed this morning  Coordination- not assessed this morning        -------------------------------------------------------------------------------------------------------------------  Assessment/Plan:  Odalis Marcum is a 42 y o  right handed male with episodes concerning for seizure who is admitted to the Epilepsy Monitoring Unit for evaluation of epiosdes  He has had events for many years concerning for seizures, prior to admission, on levetiracetam 250 mg daily which is a low dose  The patient reports a 48 hour EEG in the past where he had many episodes of his "brain stopping many times per day " He does report that levetiracetam has helped with his events  Prior to admission, he was having multiple events per day concerning for seizure  He has been admitted to the EMU to help clarify diagnosis  Levetiracetam discontinued upon admission to provoke events concerning for seizure  Underwent sleep deprivation last night  Photic stim/HV yesterday and today   If events are determined to be epileptic seizures, he will need medication adjustment  If events are found to be nonepileptic psychogenic events, he would benefit from CBT administered by a licensed psychologist  Events must be captured on video EEG to determine etiology       1)  Spells/Seizures:   1) Continue video EEG monitoring to capture and characterize events  2) Continue single lead EKG monitoring  Baseline EKG upon admission was normal    3) Medications:  Home AEDs include levetiracetam 250 mg daily  - 6/13 - d/c levetiracetam   - 6/14- continue off of levetiracetam   - 6/15 - continue off of levetiracetam  4) Additional diagnostic tests: sleep deprived last night, sleep as usual tonight  Photic stim/HV today  5) Seizure/fall/status epilepticus precautions  6)  Ativan 2 mg as needed for more than two complex partial seizures or 1 Generalized tonic clonic seizure in a 24 hour period       2)  Co morbidities:   1)cataplexy - continue with venlafaxine 37 5 mg daily  2) seasonal allergies- continue with flonase BID  3) psoriatic arthritis- stable at this time  Receiving orencia infusions monthly  4) Chronic pain (multiple herniated discs) - following with pain management  Morphine pump in place  Has spinal cord stimulator as well which is currently turned off    5) IBS/ Constipation - linzess PRN     3) DVT prophylaxis: Lovenox 40 mg daily  SCDs while in bed     Code status: FULL CODE     Total time spent: At least 10  minutes, with more than 50% spent counseling/coordinating care   In addition, the care of the patient was reviewed with nursing staff and EEG techs       -------------------------------------------------------------------------------------------------------------------  AVA Lee        Date: 6/15/2022     Time: 8:37 AM   [facility] [place]

## 2022-06-16 ENCOUNTER — APPOINTMENT (INPATIENT)
Dept: NEUROLOGY | Facility: CLINIC | Age: 47
DRG: 101 | End: 2022-06-16
Payer: MEDICARE

## 2022-06-16 PROCEDURE — 95720 EEG PHY/QHP EA INCR W/VEEG: CPT | Performed by: STUDENT IN AN ORGANIZED HEALTH CARE EDUCATION/TRAINING PROGRAM

## 2022-06-16 PROCEDURE — 95715 VEEG EA 12-26HR INTMT MNTR: CPT

## 2022-06-16 PROCEDURE — 99232 SBSQ HOSP IP/OBS MODERATE 35: CPT | Performed by: STUDENT IN AN ORGANIZED HEALTH CARE EDUCATION/TRAINING PROGRAM

## 2022-06-16 RX ADMIN — ENOXAPARIN SODIUM 40 MG: 40 INJECTION SUBCUTANEOUS at 09:27

## 2022-06-16 RX ADMIN — LUBIPROSTONE 24 MCG: 24 CAPSULE, GELATIN COATED ORAL at 17:55

## 2022-06-16 RX ADMIN — LUBIPROSTONE 24 MCG: 24 CAPSULE, GELATIN COATED ORAL at 09:27

## 2022-06-16 RX ADMIN — FLUTICASONE PROPIONATE 1 SPRAY: 50 SPRAY, METERED NASAL at 17:55

## 2022-06-16 RX ADMIN — FLUTICASONE PROPIONATE 1 SPRAY: 50 SPRAY, METERED NASAL at 09:27

## 2022-06-16 RX ADMIN — VENLAFAXINE 37.5 MG: 37.5 TABLET ORAL at 09:27

## 2022-06-16 NOTE — PROGRESS NOTES
EMU Daily Progress Note   Gabbi Cheung 42 y o  male   : 1975  MRN: [id number]     Unit/Bed#: [facility] 721-01    Encounter: [id number]  -------------------------------------------------------------------------------------------------------------------  Interval History:    Since admission patient has had one of his events concerning for seizure  This occurred after photic stimulation yesterday and reports that he was feeling off and it slowly built up  He started to get nauseated and was similar to events at home  If he did not get a dose of zofran he does believe that this may have progressed to a larger event  He had a weird feeling in his head that was different than typical events, almost pulsating like in the left side of his head and right temple  Typically with his events it is more of a wave like sensation  He is feeling better this morning  No complaints on ROS that are different from his baseline  Chronic back pain being managed implanted morphine pump  He was unable to hook his video game up to the television  His daughter may bring another device today       Vital signs stable since admission  Labs on admission (levetiracetam level pending):   Latest Reference Range & Units 22 11:42   Sodium 136 - 145 mmol/L 137   Potassium 3 5 - 5 3 mmol/L 3 9   Chloride 100 - 108 mmol/L 106   CO2 21 - 32 mmol/L 31   Anion Gap 4 - 13 mmol/L 0 (L)   BUN 5 - 25 mg/dL 16   Creatinine 0 60 - 1 30 mg/dL 0 92 [1]   Glucose, Random 65 - 140 mg/dL 69 [2]   Calcium 8 3 - 10 1 mg/dL 9 0   AST 5 - 45 U/L 14 [3]   ALT 12 - 78 U/L 23 [4]   Alkaline Phosphatase 46 - 116 U/L 80   Total Protein 6 4 - 8 2 g/dL 7 9   Albumin 3 5 - 5 0 g/dL 3 7   TOTAL BILIRUBIN 0 20 - 1 00 mg/dL 0 44 [5]   eGFR ml/min/1 73sq m 100   WBC 4 31 - 10 16 Thousand/uL 9 03   Red Blood Cell Count 3 88 - 5 62 Million/uL 5 26   Hemoglobin 12 0 - 17 0 g/dL 15 0   HCT 36 5 - 49 3 % 45 2   MCV 82 - 98 fL 86   MCH 26 8 - 34 3 pg 28 5   MCHC 31 4 - 37 4 g/dL 33 2   RDW 11 6 - 15 1 % 12 6   Platelet Count 952 - 390 Thousands/uL 311   MPV 8 9 - 12 7 fL 8 7 (L)   nRBC /100 WBCs 0   Neutrophils % 43 - 75 % 45   Immat GRANS % 0 - 2 % 0   Lymphocytes Relative 14 - 44 % 43   Monocytes Relative 4 - 12 % 8   Eosinophils 0 - 6 % 3   Basophils Relative 0 - 1 % 1   Immature Grans Absolute 0 00 - 0 20 Thousand/uL 0 03   Absolute Neutrophils 1 85 - 7 62 Thousands/µL 4 02   Lymphocytes Absolute 0 60 - 4 47 Thousands/µL 3 90   Absolute Monocytes 0 17 - 1 22 Thousand/µL 0 71   Absolute Eosinophils 0 00 - 0 61 Thousand/µL 0 30   Basophils Absolute 0 00 - 0 10 Thousands/µL 0 07     -------------------------------------------------------------------------------------------------------------------  Past Medical history, surgical history, family history, and social history are unchanged from admission H&P     ROS:  A 12 system review of system was obtained and is unrevealing except as noted in interval history  All other review of systems negative   -------------------------------------------------------------------------------------------------------------------  Allergies:    Allergies   Allergen Reactions    Allyl Isothiocyanate Anaphylaxis    Bee Venom Anaphylaxis    Broccoli [Brassica Oleracea - Food Allergy] Anaphylaxis    Diclofenac Other (See Comments)     Pain in leg feels like I have a blood clot pt sttates    Methotrexate Palpitations     Heart palpatations    Mustard Seed - Food Allergy Anaphylaxis    Other Rash and Other (See Comments)     ADHESIVE TAPE  Skin edema redness    Acetaminophen Diarrhea and GI Intolerance    Adhesive [Medical Tape]     Aspirin GI Intolerance    Celecoxib Other (See Comments)     Muscle cramps  Darrius horses, swelling    Diclofenac Sodium      Bad stomach pains    Methotrexate Derivatives     Remicade [Infliximab]     Penicillins Rash      Scheduled Meds:   Current Facility-Administered Medications   Medication Dose Route Frequency Provider Last Rate    enoxaparin  40 mg Subcutaneous Q24H [address] [employer], CRNP      fluticasone  1 spray Nasal BID Angelica Kaylee Rosendo, CRNP      LORazepam  2 mg Intravenous Q8H PRN Angelica Kaylee Rosendo, CRNP      lubiprostone  24 mcg Oral BID With Meals Angelica Kaylee Rosendo, CRNP      ondansetron  4 mg Intravenous Q6H PRN Angelica [employer], CRNP      tiZANidine  4 mg Oral 4x Daily PRN Angelica [employer], CRNP      venlafaxine  37 5 mg Oral Daily Angelica Kaylee Rosendo, CRNP       PRN Meds:   LORazepam    ondansetron    tiZANidine  -------------------------------------------------------------------------------------------------------------------  Physical Exam:  Vitals: Blood pressure 105/67, pulse 64, temperature 98 2 °F (36 8 °C), resp  rate 16, SpO2 97 %  No apparent distress  Appears well nourished  Mood appropriate for situation  Neurologic Exam  Mental status- alert and oriented to person, place, and time  Speech appropriate for conversation  Good attention and knowledge  Cranial Nerves- EOMS normal, facial muscles symmetric, speech normal, hearing intact to conversation  Motor- Moves all extremities freely  No tremor  Sensory-  not assessed this morning  DTRs- not assessed this morning  Gait- not assessed this morning  Coordination- not assessed this morning        -------------------------------------------------------------------------------------------------------------------  Assessment/Plan:  Mario Oneal is a 42 y o  right handed male with episodes concerning for seizure who is admitted to the Epilepsy Monitoring Unit for evaluation of epiosdes  He has had events for many years concerning for seizures, prior to admission, on levetiracetam 250 mg daily which is a low dose   The patient reports a 48 hour EEG in the past where he had many episodes of his "brain stopping many times per day " He does report that levetiracetam has helped with his events  Prior to admission, he was having multiple events per day concerning for seizure  He has been admitted to the EMU to help clarify diagnosis  Levetiracetam discontinued upon admission to provoke events concerning for seizure  Single mild event captured yesterday after photic stimulation without EEG changes, would like to continue monitoring to capture larger events  If events are determined to be epileptic seizures, he will need medication adjustment  If events are found to be nonepileptic psychogenic events, he would benefit from CBT administered by a licensed psychologist  Events must be captured on video EEG to determine etiology       1)  Spells/Seizures:   1) Continue video EEG monitoring to capture and characterize events  2) Continue single lead EKG monitoring  Baseline EKG upon admission was normal    3) Medications:  Home AEDs include levetiracetam 250 mg daily  - 6/13 - d/c levetiracetam   - 6/14- continue off of levetiracetam   - 6/15 - continue off of levetiracetam   - 6/16 - continue off of levetiracetam  4) Additional diagnostic tests: photic/HV  5) Seizure/fall/status epilepticus precautions  6)  Ativan 2 mg as needed for more than two complex partial seizures or 1 Generalized tonic clonic seizure in a 24 hour period       2)  Co morbidities:   1)cataplexy - continue with venlafaxine 37 5 mg daily  2) seasonal allergies- continue with flonase BID  3) psoriatic arthritis- stable at this time  Receiving orencia infusions monthly  4) Chronic pain (multiple herniated discs) - following with pain management  Morphine pump in place  Has spinal cord stimulator as well which is currently turned off    5) IBS/ Constipation - linzess PRN     3) DVT prophylaxis: Lovenox 40 mg daily  SCDs while in bed     Code status: FULL CODE     Total time spent: At least 25 minutes, with more than 50% spent counseling/coordinating care   In addition, the care of the patient was reviewed with nursing staff and EEG techs       -------------------------------------------------------------------------------------------------------------------  AVA Lee        Date: 6/16/2022     Time: 11:31 AM   [address] [facility] [place]

## 2022-06-17 ENCOUNTER — APPOINTMENT (INPATIENT)
Dept: NEUROLOGY | Facility: CLINIC | Age: 47
DRG: 101 | End: 2022-06-17
Payer: MEDICARE

## 2022-06-17 LAB — LEVETIRACETAM SERPL-MCNC: 2.7 UG/ML (ref 10–40)

## 2022-06-17 PROCEDURE — 95715 VEEG EA 12-26HR INTMT MNTR: CPT

## 2022-06-17 PROCEDURE — 95720 EEG PHY/QHP EA INCR W/VEEG: CPT | Performed by: STUDENT IN AN ORGANIZED HEALTH CARE EDUCATION/TRAINING PROGRAM

## 2022-06-17 PROCEDURE — 99232 SBSQ HOSP IP/OBS MODERATE 35: CPT | Performed by: STUDENT IN AN ORGANIZED HEALTH CARE EDUCATION/TRAINING PROGRAM

## 2022-06-17 RX ORDER — KETOROLAC TROMETHAMINE 30 MG/ML
15 INJECTION, SOLUTION INTRAMUSCULAR; INTRAVENOUS ONCE
Status: COMPLETED | OUTPATIENT
Start: 2022-06-17 | End: 2022-06-17

## 2022-06-17 RX ADMIN — VENLAFAXINE 37.5 MG: 37.5 TABLET ORAL at 09:48

## 2022-06-17 RX ADMIN — KETOROLAC TROMETHAMINE 15 MG: 30 INJECTION, SOLUTION INTRAMUSCULAR at 11:20

## 2022-06-17 RX ADMIN — TIZANIDINE 4 MG: 4 TABLET ORAL at 00:32

## 2022-06-17 RX ADMIN — ENOXAPARIN SODIUM 40 MG: 40 INJECTION SUBCUTANEOUS at 09:50

## 2022-06-17 RX ADMIN — LUBIPROSTONE 24 MCG: 24 CAPSULE, GELATIN COATED ORAL at 17:38

## 2022-06-17 RX ADMIN — FLUTICASONE PROPIONATE 1 SPRAY: 50 SPRAY, METERED NASAL at 17:37

## 2022-06-17 RX ADMIN — FLUTICASONE PROPIONATE 1 SPRAY: 50 SPRAY, METERED NASAL at 09:48

## 2022-06-17 NOTE — PROGRESS NOTES
EMU Daily Progress Note   Eri Win 42 y o  male   : 1975  MRN: [id number]     Unit/Bed#: [facility] 721-01    Encounter: [id number]  -------------------------------------------------------------------------------------------------------------------  Interval History:    Since admission patient has had one of his events concerning for seizure  This occurred after photic stimulation 2 days ago  No further events  Slept 2-3 hours last night but is having back and leg spasms  He did take flexeril which was helpful but does make him tired  Toradol in the past has been helpful which he has received in the ER at prior visits  Medication ordered  Due to back and leg spasms he may need to cut his admission short  Will manage with flexeril and toradol for now  He is going to play video games today which do provoke events at home       Vital signs stable since admission  Labs on admission (levetiracetam level pending):   Latest Reference Range & Units 22 11:42   Sodium 136 - 145 mmol/L 137   Potassium 3 5 - 5 3 mmol/L 3 9   Chloride 100 - 108 mmol/L 106   CO2 21 - 32 mmol/L 31   Anion Gap 4 - 13 mmol/L 0 (L)   BUN 5 - 25 mg/dL 16   Creatinine 0 60 - 1 30 mg/dL 0 92 [1]   Glucose, Random 65 - 140 mg/dL 69 [2]   Calcium 8 3 - 10 1 mg/dL 9 0   AST 5 - 45 U/L 14 [3]   ALT 12 - 78 U/L 23 [4]   Alkaline Phosphatase 46 - 116 U/L 80   Total Protein 6 4 - 8 2 g/dL 7 9   Albumin 3 5 - 5 0 g/dL 3 7   TOTAL BILIRUBIN 0 20 - 1 00 mg/dL 0 44 [5]   eGFR ml/min/1 73sq m 100   WBC 4 31 - 10 16 Thousand/uL 9 03   Red Blood Cell Count 3 88 - 5 62 Million/uL 5 26   Hemoglobin 12 0 - 17 0 g/dL 15 0   HCT 36 5 - 49 3 % 45 2   MCV 82 - 98 fL 86   MCH 26 8 - 34 3 pg 28 5   MCHC 31 4 - 37 4 g/dL 33 2   RDW 11 6 - 15 1 % 12 6   Platelet Count 354 - 390 Thousands/uL 311   MPV 8 9 - 12 7 fL 8 7 (L)   nRBC /100 WBCs 0   Neutrophils % 43 - 75 % 45   Immat GRANS % 0 - 2 % 0   Lymphocytes Relative 14 - 44 % 43   Monocytes Relative 4 - 12 % 8   Eosinophils 0 - 6 % 3   Basophils Relative 0 - 1 % 1   Immature Grans Absolute 0 00 - 0 20 Thousand/uL 0 03   Absolute Neutrophils 1 85 - 7 62 Thousands/µL 4 02   Lymphocytes Absolute 0 60 - 4 47 Thousands/µL 3 90   Absolute Monocytes 0 17 - 1 22 Thousand/µL 0 71   Absolute Eosinophils 0 00 - 0 61 Thousand/µL 0 30   Basophils Absolute 0 00 - 0 10 Thousands/µL 0 07     -------------------------------------------------------------------------------------------------------------------  Past Medical history, surgical history, family history, and social history are unchanged from admission H&P     ROS:  A 12 system review of system was obtained and is unrevealing except as noted in interval history  All other review of systems negative   -------------------------------------------------------------------------------------------------------------------  Allergies:    Allergies   Allergen Reactions    Allyl Isothiocyanate Anaphylaxis    Bee Venom Anaphylaxis    Broccoli [Brassica Oleracea - Food Allergy] Anaphylaxis    Diclofenac Other (See Comments)     Pain in leg feels like I have a blood clot pt sttates    Methotrexate Palpitations     Heart palpatations    Mustard Seed - Food Allergy Anaphylaxis    Other Rash and Other (See Comments)     ADHESIVE TAPE  Skin edema redness    Acetaminophen Diarrhea and GI Intolerance    Adhesive [Medical Tape]     Aspirin GI Intolerance    Celecoxib Other (See Comments)     Muscle cramps  Darrius horses, swelling    Diclofenac Sodium      Bad stomach pains    Methotrexate Derivatives     Remicade [Infliximab]     Penicillins Rash      Scheduled Meds:   Current Facility-Administered Medications   Medication Dose Route Frequency Provider Last Rate    enoxaparin  40 mg Subcutaneous Q24H [address] AVA Ferrell      fluticasone  1 spray Nasal BID AVA Ferrell      ketorolac  15 mg Intravenous Once AVA Larios      LORazepam  2 mg Intravenous Q8H PRN Angelica Kae Fulling, AVA      lubiprostone  24 mcg Oral BID With Meals AVA Ferrell      NON FORMULARY  0 5 mL Subcutaneous Weekly Mei Sosa MD      ondansetron  4 mg Intravenous Q6H PRN Angelica [place] Fulling, AVA      tiZANidine  4 mg Oral 4x Daily PRN Angelica [place] Fulling, AVA      venlafaxine  37 5 mg Oral Daily AVA Ferrell       PRN Meds:   LORazepam    ondansetron    tiZANidine  -------------------------------------------------------------------------------------------------------------------  Physical Exam:  Vitals: Blood pressure 125/82, pulse 58, temperature 97 7 °F (36 5 °C), resp  rate 18, SpO2 97 %  No apparent distress  Appears well nourished  Mood appropriate for situation  Neurologic Exam  Mental status- alert and oriented to person, place, and time  Speech appropriate for conversation  Good attention and knowledge  Cranial Nerves- EOMS normal, facial muscles symmetric, speech normal, hearing intact to conversation  Motor- Moves all extremities freely  No tremor  Sensory-  not assessed this morning  DTRs- not assessed this morning  Gait- not assessed this morning  Coordination- not assessed this morning        -------------------------------------------------------------------------------------------------------------------  Assessment/Plan:  Hector Parham is a 42 y o  right handed male with episodes concerning for seizure who is admitted to the Epilepsy Monitoring Unit for evaluation of epiosdes  He has had events for many years concerning for seizures, prior to admission, on levetiracetam 250 mg daily which is a low dose  The patient reports a 48 hour EEG in the past where he had many episodes of his "brain stopping many times per day " He does report that levetiracetam has helped with his events  Prior to admission, he was having multiple events per day concerning for seizure   He has been admitted to the EMU to help clarify diagnosis  Levetiracetam discontinued upon admission to provoke events concerning for seizure  Single mild event captured 2 days ago after photic stimulation without EEG changes, would like to continue monitoring to capture larger events  If events are determined to be epileptic seizures, he will need medication adjustment  If events are found to be nonepileptic psychogenic events, he would benefit from CBT administered by a licensed psychologist  Events must be captured on video EEG to determine etiology       1)  Spells/Seizures:   1) Continue video EEG monitoring to capture and characterize events  2) Continue single lead EKG monitoring  Baseline EKG upon admission was normal    3) Medications:  Home AEDs include levetiracetam 250 mg daily  - 6/13 - d/c levetiracetam   - 6/14- continue off of levetiracetam   - 6/15 - continue off of levetiracetam   - 6/16 - continue off of levetiracetam   - 6/17 - continue off of levetiracetam   4) Additional diagnostic tests: photic/HV  5) Seizure/fall/status epilepticus precautions  6)  Ativan 2 mg as needed for more than two complex partial seizures or 1 Generalized tonic clonic seizure in a 24 hour period       2)  Co morbidities:   1)cataplexy - continue with venlafaxine 37 5 mg daily  2) seasonal allergies- continue with flonase BID  3) psoriatic arthritis- stable at this time  Receiving orencia infusions monthly  4) Chronic pain (multiple herniated discs) - following with pain management  Morphine pump in place  Has spinal cord stimulator as well which is currently turned off    5) IBS/ Constipation - linzess PRN     3) DVT prophylaxis: Lovenox 40 mg daily  SCDs while in bed     Code status: FULL CODE     Total time spent: At least 25 minutes, with more than 50% spent counseling/coordinating care   In addition, the care of the patient was reviewed with nursing staff and EEG techs       -------------------------------------------------------------------------------------------------------------------  [address] [id number], CRNP        Date: 6/17/2022     Time: 10:26 AM   [facility]

## 2022-06-18 ENCOUNTER — APPOINTMENT (INPATIENT)
Dept: NEUROLOGY | Facility: CLINIC | Age: 47
DRG: 101 | End: 2022-06-18
Payer: MEDICARE

## 2022-06-18 VITALS
DIASTOLIC BLOOD PRESSURE: 79 MMHG | SYSTOLIC BLOOD PRESSURE: 134 MMHG | TEMPERATURE: 98.2 F | OXYGEN SATURATION: 97 % | RESPIRATION RATE: 16 BRPM | HEART RATE: 65 BPM

## 2022-06-18 PROCEDURE — 99232 SBSQ HOSP IP/OBS MODERATE 35: CPT | Performed by: STUDENT IN AN ORGANIZED HEALTH CARE EDUCATION/TRAINING PROGRAM

## 2022-06-18 PROCEDURE — 95715 VEEG EA 12-26HR INTMT MNTR: CPT

## 2022-06-18 PROCEDURE — 95720 EEG PHY/QHP EA INCR W/VEEG: CPT | Performed by: STUDENT IN AN ORGANIZED HEALTH CARE EDUCATION/TRAINING PROGRAM

## 2022-06-18 RX ORDER — LEVETIRACETAM 500 MG/1
250 TABLET ORAL
Status: DISCONTINUED | OUTPATIENT
Start: 2022-06-18 | End: 2022-06-19 | Stop reason: HOSPADM

## 2022-06-18 RX ADMIN — LEVETIRACETAM 250 MG: 500 TABLET, FILM COATED ORAL at 21:55

## 2022-06-18 RX ADMIN — LUBIPROSTONE 24 MCG: 24 CAPSULE, GELATIN COATED ORAL at 17:00

## 2022-06-18 RX ADMIN — ENOXAPARIN SODIUM 40 MG: 40 INJECTION SUBCUTANEOUS at 09:08

## 2022-06-18 RX ADMIN — LUBIPROSTONE 24 MCG: 24 CAPSULE, GELATIN COATED ORAL at 09:08

## 2022-06-18 RX ADMIN — FLUTICASONE PROPIONATE 1 SPRAY: 50 SPRAY, METERED NASAL at 09:08

## 2022-06-18 RX ADMIN — VENLAFAXINE 37.5 MG: 37.5 TABLET ORAL at 09:08

## 2022-06-18 RX ADMIN — FLUTICASONE PROPIONATE 1 SPRAY: 50 SPRAY, METERED NASAL at 17:00

## 2022-06-18 NOTE — PROGRESS NOTES
Epilepsy Monitoring Unit Follow-up Note  Patient Name: Grace Severin  : 1975  MRN: [id number]  Date: 22 Time: 10:27 AM     LOS: 5 days   Attending: Bebo Hyatt MD     Interval History: No acute events overnight  Lying comfortably in bed in no acute distress  No events occurred in the last 24 hours  Attempts to get his videogame console plugged into the TV yesterday was unsuccessful  ROS: Ten systems were reviewed and negative except for what is detailed above  Medications   Current Facility-Administered Medications   Medication Dose Route Frequency Provider Last Rate    enoxaparin  40 mg Subcutaneous Q24H [facility] Angelica [employer], CRNP      fluticasone  1 spray Nasal BID Angelica Kaylee Rosendo, CRNP      LORazepam  2 mg Intravenous Q8H PRN Angelica Kaylee Rosendo, CRNP      lubiprostone  24 mcg Oral BID With Meals Angelica Kayleemaryann Robbins, CRNP      NON FORMULARY  0 5 mL Subcutaneous Weekly Bebo Hyatt MD      ondansetron  4 mg Intravenous Q6H PRN Angelica [employer], CRNP      tiZANidine  4 mg Oral 4x Daily PRN Angelica [employer], CRNP      venlafaxine  37 5 mg Oral Daily Angelica Kaylee Rosendo, CRNP         Physical Exam   /69   Pulse 59   Temp 98 °F (36 7 °C)   Resp 18   SpO2 98%    Heart: RRR, no murmer  Lungs:  CTAB    Neurologic Exam  Mental Status:  A & 0 x 3  Language: fluent, comprehension intact  Cranial Nerves:  PERRL  EOMI no nystagums  Face symmetric  Tongue/palate midline  Motor:  No drift  Normal bulk and tone  Strength 5/5  DTRs: 2+ throughout  Coordination: Finger to nose intact    Test Results:  VEEG Results the last 24 hrs: Please see separate report  Assessment/Plan:  Girish Castillo a 42 y  o  right handed male with episodes concerning for seizure who is admitted to the Epilepsy Monitoring Unit for evaluation of Ganga Schaffer has had events for many years concerning for seizures, prior to admission, on levetiracetam 250 mg daily which is a low dose  The patient reports a 48 hour EEG in the past where he had many episodes of his "brain stopping many times per day " He does report that levetiracetam has helped with his events  Prior to admission, he was having multiple events per day concerning for seizure  He has been admitted to the EMU to help clarify diagnosis   Levetiracetam discontinued upon admission to provoke events concerning for seizure  A single mild event captured 3 days ago after photic stimulation without EEG changes  Despite continued monitoring we have been unable to elicit any more events  We will restart his keppra tonight in preporation for discharge tomorrow AM      1)  Spells/Seizures:   1) Continue video EEG monitoring to capture and characterize events  2) Continue single lead EKG monitoring   Baseline EKG upon admission was normal    3) Medications:  Home AEDs include levetiracetam 250 mg daily              - 6/13 - d/c levetiracetam              - 6/14- continue off of levetiracetam              - 6/15 - continue off of levetiracetam              - 6/16 - continue off of levetiracetam              - 6/17 - continue off of levetiracetam    - 6/18 - restart levetiracetam 250 mg daily  4) Additional diagnostic tests: photic/HV  5) Seizure/fall/status epilepticus precautions  6)  Ativan 2 mg as needed for more than two complex partial seizures or 1 Generalized tonic clonic seizure in a 24 hour period       2)  Co morbidities:   1)cataplexy - continue with venlafaxine 37 5 mg daily  2) seasonal allergies- continue with flonase BID  3) psoriatic arthritis- stable at this time  Receiving orencia infusions monthly  4) Chronic pain (multiple herniated discs) - following with pain management  Morphine pump in place  Has spinal cord stimulator as well which is currently turned off    5) IBS/ Constipation - linzess PRN     3) DVT prophylaxis: Lovenox 40 mg daily   SCDs while in bed     Code status: FULL CODE     Total time spent: At least 27 minutes, with more than 50% spent counseling/coordinating care  In addition, the care of the patient was reviewed with nursing staff and EEG techs    Fayrene Skiff, MD Date: 6/18/2022 Time: 10:27 AM

## 2022-06-18 NOTE — PLAN OF CARE
Problem: MOBILITY - ADULT  Goal: Maintain or return to baseline ADL function  Description: INTERVENTIONS:  -  Assess patient's ability to carry out ADLs; assess patient's baseline for ADL function and identify physical deficits which impact ability to perform ADLs (bathing, care of mouth/teeth, toileting, grooming, dressing, etc )  - Assess/evaluate cause of self-care deficits   - Assess range of motion  - Assess patient's mobility; develop plan if impaired  - Assess patient's need for assistive devices and provide as appropriate  - Encourage maximum independence but intervene and supervise when necessary  - Involve family in performance of ADLs  - Assess for home care needs following discharge   - Consider OT consult to assist with ADL evaluation and planning for discharge  - Provide patient education as appropriate  Outcome: Progressing  Goal: Maintains/Returns to pre admission functional level  Description: INTERVENTIONS:  - Perform BMAT or MOVE assessment daily    - Set and communicate daily mobility goal to care team and patient/family/caregiver  - Collaborate with rehabilitation services on mobility goals if consulted  - Perform Range of Motion 3 times a day    - Dangle patient 3 times a day  - Stand patient 3 times a day  - Ambulate patient 3 times a day  - Out of bed to chair 3 times a day   - Out of bed for meals 3 times a day  - Out of bed for toileting  - Record patient progress and toleration of activity level   Outcome: Progressing     Problem: Potential for Falls  Goal: Patient will remain free of falls  Description: INTERVENTIONS:  - Educate patient/family on patient safety including physical limitations  - Instruct patient to call for assistance with activity   - Consult OT/PT to assist with strengthening/mobility   - Keep Call bell within reach  - Keep bed low and locked with side rails adjusted as appropriate  - Keep care items and personal belongings within reach  - Initiate and maintain comfort rounds  - Make Fall Risk Sign visible to staff  - Offer Toileting every 2 Hours, in advance of need  - Initiate/Maintain bed alarm  - Obtain necessary fall risk management equipment: fall socks; standby assist while on EMU   - Apply yellow socks and bracelet for high fall risk patients  Outcome: Progressing     Problem: NEUROSENSORY - ADULT  Goal: Achieves stable or improved neurological status  Description: INTERVENTIONS  - Monitor and report changes in neurological status  - Monitor vital signs such as temperature, blood pressure, glucose, and any other labs ordered   - Monitor for seizure activity and implement precautions if appropriate      Outcome: Progressing  Goal: Remains free of injury related to seizures activity  Description: INTERVENTIONS  - Maintain airway, patient safety  and administer oxygen as ordered  - Monitor patient for seizure activity, document and report duration and description of seizure to physician/advanced practitioner  - If seizure occurs,  ensure patient safety during seizure  - Reorient patient post seizure  - Seizure pads on all 4 side rails  - Instruct patient/family to notify RN of any seizure activity including if an aura is experienced  - Instruct patient/family to call for assistance with activity based on nursing assessment  - Administer anti-seizure medications if ordered    Outcome: Progressing  Goal: Achieves maximal functionality and self care  Description: INTERVENTIONS  - Monitor swallowing and airway patency with patient fatigue and changes in neurological status  - Encourage and assist patient to increase activity and self care     - Encourage visually impaired, hearing impaired and aphasic patients to use assistive/communication devices  Outcome: Progressing     Problem: MUSCULOSKELETAL - ADULT  Goal: Maintain or return mobility to safest level of function  Description: INTERVENTIONS:  - Assess patient's ability to carry out ADLs; assess patient's baseline for ADL function and identify physical deficits which impact ability to perform ADLs (bathing, care of mouth/teeth, toileting, grooming, dressing, etc )  - Assess/evaluate cause of self-care deficits   - Assess range of motion  - Assess patient's mobility  - Assess patient's need for assistive devices and provide as appropriate  - Encourage maximum independence but intervene and supervise when necessary  - Involve family in performance of ADLs  - Assess for home care needs following discharge   - Consider OT consult to assist with ADL evaluation and planning for discharge  - Provide patient education as appropriate  Outcome: Progressing  Goal: Maintain proper alignment of affected body part  Description: INTERVENTIONS:  - Support, maintain and protect limb and body alignment  - Provide patient/ family with appropriate education  Outcome: Progressing     Problem: PAIN - ADULT  Goal: Verbalizes/displays adequate comfort level or baseline comfort level  Description: Interventions:  - Encourage patient to monitor pain and request assistance  - Assess pain using appropriate pain scale  - Administer analgesics based on type and severity of pain and evaluate response  - Implement non-pharmacological measures as appropriate and evaluate response  - Consider cultural and social influences on pain and pain management  - Notify physician/advanced practitioner if interventions unsuccessful or patient reports new pain  Outcome: Progressing     Problem: SAFETY ADULT  Goal: Maintain or return to baseline ADL function  Description: INTERVENTIONS:  -  Assess patient's ability to carry out ADLs; assess patient's baseline for ADL function and identify physical deficits which impact ability to perform ADLs (bathing, care of mouth/teeth, toileting, grooming, dressing, etc )  - Assess/evaluate cause of self-care deficits   - Assess range of motion  - Assess patient's mobility; develop plan if impaired  - Assess patient's need for assistive devices and provide as appropriate  - Encourage maximum independence but intervene and supervise when necessary  - Involve family in performance of ADLs  - Assess for home care needs following discharge   - Consider OT consult to assist with ADL evaluation and planning for discharge  - Provide patient education as appropriate  Outcome: Progressing  Goal: Maintains/Returns to pre admission functional level  Description: INTERVENTIONS:  - Perform BMAT or MOVE assessment daily    - Set and communicate daily mobility goal to care team and patient/family/caregiver  - Collaborate with rehabilitation services on mobility goals if consulted  - Perform Range of Motion 3 times a day    - Dangle patient 3 times a day  - Stand patient 3 times a day  - Ambulate patient 3 times a day  - Out of bed to chair 3 times a day   - Out of bed for meals 3 times a day  - Out of bed for toileting  - Record patient progress and toleration of activity level   Outcome: Progressing  Goal: Patient will remain free of falls  Description: INTERVENTIONS:  - Educate patient/family on patient safety including physical limitations  - Instruct patient to call for assistance with activity   - Consult OT/PT to assist with strengthening/mobility   - Keep Call bell within reach  - Keep bed low and locked with side rails adjusted as appropriate  - Keep care items and personal belongings within reach  - Initiate and maintain comfort rounds  - Make Fall Risk Sign visible to staff  - Offer Toileting every 2 Hours, in advance of need  - Initiate/Maintain bed alarm  - Obtain necessary fall risk management equipment: fall socks; standby assist while on EMU   - Apply yellow socks and bracelet for high fall risk patients  Outcome: Progressing     Problem: DISCHARGE PLANNING  Goal: Discharge to home or other facility with appropriate resources  Description: INTERVENTIONS:  - Identify barriers to discharge w/patient and caregiver  - Arrange for needed discharge resources and transportation as appropriate  - Identify discharge learning needs (meds, wound care, etc )  - Arrange for interpretive service to assist at discharge as needed  - Refer to Case Management Department for coordinating discharge planning if the patient needs post-hospital services based on physician/advanced practitioner order or complex needs related to functional status, cognitive ability, or social support system  Outcome: Progressing     Problem: Knowledge Deficit  Goal: Patient/family/caregiver demonstrates understanding of disease process, treatment plan, medications, and discharge instructions  Description: Complete learning assessment and assess knowledge base    Interventions:  - Provide teaching at level of understanding  - Provide teaching via preferred learning methods  Outcome: Progressing

## 2022-06-19 PROCEDURE — 99239 HOSP IP/OBS DSCHRG MGMT >30: CPT | Performed by: STUDENT IN AN ORGANIZED HEALTH CARE EDUCATION/TRAINING PROGRAM

## 2022-06-19 PROCEDURE — 95720 EEG PHY/QHP EA INCR W/VEEG: CPT | Performed by: STUDENT IN AN ORGANIZED HEALTH CARE EDUCATION/TRAINING PROGRAM

## 2022-06-19 RX ADMIN — ENOXAPARIN SODIUM 40 MG: 40 INJECTION SUBCUTANEOUS at 09:21

## 2022-06-19 RX ADMIN — LUBIPROSTONE 24 MCG: 24 CAPSULE, GELATIN COATED ORAL at 09:21

## 2022-06-19 RX ADMIN — FLUTICASONE PROPIONATE 1 SPRAY: 50 SPRAY, METERED NASAL at 09:21

## 2022-06-19 NOTE — DISCHARGE INSTRUCTIONS
Theresa MEJIAS  During your admission to the Epilepsy monitoring unit, we were not able to catch any of your typical clinical events  Following your admission to the epilepsy monitoring unit, your medications were not changed  -------------------------------------------------------------------------------------------------------------------  FOLLOW UP  You will be following up with your Neurologist at [address] as scheduled   If you have any problems with your medications, please call (available during the day Monday through Friday):  [phone]    -------------------------------------------------------------------------------------------------------------------

## 2022-06-19 NOTE — DISCHARGE SUMMARY
Epilepsy Monitoring Unit Discharge Summary    Patient Name: Curtis Jonas   YOB: 1975   MRN: [id number]   Date of Admission: 2022   Date of Discharge:  22  Referring Physician: Dr Jorge García  Attending on Admission: Yue Stephen MD  Attending on Discharge: Yue Stephen MD     Discharge Diagnoses:  Spells NOS    Procedures:  1  Continuous Video EEG  2  Telemetry    Disposition:  Discharge to Home    Admission Medications:  Prior to Admission Medications   Prescriptions Last Dose Informant Patient Reported? Taking?    B-D TB SYRINGE 1CC/27GX1/2" 27G X 1/2" 1 ML MISC   Yes No   Sig: USE THREE WEEKLY FOR ALLERGY INJECTIONS   EPINEPHrine (EPIPEN) 0 3 mg/0 3 mL SOAJ   No No   Sig: Inject 0 3 mL (0 3 mg total) into a muscle once for 1 dose   Patient taking differently: Inject 0 3 mg into a muscle as needed   Insulin Syringe-Needle U-100 27 5G X 5/8" 2 ML MISC   Yes No   Sig: 3 syringes weekly for allergy injections   LINZESS 145 MCG CAPS Past Month at Unknown time  Yes Yes   Si (one) Capsule daily on an empty stomach, at least 30 mn before first meal   NON FORMULARY   Yes Yes   Sig: Inject 0 5 mL under the skin once a week Administer 0 5 ml Subq of Serum #1 (Cat, Dog, Cockroach (CDCR)) weekly    Administer 0 5 ml Subq of Serum #2 (Tree, Tree, Weed) (TTW)) weekly   abatacept (Orencia) 250 mg Past Month at Unknown time  Yes Yes   Sig: Infuse 1,000 mg into a venous catheter every 28 days   fluticasone (FLONASE) 50 mcg/act nasal spray   Yes No   Si spray into each nostril 2 (two) times a day   levETIRAcetam (KEPPRA) 250 mg tablet 2022 at Unknown time  No Yes   Sig: Take 1 tablet (250 mg total) by mouth daily   tiZANidine (ZANAFLEX) 2 mg tablet Past Month at Unknown time  No Yes   Sig: Take 2 tablets (4 mg total) by mouth 4 (four) times a day as needed for muscle spasms   Patient taking differently: Take 4 mg by mouth as needed for muscle spasms   venlafaxine (EFFEXOR) 37 5 mg tablet 6/12/2022 at Unknown time  No Yes   Sig: Take 1 tablet (37 5 mg total) by mouth daily      Facility-Administered Medications: None       Discharge Medications:  See after visit summary for reconciled discharge medications provided to patient and family  Follow-up Appointments/Referrals:  1  Follow-up with [address] [place] [address] [facility] as scheduled with Dr Judy Martel    Recommended follow-up testing:    Diet: Regular  Activity: As tolerated  Restrictions: None    Indication for Admission:  Gatito Watts is a 42 y o  right handed male with episodes concerning for seizures who is admitted to the Epilepsy Monitoring Unit for evaluation of episodes concerning for seizures  He also has a PMH of herniated discs, chronic pain (s/p spinal stimulator, morphine pump), STEPHANIE on CPAP, cataplexy, hypersomnia, psoriatic arthritis, ankylosing spondylitis, DDD, obesity, neuropathy and hyperlipidemia  He was seen for initial consult by Dr Judy Martel on 4/21/22 who recommended EMU admission for spell characterization due to episodes concerning for seizures of unclear etiology  Hospital Course: The patient was admitted to the EMU and monitored with continuous video EEG  Keppra was weaned upon admission  He was sleep deprived two nights during his stay  A single mild event captured 3 days ago after photic stimulation without EEG changes  Despite continued monitoring we have been unable to elicit any more events  We were not able to have him play his video games which are a provoking measure due to not being allowed to connect it to the TV  The patient was informed of the normal EEG findings and that there wasn't any changes with his mild event  The etiology of his daily events of usual head rush feeling is likely non-epileptic in nature   He endorsed having them several times a day prior to admission but didn't have a single event while in the hospital     Regardless, he was restarted on his low dose of Keppra the night prior to discharge  He will follow up with Dr Darrius Dickerson as scheduled  He was interested in getting an ambulatory EEG performed so that he was play videogames at home and provoke his typical events  All questions were answered prior to discharged  He left the hospital hemodynamically stable and in acute distress  Activation procedures:  1  Medication tapering  2  Sleep deprivation  3  Photic stimulation  4  Hyperventilation    Physical Exam   /79 (BP Location: Left arm)   Pulse 65   Temp 98 2 °F (36 8 °C) (Oral)   Resp 16   SpO2 97%   Heart:  RRR, no murmer  Lungs:  CTAB     Neurologic Exam  Mental Status:  A & 0 x 3  Language: fluent, comprehension intact  Cranial Nerves:  PERRL  EOMI no nystagums  Face symmetric  Tongue/palate midline  Motor:  No drift  Normal bulk and tone  Strength 5/5  DTRs:  2+ throughout  Coordination: Finger to nose intact    EMU Conclusion  Summary of interictal findings: Normal     Summary of event findings: One mild event of nausea and odd head sensation with no EEG correlate    Seizure Classification: N/A    Epilepsy Classification: N/A    EMU findings and discussion:  See Final EEG report note for for EEG report summary    MD Pedro Luis Álvarez    The total amount of time spent with the patient was 32 minutes  More than 50% of this time was devoted to counseling and coordination of care   Issues addressed included overall management, diagnostic work up, and communication with patient/primary team

## 2022-06-19 NOTE — PLAN OF CARE
Problem: MOBILITY - ADULT  Goal: Maintain or return to baseline ADL function  Description: INTERVENTIONS:  -  Assess patient's ability to carry out ADLs; assess patient's baseline for ADL function and identify physical deficits which impact ability to perform ADLs (bathing, care of mouth/teeth, toileting, grooming, dressing, etc )  - Assess/evaluate cause of self-care deficits   - Assess range of motion  - Assess patient's mobility; develop plan if impaired  - Assess patient's need for assistive devices and provide as appropriate  - Encourage maximum independence but intervene and supervise when necessary  - Involve family in performance of ADLs  - Assess for home care needs following discharge   - Consider OT consult to assist with ADL evaluation and planning for discharge  - Provide patient education as appropriate  Outcome: Adequate for Discharge  Goal: Maintains/Returns to pre admission functional level  Description: INTERVENTIONS:  - Perform BMAT or MOVE assessment daily    - Set and communicate daily mobility goal to care team and patient/family/caregiver  - Collaborate with rehabilitation services on mobility goals if consulted  - Perform Range of Motion 3 times a day    - Dangle patient 3 times a day  - Stand patient 3 times a day  - Ambulate patient 3 times a day  - Out of bed to chair 3 times a day   - Out of bed for meals 3 times a day  - Out of bed for toileting  - Record patient progress and toleration of activity level   Outcome: Adequate for Discharge     Problem: Potential for Falls  Goal: Patient will remain free of falls  Description: INTERVENTIONS:  - Educate patient/family on patient safety including physical limitations  - Instruct patient to call for assistance with activity   - Consult OT/PT to assist with strengthening/mobility   - Keep Call bell within reach  - Keep bed low and locked with side rails adjusted as appropriate  - Keep care items and personal belongings within reach  - Initiate and maintain comfort rounds  - Make Fall Risk Sign visible to staff  - Offer Toileting every 2 Hours, in advance of need  - Initiate/Maintain bed alarm  - Obtain necessary fall risk management equipment: fall socks; standby assist while on EMU   - Apply yellow socks and bracelet for high fall risk patients  Outcome: Adequate for Discharge     Problem: NEUROSENSORY - ADULT  Goal: Achieves stable or improved neurological status  Description: INTERVENTIONS  - Monitor and report changes in neurological status  - Monitor vital signs such as temperature, blood pressure, glucose, and any other labs ordered   - Monitor for seizure activity and implement precautions if appropriate      Outcome: Adequate for Discharge  Goal: Remains free of injury related to seizures activity  Description: INTERVENTIONS  - Maintain airway, patient safety  and administer oxygen as ordered  - Monitor patient for seizure activity, document and report duration and description of seizure to physician/advanced practitioner  - If seizure occurs,  ensure patient safety during seizure  - Reorient patient post seizure  - Seizure pads on all 4 side rails  - Instruct patient/family to notify RN of any seizure activity including if an aura is experienced  - Instruct patient/family to call for assistance with activity based on nursing assessment  - Administer anti-seizure medications if ordered    Outcome: Adequate for Discharge  Goal: Achieves maximal functionality and self care  Description: INTERVENTIONS  - Monitor swallowing and airway patency with patient fatigue and changes in neurological status  - Encourage and assist patient to increase activity and self care     - Encourage visually impaired, hearing impaired and aphasic patients to use assistive/communication devices  Outcome: Adequate for Discharge     Problem: MUSCULOSKELETAL - ADULT  Goal: Maintain or return mobility to safest level of function  Description: INTERVENTIONS:  - Assess patient's ability to carry out ADLs; assess patient's baseline for ADL function and identify physical deficits which impact ability to perform ADLs (bathing, care of mouth/teeth, toileting, grooming, dressing, etc )  - Assess/evaluate cause of self-care deficits   - Assess range of motion  - Assess patient's mobility  - Assess patient's need for assistive devices and provide as appropriate  - Encourage maximum independence but intervene and supervise when necessary  - Involve family in performance of ADLs  - Assess for home care needs following discharge   - Consider OT consult to assist with ADL evaluation and planning for discharge  - Provide patient education as appropriate  Outcome: Adequate for Discharge  Goal: Maintain proper alignment of affected body part  Description: INTERVENTIONS:  - Support, maintain and protect limb and body alignment  - Provide patient/ family with appropriate education  Outcome: Adequate for Discharge     Problem: PAIN - ADULT  Goal: Verbalizes/displays adequate comfort level or baseline comfort level  Description: Interventions:  - Encourage patient to monitor pain and request assistance  - Assess pain using appropriate pain scale  - Administer analgesics based on type and severity of pain and evaluate response  - Implement non-pharmacological measures as appropriate and evaluate response  - Consider cultural and social influences on pain and pain management  - Notify physician/advanced practitioner if interventions unsuccessful or patient reports new pain  Outcome: Adequate for Discharge     Problem: SAFETY ADULT  Goal: Maintain or return to baseline ADL function  Description: INTERVENTIONS:  -  Assess patient's ability to carry out ADLs; assess patient's baseline for ADL function and identify physical deficits which impact ability to perform ADLs (bathing, care of mouth/teeth, toileting, grooming, dressing, etc )  - Assess/evaluate cause of self-care deficits   - Assess range of motion  - Assess patient's mobility; develop plan if impaired  - Assess patient's need for assistive devices and provide as appropriate  - Encourage maximum independence but intervene and supervise when necessary  - Involve family in performance of ADLs  - Assess for home care needs following discharge   - Consider OT consult to assist with ADL evaluation and planning for discharge  - Provide patient education as appropriate  Outcome: Adequate for Discharge  Goal: Maintains/Returns to pre admission functional level  Description: INTERVENTIONS:  - Perform BMAT or MOVE assessment daily    - Set and communicate daily mobility goal to care team and patient/family/caregiver  - Collaborate with rehabilitation services on mobility goals if consulted  - Perform Range of Motion 3 times a day    - Dangle patient 3 times a day  - Stand patient 3 times a day  - Ambulate patient 3 times a day  - Out of bed to chair 3 times a day   - Out of bed for meals 3 times a day  - Out of bed for toileting  - Record patient progress and toleration of activity level   Outcome: Adequate for Discharge  Goal: Patient will remain free of falls  Description: INTERVENTIONS:  - Educate patient/family on patient safety including physical limitations  - Instruct patient to call for assistance with activity   - Consult OT/PT to assist with strengthening/mobility   - Keep Call bell within reach  - Keep bed low and locked with side rails adjusted as appropriate  - Keep care items and personal belongings within reach  - Initiate and maintain comfort rounds  - Make Fall Risk Sign visible to staff  - Offer Toileting every 2 Hours, in advance of need  - Initiate/Maintain bed alarm  - Obtain necessary fall risk management equipment: fall socks; standby assist while on EMU   - Apply yellow socks and bracelet for high fall risk patients  Outcome: Adequate for Discharge     Problem: DISCHARGE PLANNING  Goal: Discharge to home or other facility with appropriate resources  Description: INTERVENTIONS:  - Identify barriers to discharge w/patient and caregiver  - Arrange for needed discharge resources and transportation as appropriate  - Identify discharge learning needs (meds, wound care, etc )  - Arrange for interpretive services to assist at discharge as needed  - Refer to Case Management Department for coordinating discharge planning if the patient needs post-hospital services based on physician/advanced practitioner order or complex needs related to functional status, cognitive ability, or social support system  Outcome: Adequate for Discharge     Problem: Knowledge Deficit  Goal: Patient/family/caregiver demonstrates understanding of disease process, treatment plan, medications, and discharge instructions  Description: Complete learning assessment and assess knowledge base    Interventions:  - Provide teaching at level of understanding  - Provide teaching via preferred learning methods  Outcome: Adequate for Discharge

## 2022-06-21 ENCOUNTER — PATIENT OUTREACH (OUTPATIENT)
Dept: CASE MANAGEMENT | Facility: OTHER | Age: 47
End: 2022-06-21

## 2022-06-21 NOTE — PROGRESS NOTES
Outreach TC to patient for CM assessment  No answer to call  Left message on voicemail requesting a return call from patient to Obdulia Esquivel 12, BSN; Outpatient Care Manager @ [phone]  First unanswered call to patient  Chart review reveals that patient was admitted to Epilepsy monitoring Unit secondary to seizure-like activity  Patient was weaned of his Keppra and was also sleep deprived x 2 days  Team was unable to elicit a response similar to what the patient experiences at home  Patient normally has his spells during his video game playing  Team was unable to hook up the viseo game system to the hospital TV  Patient was started back again on low dose Keppra and was discharged to home  This RN care manager will continue to try and Danyelle Rummage the patient or assessment

## 2022-07-07 ENCOUNTER — TELEPHONE (OUTPATIENT)
Dept: SLEEP CENTER | Facility: CLINIC | Age: 47
End: 2022-07-07

## 2022-07-07 NOTE — TELEPHONE ENCOUNTER
Patient left voice message asking for refill of venlafaxine  Last script written 6/7/22 for 30-day supply with 5 refills  Left voice message for pateint and advised of above  Advised to call [facility] DR ONEIL BAEZA to request refill

## 2022-07-20 ENCOUNTER — HOSPITAL ENCOUNTER (OUTPATIENT)
Dept: CT IMAGING | Facility: HOSPITAL | Age: 47
Discharge: HOME | End: 2022-07-20
Payer: MEDICARE

## 2022-07-20 ENCOUNTER — HOSPITAL ENCOUNTER (OUTPATIENT)
Dept: RADIOLOGY | Facility: HOSPITAL | Age: 47
Discharge: HOME | End: 2022-07-20
Payer: MEDICARE

## 2022-07-20 DIAGNOSIS — M54.16 RADICULOPATHY, LUMBAR REGION: ICD-10-CM

## 2022-07-20 DIAGNOSIS — M47.817 SPONDYLOSIS WITHOUT MYELOPATHY OR RADICULOPATHY, LUMBOSACRAL REGION: ICD-10-CM

## 2022-07-20 DIAGNOSIS — M47.817 LUMBOSACRAL SPONDYLOSIS WITHOUT MYELOPATHY: ICD-10-CM

## 2022-07-20 DIAGNOSIS — M54.16 LUMBAR RADICULOPATHY: ICD-10-CM

## 2022-07-20 PROCEDURE — G1004 CDSM NDSC: HCPCS

## 2022-07-20 PROCEDURE — 72131 CT LUMBAR SPINE W/O DYE: CPT

## 2022-07-20 PROCEDURE — 72100 X-RAY EXAM L-S SPINE 2/3 VWS: CPT

## 2022-08-18 ENCOUNTER — PATIENT OUTREACH (OUTPATIENT)
Dept: CASE MANAGEMENT | Facility: OTHER | Age: 47
End: 2022-08-18

## 2022-08-18 NOTE — PROGRESS NOTES
Chart review reveals that patient did complete a CT scan of his lumbar spine on 7/20/22  The results revealed L4-5 grade 1 spondylolisthesis, severe R and moderate to severe L foraminal narrowing  [id number] bulge with osteophytes and moderate L and mild R foraminal narrowing  X-ray revealed degenerative changes at the same locations  Patient is scheduled for an appointment with neurology on 9/1/22  Chart review will continue until end of episode

## 2022-08-22 ENCOUNTER — TELEPHONE (OUTPATIENT)
Dept: NEUROLOGY | Facility: CLINIC | Age: 47
End: 2022-08-22

## 2022-08-22 NOTE — TELEPHONE ENCOUNTER
I called and left a voicemail message about patients upcoming appointment with Dr Arnett on 9/1/22 @ 2:30 pm  I requested a call back to our office to confirm the appointment or if the patient has any issues or concerns or cannot keep this appointment
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
## 2022-09-01 ENCOUNTER — OFFICE VISIT (OUTPATIENT)
Dept: NEUROLOGY | Facility: CLINIC | Age: 47
End: 2022-09-01
Payer: MEDICARE

## 2022-09-01 VITALS
HEART RATE: 67 BPM | RESPIRATION RATE: 18 BRPM | SYSTOLIC BLOOD PRESSURE: 126 MMHG | BODY MASS INDEX: 34.8 KG/M2 | WEIGHT: 248.6 LBS | TEMPERATURE: 98.2 F | HEIGHT: 71 IN | OXYGEN SATURATION: 98 % | DIASTOLIC BLOOD PRESSURE: 86 MMHG

## 2022-09-01 DIAGNOSIS — R56.9 SEIZURE-LIKE ACTIVITY (HCC): Primary | ICD-10-CM

## 2022-09-01 DIAGNOSIS — G40.209 PARTIAL SYMPTOMATIC EPILEPSY WITH COMPLEX PARTIAL SEIZURES, NOT INTRACTABLE, WITHOUT STATUS EPILEPTICUS (HCC): ICD-10-CM

## 2022-09-01 PROCEDURE — 99214 OFFICE O/P EST MOD 30 MIN: CPT | Performed by: PSYCHIATRY & NEUROLOGY

## 2022-09-01 RX ORDER — LEVETIRACETAM 250 MG/1
250 TABLET ORAL DAILY
Qty: 30 TABLET | Refills: 5 | Status: SHIPPED | OUTPATIENT
Start: 2022-09-01

## 2022-09-01 NOTE — PROGRESS NOTES
Review of Systems   Constitutional: Positive for fatigue (every other day)  Negative for appetite change and fever  HENT: Negative  Negative for hearing loss, tinnitus, trouble swallowing and voice change  Eyes: Negative  Negative for photophobia and pain  Respiratory: Negative  Negative for shortness of breath  Cardiovascular: Negative  Negative for palpitations  Gastrointestinal: Negative  Negative for nausea and vomiting  Endocrine: Negative  Negative for cold intolerance  Genitourinary: Negative  Negative for dysuria, frequency and urgency  Musculoskeletal: Positive for back pain, gait problem (gait dysturbance - balance issues), neck pain and neck stiffness  Negative for myalgias  Skin: Negative  Negative for rash  Allergic/Immunologic: Negative  Neurological: Positive for numbness (arms and legs at times)  Negative for dizziness, tremors, seizures, syncope, facial asymmetry, speech difficulty, weakness, light-headedness and headaches  Hematological: Negative  Does not bruise/bleed easily  Psychiatric/Behavioral: Positive for sleep disturbance (due to pain)  Negative for confusion and hallucinations

## 2022-09-01 NOTE — PATIENT INSTRUCTIONS
Plan:   Recurrent seizure-like episodes of abnormal head rush sensation, nausea, with impaired speech, but no altered awareness, or convulsion  These do not seem to be epileptic seizures as your extended inpatient video EEG monitoring study is essentially normal; however, the typical event was not captured  - 72 hours ambulatory EEG  - continue with Levetiracetam 250mg daily; but stop this medication two days before starting ambulatory EEG    - try to do as much as you can to trigger your typical events (flashing lights, movies with intense flashing sequences)    Continue with follow-up with sleep medicine regarding your Narcolepsy and obstructive sleep apnea    Follow-up in 4 months

## 2022-09-19 ENCOUNTER — PATIENT OUTREACH (OUTPATIENT)
Dept: CASE MANAGEMENT | Facility: OTHER | Age: 47
End: 2022-09-19

## 2022-09-19 NOTE — PROGRESS NOTES
Chart review reveals that patient did complete an office visit with neurology on 9/1/22  No changes were made to medications  Patient was ordered a 72 hours ambulatory EEG study  Patient was instructed to continue his Keppra  The ambulatory EEG is scheduled for 10/17/22-10/20/22  BPCI episode end  The episode has been resolved  I removed myself from the care team and the care coordination note has been updated

## 2022-10-17 ENCOUNTER — HOSPITAL ENCOUNTER (OUTPATIENT)
Dept: NEUROLOGY | Facility: CLINIC | Age: 47
Discharge: HOME | End: 2022-10-17

## 2022-10-17 DIAGNOSIS — R56.9 SEIZURE-LIKE ACTIVITY (HCC): ICD-10-CM

## 2022-10-18 ENCOUNTER — HOSPITAL ENCOUNTER (OUTPATIENT)
Dept: NEUROLOGY | Facility: CLINIC | Age: 47
Discharge: HOME | End: 2022-10-18
Payer: MEDICARE

## 2022-10-18 DIAGNOSIS — R56.9 SEIZURE-LIKE ACTIVITY (HCC): ICD-10-CM

## 2022-10-18 PROCEDURE — 95708 EEG WO VID EA 12-26HR UNMNTR: CPT

## 2022-10-18 PROCEDURE — 95719 EEG PHYS/QHP EA INCR W/O VID: CPT | Performed by: PSYCHIATRY & NEUROLOGY

## 2022-10-19 ENCOUNTER — HOSPITAL ENCOUNTER (OUTPATIENT)
Dept: NEUROLOGY | Facility: CLINIC | Age: 47
Discharge: HOME | End: 2022-10-19
Payer: MEDICARE

## 2022-10-19 DIAGNOSIS — R56.9 SEIZURE-LIKE ACTIVITY (HCC): ICD-10-CM

## 2022-10-19 PROCEDURE — 95708 EEG WO VID EA 12-26HR UNMNTR: CPT

## 2022-10-19 PROCEDURE — 95719 EEG PHYS/QHP EA INCR W/O VID: CPT | Performed by: PSYCHIATRY & NEUROLOGY

## 2022-10-20 ENCOUNTER — HOSPITAL ENCOUNTER (OUTPATIENT)
Dept: NEUROLOGY | Facility: CLINIC | Age: 47
End: 2022-10-20
Payer: MEDICARE

## 2022-10-20 ENCOUNTER — TELEPHONE (OUTPATIENT)
Dept: NEUROLOGY | Facility: CLINIC | Age: 47
End: 2022-10-20

## 2022-10-20 DIAGNOSIS — R56.9 SEIZURE-LIKE ACTIVITY (HCC): ICD-10-CM

## 2022-10-20 PROCEDURE — 95708 EEG WO VID EA 12-26HR UNMNTR: CPT

## 2022-10-20 PROCEDURE — 95719 EEG PHYS/QHP EA INCR W/O VID: CPT | Performed by: PSYCHIATRY & NEUROLOGY

## 2022-10-20 NOTE — TELEPHONE ENCOUNTER
Patient aware of results via mychart  Patient response to your question below: Christopher Francis  to Jeevan Hughes MD      10/20/22 3:15 PM  Not a question but answer to yours   No, I did not have any speech impairment issues during this test   The only things that happened were what I noted during test

## 2022-10-20 NOTE — TELEPHONE ENCOUNTER
----- Message from Rome Ramsey MD sent at 10/20/2022  1:35 PM EDT -----  3 days of ambulatory EEG study is normal   No evidence of epileptiform discharges or seizure activity  There was an episode of nausea and vomiting triggered by bedroom light on the first day  There was an episode of nausea while playing video games on the second day  These had no seizure correlation  Did these event have a feeling of the abnormal head rush sensation or impaired speech? Based on symptoms and EMU and 72 hours ambulatory EEG study; I doubt that the events of head rush feeling are seizures themselves

## 2022-10-21 NOTE — TELEPHONE ENCOUNTER
Options are to:  1 - continue with Levetiracetam 250mg daily as started by Dr Terry Arroyo  2 - discontinue Levetiracetam and monitor for recurrence of spells

## 2022-12-05 ENCOUNTER — OFFICE VISIT (OUTPATIENT)
Dept: SLEEP CENTER | Facility: CLINIC | Age: 47
End: 2022-12-05

## 2022-12-05 VITALS
HEART RATE: 103 BPM | HEIGHT: 71 IN | WEIGHT: 245 LBS | DIASTOLIC BLOOD PRESSURE: 90 MMHG | SYSTOLIC BLOOD PRESSURE: 144 MMHG | BODY MASS INDEX: 34.3 KG/M2

## 2022-12-05 DIAGNOSIS — G47.33 OBSTRUCTIVE SLEEP APNEA TREATED WITH CONTINUOUS POSITIVE AIRWAY PRESSURE (CPAP): Primary | ICD-10-CM

## 2022-12-05 DIAGNOSIS — G47.411 CATAPLEXY: ICD-10-CM

## 2022-12-05 DIAGNOSIS — Z99.89 OBSTRUCTIVE SLEEP APNEA TREATED WITH CONTINUOUS POSITIVE AIRWAY PRESSURE (CPAP): Primary | ICD-10-CM

## 2022-12-05 RX ORDER — VENLAFAXINE 37.5 MG/1
37.5 TABLET ORAL DAILY
Qty: 30 TABLET | Refills: 5 | Status: SHIPPED | OUTPATIENT
Start: 2022-12-05

## 2022-12-05 NOTE — PROGRESS NOTES
Assessment/Plan:      1  Obstructive sleep apnea treated with continuous positive airway pressure (CPAP)  -     PAP DME Resupply/Reorder    2  Cataplexy  -     venlafaxine (EFFEXOR) 37 5 mg tablet; Take 1 tablet (37 5 mg total) by mouth daily     Overall, Mr Damien Sims is doing very well, he is consistently using his CPAP machine and is treatment is beneficial and effective  AHI is normal with treatment  No change is needed in his current settings  I will reorder supplies today  As noted previously, he has had evaluation for narcolepsy but has not had a positive MSLT  That said, he describes some symptoms of cataplexy which have responded well to venlafaxine  I therefore would recommend continuing this medication and renewed today  He does not experience any side effects  Subjective:      Patient ID: Héctor Barcenas is a 42 y o  male  Mr Damien Sims returns in follow-up, he has a history of obstructive sleep apnea (mild, respiratory event index 6 7 at diagnosis in March 2021) treated with CPAP  We reviewed his history in great detail at his previous visit documented in the June 2022 note  We discussed it is possible he had cataplexy, I renewed venlafaxine at the last visit  - he feels venlafaxine has been help  It occurs 1-2 times a week, arms feel tired  Provoked    Had an epilepsy monitoring unit admission in June 2022-normal EEG findings were seen  He subsequently had an ambulatory EEG in October 2022-this too was a normal study  He is no longer taking levetiracetam     CPAP data reviewed today  Dream station 2 auto CPAP set at 8 cm H2O  Average session 8 hours 30 minutes  Machine used 30/30 nights  AHI 3  Mask- uses nasal pillows     He has been going to bed 10 pm, Falls asleep in 30-60 min  Wakes 1-2 times a night on average, 10+ with change in barometric pressure    Wakes 8-9 AM     [place] 8         [place] Sleepiness Scale:     Sitting and reading: Slight chance of dozing  Watching TV: Slight chance of dozing  Sitting, inactive in a public place (e g  a theatre or a meeting): Slight chance of dozing  As a passenger in a car for an hour without a break: Slight chance of dozing  Lying down to rest in the afternoon when circumstances permit: Moderate chance of dozing  Sitting and talking to someone: Slight chance of dozing  Sitting quietly after a lunch without alcohol: Slight chance of dozing  In a car, while stopped for a few minutes in traffic: Would never doze  Total score: 8     The following portions of the patient's history were reviewed and updated as appropriate: allergies, current medications, past family history, past medical history, past social history, past surgical history, and problem list     Review of Systems    Genitourinary none   Cardiology ankle/leg swelling   Gastrointestinal none   Neurology need to move extremities, muscle weakness and numbness/tingling of an extremity   Constitutional fatigue   Integumentary rash or dry skin   Psychiatry none   Musculoskeletal joint pain, muscle aches and back pain   Pulmonary none   ENT none   Endocrine none   Hematological none       Objective:        /90 (BP Location: Left arm, Patient Position: Sitting, Cuff Size: Large)   Pulse 103   Ht 5' 11" (1 803 m)   Wt 111 kg (245 lb)   BMI 34 17 kg/m²     Physical Exam   RRR  Lungs CTA b/l  No edema

## 2022-12-05 NOTE — PROGRESS NOTES
Review of Systems      Genitourinary none   Cardiology ankle/leg swelling   Gastrointestinal none   Neurology need to move extremities, muscle weakness and numbness/tingling of an extremity   Constitutional fatigue   Integumentary rash or dry skin   Psychiatry none   Musculoskeletal joint pain, muscle aches and back pain   Pulmonary none   ENT none   Endocrine none   Hematological none

## 2022-12-06 ENCOUNTER — TELEPHONE (OUTPATIENT)
Dept: SLEEP CENTER | Facility: CLINIC | Age: 47
End: 2022-12-06

## 2022-12-07 LAB

## 2022-12-22 ENCOUNTER — TELEPHONE (OUTPATIENT)
Dept: NEUROLOGY | Facility: CLINIC | Age: 47
End: 2022-12-22

## 2022-12-22 NOTE — TELEPHONE ENCOUNTER
I called and left a voicemail message about patients upcoming appointment with Dr Arnett on 1/5/23 @ 1:30 pm  I requested a call back to our office to confirm the appointment or if the patient has any issues or concerns or cannot keep this appointment   I sent out an appt card with the doctors name , date, time and location of appt

## 2023-01-03 NOTE — PROGRESS NOTES
Next appt 1/11/2023   Last appt 3-11-22    Refill Requested / Last Refill Info:  losartan-hydrochlorothiazide (HYZAAR) 100-25 MG per tablet 90 tablet 0 11/25/2022     Sig - Route: Take 1 tablet by mouth daily. - Oral      TEMazepam (RESTORIL) 15 MG capsule 28 capsule 0 11/30/2022     Sig: TAKE 1 CAPSULE EVERY NIGHT AS NEEDED FOR SLEEP      Refill unable to be completed per standing System's protocol due to: Non-Protocol Medication and  Unable to refill due to: Please see reason under each med    Orders pended, and routed to provider for approval.   Please route any notes back to your nursing pool via patient call NOT Rx Auth.  Thank you, Refill Center Staff     Progress Note - [address] 41 y o  male   :1975, MRN: [id number]  2019          Follow Up Evaluation / Problem:     Obstructive Sleep Apnea  Narcolepsy with Cataplexy  Obesity  Episodic episodes of altered consciousness  History of psoriatic arthritis  History of ankylosing spondylitis  Episodes of altered behavior with uncontrolled rage   History of multiple head trauma with concussion       Thank you for the opportunity of participating in the evaluation and care of this patient in the Sleep Clinic at [facility]  HPI: Lucy Saxena is a 41y o  year old male  He has a history of multiple pathologies including Obstructive Sleep Apnea, narcolepsy with cataplexy, partial seizure disorder, psoriatic arthritis, ankylosing spondylitis and multiple head traumas with concussion          Current Outpatient Medications:     amLODIPine (NORVASC) 2 5 mg tablet, , Disp: , Rfl:     cloNIDine (CATAPRES) 0 1 mg tablet, 1 (one) Tablet three times daily, as needed, Disp: , Rfl: 0    fluticasone (VERAMYST) 27 5 MCG/SPRAY nasal spray, into each nostril, Disp: , Rfl:     gabapentin (NEURONTIN) 300 mg capsule, Take by mouth, Disp: , Rfl:     HYDROcodone-acetaminophen (NORCO) 7 5-325 mg per tablet, Take 1 tablet by mouth every 8 (eight) hours as needed for pain, Disp: , Rfl:     levETIRAcetam (KEPPRA) 250 mg tablet, Take 1 tablet (250 mg total) by mouth daily at bedtime, Disp: 30 tablet, Rfl: 3    LINZESS 145 MCG CAPS, 1 (one) Capsule daily on an empty stomach, at least 30 mn before first meal, Disp: , Rfl: 0    oxyCODONE-acetaminophen (PERCOCET)  mg per tablet, Take 1 tablet by mouth 2 (two) times a day, Disp: , Rfl:     tiZANidine (ZANAFLEX) 2 mg tablet, Take 4 mg by mouth 4 (four) times a day as needed for muscle spasms, Disp: , Rfl:     venlafaxine (EFFEXOR-XR) 37 5 mg 24 hr capsule, Take 1 capsule (37 5 mg total) by mouth daily, Disp: 30 capsule, Rfl: 1    oxyCODONE (OXYCONTIN) 20 mg 12 hr tablet, 10 mg OxyCONTIN 20 MG TB12 take 1 Po TID  Refills: 0  Active , Disp: , Rfl:     oxyCODONE (ROXICODONE) 30 MG immediate release tablet, 15 mg 4 (four) times a day OxyCODONE HCl TABS (15 mg) take 1 tab po qid  Refills: 0  Active , Disp: , Rfl:     oxyCODONE (ROXICODONE) 5 mg immediate release tablet, Take 5 mg by mouth 2 (two) times a day as needed, Disp: , Rfl: 0    [place] Sleepiness Scale  Sitting and reading: Moderate chance of dozing  Watching TV: Slight chance of dozing  Sitting, inactive in a public place (e g  a theatre or a meeting): Slight chance of dozing  As a passenger in a car for an hour without a break: Moderate chance of dozing  Lying down to rest in the afternoon when circumstances permit: Moderate chance of dozing  Sitting and talking to someone: Slight chance of dozing  Sitting quietly after a lunch without alcohol: Slight chance of dozing  In a car, while stopped for a few minutes in traffic: Would never doze  Total score: 10              Vitals:    07/12/19 1100   BP: 142/78   Pulse: 80   Weight: 115 kg (254 lb)   Height: 5' 11" (1 803 m)       Body mass index is 35 43 kg/m²  Neck Circumference: 45       EPWORTH SLEEPINESS SCORE  Total score: 10      Past History Since Last Sleep Center Visit:     He reports that he has  from his wife and is moving torch divorce  This has removed a lot of stress from his daily life  He seems more relaxed and happy  He reports fewer episodes of dissociation and periods of uncontrolled rage  He seems to be doing well with nasal CPAP  He recently had a new battery pack in stool for his spinal cord stimulator  He tells me that he will be obtaining a morphine pump within the next several months  We talked about possible complications which might affect Obstructive Sleep Apnea    If the morphine causes significant surgery cortical depression he would likely complicate obstructive apneas with central apneas as well  This might require BiPAP or ASV  He currently planned to repeat his sleep studies after he has healed from implantation of his morphine pump and adjustments to his dose have been completed  The review of systems and following portions of the patient's history were reviewed and updated as appropriate: allergies, current medications, past family history, past medical history, past social history, past surgical history, and problem list     Review of Systems      Genitourinary none   Cardiology ankle/leg swelling   Gastrointestinal none   Neurology muscle weakness, numbness/tingling of an extremity, difficulty with memory and balance problems   Constitutional fatigue   Integumentary none   Psychiatry none   Musculoskeletal joint pain, muscle aches, back pain, legs twitching/jerking, sciatica and leg cramps   Pulmonary none   ENT ringing in ears   Endocrine none   Hematological none       OBJECTIVE    PAP Pressure: Nasal CPAP set to deliver 8 cm of water pressure  DME Provider: [employer]    Physical Exam:     General Appearance:   Alert, cooperative, no distress, appears stated age     Head:   Normocephalic, without obvious abnormality, atraumatic     Eyes:   PERRL, conjunctiva/corneas clear, EOM's intact          Nose:  Nares normal, septum midline, no drainage or sinus tenderness     Neck:  Supple, symmetrical, trachea midline, no adenopathy; Thyroid: No enlargement, tenderness or nodules; no carotid bruit or JVD       Extremities:  Extremities normal, atraumatic, no cyanosis or edema     Pulses:  2+ and symmetric all extremities     Skin:  Skin color, texture, turgor normal, no rashes or lesions       Neurologic:  CNII-XII intact  Normal strength, sensation throughout       ASSESSMENT / PLAN    1  Obstructive sleep apnea on CPAP     2   Partial symptomatic epilepsy with complex partial seizures, not intractable, without status epilepticus ([facility] [address] )     3  Idiopathic hypersomnia     4  Chronic pain syndrome         Using 8 cm of water pressure  2  Counseling / Coordination of Care  Total clinic time spent today 40 minutes  Greater than 50% of total time was spent with the patient and / or family counseling and / or coordination of care  A description of the counseling / coordination of care:     Impressions, Diagnostic results, Prognosis, Instructions for management, Risks and benefits of treatment, Patient and family education, Risk factor reductions and Importance of compliance with treatment    The following instructions have been given to the patient today:    Patient Instructions   1  Continue use of CPAP using 8 cm of water  2  Repeat sleep studies after insertion of the morphine pump  3  Allow time for the pump site to heal and titration of medication  4  Continue levetiracetam and venlafaxine at current levels  5  Provide the patient with cereals which can be used at his disability hearings  6   Return for routine re-evaluation in 6 months         Forest Bustamante
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
## 2023-01-05 ENCOUNTER — OFFICE VISIT (OUTPATIENT)
Dept: NEUROLOGY | Facility: CLINIC | Age: 48
End: 2023-01-05

## 2023-01-05 VITALS
HEART RATE: 73 BPM | HEIGHT: 71 IN | TEMPERATURE: 98.2 F | DIASTOLIC BLOOD PRESSURE: 88 MMHG | OXYGEN SATURATION: 98 % | SYSTOLIC BLOOD PRESSURE: 126 MMHG | RESPIRATION RATE: 18 BRPM | BODY MASS INDEX: 35.08 KG/M2 | WEIGHT: 250.6 LBS

## 2023-01-05 DIAGNOSIS — R29.818 TRANSIENT NEUROLOGICAL SYMPTOMS: Primary | ICD-10-CM

## 2023-01-05 PROBLEM — R56.9 SEIZURE-LIKE ACTIVITY (HCC): Status: RESOLVED | Noted: 2018-06-04 | Resolved: 2023-01-05

## 2023-01-05 RX ORDER — POLYETHYLENE GLYCOL 3350, SODIUM CHLORIDE, POTASSIUM CHLORIDE, SODIUM BICARBONATE, AND SODIUM SULFATE 240; 5.84; 2.98; 6.72; 22.72 G/4L; G/4L; G/4L; G/4L; G/4L
POWDER, FOR SOLUTION ORAL
COMMUNITY
Start: 2022-12-02

## 2023-01-05 RX ORDER — POLYETHYLENE GLYCOL-3350 AND ELECTROLYTES WITH FLAVOR PACK 240; 5.84; 2.98; 6.72; 22.72 G/278.26G; G/278.26G; G/278.26G; G/278.26G; G/278.26G
POWDER, FOR SOLUTION ORAL
COMMUNITY
Start: 2022-12-02

## 2023-01-05 NOTE — PATIENT INSTRUCTIONS
There is no diagnosis of epilepsy or association of head rush feeling triggered by lights as being focal seizures  - would not recommend continuing with any antiseizure medication  - call the office if you have new episodes of confusion, altered sense awareness, impaired consciousness, or convulsion  - would at least recommend MRI brain study to assess if there are structural abnormalities to cause visual symptoms; let us know when you have your spinal cord stimulator removed  - follow-up as needed for new neurological complaints

## 2023-01-05 NOTE — PROGRESS NOTES
Review of Systems   Constitutional: Negative  Negative for appetite change and fever  HENT: Positive for tinnitus (both ears at times)  Negative for hearing loss, trouble swallowing and voice change  Eyes: Negative  Negative for photophobia, pain and visual disturbance  Respiratory: Negative  Negative for shortness of breath  Cardiovascular: Negative  Negative for palpitations  Gastrointestinal: Positive for constipation (at times) and diarrhea (at times)  Negative for nausea and vomiting  Endocrine: Negative  Negative for cold intolerance  Genitourinary: Negative  Negative for dysuria, frequency and urgency  Musculoskeletal: Positive for back pain (low back pain), gait problem (gait dysturbance at times), neck pain and neck stiffness  Negative for myalgias  Skin: Negative  Negative for rash  Allergic/Immunologic: Negative  Neurological: Positive for weakness (whole body at times) and numbness (hands, arms and at times his feet)  Negative for dizziness, tremors, seizures, syncope, facial asymmetry, speech difficulty, light-headedness and headaches  Hematological: Negative  Does not bruise/bleed easily  Psychiatric/Behavioral: Positive for sleep disturbance (due to pain)  Negative for confusion and hallucinations

## 2023-06-15 ENCOUNTER — OFFICE VISIT (OUTPATIENT)
Dept: SLEEP CENTER | Facility: CLINIC | Age: 48
End: 2023-06-15
Payer: MEDICARE

## 2023-06-15 VITALS
DIASTOLIC BLOOD PRESSURE: 86 MMHG | WEIGHT: 212 LBS | HEIGHT: 71 IN | BODY MASS INDEX: 29.68 KG/M2 | HEART RATE: 92 BPM | SYSTOLIC BLOOD PRESSURE: 128 MMHG

## 2023-06-15 DIAGNOSIS — G47.10 HYPERSOMNIA: ICD-10-CM

## 2023-06-15 DIAGNOSIS — Z99.89 OBSTRUCTIVE SLEEP APNEA TREATED WITH CONTINUOUS POSITIVE AIRWAY PRESSURE (CPAP): Primary | ICD-10-CM

## 2023-06-15 DIAGNOSIS — G47.33 OBSTRUCTIVE SLEEP APNEA TREATED WITH CONTINUOUS POSITIVE AIRWAY PRESSURE (CPAP): Primary | ICD-10-CM

## 2023-06-15 PROCEDURE — 99214 OFFICE O/P EST MOD 30 MIN: CPT | Performed by: PSYCHIATRY & NEUROLOGY

## 2023-06-15 RX ORDER — CLOBETASOL PROPIONATE 0.5 MG/G
CREAM TOPICAL 2 TIMES DAILY
COMMUNITY
Start: 2023-02-01

## 2023-06-15 NOTE — LETTER
Date: 6/15/2023    To whom it may concern: This is to certify that Smooth Saunders has been under my care for the following diagnosis:  Sleep apnea, cataplexy, sleepiness         I feel that Smooth Saunders is unable to serve on [address] [employer] at this time for the above mentioned medical reasons                    Sincerely,  Edyta Lambert MD

## 2023-06-16 ENCOUNTER — TELEPHONE (OUTPATIENT)
Dept: SLEEP CENTER | Facility: CLINIC | Age: 48
End: 2023-06-16

## 2023-06-17 DIAGNOSIS — G47.411 CATAPLEXY: ICD-10-CM

## 2023-06-17 RX ORDER — VENLAFAXINE 37.5 MG/1
TABLET ORAL
Qty: 30 TABLET | Refills: 0 | Status: SHIPPED | OUTPATIENT
Start: 2023-06-17

## 2023-06-17 NOTE — PROGRESS NOTES
Progress Note - Sleep Medicine  Dimitris Bal 41 y o  male MRN: [id number]       Impression & Plan:     Dimitris Bal is a 51-year-old male with a past medical history of chronic back pain with spinal cord stimulator and intrathecal morphine pump, ankylosing spondylitis, obstructive sleep apnea, who presents for follow-up of excessive daytime sleepiness  1   Mild obstructive sleep apnea  On fixed pressure of 8  Residual AHI 3 5  Patient is doing well on CPAP  He feels more refreshed and states his symptoms have improved  However he is still feeling tired  Current [place] score of 9    Plan  Continue CPAP at current settings    2  Hyper insomnia  Previous diagnosis of narcolepsy with cataplexy  He reports sleep paralysis, hallucinations, cataplexy  These have decreased in frequency since he started taking venlafaxine  This diagnosis is questionable as his description of symptoms is atypical  He reports hallucinations occur when he is awake, he reports sleep paralysis occurring for up to 3 hours, cataplexy occurring with exercise  These are all atypical descriptions of symptoms  Cataplexy occurring with exercise may be due to his back pain  Excessive daytime sleepiness could be due to patient's morphine pump  However patient states that the symptoms have improved since he started using venlafaxine  No definitive diagnosis of narcolepsy based on MSLT's in 2008 as there were no SOREM's    Plan  Continue venlafaxine as patient has derived benefit from it    ______________________________________________________________________    HPI:    Dimitris Bal is a 51-year-old male with a past medical history of chronic back pain with spinal cord stimulator and intrathecal morphine pump, ankylosing spondylitis, obstructive sleep apnea, who presents for follow-up of excessive daytime sleepiness  He has been using CPAP with excellent compliance    Despite this he does feel sleepy still and somewhat tired throughout the day     He goes to bed at 10 PM and falls asleep within 1 hour  He wakes up at 8 AM   He averages 8 to 10 hours of sleep per night  He takes naps for 30 minutes to 1 hour  Naps make him feel more refreshed  He describes cataplexy that has decreased in frequency since he started venlafaxine  He reports it as an intermittent weakness in his face, arms, neck  He stated he had trouble walking and that cataplexy occurred constantly  Currently cataplexy occurs only with exercises  He reports sleep paralysis that occur once every 6 months and last less than an hour  He states that previously he would have sleep paralysis episodes that could be as long as 3 hours  He states that he previously occurred once a week  He reports seeing sleep associated hallucinations including shadows, squirrels, monkeys  He states they have not occurred in a few years  He states that previously occurred when he was awake as well  In the past he has tried Provigil, Xyrem, Vyvanse, Adderall with limited benefit  He has chronic back pain and is currently using an intrathecal morphine pump  Review of Systems:  Review of Systems   All other systems reviewed and are negative          Social history updates:  Social History     Tobacco Use   Smoking Status Never   Smokeless Tobacco Never     Social History     Socioeconomic History   • Marital status:      Spouse name: Not on file   • Number of children: Not on file   • Years of education: Not on file   • Highest education level: Not on file   Occupational History   • Not on file   Tobacco Use   • Smoking status: Never   • Smokeless tobacco: Never   Vaping Use   • Vaping Use: Not on file   Substance and Sexual Activity   • Alcohol use: No   • Drug use: No   • Sexual activity: Not on file   Other Topics Concern   • Not on file   Social History Narrative   • Not on file     Social Determinants of Health     Financial Resource Strain: Not on file   Food Insecurity: "Not on file   Transportation Needs: Not on file   Physical Activity: Not on file   Stress: Not on file   Social Connections: Not on file   Intimate Partner Violence: Not on file   Housing Stability: Unknown (6/14/2022)    Housing Stability Vital Sign    • Unable to Pay for Housing in the Last Year: Patient refused    • Number of Places Lived in the Last Year: Not on file    • Unstable Housing in the Last Year: Patient refused       PhysicalExamination:  Vitals:   /86 (BP Location: Left arm, Patient Position: Sitting, Cuff Size: Large)   Pulse 92   Ht 5' 11\" (1 803 m)   Wt 96 2 kg (212 lb)   BMI 29 57 kg/m²     Physical Exam  General:  Awake alert and oriented x 3, conversant without conversational dyspnea, NAD, normal affect  HEENT:  PERRL, Sclera noninjected, nonicteric OU, Nares patent,  no craniofacial abnormalities, Mucous membranes, moist, no oral lesions, normal dentition  NECK:  Trachea midline, no accessory muscle use, no stridor, no cervical or supraclavicular adenopathy, JVP not elevated  CARDIAC: Reg, single s1/S2, no m/r/g  PULM: CTA bilaterally no wheezing, rhonchi or rales  EXT: No cyanosis, no clubbing, no edema, normal capillary refill  NEURO: no focal neurologic deficits, AAOx3, moving all extremities appropriately     Diagnostic Data:  Labs: I personally reviewed the most recent laboratory data pertinent to today's visit  No visits with results within 6 Month(s) from this visit     Latest known visit with results is:   Telephone on 12/06/2022   Component Date Value   • Supplier Name 12/06/2022 [employer] [facility]    • Supplier Phone Number 12/06/2022 [phone]    • Order Status 12/06/2022 Completed    • Delivery Request Date 12/06/2022 12/06/2022    • Date Delivered  12/06/2022 12/06/2022    • Item Description 12/06/2022 PAP Accessory    • Item Description 12/06/2022 PAP Mask, Nasal Pillow, Fit Upon Setup, N/A, 1 per 3 months    • Item Description 12/06/2022 Humidifier " "Water Chamber, 1 per 6 months    • Item Description 12/06/2022 PAP Headgear, 1 per 6 months    • Item Description 12/06/2022 PAP Humidifier, Heated    • Item Description 12/06/2022 PAP Monitoring Modem    • Item Description 12/06/2022 Heated PAP Tubing, 1 per 3 months    • Item Description 12/06/2022 Disposable PAP Filter, 2 per 1 month    • Item Description 12/06/2022 Non-Disposable PAP Filter, 1 per 6 months    • Item Description 12/06/2022 PAP Mask Interface Cushion, Nasal Pillow, 2 per 1 month        I have personally reviewed pertinent lab results    Lab Results   Component Value Date    WBC 9 03 06/13/2022    HGB 15 0 06/13/2022    HCT 45 2 06/13/2022    MCV 86 06/13/2022     06/13/2022     Lab Results   Component Value Date    CALCIUM 9 0 06/13/2022    K 3 9 06/13/2022    CO2 31 06/13/2022     06/13/2022    BUN 16 06/13/2022    CREATININE 0 92 06/13/2022     No results found for: \"IGE\"  Lab Results   Component Value Date    ALT 23 06/13/2022    AST 14 06/13/2022    ALKPHOS 80 06/13/2022     No results found for: \"IRON\", \"TIBC\", \"FERRITIN\"  No results found for: \"IMUSLCLM39\"  No results found for: \"FOLATE\"      Arterial Blood Gas result: NA    Sleep studies:  Diagnostic: HST 3/11/2021 ARIC 6 7  Titration:  Split:    Compliance Data:  Type of CPAP: DreamStation 2 auto CPAP fixed pressure of 8                                   Percent usage: 100%                                   Average time used: 8 hours and 45 minutes                                  Time in large leak: 0                                   Residual AHI: 3 5                                         MD Bridger Davies 73 Sleep Medicine Fellow  "

## 2023-06-19 LAB

## 2023-07-09 DIAGNOSIS — G47.411 CATAPLEXY: ICD-10-CM

## 2023-07-09 RX ORDER — VENLAFAXINE 37.5 MG/1
TABLET ORAL
Qty: 30 TABLET | Refills: 0 | Status: SHIPPED | OUTPATIENT
Start: 2023-07-09

## 2023-07-12 ENCOUNTER — EVALUATION (OUTPATIENT)
Dept: PHYSICAL THERAPY | Facility: CLINIC | Age: 48
End: 2023-07-12
Payer: MEDICARE

## 2023-07-12 DIAGNOSIS — M54.12 CERVICAL RADICULOPATHY: Primary | ICD-10-CM

## 2023-07-12 PROCEDURE — 97161 PT EVAL LOW COMPLEX 20 MIN: CPT | Performed by: PHYSICAL THERAPIST

## 2023-07-12 PROCEDURE — 97140 MANUAL THERAPY 1/> REGIONS: CPT | Performed by: PHYSICAL THERAPIST

## 2023-07-12 NOTE — LETTER
2023    Lesa Garcia MD  [address] [place] [address]    Patient: Kasey Jackson   YOB: 1975   Date of Visit: 2023     Encounter Diagnosis     ICD-10-CM    1. Cervical radiculopathy  M54.12           Dear Dr. Srinivas Guzman: Thank you for your recent referral of Kasey Jackson. Please review the attached evaluation summary from Asher's recent visit. Please verify that you agree with the plan of care by signing the attached order. If you have any questions or concerns, please do not hesitate to call. I sincerely appreciate the opportunity to share in the care of one of your patients and hope to have another opportunity to work with you in the near future. Sincerely,    Bree Price, PT      Referring Provider:      I certify that I have read the below Plan of Care and certify the need for these services furnished under this plan of treatment while under my care. Lesa Garcia MD  [address] [place] [address]  Via Fax: [phone]          PT Evaluation     Today's date: 2023  Patient name: Kasey Jackson  : 1975  MRN: [id number]  Referring provider: Lesa Garcia MD  Dx:   Encounter Diagnosis     ICD-10-CM    1.  Cervical radiculopathy  M54.12           Start Time: 1115          Assessment  Assessment details: Kasey Jackson is a 41 y.o. male presenting to outpatient physical therapy at [facility] with complaints of neck pain.  They present with decreased range of motion, decreased strength, limited flexibility, poor postural awareness, poor body mechanics, poor balance, decreased tolerance to activity and decreased functional mobility due to Cervical radiculopathy  (primary encounter diagnosis). Isidro Vaz would benefit from skilled physical therapy to address noted impairments in order to allow for full functional return to work and ADL-related activities. Thank you for the referral!  Impairments: abnormal muscle Patient may discontinue potassium 10 mEq tablet. Would recommend rechecking BMP in one month to monitor potassium levels.   firing, abnormal or restricted ROM, activity intolerance, impaired balance, impaired physical strength, lacks appropriate home exercise program, pain with function and poor body mechanics  Barriers to therapy: n/a  Understanding of Dx/Px/POC: excellent  Goals  ST.  Independent with HEP in 2 weeks  2. Decrease pain by 50% in 3 wks  3.  Increase cervical ROM to only mild restriction into rotation bilat in 3 weeks     LT. Achieve FOTO score of 57/100 in 6 weeks   2.  Able to wake with 75% less pain in 6 weeks  3. Strength = 5/5 sh' flex in 6 weeks      Plan  Plan details: RE in 6 weeks. Patient would benefit from: skilled physical therapy  Planned modality interventions: cryotherapy, electrical stimulation/Russian stimulation and thermotherapy: hydrocollator packs  Planned therapy interventions: abdominal trunk stabilization, manual therapy, neuromuscular re-education, therapeutic activities, therapeutic exercise, body mechanics training and home exercise program  Frequency: 2x week  Duration in visits: 12  Duration in weeks: 6  Plan of Care beginning date: 2023  Plan of Care expiration date: 2023  Treatment plan discussed with: patient        Subjective Evaluation    History of Present Illness  Mechanism of injury: Pt c/o cervical pain. Pt reports he has a h/o 3 cervical disc herniations. He reports he gets steroid shots into the neck for pain relief, the most recent being  on the L and May 8th on the R. Currently he is feeling a nerve pain in the R scalene region, was told its secondary to muscle tightness. Pain is described as constant aching. Rated 5/10. Worse in the AM with stiffness after sleeping. Will sleep in different positions because he has a bad back. Uses 1 pillow. No specific agg factors for the neck besides AM stiffness. He has tried exercises/stretches for it without relief. No other easing factors. Positive for BUE N/T when his neck pain is severe.  Positive for headaches at the base of the skull every morning; will last 30-60 mins. Positive for BUE and hand weakness. Denies dizziness or lightheadedness, pain with breathing. Pt does not work. He spends his time walking 1 mile per day, doing housework and yard work. He has a h/o lumbar pain and has a morphine pump for this. Reports stability in the low back pain (rated 5/10 on avg) since getting the pump.   Patient Goals  Patient goals for therapy: decreased edema, decreased pain, improved balance, increased motion, return to work, return to [employer] activities, independence with ADLs/IADLs and increased strength          Objective     Postural Observations    Additional Postural Observation Details  Rounded shoulders, protracted scap bilat, fwd head    Tenderness     Additional Tenderness Details  TTP with increased tone R scalenes, UT, LS, pec, biceps    (+) cervical compression  (-) distraction    Neurological Testing     Sensation   Cervical/Thoracic   Left   Intact: light touch    Right   Intact: light touch    Reflexes   Left   Biceps (C5/C6): normal (2+)  Brachioradialis (C6): normal (2+)    Right   Biceps (C5/C6): normal (2+)  Brachioradialis (C6): normal (2+)    Active Range of Motion   Cervical/Thoracic Spine       Cervical    Flexion: 45 degrees  Restriction level: minimal  Extension: 25 degrees     Restriction level: moderate  Left lateral flexion: 30 degrees     with pain Restriction level: moderate  Right lateral flexion: 30 degrees     with pain Restriction level moderate  Left rotation: 32 degrees with pain Restriction level: moderate  Right rotation: 35 degrees    with pain Restriction level: moderate    Thoracic    Flexion:  WFL  Extension:  Restriction level: maximal  Left rotation:  Restriction level: moderate  Right rotation:  Restriction level: minimal    Additional Active Range of Motion Details  Shoulder elevation ROM NL    Strength/Myotome Testing   Cervical Spine   Neck extension: 4+  Neck flexion: 4+    Left   Neck lateral flexion (C3): 4+    Right   Neck lateral flexion (C3): 4+    Left Shoulder     Planes of Motion   Flexion: 4+   Extension: 4+   Abduction: 5   External rotation at 0°: 5   Internal rotation at 0°: 5     Isolated Muscles   Upper trapezius: 5     Right Shoulder     Planes of Motion   Flexion: 4+   Extension: 4+   Abduction: 5   External rotation at 0°: 5   Internal rotation at 0°: 5     Isolated Muscles   Upper trapezius: 5     Left Elbow   Flexion: 5  Extension: 5    Right Elbow   Flexion: 5  Extension: 5    Left Wrist/Hand   Wrist extension: 5  Wrist flexion: 5    Right Wrist/Hand   Wrist extension: 5  Wrist flexion: 5    Additional Strength Details   4+/5 bilat            Precautions: h/o cervical herniations with cervical joint and muscular stiffness      HEP:  Access Code: [email or web address]  URL: [email or web address]  Date: 07/12/2023  Prepared by: Wilbur Smyth    Exercises  - Seated Cervical Rotation AROM  - 10 reps  - Seated Cervical Extension AROM  - 10 reps  - Seated Cervical Sidebending AROM  - 10 reps  - Chin Tuck  - 10 reps  - Supine Anterior Scalene Stretch  - 3 sets - 10 sec hold  - Doorway Pec Stretch at 60 Elevation  - 3 sets - 10 sec hold      Manuals 7/12            Scalene, UT, LS JANE            Scalene, UT stretch             cspine PROM                                                                 Neuro Re-Ed                                                                                                        Ther Ex             UBE -cardio             Doorway pec 10"x3            Scalene/SCM stretch 10"x3            cspine AROM x10            Chin tuck x10            scap rtrxn             cspine iso 4way             row             Sh' ext                                                    Ther Activity                                       Gait Training                                       Modalities

## 2023-07-12 NOTE — PROGRESS NOTES
PT Evaluation     Today's date: 2023  Patient name: Emmett Fields  : 1975  MRN: [id number]  Referring provider: King Huitron MD  Dx:   Encounter Diagnosis     ICD-10-CM    1. Cervical radiculopathy  M54.12           Start Time: 1115          Assessment  Assessment details: Emmett Fields is a 41 y.o. male presenting to outpatient physical therapy at [facility] with complaints of neck pain.  They present with decreased range of motion, decreased strength, limited flexibility, poor postural awareness, poor body mechanics, poor balance, decreased tolerance to activity and decreased functional mobility due to Cervical radiculopathy  (primary encounter diagnosis). Jesica Rojas would benefit from skilled physical therapy to address noted impairments in order to allow for full functional return to work and ADL-related activities. Thank you for the referral!  Impairments: abnormal muscle firing, abnormal or restricted ROM, activity intolerance, impaired balance, impaired physical strength, lacks appropriate home exercise program, pain with function and poor body mechanics  Barriers to therapy: n/a  Understanding of Dx/Px/POC: excellent  Goals  ST.  Independent with HEP in 2 weeks  2. Decrease pain by 50% in 3 wks  3.  Increase cervical ROM to only mild restriction into rotation bilat in 3 weeks     LT. Achieve FOTO score of 57/100 in 6 weeks   2.  Able to wake with 75% less pain in 6 weeks  3. Strength = 5/5 sh' flex in 6 weeks      Plan  Plan details: RE in 6 weeks.   Patient would benefit from: skilled physical therapy  Planned modality interventions: cryotherapy, electrical stimulation/Russian stimulation and thermotherapy: hydrocollator packs  Planned therapy interventions: abdominal trunk stabilization, manual therapy, neuromuscular re-education, therapeutic activities, therapeutic exercise, body mechanics training and home exercise program  Frequency: 2x week  Duration in visits: 12  Duration in weeks: 6  Plan of Care beginning date: 7/12/2023  Plan of Care expiration date: 8/25/2023  Treatment plan discussed with: patient        Subjective Evaluation    History of Present Illness  Mechanism of injury: Pt c/o cervical pain. Pt reports he has a h/o 3 cervical disc herniations. He reports he gets steroid shots into the neck for pain relief, the most recent being June 5th on the L and May 8th on the R. Currently he is feeling a nerve pain in the R scalene region, was told its secondary to muscle tightness. Pain is described as constant aching. Rated 5/10. Worse in the AM with stiffness after sleeping. Will sleep in different positions because he has a bad back. Uses 1 pillow. No specific agg factors for the neck besides AM stiffness. He has tried exercises/stretches for it without relief. No other easing factors. Positive for BUE N/T when his neck pain is severe. Positive for headaches at the base of the skull every morning; will last 30-60 mins. Positive for BUE and hand weakness. Denies dizziness or lightheadedness, pain with breathing. Pt does not work. He spends his time walking 1 mile per day, doing housework and yard work. He has a h/o lumbar pain and has a morphine pump for this. Reports stability in the low back pain (rated 5/10 on avg) since getting the pump.   Patient Goals  Patient goals for therapy: decreased edema, decreased pain, improved balance, increased motion, return to work, return to [employer] activities, independence with ADLs/IADLs and increased strength          Objective     Postural Observations    Additional Postural Observation Details  Rounded shoulders, protracted scap bilat, fwd head    Tenderness     Additional Tenderness Details  TTP with increased tone R scalenes, UT, LS, pec, biceps    (+) cervical compression  (-) distraction    Neurological Testing     Sensation   Cervical/Thoracic   Left   Intact: light touch    Right   Intact: light touch    Reflexes   Left   Biceps (C5/C6): normal (2+)  Brachioradialis (C6): normal (2+)    Right   Biceps (C5/C6): normal (2+)  Brachioradialis (C6): normal (2+)    Active Range of Motion   Cervical/Thoracic Spine       Cervical    Flexion: 45 degrees  Restriction level: minimal  Extension: 25 degrees     Restriction level: moderate  Left lateral flexion: 30 degrees     with pain Restriction level: moderate  Right lateral flexion: 30 degrees     with pain Restriction level moderate  Left rotation: 32 degrees with pain Restriction level: moderate  Right rotation: 35 degrees    with pain Restriction level: moderate    Thoracic    Flexion:  WFL  Extension:  Restriction level: maximal  Left rotation:  Restriction level: moderate  Right rotation:  Restriction level: minimal    Additional Active Range of Motion Details  Shoulder elevation ROM NL    Strength/Myotome Testing   Cervical Spine   Neck extension: 4+  Neck flexion: 4+    Left   Neck lateral flexion (C3): 4+    Right   Neck lateral flexion (C3): 4+    Left Shoulder     Planes of Motion   Flexion: 4+   Extension: 4+   Abduction: 5   External rotation at 0°: 5   Internal rotation at 0°: 5     Isolated Muscles   Upper trapezius: 5     Right Shoulder     Planes of Motion   Flexion: 4+   Extension: 4+   Abduction: 5   External rotation at 0°: 5   Internal rotation at 0°: 5     Isolated Muscles   Upper trapezius: 5     Left Elbow   Flexion: 5  Extension: 5    Right Elbow   Flexion: 5  Extension: 5    Left Wrist/Hand   Wrist extension: 5  Wrist flexion: 5    Right Wrist/Hand   Wrist extension: 5  Wrist flexion: 5    Additional Strength Details   4+/5 bilat             Precautions: h/o cervical herniations with cervical joint and muscular stiffness      HEP:  Access Code: [email or web address]  URL: [email or web address]  Date: 07/12/2023  Prepared by: Arely Jackson    Exercises  - Seated Cervical Rotation AROM  - 10 reps  - Seated Cervical Extension AROM  - 10 reps  - Seated Cervical Sidebending AROM  - 10 reps  - Chin Tuck  - 10 reps  - Supine Anterior Scalene Stretch  - 3 sets - 10 sec hold  - Doorway Pec Stretch at 60 Elevation  - 3 sets - 10 sec hold      Manuals 7/12            Scalene, UT, LS JANE            Scalene, UT stretch             cspine PROM                                                                 Neuro Re-Ed                                                                                                        Ther Ex             UBE -cardio             Doorway pec 10"x3            Scalene/SCM stretch 10"x3            cspine AROM x10            Chin tuck x10            scap rtrxn             cspine iso 4way             row             Sh' ext                                                    Ther Activity                                       Gait Training                                       Modalities

## 2023-07-18 ENCOUNTER — OFFICE VISIT (OUTPATIENT)
Dept: PHYSICAL THERAPY | Facility: CLINIC | Age: 48
End: 2023-07-18
Payer: MEDICARE

## 2023-07-18 DIAGNOSIS — M54.12 CERVICAL RADICULOPATHY: Primary | ICD-10-CM

## 2023-07-18 PROCEDURE — 97140 MANUAL THERAPY 1/> REGIONS: CPT

## 2023-07-18 PROCEDURE — 97110 THERAPEUTIC EXERCISES: CPT

## 2023-07-18 NOTE — PROGRESS NOTES
Daily Note     Today's date: 2023  Patient name: Yousuf Garza  : 1975  MRN: [id number]  Referring provider: Hazel Diane MD  Dx:   Encounter Diagnosis     ICD-10-CM    1. Cervical radiculopathy  M54.12                      Subjective: Pt reports he is still very stiff and painful    Objective: See treatment diary below      Assessment: Initiated PT POC today. Pt presents with chronic cervical tightness with he noted he feels HEP made worse. He benefits from manual therapy with improved cervical side bend and rotation. Tolerated TE's well. Tolerated treatment fair. Patient demonstrated fatigue post treatment, exhibited good technique with therapeutic exercises and would benefit from continued PT      Plan: Continue per plan of care. Progress treatment as tolerated. Precautions: h/o cervical herniations with cervical joint and muscular stiffness      HEP:  Access Code: [email or web address]  URL: [email or web address]  Date: 2023  Prepared by: Brenda Boateng    Exercises  - Seated Cervical Rotation AROM  - 10 reps  - Seated Cervical Extension AROM  - 10 reps  - Seated Cervical Sidebending AROM  - 10 reps  - Chin Tuck  - 10 reps  - Supine Anterior Scalene Stretch  - 3 sets - 10 sec hold  - Doorway Pec Stretch at 60 Elevation  - 3 sets - 10 sec hold      Manuals            Scalene, UT, LS JANE WE           Scalene, UT stretch  WE           cspine PROM  WE                                                               Neuro Re-Ed                                                                                                        Ther Ex             UBE -cardio  2'/2'           Doorway pec 10"x3            Scalene/SCM stretch 10"x3 10"x3 ea           cspine AROM x10 x10 ea           Chin tuck x10 x10           scap rtrxn  3"x10           cspine iso 4way             row  GTB  x10           Sh' ext  GTB  x10                                                  Ther Activity Gait Training                                       Modalities

## 2023-07-20 ENCOUNTER — OFFICE VISIT (OUTPATIENT)
Dept: PHYSICAL THERAPY | Facility: CLINIC | Age: 48
End: 2023-07-20
Payer: MEDICARE

## 2023-07-20 DIAGNOSIS — M54.12 CERVICAL RADICULOPATHY: Primary | ICD-10-CM

## 2023-07-20 PROCEDURE — 97110 THERAPEUTIC EXERCISES: CPT | Performed by: PHYSICAL THERAPIST

## 2023-07-20 PROCEDURE — 97140 MANUAL THERAPY 1/> REGIONS: CPT | Performed by: PHYSICAL THERAPIST

## 2023-07-20 NOTE — PROGRESS NOTES
Daily Note     Today's date: 2023  Patient name: Tre Fry  : 1975  MRN: [id number]  Referring provider: Rowena Subramanian MD  Dx:   Encounter Diagnosis     ICD-10-CM    1. Cervical radiculopathy  M54.12           Start Time: 917          Subjective: Stiff and sore this week, no significant change after lv. Objective: See treatment diary below      Assessment: Greater scalene tone R>L. After STM, stretching and cervical PROM, had pt sit with MHP for muscle release. Pt noted feeling slightly more flexible in the neck end of session. Tolerated treatment fair. Patient demonstrated fatigue post treatment, exhibited good technique with therapeutic exercises and would benefit from continued PT      Plan: Continue per plan of care. Progress treatment as tolerated. Precautions: h/o cervical herniations with cervical joint and muscular stiffness      HEP:  Access Code: [email or web address]  URL: [email or web address]  Date: 2023  Prepared by: Alver Starling    Exercises  - Seated Cervical Rotation AROM  - 10 reps  - Seated Cervical Extension AROM  - 10 reps  - Seated Cervical Sidebending AROM  - 10 reps  - Chin Tuck  - 10 reps  - Supine Anterior Scalene Stretch  - 3 sets - 10 sec hold  - Doorway Pec Stretch at 60 Elevation  - 3 sets - 10 sec hold      Manuals           Scalene, UT, LS JANE WE JANE          Scalene, UT stretch  WE JANE          cspine PROM  WE JANE                                                              Neuro Re-Ed                                                                                                        Ther Ex             UBE -cardio  2'/2' 2'/2'          Doorway pec 10"x3            Scalene/SCM stretch 10"x3 10"x3 ea           cspine AROM x10 x10 ea x10 ea          Chin tuck x10 x10           Sh' rolls bwd   x20          scap rtrxn  3"x10           cspine iso 4way             row  GTB  x10 btb x20          Sh' ext  GTB  x10 btb x20 Ther Activity                                       Gait Training                                       Modalities             P   Post manuals 5'

## 2023-07-25 ENCOUNTER — OFFICE VISIT (OUTPATIENT)
Dept: PHYSICAL THERAPY | Facility: CLINIC | Age: 48
End: 2023-07-25
Payer: MEDICARE

## 2023-07-25 DIAGNOSIS — M54.12 CERVICAL RADICULOPATHY: Primary | ICD-10-CM

## 2023-07-25 PROCEDURE — 97110 THERAPEUTIC EXERCISES: CPT | Performed by: PHYSICAL THERAPIST

## 2023-07-25 PROCEDURE — 97140 MANUAL THERAPY 1/> REGIONS: CPT | Performed by: PHYSICAL THERAPIST

## 2023-07-25 NOTE — PROGRESS NOTES
Daily Note     Today's date: 2023  Patient name: Joe Daniels  : 1975  MRN: [id number]  Referring provider: Nilsa Fonseca MD  Dx:   Encounter Diagnosis     ICD-10-CM    1. Cervical radiculopathy  M54.12                      Subjective: Increased pain to the R suboccipital/upper trap region that started after lv. Since he's been having this pain, mobility has been worse. Overall is unsure if therapy is making his symptoms worse and declines changing the current program from what has been performed. Objective: See treatment diary below      Assessment: Increased tone to the occipital insertion of the upper trap/LS today. Pt notes a throbbing to the SO region after gentle STM. Passive stretching held today in favor of active mobility. Tolerated treatment fair. Patient demonstrated fatigue post treatment, exhibited good technique with therapeutic exercises and would benefit from continued PT      Plan: Continue per plan of care. Progress treatment as tolerated. Hold on all manuals nv. Precautions: h/o cervical herniations with cervical joint and muscular stiffness      HEP:  Access Code: [email or web address]  URL: [email or web address]  Date: 2023  Prepared by: Naeem Pisano    Exercises  - Seated Cervical Rotation AROM  - 10 reps  - Seated Cervical Extension AROM  - 10 reps  - Seated Cervical Sidebending AROM  - 10 reps  - Chin Tuck  - 10 reps  - Supine Anterior Scalene Stretch  - 3 sets - 10 sec hold  - Doorway Pec Stretch at 60 Elevation  - 3 sets - 10 sec hold      Manuals          SO, UT, LS STM    JANE         Scalene, UT, LS JANE DANDY Seals UT stretch  DANDY LARA          cspine PROM  DANDY LARA                                                              Neuro Re-Ed                                                                                                        Ther Ex             UBE -cardio  2'/2' 2'/2' 3'/3'         Doorway pec 10"x3            Supine chin tuck    x10         Scalene/SCM stretch 10"x3 10"x3 ea           cspine AROM x10 x10 ea x10 ea x10 ea         Chin tuck x10 x10           Sh' rolls bwd   x20          scap rtrxn  3"x10           cspine iso 4way             row  GTB  x10 btb x20 Grey 5"x20         Sh' ext  GTB  x10 btb x20 Grey 5"x20                                                Ther Activity                                       Gait Training                                       Modalities             MHP   Post manuals 5' Post manuals 8'

## 2023-07-27 ENCOUNTER — OFFICE VISIT (OUTPATIENT)
Dept: PHYSICAL THERAPY | Facility: CLINIC | Age: 48
End: 2023-07-27
Payer: MEDICARE

## 2023-07-27 DIAGNOSIS — M54.12 CERVICAL RADICULOPATHY: Primary | ICD-10-CM

## 2023-07-27 PROCEDURE — 97110 THERAPEUTIC EXERCISES: CPT | Performed by: PHYSICAL THERAPIST

## 2023-07-27 NOTE — PROGRESS NOTES
Daily Note     Today's date: 2023  Patient name: Ruben Thakur  : 1975  MRN: [id number]  Referring provider: Kavon Ardon MD  Dx:   Encounter Diagnosis     ICD-10-CM    1. Cervical radiculopathy  M54.12           Start Time: 1125          Subjective: Increased pain to the R suboccipital/upper trap region which is unchanged after last visit. Objective: See treatment diary below      Assessment: Held on manuals today for change of program due to lack of results and instead introduced self-cervical rotation SNAG with towel. He did note pain at rep 5 of 10 when rotating to the L, which worsened with subsequent reps. He does well with scap retraction and awareness of decreased upper trap compensation when performing rows and shoulder extensions. Given SNAG and scap retractions for updated HEP. Tolerated treatment fair. Patient demonstrated fatigue post treatment, exhibited good technique with therapeutic exercises and would benefit from continued PT      Plan: Continue per plan of care. Progress treatment as tolerated. Precautions: h/o cervical herniations with cervical joint and muscular stiffness      HEP:  Access Code: [email or web address]  URL: [email or web address]  Date: 2023  Prepared by: Angela Avina    Exercises  - Seated Assisted Cervical Rotation with Towel  - 10 reps - 5 sec hold  - Shoulder Blade Squeeze  - 20 reps - 5 sec hold      Manuals         SO, UT, LS STM    JANE         Scalene, UT, LS JANE WE JANE          Scalene, UT stretch  WE JANE          cspine PROM  WE JANE                                                              Neuro Re-Ed                                                                                                        Ther Ex             UBE -cardio  2'/2' 2'/2' 3'/3' 3'/3'        Doorway pec 10"x3            Supine chin tuck    x10         Scalene/SCM stretch 10"x3 10"x3 ea           cspine AROM x10 x10 ea x10 ea x10 ea x10 ea Chin tuck x10 x10   x10        cspine rot SNAG with towel     5"x10 ea        Sh' rolls bwd   x20          scap rtrxn  3"x10   5"x20        cspine iso 4way             row  GTB  x10 btb x20 Grey 5"x20 mtb 5"x20        Sh' ext  GTB  x10 btb x20 Grey 5"x20 mtb 5"x20                                               Ther Activity                                       Gait Training                                       Modalities             MHP   Post manuals 5' Post manuals 8'

## 2023-08-01 ENCOUNTER — OFFICE VISIT (OUTPATIENT)
Dept: PHYSICAL THERAPY | Facility: CLINIC | Age: 48
End: 2023-08-01
Payer: MEDICARE

## 2023-08-01 DIAGNOSIS — M54.12 CERVICAL RADICULOPATHY: Primary | ICD-10-CM

## 2023-08-01 PROCEDURE — 97110 THERAPEUTIC EXERCISES: CPT | Performed by: PHYSICAL THERAPIST

## 2023-08-01 NOTE — PROGRESS NOTES
Daily Note     Today's date: 2023  Patient name: Brett Solis  : 1975  MRN: [id number]  Referring provider: Vince Morales MD  Dx:   Encounter Diagnosis     ICD-10-CM    1. Cervical radiculopathy  M54.12           Start Time: 1130          Subjective: Less R SO/upper trap pain today. He noticed it is the worst at night, starts to stiffen up around 9pm.    Objective: See treatment diary below      Assessment: Instructed pt to make a routine of doing the exercises and using heat around 830pm to help relieve nighttime stiffness. Pt noted weakness with LF isometrics on the R vs L. Pain with cspine flexion and extension isometrics. Tolerated treatment fair. Patient demonstrated fatigue post treatment, exhibited good technique with therapeutic exercises and would benefit from continued PT      Plan: Continue per plan of care. Progress treatment as tolerated. Precautions: h/o cervical herniations with cervical joint and muscular stiffness      HEP:  Access Code: [email or web address]  URL: [email or web address]  Date: 2023  Prepared by: Sharon Dai    Exercises  - Seated Assisted Cervical Rotation with Towel  - 10 reps - 5 sec hold  - Shoulder Blade Squeeze  - 20 reps - 5 sec hold      Manuals        SO, UT, LS STM    JANE         Scalene, UT, LS JANE WE JANE          Scalene, UT stretch  WE JANE          cspine PROM  WE JANE                                                              Neuro Re-Ed                                                                                                        Ther Ex             UBE -cardio  2'/2' 2'/2' 3'/3' 3'/3' 3'/3'       Doorway pec 10"x3            Supine chin tuck    x10         Scalene/SCM stretch 10"x3 10"x3 ea           cspine AROM x10 x10 ea x10 ea x10 ea x10 ea x10 ea       Chin tuck x10 x10   x10 5"x10       cspine rot SNAG with towel     5"x10 ea 5"x6 ea       Sh' rolls bwd   x20          scap rtrxn  3"x10   5"x20 5"x20 cspine iso LF      5"x10       cspine iso flex      5"x10 P! cspine iso ext      5"x10 P!        row  GTB  x10 btb x20 Grey 5"x20 mtb 5"x20 mtb 5"x20       Sh' ext  GTB  x10 btb x20 Grey 5"x20 mtb 5"x20 mtb 5"x20                                              Ther Activity                                       Gait Training                                       Modalities             MHP   Post manuals 5' Post manuals 8'

## 2023-08-03 ENCOUNTER — OFFICE VISIT (OUTPATIENT)
Dept: PHYSICAL THERAPY | Facility: CLINIC | Age: 48
End: 2023-08-03
Payer: MEDICARE

## 2023-08-03 DIAGNOSIS — M54.12 CERVICAL RADICULOPATHY: Primary | ICD-10-CM

## 2023-08-03 PROCEDURE — 97110 THERAPEUTIC EXERCISES: CPT | Performed by: PHYSICAL THERAPIST

## 2023-08-03 NOTE — PROGRESS NOTES
Daily Note     Today's date: 8/3/2023  Patient name: Yessica Trimble  : 1975  MRN: [id number]  Referring provider: Nicole Herrmann MD  Dx:   Encounter Diagnosis     ICD-10-CM    1. Cervical radiculopathy  M54.12           Start Time: 1116          Subjective: Tried doing the exercises later to avoid the 9pm neck stiffening, but this only caused him to wake up in the middle of the night with stiffness. Objective: See treatment diary below      Assessment: Discussed with pt that the soreness he feels can also be due to the novelty of the exercise in his routine. Instructed to perform the exercises consistently in order to build tolerance and monitor symptoms. Good program recall. Continues to have pain with cspine flexion and extension isometrics. Increased postural strengthening load. Tolerated treatment fair. Patient demonstrated fatigue post treatment, exhibited good technique with therapeutic exercises and would benefit from continued PT      Plan: Continue per plan of care. Progress treatment as tolerated. Precautions: h/o cervical herniations with cervical joint and muscular stiffness      HEP:  Access Code: [email or web address]  URL: [email or web address]  Date: 2023  Prepared by: Xi Grullon    Exercises  - Seated Assisted Cervical Rotation with Towel  - 10 reps - 5 sec hold  - Shoulder Blade Squeeze  - 20 reps - 5 sec hold      Manuals 7/12 7/18 7/20 7/25 7/27 8/1 8/3      SO, UT, LS STM    JANE         Scalene, UT, LS JANE WE JANE          Scalene, UT stretch  WE JANE          cspine PROM  WE JANE                                                              Neuro Re-Ed                                                                                                        Ther Ex             UBE -cardio  2'/2' 2'/2' 3'/3' 3'/3' 3'/3' 3'/3'      Doorway pec 10"x3            Supine chin tuck    x10         Scalene/SCM stretch 10"x3 10"x3 ea           cspine AROM x10 x10 ea x10 ea x10 ea x10 ea x10 ea x10 ea      Chin tuck x10 x10   x10 5"x10 5"x10      cspine rot SNAG with towel     5"x10 ea 5"x6 ea 5"x6 ea      Sh' rolls bwd   x20          scap rtrxn  3"x10   5"x20 5"x20 5"x20      cspine iso LF      5"x10 5"x10      cspine iso flex      5"x10 P! 5"x10      cspine iso ext      5"x10 P! 5"x10      row  GTB  x10 btb x20 Grey 5"x20 mtb 5"x20 mtb 5"x20 55# 5"x20      Sh' ext  GTB  x10 btb x20 Grey 5"x20 mtb 5"x20 mtb 5"x20 55# 5"x20                                             Ther Activity                                       Gait Training                                       Modalities             MHP   Post manuals 5' Post manuals 8'

## 2023-08-08 ENCOUNTER — OFFICE VISIT (OUTPATIENT)
Dept: PHYSICAL THERAPY | Facility: CLINIC | Age: 48
End: 2023-08-08
Payer: MEDICARE

## 2023-08-08 DIAGNOSIS — M54.12 CERVICAL RADICULOPATHY: Primary | ICD-10-CM

## 2023-08-08 PROCEDURE — 97110 THERAPEUTIC EXERCISES: CPT | Performed by: PHYSICAL THERAPIST

## 2023-08-08 NOTE — PROGRESS NOTES
Daily Note     Today's date: 2023  Patient name: Shaina German  : 1975  MRN: [id number]  Referring provider: Ashwini Mehta MD  Dx:   Encounter Diagnosis     ICD-10-CM    1. Cervical radiculopathy  M54.12           Start Time: 1130          Subjective: Tried alternating AM mobility work and PM neck strengthening and this did not improve symptoms. Continues to wake with neck pain and stiffness. Curious about trying the neck AROM exercises with a racing helmet on that he has, helmet weighs about 5lbs. Objective: See treatment diary below      Assessment: Will trial 4 days of PM mobility and then 4 days of PM strengthening for consistency of program and monitor symptoms. Discussed with pt that doing the exercises weighted as he described is another type of neck strength training that he is free to do as long as he is consistent and adjust according to tolerance. Added LPD to upper back strengthening, which he did fine with. Tolerated treatment fair. Patient demonstrated fatigue post treatment, exhibited good technique with therapeutic exercises and would benefit from continued PT      Plan: Continue per plan of care. Progress treatment as tolerated. Precautions: h/o cervical herniations with cervical joint and muscular stiffness      HEP:  Access Code: [email or web address]  URL: [email or web address]  Date: 2023  Prepared by: Sincere Haynes    Exercises  - Seated Assisted Cervical Rotation with Towel  - 10 reps - 5 sec hold  - Shoulder Blade Squeeze  - 20 reps - 5 sec hold      Manuals 7/12 7/18 7/20 7/25 7/27 8/1 8/3 8/8     SO, UT, LS STM    SERGEY Caraballo, LS JANE SERGEY Dior stretch  DANDY LARA          cspine PROM  DANDY LARA                                                              Neuro Re-Ed                                                                                                        Ther Ex             UBE -cardio  2'/2' 2'/2' 3'/3' 3'/3' 3'/3' 3'/3' 3'/3' Doorway pec 10"x3            Supine chin tuck    x10         Scalene/SCM stretch 10"x3 10"x3 ea           cspine AROM x10 x10 ea x10 ea x10 ea x10 ea x10 ea x10 ea x10 ea     Chin tuck x10 x10   x10 5"x10 5"x10 5"x10     cspine rot SNAG with towel     5"x10 ea 5"x6 ea 5"x6 ea 5"x5 ea     Sh' rolls bwd   x20          scap rtrxn  3"x10   5"x20 5"x20 5"x20 5"x20     cspine iso LF      5"x10 5"x10 5"x10     cspine iso flex      5"x10 P! 5"x10 5"x10     cspine iso ext      5"x10 P! 5"x10 5"x10     row  GTB  x10 btb x20 Grey 5"x20 mtb 5"x20 mtb 5"x20 55# 5"x20 55# 5"x20     Sh' ext  GTB  x10 btb x20 Grey 5"x20 mtb 5"x20 mtb 5"x20 55# 5"x20 55# 5"x20     LPD        100# 5"x20                               Ther Activity                                       Gait Training                                       Modalities             MHP   Post manuals 5' Post manuals 8'

## 2023-08-10 ENCOUNTER — APPOINTMENT (OUTPATIENT)
Dept: PHYSICAL THERAPY | Facility: CLINIC | Age: 48
End: 2023-08-10
Payer: MEDICARE

## 2023-08-12 DIAGNOSIS — G47.411 CATAPLEXY: ICD-10-CM

## 2023-08-13 RX ORDER — VENLAFAXINE 37.5 MG/1
TABLET ORAL
Qty: 30 TABLET | Refills: 0 | Status: SHIPPED | OUTPATIENT
Start: 2023-08-13

## 2023-08-15 ENCOUNTER — OFFICE VISIT (OUTPATIENT)
Dept: PHYSICAL THERAPY | Facility: CLINIC | Age: 48
End: 2023-08-15
Payer: MEDICARE

## 2023-08-15 DIAGNOSIS — M54.12 CERVICAL RADICULOPATHY: Primary | ICD-10-CM

## 2023-08-15 PROCEDURE — 97140 MANUAL THERAPY 1/> REGIONS: CPT | Performed by: PHYSICAL THERAPIST

## 2023-08-15 PROCEDURE — 97112 NEUROMUSCULAR REEDUCATION: CPT | Performed by: PHYSICAL THERAPIST

## 2023-08-15 NOTE — PROGRESS NOTES
PT Re-Evaluation     Today's date: 8/15/2023  Patient name: Tre Fry  : 1975  MRN: [id number]  Referring provider: Rowena Subramanian MD  Dx:   Encounter Diagnosis     ICD-10-CM    1. Cervical radiculopathy  M54.12           Start Time: 1130          Assessment  Assessment details: Tre Fry is a 41 y.o. male presenting to outpatient physical therapy at [facility] with complaints of neck pain.  They present with decreased range of motion, decreased strength, limited flexibility, poor postural awareness, poor body mechanics, poor balance, decreased tolerance to activity and decreased functional mobility due to Cervical radiculopathy  (primary encounter diagnosis). Heath Blanchard would benefit from skilled physical therapy to address noted impairments in order to allow for full functional return to work and ADL-related activities. Thank you for the referral!    RE: 8/15/23  Tre Fry has attended physical therapy for 9 visits. In this time, they have made significant improvements in symptoms and function, as noted by subjective report, improved balance, ROM, strength, flexibility and activity tolerance. They have made positive goal progress with FOTO score improvement from 44 at initial evaluation to 48 currently. They do continue to have impairments in pain, ROM, strength, balance, flexibility and activity tolerance. Recommend continued skilled PT in order to maximize function. Impairments: abnormal muscle firing, abnormal or restricted ROM, activity intolerance, impaired balance, impaired physical strength, lacks appropriate home exercise program, pain with function and poor body mechanics  Barriers to therapy: n/a  Understanding of Dx/Px/POC: excellent  Goals  ST.  Independent with HEP in 2 weeks - met  2. Decrease pain by 50% in 3 wks - not met  3.  Increase cervical ROM to only mild restriction into rotation bilat in 3 weeks - met    LT.  Achieve FOTO score of 57/100 in 6 weeks - near met  2.  Able to wake with 75% less pain in 6 weeks - near met  3. Strength = 5/5 sh' flex in 6 weeks - met      Plan  Plan details: RE in 6 weeks. Patient would benefit from: skilled physical therapy  Planned modality interventions: cryotherapy, electrical stimulation/Russian stimulation and thermotherapy: hydrocollator packs  Planned therapy interventions: abdominal trunk stabilization, manual therapy, neuromuscular re-education, therapeutic activities, therapeutic exercise, body mechanics training and home exercise program  Frequency: 2x week  Duration in visits: 12  Duration in weeks: 6  Plan of Care beginning date: 8/15/2023  Plan of Care expiration date: 9/29/2023  Treatment plan discussed with: patient        Subjective Evaluation    History of Present Illness  Mechanism of injury: Pt c/o cervical pain. Pt reports he has a h/o 3 cervical disc herniations. He reports he gets steroid shots into the neck for pain relief, the most recent being June 5th on the L and May 8th on the R. Currently he is feeling a nerve pain in the R scalene region, was told its secondary to muscle tightness. Pain is described as constant aching. Rated 5/10. Worse in the AM with stiffness after sleeping. Will sleep in different positions because he has a bad back. Uses 1 pillow. No specific agg factors for the neck besides AM stiffness. He has tried exercises/stretches for it without relief. No other easing factors. Positive for BUE N/T when his neck pain is severe. Positive for headaches at the base of the skull every morning; will last 30-60 mins. Positive for BUE and hand weakness. Denies dizziness or lightheadedness, pain with breathing. Pt does not work. He spends his time walking 1 mile per day, doing housework and yard work. He has a h/o lumbar pain and has a morphine pump for this. Reports stability in the low back pain (rated 5/10 on avg) since getting the pump.       RE: 8/15/23  Pt Has noticed a large part of his neck pain at night is due to a misalignment that happens when he lies down. Last week, he felt his neck crack after lying down, and then had no pain and was able to sleep for hours for the first time in a while. He has a f/u with his dr regarding the neck in a few weeks. At this time, he'd like to transition care to his shoulders. Shoulder pain has been ongoing for several years. He notes a h/o L shoulder spurring. Pain is anterior shoulder bilaterally. Worse with movements like weed wacking; pain will be 5/10, then have immediate weakness and the pain lingers. Eased with doing fwd arm circles with band around the wrists. He will have occasional times of UE numbness and tingling L>R.   Patient Goals  Patient goals for therapy: decreased edema, decreased pain, improved balance, increased motion, return to work, return to [employer] activities, independence with ADLs/IADLs and increased strength          Objective     Postural Observations    Additional Postural Observation Details  Rounded shoulders, protracted scap bilat, R scap elevated, fwd head    Tenderness     Additional Tenderness Details  TTP with increased tone R scalenes, UT, LS, pec, biceps    (+) cervical compression  (-) distraction  TTP bilat prox biceps tendon, supraspin tendon    (-) sulcus  (-) painful arc    Neurological Testing     Sensation   Cervical/Thoracic   Left   Intact: light touch    Right   Intact: light touch    Reflexes   Left   Biceps (C5/C6): normal (2+)  Brachioradialis (C6): normal (2+)    Right   Biceps (C5/C6): normal (2+)  Brachioradialis (C6): normal (2+)    Active Range of Motion   Cervical/Thoracic Spine       Cervical    Flexion: 55 degrees  WFL  Extension: 45 degrees     Restriction level: minimal  Left lateral flexion: 35 degrees     with pain Restriction level: moderate  Right lateral flexion: 45 degrees     Restriction level minimal  Left rotation: 41 degrees Restriction level: minimal  Right rotation: 50 degrees    Restriction level: minimal    Thoracic    Flexion:  WFL  Extension:  Restriction level: maximal  Left rotation:  Restriction level: moderate  Right rotation:  Restriction level: minimal  Left Shoulder   Normal active range of motion    Right Shoulder   Normal active range of motion    Strength/Myotome Testing   Cervical Spine   Neck extension: 4+  Neck flexion: 4    Left   Neck lateral flexion (C3): 4    Right   Neck lateral flexion (C3): 4    Left Shoulder     Planes of Motion   Flexion: 5   Extension: 4+   Abduction: 5   External rotation at 0°: 5   Internal rotation at 0°: 5     Isolated Muscles   Lower trapezius: 4   Middle trapezius: 4+   Upper trapezius: 5     Right Shoulder     Planes of Motion   Flexion: 5   Extension: 4+   Abduction: 5   External rotation at 0°: 5   Internal rotation at 0°: 5     Isolated Muscles   Lower trapezius: 4   Middle trapezius: 4+   Upper trapezius: 5     Left Elbow   Flexion: 5  Extension: 5    Right Elbow   Flexion: 5  Extension: 5    Left Wrist/Hand   Wrist extension: 5  Wrist flexion: 5    Right Wrist/Hand   Wrist extension: 5  Wrist flexion: 5    Additional Strength Details   4+/5 bilat             Precautions: h/o cervical herniations with cervical joint and muscular stiffness; bilat RC tendinitis      HEP:  Access Code: [email or web address]  URL: [email or web address]  Date: 08/15/2023  Prepared by: Andrew Moss    Exercises  - Prone Shoulder Blade Squeeze  - 10 reps - 10 sec hold  - Prone T  - 10 reps - 10 sec hold    Manuals 7/12 7/18 7/20 7/25 7/27 8/1 8/3 8/8 8/15    RE         [facility]    SO, UT, LS STM    JANE         Scalene, UT, LS JANE WE JANE          [place], UT stretch  WE JANE          cspine PROM  WE JANE          Sh' AP, inf mob bilat         JANE    Sh' PROM bilat         JANE                              Neuro Re-Ed             Prone squeeze +hand lift off         10"x10    Prone T         10"x10    Prone hands behind head elbow lift off Ther Ex             UBE -cardio  2'/2' 2'/2' 3'/3' 3'/3' 3'/3' 3'/3' 3'/3' 3'/3'    Doorway pec 10"x3            Supine chin tuck    x10         Scalene/SCM stretch 10"x3 10"x3 ea           cspine AROM x10 x10 ea x10 ea x10 ea x10 ea x10 ea x10 ea x10 ea     Chin tuck x10 x10   x10 5"x10 5"x10 5"x10     cspine rot SNAG with towel     5"x10 ea 5"x6 ea 5"x6 ea 5"x5 ea     Sh' rolls bwd   x20          scap rtrxn  3"x10   5"x20 5"x20 5"x20 5"x20     cspine iso LF      5"x10 5"x10 5"x10     cspine iso flex      5"x10 P! 5"x10 5"x10     cspine iso ext      5"x10 P! 5"x10 5"x10     row  GTB  x10 btb x20 Grey 5"x20 mtb 5"x20 mtb 5"x20 55# 5"x20 55# 5"x20     Sh' ext  GTB  x10 btb x20 Grey 5"x20 mtb 5"x20 mtb 5"x20 55# 5"x20 55# 5"x20     LPD        100# 5"x20     Serratus foam roll up wall                                                                 Ther Activity                                       Gait Training                                       Modalities             MHP   Post manuals 5' Post manuals 8'

## 2023-08-15 NOTE — LETTER
August 15, 2023    Belkis Liz MD  [address] [place] [address]    Patient: Rhiannon Ahuja   YOB: 1975   Date of Visit: 8/15/2023     Encounter Diagnosis     ICD-10-CM    1. Cervical radiculopathy  M54.12           Dear Dr. Martinez Rather: Thank you for your recent referral of Rhiannon Ahuja. Please review the attached evaluation summary from Asher's recent visit. Please verify that you agree with the plan of care by signing the attached order. If you have any questions or concerns, please do not hesitate to call. I sincerely appreciate the opportunity to share in the care of one of your patients and hope to have another opportunity to work with you in the near future. Sincerely,    Dalton Stein, PT      Referring Provider:      I certify that I have read the below Plan of Care and certify the need for these services furnished under this plan of treatment while under my care. Belkis Liz MD  [address] [place] [address]  Via Fax: [phone]          PT Re-Evaluation     Today's date: 8/15/2023  Patient name: Rhiannon hAuja  : 1975  MRN: [id number]  Referring provider: Belkis Liz MD  Dx:   Encounter Diagnosis     ICD-10-CM    1.  Cervical radiculopathy  M54.12           Start Time: 1130          Assessment  Assessment details: Rhiannon Ahuja is a 41 y.o. male presenting to outpatient physical therapy at [facility] with complaints of neck pain.  They present with decreased range of motion, decreased strength, limited flexibility, poor postural awareness, poor body mechanics, poor balance, decreased tolerance to activity and decreased functional mobility due to Cervical radiculopathy  (primary encounter diagnosis). Emir Guerrero would benefit from skilled physical therapy to address noted impairments in order to allow for full functional return to work and ADL-related activities. Thank you for the referral!    RE: 8/15/23  Rhiannon Ahuja has attended physical therapy for 9 visits. In this time, they have made significant improvements in symptoms and function, as noted by subjective report, improved balance, ROM, strength, flexibility and activity tolerance. They have made positive goal progress with FOTO score improvement from 44 at initial evaluation to 48 currently. They do continue to have impairments in pain, ROM, strength, balance, flexibility and activity tolerance. Recommend continued skilled PT in order to maximize function. Impairments: abnormal muscle firing, abnormal or restricted ROM, activity intolerance, impaired balance, impaired physical strength, lacks appropriate home exercise program, pain with function and poor body mechanics  Barriers to therapy: n/a  Understanding of Dx/Px/POC: excellent  Goals  ST.  Independent with HEP in 2 weeks - met  2. Decrease pain by 50% in 3 wks - not met  3.  Increase cervical ROM to only mild restriction into rotation bilat in 3 weeks - met    LT. Achieve FOTO score of 57/100 in 6 weeks - near met  2.  Able to wake with 75% less pain in 6 weeks - near met  3. Strength = 5/5 sh' flex in 6 weeks - met      Plan  Plan details: RE in 6 weeks. Patient would benefit from: skilled physical therapy  Planned modality interventions: cryotherapy, electrical stimulation/Russian stimulation and thermotherapy: hydrocollator packs  Planned therapy interventions: abdominal trunk stabilization, manual therapy, neuromuscular re-education, therapeutic activities, therapeutic exercise, body mechanics training and home exercise program  Frequency: 2x week  Duration in visits: 12  Duration in weeks: 6  Plan of Care beginning date: 8/15/2023  Plan of Care expiration date: 2023  Treatment plan discussed with: patient        Subjective Evaluation    History of Present Illness  Mechanism of injury: Pt c/o cervical pain. Pt reports he has a h/o 3 cervical disc herniations.  He reports he gets steroid shots into the neck for pain relief, the most recent being June 5th on the L and May 8th on the R. Currently he is feeling a nerve pain in the R scalene region, was told its secondary to muscle tightness. Pain is described as constant aching. Rated 5/10. Worse in the AM with stiffness after sleeping. Will sleep in different positions because he has a bad back. Uses 1 pillow. No specific agg factors for the neck besides AM stiffness. He has tried exercises/stretches for it without relief. No other easing factors. Positive for BUE N/T when his neck pain is severe. Positive for headaches at the base of the skull every morning; will last 30-60 mins. Positive for BUE and hand weakness. Denies dizziness or lightheadedness, pain with breathing. Pt does not work. He spends his time walking 1 mile per day, doing housework and yard work. He has a h/o lumbar pain and has a morphine pump for this. Reports stability in the low back pain (rated 5/10 on avg) since getting the pump. RE: 8/15/23  Pt Has noticed a large part of his neck pain at night is due to a misalignment that happens when he lies down. Last week, he felt his neck crack after lying down, and then had no pain and was able to sleep for hours for the first time in a while. He has a f/u with his dr regarding the neck in a few weeks. At this time, he'd like to transition care to his shoulders. Shoulder pain has been ongoing for several years. He notes a h/o L shoulder spurring. Pain is anterior shoulder bilaterally. Worse with movements like weed wacking; pain will be 5/10, then have immediate weakness and the pain lingers. Eased with doing fwd arm circles with band around the wrists. He will have occasional times of UE numbness and tingling L>R.   Patient Goals  Patient goals for therapy: decreased edema, decreased pain, improved balance, increased motion, return to work, return to [employer] activities, independence with ADLs/IADLs and increased strength          Objective     Postural Observations    Additional Postural Observation Details  Rounded shoulders, protracted scap bilat, R scap elevated, fwd head    Tenderness     Additional Tenderness Details  TTP with increased tone R scalenes, UT, LS, pec, biceps    (+) cervical compression  (-) distraction  TTP bilat prox biceps tendon, supraspin tendon    (-) sulcus  (-) painful arc    Neurological Testing     Sensation   Cervical/Thoracic   Left   Intact: light touch    Right   Intact: light touch    Reflexes   Left   Biceps (C5/C6): normal (2+)  Brachioradialis (C6): normal (2+)    Right   Biceps (C5/C6): normal (2+)  Brachioradialis (C6): normal (2+)    Active Range of Motion   Cervical/Thoracic Spine       Cervical    Flexion: 55 degrees  WFL  Extension: 45 degrees     Restriction level: minimal  Left lateral flexion: 35 degrees     with pain Restriction level: moderate  Right lateral flexion: 45 degrees     Restriction level minimal  Left rotation: 41 degrees Restriction level: minimal  Right rotation: 50 degrees    Restriction level: minimal    Thoracic    Flexion:  WFL  Extension:  Restriction level: maximal  Left rotation:  Restriction level: moderate  Right rotation:  Restriction level: minimal  Left Shoulder   Normal active range of motion    Right Shoulder   Normal active range of motion    Strength/Myotome Testing   Cervical Spine   Neck extension: 4+  Neck flexion: 4    Left   Neck lateral flexion (C3): 4    Right   Neck lateral flexion (C3): 4    Left Shoulder     Planes of Motion   Flexion: 5   Extension: 4+   Abduction: 5   External rotation at 0°: 5   Internal rotation at 0°: 5     Isolated Muscles   Lower trapezius: 4   Middle trapezius: 4+   Upper trapezius: 5     Right Shoulder     Planes of Motion   Flexion: 5   Extension: 4+   Abduction: 5   External rotation at 0°: 5   Internal rotation at 0°: 5     Isolated Muscles   Lower trapezius: 4   Middle trapezius: 4+   Upper trapezius: 5 Left Elbow   Flexion: 5  Extension: 5    Right Elbow   Flexion: 5  Extension: 5    Left Wrist/Hand   Wrist extension: 5  Wrist flexion: 5    Right Wrist/Hand   Wrist extension: 5  Wrist flexion: 5    Additional Strength Details   4+/5 bilat            Precautions: h/o cervical herniations with cervical joint and muscular stiffness; bilat RC tendinitis      HEP:  Access Code: [email or web address]  URL: [email or web address]  Date: 08/15/2023  Prepared by: Misa Howard    Exercises  - Prone Shoulder Blade Squeeze  - 10 reps - 10 sec hold  - Prone T  - 10 reps - 10 sec hold    Manuals 7/12 7/18 7/20 7/25 7/27 8/1 8/3 8/8 8/15    RE         JANE    FOTO         JANE    SO, UT, LS STM    JANE         Scalene, UT, LS JANE WE JANE          Scal[place], UT stretch  WE JANE          cspine PROM  WE JANE          Sh' AP, inf mob bilat         JANE    Sh' PROM bilat         JANE                              Neuro Re-Ed             Prone squeeze +hand lift off         10"x10    Prone T         10"x10    Prone hands behind head elbow lift off                                                                 Ther Ex             UBE -cardio  2'/2' 2'/2' 3'/3' 3'/3' 3'/3' 3'/3' 3'/3' 3'/3'    Doorway pec 10"x3            Supine chin tuck    x10         Scalene/SCM stretch 10"x3 10"x3 ea           cspine AROM x10 x10 ea x10 ea x10 ea x10 ea x10 ea x10 ea x10 ea     Chin tuck x10 x10   x10 5"x10 5"x10 5"x10     cspine rot SNAG with towel     5"x10 ea 5"x6 ea 5"x6 ea 5"x5 ea     Sh' rolls bwd   x20          scap rtrxn  3"x10   5"x20 5"x20 5"x20 5"x20     cspine iso LF      5"x10 5"x10 5"x10     cspine iso flex      5"x10 P! 5"x10 5"x10     cspine iso ext      5"x10 P! 5"x10 5"x10     row  GTB  x10 btb x20 Grey 5"x20 mtb 5"x20 mtb 5"x20 55# 5"x20 55# 5"x20     Sh' ext  GTB  x10 btb x20 Grey 5"x20 mtb 5"x20 mtb 5"x20 55# 5"x20 55# 5"x20     LPD        100# 5"x20     Serratus foam roll up wall Ther Activity                                       Gait Training                                       Modalities             MHP   Post manuals 5' Post manuals 8'

## 2023-08-17 ENCOUNTER — OFFICE VISIT (OUTPATIENT)
Dept: PHYSICAL THERAPY | Facility: CLINIC | Age: 48
End: 2023-08-17
Payer: MEDICARE

## 2023-08-17 DIAGNOSIS — M54.12 CERVICAL RADICULOPATHY: Primary | ICD-10-CM

## 2023-08-17 DIAGNOSIS — G89.29 CHRONIC RIGHT SHOULDER PAIN: ICD-10-CM

## 2023-08-17 DIAGNOSIS — M25.511 CHRONIC RIGHT SHOULDER PAIN: ICD-10-CM

## 2023-08-17 DIAGNOSIS — G89.29 CHRONIC LEFT SHOULDER PAIN: ICD-10-CM

## 2023-08-17 DIAGNOSIS — M25.512 CHRONIC LEFT SHOULDER PAIN: ICD-10-CM

## 2023-08-17 PROCEDURE — 97110 THERAPEUTIC EXERCISES: CPT | Performed by: PHYSICAL THERAPIST

## 2023-08-17 PROCEDURE — 97140 MANUAL THERAPY 1/> REGIONS: CPT | Performed by: PHYSICAL THERAPIST

## 2023-08-17 NOTE — PROGRESS NOTES
Daily Note     Today's date: 2023  Patient name: Zoey Harrison  : 1975  MRN: [id number]  Referring provider: Cory Rocha MD  Dx:   Encounter Diagnosis     ICD-10-CM    1. Cervical radiculopathy  M54.12       2. Chronic left shoulder pain  M25.512     G89.29       3. Chronic right shoulder pain  M25.511     G89.29                      Subjective: Sore in the shoulders today      Objective: See treatment diary below      Assessment: Good tolerance to scap stabilization and RC eccentric exercise today. Tolerated treatment well. Patient demonstrated fatigue post treatment and would benefit from continued PT      Plan: Continue per plan of care. Precautions: h/o cervical herniations with cervical joint and muscular stiffness; bilat RC tendinitis      HEP:  Access Code: [email or web address]  URL: [email or web address]  Date: 08/15/2023  Prepared by: Andrew Moss    Exercises  - Prone Shoulder Blade Squeeze  - 10 reps - 10 sec hold  - Prone T  - 10 reps - 10 sec hold    Manuals 7/12 7/18 7/20 7/25 7/27 8/1 8/3 8/8 8/15 8/17   RE         [facility]    SO, UT, LS STM    JANE         Scalene, UT, LS JANE WE JANE          [place], UT stretch  WE JANE          cspine PROM  WE JANE          Sh' AP, inf mob bilat         JANE JANE   Sh' PROM bilat         JANE [facility]                             Neuro Re-Ed             Prone squeeze +hand lift off         10"x10 10"x10   Prone T         10"x10 10"x10   Prone hands behind head elbow lift off                                                                 Ther Ex             UBE -cardio  2'/2' 2'/2' 3'/3' 3'/3' 3'/3' 3'/3' 3'/3' 3'/3' 3'/3'   Doorway pec 10"x3            Supine chin tuck    x10         Scalene/SCM stretch 10"x3 10"x3 ea           cspine AROM x10 x10 ea x10 ea x10 ea x10 ea x10 ea x10 ea x10 ea     Chin tuck x10 x10   x10 5"x10 5"x10 5"x10     cspine rot SNAG with towel     5"x10 ea 5"x6 ea 5"x6 ea 5"x5 ea     Sh' rolls bwd   x20          scap rtrxn 3"x10   5"x20 5"x20 5"x20 5"x20     cspine iso LF      5"x10 5"x10 5"x10     cspine iso flex      5"x10 P! 5"x10 5"x10     cspine iso ext      5"x10 P! 5"x10 5"x10     row  GTB  x10 btb x20 Grey 5"x20 mtb 5"x20 mtb 5"x20 55# 5"x20 55# 5"x20     Sh' ext  GTB  x10 btb x20 Grey 5"x20 mtb 5"x20 mtb 5"x20 55# 5"x20 55# 5"x20     LPD        100# 5"x20     Serratus foam roll up wall          2x10   Standing sh' abd ecc          5" up  5" hold  5" down  x5 8#   Standing sh' scap ecc          5" up  5" hold  5" down  x5 6#   Standing sh' flex ecc          5" up  5" hold  5" down  x5 6#   S/L ER ecc          5" up  5" hold  5" down  x5 8#    S/L abd ecc             S/L flex ecc                                       Ther Activity                                       Gait Training                                       Modalities             MHP   Post manuals 5' Post manuals 8'

## 2023-08-22 ENCOUNTER — OFFICE VISIT (OUTPATIENT)
Dept: PHYSICAL THERAPY | Facility: CLINIC | Age: 48
End: 2023-08-22
Payer: MEDICARE

## 2023-08-22 DIAGNOSIS — G89.29 CHRONIC RIGHT SHOULDER PAIN: ICD-10-CM

## 2023-08-22 DIAGNOSIS — G89.29 CHRONIC LEFT SHOULDER PAIN: ICD-10-CM

## 2023-08-22 DIAGNOSIS — M25.512 CHRONIC LEFT SHOULDER PAIN: ICD-10-CM

## 2023-08-22 DIAGNOSIS — M25.511 CHRONIC RIGHT SHOULDER PAIN: ICD-10-CM

## 2023-08-22 DIAGNOSIS — M54.12 CERVICAL RADICULOPATHY: Primary | ICD-10-CM

## 2023-08-22 PROCEDURE — 97110 THERAPEUTIC EXERCISES: CPT | Performed by: PHYSICAL THERAPIST

## 2023-08-22 PROCEDURE — 97112 NEUROMUSCULAR REEDUCATION: CPT | Performed by: PHYSICAL THERAPIST

## 2023-08-22 PROCEDURE — 97140 MANUAL THERAPY 1/> REGIONS: CPT | Performed by: PHYSICAL THERAPIST

## 2023-08-22 NOTE — PROGRESS NOTES
Daily Note     Today's date: 2023  Patient name: Anastacio Rivas  : 1975  MRN: [id number]  Referring provider: Amanda Gandhi MD  Dx:   Encounter Diagnosis     ICD-10-CM    1. Cervical radiculopathy  M54.12       2. Chronic left shoulder pain  M25.512     G89.29       3. Chronic right shoulder pain  M25.511     G89.29           Start Time: 1133          Subjective: Felt like he tore a muscle in the upper back after lv so he took some time off the exercises. Denies bruising, weakness or pain with breathing. Objective: See treatment diary below      Assessment: Discussed with pt that he was likely experiencing muscle soreness and not torn muscles after last session. Decreased weight of dumbbells used today and performed mid trap STM end of session to avoid excessive soreness after. Tolerated treatment well. Patient demonstrated fatigue post treatment and would benefit from continued PT      Plan: Continue per plan of care. Precautions: h/o cervical herniations with cervical joint and muscular stiffness; miles RC tendinitis      HEP:  Access Code: [email or web address]  URL: [email or web address]  Date: 08/15/2023  Prepared by: Courtney Kendrick    Exercises  - Prone Shoulder Blade Squeeze  - 10 reps - 10 sec hold  - Prone T  - 10 reps - 10 sec hold    Manuals 8/22      8/3 8/8 8/15 8/17   RE         [address] [facility]    Prone sh' PA mob with hand behind head  [facility]            Sh' AP, inf mob bilat         JANE JANE   Sh' PROM bilat         JANE JANE   Mid trap STM  [facility]                         Neuro Re-Ed             Prone squeeze +hand lift off 10"x10        10"x10 10"x10   Prone T 10"x10        10"x10 10"x10   Prone hands behind head elbow lift off                                                                 Ther Ex             UBE -cardio 3'/3'      3'/3' 3'/3' 3'/3' 3'/3'   cspine AROM       x10 ea x10 ea     Chin tuck       5"x10 5"x10     cspine rot SNAG with towel       5"x6 ea 5"x5 ea     scap rtrxn 5"x20 5"x20     cspine iso LF       5"x10 5"x10     cspine iso flex       5"x10 5"x10     cspine iso ext       5"x10 5"x10     row       55# 5"x20 55# 5"x20     Sh' ext       55# 5"x20 55# 5"x20     LPD        100# 5"x20     Serratus foam roll up wall Blue bolst 2x10         2x10   Standing sh' abd ecc 5" up  5" hold  5" down  x5 6#         5" up  5" hold  5" down  x5 8#   Standing sh' scap ecc 5" up  5" hold  5" down  x5 6#         5" up  5" hold  5" down  x5 6#   Standing sh' flex ecc 5" up  5" hold  5" down  x5 6#         5" up  5" hold  5" down  x5 6#   S/L ER ecc 5" up  5" hold  5" down  x5 6#          5" up  5" hold  5" down  x5 8#    S/L abd ecc 5" up  5" hold  5" down  x5 6#             S/L flex ecc                                       Ther Activity                                       Gait Training                                       Modalities             [facility]

## 2023-08-24 ENCOUNTER — OFFICE VISIT (OUTPATIENT)
Dept: PHYSICAL THERAPY | Facility: CLINIC | Age: 48
End: 2023-08-24
Payer: MEDICARE

## 2023-08-24 DIAGNOSIS — M54.12 CERVICAL RADICULOPATHY: Primary | ICD-10-CM

## 2023-08-24 DIAGNOSIS — M25.511 CHRONIC RIGHT SHOULDER PAIN: ICD-10-CM

## 2023-08-24 DIAGNOSIS — M25.512 CHRONIC LEFT SHOULDER PAIN: ICD-10-CM

## 2023-08-24 DIAGNOSIS — G89.29 CHRONIC RIGHT SHOULDER PAIN: ICD-10-CM

## 2023-08-24 DIAGNOSIS — G89.29 CHRONIC LEFT SHOULDER PAIN: ICD-10-CM

## 2023-08-24 PROCEDURE — 97140 MANUAL THERAPY 1/> REGIONS: CPT | Performed by: PHYSICAL THERAPIST

## 2023-08-24 PROCEDURE — 97110 THERAPEUTIC EXERCISES: CPT | Performed by: PHYSICAL THERAPIST

## 2023-08-24 NOTE — PROGRESS NOTES
Daily Note     Today's date: 2023  Patient name: Lindsay Nguyen  : 1975  MRN: [id number]  Referring provider: Patricia Vanegas MD  Dx:   Encounter Diagnosis     ICD-10-CM    1. Cervical radiculopathy  M54.12       2. Chronic left shoulder pain  M25.512     G89.29       3. Chronic right shoulder pain  M25.511     G89.29           Start Time: 1133          Subjective: Sore bilateral anterior shoulders after prone mobilizations lv. Objective: See treatment diary below      Assessment: Held on prone mobilizations and added anterior shoulder stretching beginning of session. Still performed mid trap STM after prone TE. Tolerated treatment well. Patient demonstrated fatigue post treatment and would benefit from continued PT      Plan: Continue per plan of care. Precautions: h/o cervical herniations with cervical joint and muscular stiffness; miles RC tendinitis      HEP:  Access Code: [email or web address]  URL: [email or web address]  Date: 08/15/2023  Prepared by: Savi West    Exercises  - Prone Shoulder Blade Squeeze  - 10 reps - 10 sec hold  - Prone T  - 10 reps - 10 sec hold    Manuals 8/22 8/24       8/15 8/17   RE         [address] [facility]    Prone sh' PA mob with hand behind head  [facility]            Sh' AP, inf mob bilat         JANE JANE   Sh' PROM bilat         JANE JANE   Mid trap STM  [facility]                        Neuro Re-Ed             Prone squeeze +hand lift off 10"x10 10"x10       10"x10 10"x10   Prone T 10"x10 10"x10       10"x10 10"x10   Prone hands behind head elbow lift off                                                                 Ther Ex             UBE -cardio 3'/3' 3'/3'       3'/3' 3'/3'   Doorway pec  30"x2           Lat stretch  30"x2           Open book  5"x10 ea           Serratus foam roll up wall Blue bolst 2x10 Blue bolst 2x10        2x10   Standing sh' abd ecc 5" up  5" hold  5" down  x5 6# 5" up  5" hold  5" down  x5 6#         5" up  5" hold  5" down  x5 8#   Standing sh' scap ecc 5" up  5" hold  5" down  x5 6# 5" up  5" hold  5" down  x5 6#         5" up  5" hold  5" down  x5 6#   Standing sh' flex ecc 5" up  5" hold  5" down  x5 6# 5" up  5" hold  5" down  x5 6#         5" up  5" hold  5" down  x5 6#   S/L ER ecc 5" up  5" hold  5" down  x5 6#  5" up  5" hold  5" down  x5 6#         5" up  5" hold  5" down  x5 8#    S/L abd ecc 5" up  5" hold  5" down  x5 6#  5" up  5" hold  5" down  x5 6#            S/L flex ecc                                       Ther Activity                                       Gait Training                                       Modalities             [facility]

## 2023-08-28 ENCOUNTER — OFFICE VISIT (OUTPATIENT)
Dept: PHYSICAL THERAPY | Facility: CLINIC | Age: 48
End: 2023-08-28
Payer: MEDICARE

## 2023-08-28 DIAGNOSIS — M25.512 CHRONIC LEFT SHOULDER PAIN: ICD-10-CM

## 2023-08-28 DIAGNOSIS — M25.511 CHRONIC RIGHT SHOULDER PAIN: ICD-10-CM

## 2023-08-28 DIAGNOSIS — G89.29 CHRONIC LEFT SHOULDER PAIN: ICD-10-CM

## 2023-08-28 DIAGNOSIS — G89.29 CHRONIC RIGHT SHOULDER PAIN: ICD-10-CM

## 2023-08-28 DIAGNOSIS — M54.12 CERVICAL RADICULOPATHY: Primary | ICD-10-CM

## 2023-08-28 PROCEDURE — 97110 THERAPEUTIC EXERCISES: CPT | Performed by: PHYSICAL THERAPIST

## 2023-08-28 PROCEDURE — 97112 NEUROMUSCULAR REEDUCATION: CPT | Performed by: PHYSICAL THERAPIST

## 2023-08-28 PROCEDURE — 97140 MANUAL THERAPY 1/> REGIONS: CPT | Performed by: PHYSICAL THERAPIST

## 2023-08-28 NOTE — PROGRESS NOTES
Daily Note     Today's date: 2023  Patient name: Jignesh Marion  : 1975  MRN: [id number]  Referring provider: Kali Ruggiero MD  Dx:   Encounter Diagnosis     ICD-10-CM    1. Cervical radiculopathy  M54.12       2. Chronic left shoulder pain  M25.512     G89.29       3. Chronic right shoulder pain  M25.511     G89.29           Start Time: 1528          Subjective: PT has been helping the shoulders. Feels stronger with less pain. Objective: See treatment diary below      Assessment: Increased weight of eccentric exercises today without issue. Resting scap position has normalized, no longer elevated on the R. He does have a muscle knot R low thoracic region. Tolerated treatment well. Patient demonstrated fatigue post treatment and would benefit from continued PT      Plan: Continue per plan of care. Precautions: h/o cervical herniations with cervical joint and muscular stiffness; bilat RC tendinitis      HEP:  Access Code: [email or web address]  URL: [email or web address]  Date: 08/15/2023  Prepared by: Jani Mendoza    Exercises  - Prone Shoulder Blade Squeeze  - 10 reps - 10 sec hold  - Prone T  - 10 reps - 10 sec hold    Manuals 8/22 8/24 8/28      8/15 8/17   RE         [address] [facility]    Prone sh' PA mob with hand behind head [facility]            Sh' AP, inf mob bilat         JANE JANE   Sh' PROM bilat         JANE JANE   Mid trap STM [address] JANE                       Neuro Re-Ed             Prone squeeze +hand lift off 10"x10 10"x10 10"x10      10"x10 10"x10   Prone T 10"x10 10"x10 10"x10      10"x10 10"x10   Prone hands behind head elbow lift off                                                                 Ther Ex             UBE -cardio 3'/3' 3'/3' 3'/3'      3'/3' 3'/3'   Doorway pec  30"x2 30"x2          Lat stretch  30"x2 30"x2          Open book  5"x10 ea 5"x10 ea          Serratus foam roll up wall Blue bolst 2x10 Blue bolst 2x10 Blue bolst 2x10       2x10   Standing sh' abd ecc 5" up  5" hold  5" down  x5 6# 5" up  5" hold  5" down  x5 6#  5" up  5" hold  5" down  x5 7#       5" up  5" hold  5" down  x5 8#   Standing sh' scap ecc 5" up  5" hold  5" down  x5 6# 5" up  5" hold  5" down  x5 6#  5" up  5" hold  5" down  x5 7#       5" up  5" hold  5" down  x5 6#   Standing sh' flex ecc 5" up  5" hold  5" down  x5 6# 5" up  5" hold  5" down  x5 6#  5" up  5" hold  5" down  x5 7#       5" up  5" hold  5" down  x5 6#   S/L ER ecc 5" up  5" hold  5" down  x5 6#  5" up  5" hold  5" down  x5 6#  5" up  5" hold  5" down  x5 7#        5" up  5" hold  5" down  x5 8#    S/L abd ecc 5" up  5" hold  5" down  x5 6#  5" up  5" hold  5" down  x5 6#  5" up  5" hold  5" down  x5 7#           S/L flex ecc                                       Ther Activity                                       Gait Training                                       Modalities             [facility]

## 2023-08-30 ENCOUNTER — OFFICE VISIT (OUTPATIENT)
Dept: PHYSICAL THERAPY | Facility: CLINIC | Age: 48
End: 2023-08-30
Payer: MEDICARE

## 2023-08-30 DIAGNOSIS — M25.511 CHRONIC RIGHT SHOULDER PAIN: ICD-10-CM

## 2023-08-30 DIAGNOSIS — M54.12 CERVICAL RADICULOPATHY: Primary | ICD-10-CM

## 2023-08-30 DIAGNOSIS — G89.29 CHRONIC LEFT SHOULDER PAIN: ICD-10-CM

## 2023-08-30 DIAGNOSIS — M25.512 CHRONIC LEFT SHOULDER PAIN: ICD-10-CM

## 2023-08-30 DIAGNOSIS — G89.29 CHRONIC RIGHT SHOULDER PAIN: ICD-10-CM

## 2023-08-30 PROCEDURE — 97112 NEUROMUSCULAR REEDUCATION: CPT | Performed by: PHYSICAL THERAPIST

## 2023-08-30 PROCEDURE — 97110 THERAPEUTIC EXERCISES: CPT | Performed by: PHYSICAL THERAPIST

## 2023-08-30 PROCEDURE — 97140 MANUAL THERAPY 1/> REGIONS: CPT | Performed by: PHYSICAL THERAPIST

## 2023-08-30 NOTE — PROGRESS NOTES
Daily Note - d/c summary    Today's date: 2023  Patient name: Samra Palomino  : 1975  MRN: [id number]  Referring provider: Griselda Manzano MD  Dx:   Encounter Diagnosis     ICD-10-CM    1. Cervical radiculopathy  M54.12       2. Chronic left shoulder pain  M25.512     G89.29       3. Chronic right shoulder pain  M25.511     G89.29           Start Time: 1528          Subjective: PT has been helping the shoulders. Feels stronger with less pain. Ready for d/c to independent HEP. Objective: See treatment diary below      Assessment: Samra Palomino has attended physical therapy for 14 visits. In this time, they have made significant improvements in symptoms and function, as noted by subjective report, improved balance, ROM, strength, flexibility and activity tolerance. They have made positive goal progress with FOTO score improvement. Recommend d/c to HEP at this time. Goals  ST.  Independent with HEP in 2 weeks - met  2. Decrease pain by 50% in 3 wks - met  3.  Increase cervical ROM to only mild restriction into rotation bilat in 3 weeks - met    LT. Achieve FOTO score of 57/100 in 6 weeks - near met  2.  Able to wake with 75% less pain in 6 weeks - met  3. Strength = 5/5 sh' flex in 6 weeks - met    Plan: D/c to HEP. Precautions: h/o cervical herniations with cervical joint and muscular stiffness; bilat RC tendinitis      HEP:  Access Code: [email or web address]  URL: [email or web address]  Date: 2023  Prepared by: Lashonda Nichols    Exercises  - Doorway Pec Stretch at 60 Elevation  - 2 sets - 30 sec hold  - Standing Shoulder and Trunk Flexion at Table  - 2 sets - 30 sec hold  - Sidelying Thoracic Rotation with Open Book  - 10 reps - 5 sec hold  - Prone A  - 10 reps - 10 sec hold  - Prone T  - 10 reps - 10 sec hold  - Sidelying Shoulder External Rotation  - 5 reps - 5 sec hold  - Sidelying Shoulder Abduction  - 5 reps - 5 sec hold  - Standing Shoulder Flexion to 90 Degrees with Dumbbells  - 5 reps - 5 sec hold  - Scaption with Dumbbells  - 5 reps - 5 sec hold  - Shoulder Abduction with Dumbbells - Palms Down  - 5 reps - 5 sec hold  - Serratus Activation at [address]  - 2 sets - 10 reps    Manuals 8/22 8/24 8/28 8/30     8/15 8/17   RE    [address]     JANE    Prone sh' PA mob with hand behind head [facility]            Sh' AP, inf mob bilat         JANE JANE   Sh' PROM bilat         JANE JANE   Mid trap STM JANE JANE JANE JANE                      Neuro Re-Ed             Prone squeeze +hand lift off 10"x10 10"x10 10"x10 10"x10     10"x10 10"x10   Prone T 10"x10 10"x10 10"x10 10"x10     10"x10 10"x10   Prone hands behind head elbow lift off                                                                 Ther Ex             UBE -cardio 3'/3' 3'/3' 3'/3' 3'/3'     3'/3' 3'/3'   Doorway pec  30"x2 30"x2 30"x2         Lat stretch  30"x2 30"x2 30"x2         Open book  5"x10 ea 5"x10 ea 5"x10 ea         Serratus foam roll up wall Blue bolst 2x10 Blue bolst 2x10 Blue bolst 2x10 Blue bolst 2x10      2x10   Standing sh' abd ecc 5" up  5" hold  5" down  x5 6# 5" up  5" hold  5" down  x5 6#  5" up  5" hold  5" down  x5 7# 5" up  5" hold  5" down  x5 7#      5" up  5" hold  5" down  x5 8#   Standing sh' scap ecc 5" up  5" hold  5" down  x5 6# 5" up  5" hold  5" down  x5 6#  5" up  5" hold  5" down  x5 7# 5" up  5" hold  5" down  x5 7#      5" up  5" hold  5" down  x5 6#   Standing sh' flex ecc 5" up  5" hold  5" down  x5 6# 5" up  5" hold  5" down  x5 6#  5" up  5" hold  5" down  x5 7# 5" up  5" hold  5" down  x5 7#      5" up  5" hold  5" down  x5 6#   S/L ER ecc 5" up  5" hold  5" down  x5 6#  5" up  5" hold  5" down  x5 6#  5" up  5" hold  5" down  x5 7#  5" up  5" hold  5" down  x5 7#      5" up  5" hold  5" down  x5 8#    S/L abd ecc 5" up  5" hold  5" down  x5 6#  5" up  5" hold  5" down  x5 6#  5" up  5" hold  5" down  x5 7#  5" up  5" hold  5" down  x5 7#         S/L flex ecc Ther Activity                                       Gait Training                                       Modalities             P

## 2023-09-03 DIAGNOSIS — G47.411 CATAPLEXY: ICD-10-CM

## 2023-09-04 RX ORDER — VENLAFAXINE 37.5 MG/1
TABLET ORAL
Qty: 30 TABLET | Refills: 0 | Status: SHIPPED | OUTPATIENT
Start: 2023-09-04

## 2023-09-25 DIAGNOSIS — G47.411 CATAPLEXY: ICD-10-CM

## 2023-09-25 RX ORDER — VENLAFAXINE 37.5 MG/1
TABLET ORAL
Qty: 30 TABLET | Refills: 3 | Status: SHIPPED | OUTPATIENT
Start: 2023-09-25

## 2023-12-15 ENCOUNTER — OFFICE VISIT (OUTPATIENT)
Dept: SLEEP CENTER | Facility: CLINIC | Age: 48
End: 2023-12-15
Payer: MEDICARE

## 2023-12-15 VITALS
SYSTOLIC BLOOD PRESSURE: 140 MMHG | HEIGHT: 71 IN | HEART RATE: 76 BPM | DIASTOLIC BLOOD PRESSURE: 83 MMHG | WEIGHT: 212 LBS | BODY MASS INDEX: 29.68 KG/M2

## 2023-12-15 DIAGNOSIS — G47.33 OBSTRUCTIVE SLEEP APNEA TREATED WITH CONTINUOUS POSITIVE AIRWAY PRESSURE (CPAP): Primary | ICD-10-CM

## 2023-12-15 DIAGNOSIS — G47.19 EXCESSIVE DAYTIME SLEEPINESS: ICD-10-CM

## 2023-12-15 PROCEDURE — 99213 OFFICE O/P EST LOW 20 MIN: CPT | Performed by: PSYCHIATRY & NEUROLOGY

## 2023-12-15 NOTE — PROGRESS NOTES
Assessment/Plan:    1. Obstructive sleep apnea treated with continuous positive airway pressure (CPAP)  -     CPAP Auto New DME    2. Excessive daytime sleepiness  -     CPAP Auto New DME      Mr. Solitario Guillory is doing great, he is consistently using his CPAP machine and treatment is beneficial and effective. He has a DreamStation 2 machine, we reviewed the recent FDA safety warning. I believe he has not received a CPAP machine through Medicare in over 5 years and should be eligible for new machine. We discussed this and he wishes to receive a new machine. I therefore have ordered a ResMed AirSense 11. I set the range at 7 to 9 cm H2O. We discussed he receives a new machine I only see him back for a compliance visit    In the past with diagnose of cataplexy/narcolepsy was considered. Venlafaxine has been effective he has not had any episodes that have been cataplexy like. He did not have a good reaction to modafinil in the past so I would not revisit this. He knows to avoid drowsy driving. Subjective:      Patient ID: Dannielle Ruiz is a 41 y.o. male. HPI    This is a 26-year-old male who returns in a follow-up visit, he last saw Dr. William Gill and myself in June 2023. He has a history of obstructive sleep apnea, mild with a respiratory event index of 6.7 at baseline in 2021, treated with CPAP. There was a question of cataplexy and narcolepsy in the past, has been treated with venlafaxine. He has had an EEG, EMU admission, and ambulatory EEG which were normal.  In the past he was on levetiracetam but does not use this medication at present. Since his last visit with me, he saw otolaryngology in November 2023. Their notes describe nonseasonal allergic rhinitis and recommend continued treat with immunotherapy, nasal steroids, and nasal moisturizer. No sleep paralysis. No recent cataplexy like episodes since taking venlafaxine    [place] Sleepiness Scale  Sitting and reading:  Moderate chance of dozing  Watching TV: Slight chance of dozing  Sitting, inactive in a public place (e.g. a theatre or a meeting): Slight chance of dozing  As a passenger in a car for an hour without a break: Slight chance of dozing  Lying down to rest in the afternoon when circumstances permit: Moderate chance of dozing  Sitting and talking to someone: Slight chance of dozing  Sitting quietly after a lunch without alcohol: Slight chance of dozing  In a car, while stopped for a few minutes in traffic: Would never doze  Total score: 9    He has been going to bed 10 pm, up 7-9 am   Wakes 1-2 times a night    Feels sleepy at times  Naps-every other day  Not dozing much, this is better  No drowsy driving    No dry nose or mouth with PAP. No nose bleeds.   No aerophagia       PAP Data  Dreamstation 2  set t 8 cm h20  Uses 30/30 days  Average session 8 hr 42 min   AHI 3.4  No leak   Nasal pillows     Review of Systems    No anxiety  No depression    Objective:      /83 (BP Location: Left arm, Patient Position: Sitting, Cuff Size: Large)   Pulse 76   Ht 5' 11" (1.803 m)   Wt 96.2 kg (212 lb)   BMI 29.57 kg/m²          Physical Exam

## 2023-12-18 ENCOUNTER — TELEPHONE (OUTPATIENT)
Dept: SLEEP CENTER | Facility: CLINIC | Age: 48
End: 2023-12-18

## 2023-12-18 NOTE — TELEPHONE ENCOUNTER
1/2/24 set up [place]    Rx and clinicals for CPAP DME setup sent to [facility] [employer] via [employer].

## 2023-12-19 LAB

## 2023-12-21 ENCOUNTER — HOSPITAL ENCOUNTER (OUTPATIENT)
Dept: RADIOLOGY | Facility: HOSPITAL | Age: 48
End: 2023-12-21
Payer: MEDICARE

## 2023-12-21 DIAGNOSIS — M45.0 ANKYLOSING SPONDYLITIS OF MULTIPLE SITES IN SPINE (HCC): ICD-10-CM

## 2023-12-21 DIAGNOSIS — M25.551 RIGHT HIP PAIN: ICD-10-CM

## 2023-12-21 PROCEDURE — 73521 X-RAY EXAM HIPS BI 2 VIEWS: CPT

## 2023-12-30 LAB
DME PARACHUTE DELIVERY DATE EXPECTED: NORMAL
DME PARACHUTE DELIVERY DATE REQUESTED: NORMAL
DME PARACHUTE DELIVERY NOTE: NORMAL
DME PARACHUTE ITEM DESCRIPTION: NORMAL
DME PARACHUTE ORDER STATUS: NORMAL
DME PARACHUTE SUPPLIER NAME: NORMAL
DME PARACHUTE SUPPLIER PHONE: NORMAL

## 2024-01-02 LAB

## 2024-01-11 DIAGNOSIS — G47.411 CATAPLEXY: ICD-10-CM

## 2024-01-11 RX ORDER — VENLAFAXINE 37.5 MG/1
TABLET ORAL
Qty: 30 TABLET | Refills: 3 | Status: SHIPPED | OUTPATIENT
Start: 2024-01-11

## 2024-02-16 ENCOUNTER — OFFICE VISIT (OUTPATIENT)
Dept: SLEEP CENTER | Facility: CLINIC | Age: 49
End: 2024-02-16
Payer: MEDICARE

## 2024-02-16 VITALS
SYSTOLIC BLOOD PRESSURE: 121 MMHG | BODY MASS INDEX: 34.16 KG/M2 | HEIGHT: 71 IN | HEART RATE: 66 BPM | WEIGHT: 244 LBS | DIASTOLIC BLOOD PRESSURE: 70 MMHG

## 2024-02-16 DIAGNOSIS — G47.19 EXCESSIVE DAYTIME SLEEPINESS: ICD-10-CM

## 2024-02-16 DIAGNOSIS — G47.33 OBSTRUCTIVE SLEEP APNEA TREATED WITH CONTINUOUS POSITIVE AIRWAY PRESSURE (CPAP): Primary | ICD-10-CM

## 2024-02-16 DIAGNOSIS — E66.9 CLASS 1 OBESITY WITH BODY MASS INDEX (BMI) OF 34.0 TO 34.9 IN ADULT, UNSPECIFIED OBESITY TYPE, UNSPECIFIED WHETHER SERIOUS COMORBIDITY PRESENT: ICD-10-CM

## 2024-02-16 PROBLEM — E66.811 CLASS 1 OBESITY WITH BODY MASS INDEX (BMI) OF 34.0 TO 34.9 IN ADULT, UNSPECIFIED OBESITY TYPE, UNSPECIFIED WHETHER SERIOUS COMORBIDITY PRESENT: Status: ACTIVE | Noted: 2024-02-16

## 2024-02-16 PROCEDURE — 99214 OFFICE O/P EST MOD 30 MIN: CPT

## 2024-02-16 NOTE — PROGRESS NOTES
[facility]  Sleep Medicine Follow Up/Established Patient    PATIENT NAME: Asher Medel  DATE OF SERVICE: February 16, 2024  DATE OF LAST VISIT: 12/15/2023    ASSESSMENT/PLAN:  Asher Medel is a 48 y.o. male with PMHx of STEPHANIE on CPAP, ankylosing spondylitis with chronic back pain s/p morphine pump, HLD and obesity who returns to the office for follow-up of STEPHANIE.    Obstructive Sleep Apnea on CPAP  Excessive Daytime Sleepiness  Obesity  -The patient has a complex history of excessive daytime sleepiness and prior suspicion for idiopathic hypersomnia or narcolepsy; however, at this time has felt to only have mild STEPHANIE (ARIC 6.7, O2 genesis 84%, 2021).  He is currently on auto CPAP 7-9 cmH2O with good benefit and compliance.  Additionally he has historically been on venlafaxine 37.5 mg due to episodes that were previously felt to not be consistent with cataplexy, but responded well to the addition of venlafaxine.  -Therapy and compliance data reviewed: Residual AHI <5, compliance 90% and no significant mask leak.  - Continue APAP 7-9 cmH2O with a nasal pillows mask, refill supplies were sent to the patient's DME.  - As the patient is currently doing well on venlafaxine 37.5 mg and does not have any significant side effects we will plan to continue this medication at this time.  - Explained the importance of keeping the machine clean, and that they should be eligible for refills of supplies every 3-6 months depending on insurance.   - Encouraged healthy lifestyle with adequate sleep (7-9 hours per night), healthy balanced diet and routine exercise.  Explained the importance of avoiding driving while drowsy.  - Follow-up in 1 year.    ________________________________________________________________________________________________    Interval History: Asher Medel is a 48 y.o. male with PMHx of STEPHANIE on CPAP, ankylosing spondylitis with chronic back pain s/p morphine pump, HLD and obesity who returns to  "the office for follow-up of STEPHANIE.  During his prior visit he was prescribed a new machine as his prior DS 2 needed to be replaced.  The patient reports he is doing well on his new CPAP machine, has no acute complaints regarding the machine or his sleep at this time.  He states he is continue to not have any further cataplexy-like episodes since being on the venlafaxine.  He also denies episodes of sleep arousals at this time or any episodes of \"rage\" as described during prior visits.  He states he feels fairly awake during the day, and maintains an ESS <10.  He does note his sleep can be interrupted at times due to his back pain, but denies any snoring, witnessed apneas or gasping/choking as well as he is wearing his CPAP.  He denies symptoms consistent with RLS at this time    PAP History  Sleep Apnea Type: STEPHANIE  Most Recent AHI: 6.7 on 3/10/2021  Treatment: APAP    DME: [facility]    Uses APAP for 8.5 hours per night, 6-7 nights per week.  Current PAP Settings: 7-9 cmH2O  Compliance Data: 93%    Mask Type: nasal pillows  PAP Issues: None  Uses Chin Strap: Sometimes  Uses Ramp Function: Yes   Uses Humidity: Yes     There is a perceived benefit by the patient: feels more awake when using CPAP  No residual snoring that he is aware of, doesn't seem to be opening his mouth at night.    Prior History: The patient reportedly began having symptoms of daytime sleepiness and episodes of altered level of consciousness with sleep paralysis and hypnagogic hallucinations in 2007.  He had an MSLT at [facility] in 2008 that was not consistent with narcolepsy, but was empirically started on modafinil 200 mg which worsened his daytime sleepiness.  He was later switched to Adderall XR and then Vyvanse which also increased his sleepiness.  He then saw sleep medicine at [facility] in 2008 and PSG/MSLT showed an MSLT of 12.3 min with no SOREMPs.  He was started on selegeline with no response.  A repeat PSG in 2013 showed mild STEPHANIE with an AHI of " "5.9 worsening of 10.3 and REM sleep and presence of central events.  He was started on CPAP at this time, but did not have significant improvement in his daytime sleepiness.  There is been some question of potential seizure disorder; however, workup has been unremarkable.  He was previously on Keppra 500 mg which was decreased at some point to 250 mg due to \"brain fog\" and this is improved some of the episodes of altered level consciousness/rage that he was describing.  More recently he was started on venlafaxine 37.5 mg due to reports of intermittent weakness in his face, neck and arms and noted improvement in these episodes with the venlafaxine.  He was eventually taken off the Keppra, but remains on the venlafaxine this time.  In 2020 he had a morphine pump inserted for chronic pain from ankylosing spondylitis and noted an improvement in his hypersomnia after this.  As of now he does not carry the diagnosis of IH or narcolepsy given a prior negative PSG/MSLT testing, but remains on the venlafaxine.  Per prior notes his description of these events is not [employer] consistent with cataplexy, and he was reporting sleep paralysis that was lasting hours previously.  He remains on CPAP at this time and had a repeat HSAT in 2021 which showed mild STEPHANIE with an ARIC of 6.7 and O2 genesis of 84%.  He was last seen 3 months ago at which time he was doing well and plan was to continue on the venlafaxine and the CPAP.  It was noted that his prior MARILEE to need to be replaced, and he was prescribed a new machine at that time.    ESS: Total score: 8/24  Greater or equal to 10 is positive for excessive daytime sleepiness  Pertinent Meds: venlafaxine 37.5mg qd    Sleep-Wake Schedule:  Bedtime: 6431-5207  Wake Time: 2954-0621  Difficulty Falling Asleep: occasionally if he is having back pain, can range from 15-60 min  Avg Number of Awakenings: 1-2x  Cause of Awakenings: back pain related  Weekend Sleep Schedule: unchanged  Naps: 2-3x a " week for 10 min to 2 hr    Avg TST per 24 hours: 6-8 hours    Past Treatments:  CPAP 7-9 cmH2O  Modafinil  Vyvanse  Adderall XR  Keppra 250-500 mg  Venlafaxine 37.5 mg    Prior Sleep Studies:  HSAT -- 3/10/2021  ARIC - 6.7  O2 Yousif - 84.0%  TST < 90% - 0.1% TRT    Other Relevant Labs and Studies:  Therapy and Compliance Data -- 1/17/2024 - 2/15/2024      Past Medical History:   Diagnosis Date    Ankylosing spondylitis (HCC)     Arthritis     Chronic pain     DJD (degenerative joint disease)     Herniated disc, cervical     Narcolepsy     Psoriatic arthritis (HCC)     Seizures (HCC)     Sleep apnea     Sleep apnea      Past Surgical History:   Procedure Laterality Date    IMPLANTATION / PLACEMENT EPIDURAL NEUROSTIMULATOR ELECTRODES      INTRATHECAL PUMP IMPLANTATION Left     morphine pump    RHINOPLASTY      TONSILLECTOMY       Patient Active Problem List   Diagnosis    Idiopathic hypersomnia    Obstructive sleep apnea treated with continuous positive airway pressure (CPAP)    Chronic pain    Primary narcolepsy with cataplexy    Closed fracture of sesamoid bone of right foot    Psoriasis with arthropathy (HCC)    Ankylosing spondylitis (HCC)    Allergic rhinitis    DDD (degenerative disc disease), lumbosacral    Fatigue    Herniated cervical disc    Mixed hyperlipidemia    Neuropathy, peripheral    Obesity    Transient neurological symptoms    Excessive daytime sleepiness     Allergies as of 02/16/2024 - Reviewed 02/16/2024   Allergen Reaction Noted    Allyl isothiocyanate Anaphylaxis 11/03/2020    Bee venom Anaphylaxis 06/13/2022    Broccoli [brassica oleracea - food allergy] Anaphylaxis 11/03/2020    Diclofenac Other (See Comments) 06/06/2016    Medical tape Other (See Comments) 06/04/2018    Methotrexate Palpitations 06/06/2016    Mustard seed - food allergy Anaphylaxis 06/13/2022    Other Rash and Other (See Comments) 05/20/2016    Acetaminophen Diarrhea, GI Intolerance, and Abdominal Pain 12/27/2018    Aspirin  "GI Intolerance 12/31/2020    Celecoxib Other (See Comments) 06/06/2016    Diclofenac sodium  06/06/2016    Methotrexate derivatives  09/20/2013    Infliximab Rash 12/27/2018    Penicillins Rash and Other (See Comments) 07/09/2008     REVIEW OF SYSTEMS:  Review of Systems  10-point system review completed, all of which are negative except as mentioned above.    CURRENT MEDICATIONS:  Current Outpatient Medications   Medication Instructions    B-D TB SYRINGE 1CC/27GX1/2\" 27G X 1/2\" 1 ML MISC USE THREE WEEKLY FOR ALLERGY INJECTIONS    clobetasol (TEMOVATE) 0.05 % cream 2 times daily    EPINEPHrine (EPIPEN) 0.3 mg, Intramuscular, Once    fluticasone (FLONASE) 50 mcg/act nasal spray 1 spray, Nasal, 2 times daily    GaviLyte-C 240 g solution FOLLOW INSTRUCTIONS FROM PEDRO LUIS PREP SHEET    Insulin Syringe-Needle U-100 27.5G X 5/8\" 2 ML MISC 3 syringes weekly for allergy injections    LINZESS 145 MCG CAPS 1 (one) Capsule daily on an empty stomach, at least 30 mn before first meal    NON FORMULARY 0.5 mL, Subcutaneous, Weekly, Administer 0.5 ml Subq of Serum #1 (Cat, Dog, Cockroach (CDCR)) weekly<BR><BR>Administer 0.5 ml Subq of Serum #2 (Tree, Tree, Weed) (TTW)) weekly    NON FORMULARY 0.3 mg, Morphine pump    Orencia 1,000 mg, Intravenous, Every 28 days    PEG 3350-KCl-NaBcb-NaCl-NaSulf (PEG 3350/Electrolytes) 240 g SOLR     tiZANidine (ZANAFLEX) 4 mg, Oral, 4 times daily PRN    venlafaxine (EFFEXOR) 37.5 mg tablet Take 1 tablet by mouth once daily     SOCIAL HISTORY:  Social History     Tobacco Use    Smoking status: Never    Smokeless tobacco: Never   Substance Use Topics    Alcohol use: No    Drug use: No     FAMILY HISTORY:  Family History   Problem Relation Age of Onset    Cancer Mother     Diabetes Father     Cancer Father     Heart disease Father     Narcolepsy Father     Sleep apnea Father     Narcolepsy Paternal Uncle     Narcolepsy Paternal Grandmother      PHYSICAL EXAMINATION:  Vital Signs:  /70 (BP Location: " "Left arm, Patient Position: Sitting, Cuff Size: Large)   Pulse 66   Ht 5' 11\" (1.803 m)   Wt 111 kg (244 lb)   BMI 34.03 kg/m²   Body mass index is 34.03 kg/m².  Constitutional: NAD, well appearing, obese  Mental Status: AAOx3  Skin: Warm, dry, no rashes noted   Eyes: PERRL, normal conjunctiva  ENT: Nasal congestion absent, nasal valve incompetence absent.  Posterior Airspace:   [place] Tongue Position: 3  Retrognathia: absent  Overbite: absent  High Arched Palate: absent  Tongue Scalloping/Ridging: present  Tonsils: 1+  Uvula: normal  Chest: RRR, +S1/S2, CTA B/L, no W/R/R   Abdomen: Soft, NT/ND  Extremities: No digital clubbing or pedal edema    I have spent a total time of 30 minutes on 02/16/24 in caring for this patient including Instructions for management, Patient and family education, Importance of tx compliance, Counseling / Coordination of care, Documenting in the medical record, Reviewing / ordering tests, medicine, procedures  , and Obtaining or reviewing history  .    Winter Dumont MD  Pulmonary-Critical Care and Sleep Medicine  02/16/24    Portions of the record may have been created with voice recognition software. Occasional wrong word or \"sound a like\" substitutions may have occurred due to the inherent limitations of voice recognition software. Please read the chart carefully and recognize, using context, where substitutions have occurred.   "

## 2024-02-16 NOTE — PATIENT INSTRUCTIONS
Continue PAP Therapy  - Continue APAP at 7-9 cmH2O.  Remember to clean your mask and equipment regularly, as directed.  You should be eligible for new supplies approximately every 3-6 months, depending on your insurance coverage. Contact your Durable Medical Equipment (DME) company for new supplies as needed.  Follow up in 6 months with Dr. Macedo    Care and Maintenance  Headgear should be washed as needed. Daily inspection and weekly washings are recommended. Do not disassemble the straps. Machine wash in warm water, making sure to attach Velcro hooks and tabs before washing. Line dry or machine dry on a low setting.  Masks should be washed every day. Daily inspection is recommended. Leave the mask and tubing attached. Gently wash the mask with a soft cloth using warm water and mild detergent, concentrating on the mask cushion flaps. DO NOT use alcohol or bleach. Rinse thoroughly and air dry.  Tubing and headgear should be washed weekly. Daily inspection is recommended. Wash in warm water and mild detergent and rinse thoroughly. Hook the tubing to the machine and blow until dry.  Humidifier should be washed daily and filled with DISTILLED water before use. Wash with warm water and mild detergent. Disinfect weekly by soaking with a solution of 1 part white vinegar and 3 parts water for 30 minutes. Rinse thoroughly and air dry.  Disposable filters should be replaced once a month. Wash reusable foam filters with warm water and mild detergent at least once a month. Rinse thoroughly and dry with paper towels.  Avoid  that contain fragrance or conditioners, as these will leave a residue.  NEVER iron any soft goods.    Insurance Requirements  Your insurance requires a face-to-face follow up visit within a 31-90 day period after starting PAP therapy.  Your insurance requires compliance with your PAP device, which is at least 4 hours per night for 70% of the time. This must be done over a 30 day period and must  occur within the initial 31-90 day period after starting CPAP.  Your insurance also requires at least yearly follow ups to continue to pay for CPAP supplies.     PAP Supply Guidelines  Below are the guidelines for reordering your supplies. You will be responsible for your deductible, co payments, and out of pocket expenses.    Item Frequency   Nasal Mask (no headgear) 1 every 3 months   Nasal Mask Cushion 1 every 2 weeks   Full Face Mask (no headgear) 1 every 3 months   Full Face Mask Cushion 1 every month   Nasal Pillows 1 every 2 weeks   Headgear 1 every 6 months   hin Strap 1 every 6 months   parisa 1 every 3 months   Filters: Reusable 1 every 6 months   Filters: Disposable 1 every 2 weeks   Humidifier Chamber(disposable) 1 every 6 months

## 2024-02-20 ENCOUNTER — TELEPHONE (OUTPATIENT)
Dept: SLEEP CENTER | Facility: CLINIC | Age: 49
End: 2024-02-20

## 2024-02-21 LAB

## 2024-04-28 DIAGNOSIS — G47.411 CATAPLEXY: ICD-10-CM

## 2024-04-28 RX ORDER — VENLAFAXINE 37.5 MG/1
TABLET ORAL
Qty: 30 TABLET | Refills: 0 | Status: SHIPPED | OUTPATIENT
Start: 2024-04-28

## 2024-05-26 DIAGNOSIS — G47.411 CATAPLEXY: ICD-10-CM

## 2024-05-26 RX ORDER — VENLAFAXINE 37.5 MG/1
TABLET ORAL
Qty: 30 TABLET | Refills: 0 | Status: SHIPPED | OUTPATIENT
Start: 2024-05-26

## 2024-06-22 DIAGNOSIS — G47.411 CATAPLEXY: ICD-10-CM

## 2024-06-23 RX ORDER — VENLAFAXINE 37.5 MG/1
TABLET ORAL
Qty: 30 TABLET | Refills: 0 | Status: SHIPPED | OUTPATIENT
Start: 2024-06-23

## 2024-07-19 DIAGNOSIS — G47.411 CATAPLEXY: ICD-10-CM

## 2024-07-19 RX ORDER — VENLAFAXINE 37.5 MG/1
TABLET ORAL
Qty: 30 TABLET | Refills: 1 | Status: SHIPPED | OUTPATIENT
Start: 2024-07-19

## 2024-07-24 ENCOUNTER — HOSPITAL ENCOUNTER (EMERGENCY)
Facility: HOSPITAL | Age: 49
Discharge: HOME | End: 2024-07-24
Attending: EMERGENCY MEDICINE
Payer: MEDICARE

## 2024-07-24 VITALS
SYSTOLIC BLOOD PRESSURE: 127 MMHG | DIASTOLIC BLOOD PRESSURE: 80 MMHG | TEMPERATURE: 98.1 F | RESPIRATION RATE: 17 BRPM | HEART RATE: 67 BPM | OXYGEN SATURATION: 97 %

## 2024-07-24 DIAGNOSIS — M25.50 POLYARTHRALGIA: Primary | ICD-10-CM

## 2024-07-24 LAB
ALBUMIN SERPL BCG-MCNC: 4.3 G/DL (ref 3.5–5)
ALP SERPL-CCNC: 60 U/L (ref 34–104)
ALT SERPL W P-5'-P-CCNC: 15 U/L (ref 7–52)
ANION GAP SERPL CALCULATED.3IONS-SCNC: 5 MMOL/L (ref 4–13)
AST SERPL W P-5'-P-CCNC: 12 U/L (ref 13–39)
BASOPHILS # BLD AUTO: 0.04 THOUSANDS/ÂΜL (ref 0–0.1)
BASOPHILS NFR BLD AUTO: 0 % (ref 0–1)
BILIRUB SERPL-MCNC: 0.74 MG/DL (ref 0.2–1)
BILIRUB UR QL STRIP: NEGATIVE
BUN SERPL-MCNC: 12 MG/DL (ref 5–25)
CALCIUM SERPL-MCNC: 9.4 MG/DL (ref 8.4–10.2)
CHLORIDE SERPL-SCNC: 103 MMOL/L (ref 96–108)
CLARITY UR: CLEAR
CO2 SERPL-SCNC: 29 MMOL/L (ref 21–32)
COLOR UR: YELLOW
CREAT SERPL-MCNC: 0.89 MG/DL (ref 0.6–1.3)
EOSINOPHIL # BLD AUTO: 0.16 THOUSAND/ÂΜL (ref 0–0.61)
EOSINOPHIL NFR BLD AUTO: 2 % (ref 0–6)
ERYTHROCYTE [DISTWIDTH] IN BLOOD BY AUTOMATED COUNT: 12.4 % (ref 11.6–15.1)
GFR SERPL CREATININE-BSD FRML MDRD: 102 ML/MIN/1.73SQ M
GLUCOSE SERPL-MCNC: 95 MG/DL (ref 65–140)
GLUCOSE UR STRIP-MCNC: NEGATIVE MG/DL
HCT VFR BLD AUTO: 45.7 % (ref 36.5–49.3)
HGB BLD-MCNC: 15.1 G/DL (ref 12–17)
HGB UR QL STRIP.AUTO: NEGATIVE
IMM GRANULOCYTES # BLD AUTO: 0.03 THOUSAND/UL (ref 0–0.2)
IMM GRANULOCYTES NFR BLD AUTO: 0 % (ref 0–2)
KETONES UR STRIP-MCNC: NEGATIVE MG/DL
LEUKOCYTE ESTERASE UR QL STRIP: NEGATIVE
LYMPHOCYTES # BLD AUTO: 3.52 THOUSANDS/ÂΜL (ref 0.6–4.47)
LYMPHOCYTES NFR BLD AUTO: 34 % (ref 14–44)
MAGNESIUM SERPL-MCNC: 2.2 MG/DL (ref 1.9–2.7)
MCH RBC QN AUTO: 29 PG (ref 26.8–34.3)
MCHC RBC AUTO-ENTMCNC: 33 G/DL (ref 31.4–37.4)
MCV RBC AUTO: 88 FL (ref 82–98)
MONOCYTES # BLD AUTO: 0.78 THOUSAND/ÂΜL (ref 0.17–1.22)
MONOCYTES NFR BLD AUTO: 8 % (ref 4–12)
NEUTROPHILS # BLD AUTO: 5.86 THOUSANDS/ÂΜL (ref 1.85–7.62)
NEUTS SEG NFR BLD AUTO: 56 % (ref 43–75)
NITRITE UR QL STRIP: NEGATIVE
NRBC BLD AUTO-RTO: 0 /100 WBCS
PH UR STRIP.AUTO: 7.5 [PH]
PLATELET # BLD AUTO: 290 THOUSANDS/UL (ref 149–390)
PMV BLD AUTO: 8.6 FL (ref 8.9–12.7)
POTASSIUM SERPL-SCNC: 4.1 MMOL/L (ref 3.5–5.3)
PROT SERPL-MCNC: 7.7 G/DL (ref 6.4–8.4)
PROT UR STRIP-MCNC: NEGATIVE MG/DL
RBC # BLD AUTO: 5.2 MILLION/UL (ref 3.88–5.62)
SODIUM SERPL-SCNC: 137 MMOL/L (ref 135–147)
SP GR UR STRIP.AUTO: 1.01 (ref 1–1.03)
UROBILINOGEN UR STRIP-ACNC: <2 MG/DL
WBC # BLD AUTO: 10.39 THOUSAND/UL (ref 4.31–10.16)

## 2024-07-24 PROCEDURE — 36415 COLL VENOUS BLD VENIPUNCTURE: CPT | Performed by: PHYSICIAN ASSISTANT

## 2024-07-24 PROCEDURE — 85025 COMPLETE CBC W/AUTO DIFF WBC: CPT | Performed by: PHYSICIAN ASSISTANT

## 2024-07-24 PROCEDURE — 96360 HYDRATION IV INFUSION INIT: CPT

## 2024-07-24 PROCEDURE — 83735 ASSAY OF MAGNESIUM: CPT | Performed by: PHYSICIAN ASSISTANT

## 2024-07-24 PROCEDURE — 99284 EMERGENCY DEPT VISIT MOD MDM: CPT | Performed by: PHYSICIAN ASSISTANT

## 2024-07-24 PROCEDURE — 81003 URINALYSIS AUTO W/O SCOPE: CPT | Performed by: PHYSICIAN ASSISTANT

## 2024-07-24 PROCEDURE — 80053 COMPREHEN METABOLIC PANEL: CPT | Performed by: PHYSICIAN ASSISTANT

## 2024-07-24 PROCEDURE — 99284 EMERGENCY DEPT VISIT MOD MDM: CPT

## 2024-07-24 RX ADMIN — SODIUM CHLORIDE 1000 ML: 0.9 INJECTION, SOLUTION INTRAVENOUS at 09:47

## 2024-07-24 NOTE — ED PROVIDER NOTES
"History  Chief Complaint   Patient presents with    Joint Pain     Pt reports increased joint pain over the past couple weeks. States the last time he felt like this he had lyme's disease. Was tested over the weekend and it was (-). Reports only urinating 2 times yesterday.     Patient is a 49 yo wm with history of psoriatic arthritis with monthly infusion of orencia, ankylosing spondylitis, narcolepsy, multiple herniated discs who reports 2 week history of \"worsening joint pain\". Pain started in back, now felt diffusely to hands, feet. Seen at urgent care 5 days ago; lyme was negative and ESR 26. Advised to follow up with his rheumatologist. States he sent message but did not hear back. Pt reports he urinated twice yesterday.  Reports urine was brown in the afternoon and then noted blood in the urine last night.  States he has not urinated this morning.  No fever.  No nausea or vomiting.  No chest pain or shortness of breath.  No abdominal pain.  Patient has a morphine pump        Prior to Admission Medications   Prescriptions Last Dose Informant Patient Reported? Taking?   B-D TB SYRINGE 1CC/27GX1/2\" 27G X 1/2\" 1 ML MISC   Yes No   Sig: USE THREE WEEKLY FOR ALLERGY INJECTIONS   EPINEPHrine (EPIPEN) 0.3 mg/0.3 mL SOAJ   No No   Sig: Inject 0.3 mL (0.3 mg total) into a muscle once for 1 dose   GaviLyte-C 240 g solution   Yes No   Sig: FOLLOW INSTRUCTIONS FROM P[facility] PREP SHEET   Patient not taking: Reported on 6/15/2023   Insulin Syringe-Needle U-100 27.5G X 5/8\" 2 ML MISC   Yes No   Sig: 3 syringes weekly for allergy injections   LINZESS 145 MCG CAPS   Yes No   Si (one) Capsule daily on an empty stomach, at least 30 mn before first meal   NON FORMULARY   Yes No   Sig: Inject 0.5 mL under the skin once a week Administer 0.5 ml Subq of Serum #1 (Cat, Dog, Cockroach (CDCR)) weekly    Administer 0.5 ml Subq of Serum #2 (Tree, Tree, Weed) (TTW)) weekly   NON FORMULARY   Yes No   Si.3 mg Morphine pump   PEG " 3350-KCl-NaBcb-NaCl-NaSulf (PEG 3350/Electrolytes) 240 g SOLR   Yes No   abatacept (Orencia) 250 mg   Yes No   Sig: Infuse 1,000 mg into a venous catheter every 28 days   clobetasol (TEMOVATE) 0.05 % cream   Yes No   Sig: Apply topically 2 (two) times a day   Patient not taking: Reported on 6/15/2023   fluticasone (FLONASE) 50 mcg/act nasal spray   Yes No   Si spray into each nostril 2 (two) times a day   tiZANidine (ZANAFLEX) 2 mg tablet   No No   Sig: Take 2 tablets (4 mg total) by mouth 4 (four) times a day as needed for muscle spasms   Patient taking differently: Take 4 mg by mouth as needed for muscle spasms   venlafaxine (EFFEXOR) 37.5 mg tablet   No No   Sig: Take 1 tablet by mouth once daily      Facility-Administered Medications: None       Past Medical History:   Diagnosis Date    Ankylosing spondylitis ([facility])     Arthritis     Chronic pain     DJD (degenerative joint disease)     Herniated disc, cervical     Narcolepsy     Psoriatic arthritis ([facility])     Seizures ([facility])     Sleep apnea     Sleep apnea        Past Surgical History:   Procedure Laterality Date    IMPLANTATION / PLACEMENT EPIDURAL NEUROSTIMULATOR ELECTRODES      INTRATHECAL PUMP IMPLANTATION Left     morphine pump    RHINOPLASTY      TONSILLECTOMY         Family History   Problem Relation Age of Onset    Cancer Mother     Diabetes Father     Cancer Father     Heart disease Father     Narcolepsy Father     Sleep apnea Father     Narcolepsy Paternal Uncle     Narcolepsy Paternal Grandmother      I have reviewed and agree with the history as documented.    E-Cigarette/Vaping     E-Cigarette/Vaping Substances    Nicotine No     THC No     CBD No     Flavoring No     Other No     Unknown No      Social History     Tobacco Use    Smoking status: Never    Smokeless tobacco: Never   Substance Use Topics    Alcohol use: No    Drug use: No       Review of Systems   Constitutional:  Negative for fever.   Respiratory:  Negative for shortness of breath.     Cardiovascular:  Negative for chest pain.   Gastrointestinal:  Negative for abdominal pain, nausea and vomiting.   Genitourinary:  Positive for hematuria. Negative for dysuria.   Musculoskeletal:  Positive for arthralgias and back pain. Negative for joint swelling.   Neurological:  Negative for headaches.       Physical Exam  Physical Exam  Vitals and nursing note reviewed.   Constitutional:       General: He is not in acute distress.     Appearance: Normal appearance. He is not ill-appearing, toxic-appearing or diaphoretic.   HENT:      Head: Normocephalic and atraumatic.      Nose: Nose normal.      Mouth/Throat:      Mouth: Mucous membranes are dry.      Pharynx: Oropharynx is clear.   Eyes:      Extraocular Movements: Extraocular movements intact.      Conjunctiva/sclera: Conjunctivae normal.      Pupils: Pupils are equal, round, and reactive to light.   Cardiovascular:      Rate and Rhythm: Normal rate and regular rhythm.      Pulses: Normal pulses.      Heart sounds: Normal heart sounds.   Pulmonary:      Effort: Pulmonary effort is normal.      Breath sounds: Normal breath sounds.   Abdominal:      General: Abdomen is flat. Bowel sounds are normal.      Palpations: Abdomen is soft.      Tenderness: There is no abdominal tenderness. There is no right CVA tenderness, left CVA tenderness, guarding or rebound.      Hernia: No hernia is present.      Comments: L abdominal subcutaneous morphine pump   Musculoskeletal:         General: Normal range of motion.      Cervical back: Normal range of motion and neck supple. No rigidity or tenderness.   Skin:     General: Skin is warm and dry.      Capillary Refill: Capillary refill takes less than 2 seconds.   Neurological:      General: No focal deficit present.      Mental Status: He is alert and oriented to person, place, and time. Mental status is at baseline.         Vital Signs  ED Triage Vitals   Temperature Pulse Respirations Blood Pressure SpO2   07/24/24 0907  07/24/24 0907 07/24/24 0907 07/24/24 0907 07/24/24 0907   98.1 °F (36.7 °C) 76 18 (!) 173/91 97 %      Temp Source Heart Rate Source Patient Position - Orthostatic VS BP Location FiO2 (%)   07/24/24 0907 07/24/24 0907 07/24/24 0907 07/24/24 0907 --   Temporal Monitor Lying Left arm       Pain Score       07/24/24 0923       10 - Worst Possible Pain           Vitals:    07/24/24 0907 07/24/24 0923 07/24/24 1100 07/24/24 1200   BP: (!) 173/91 (!) 173/91 128/72 127/80   Pulse: 76 70 69 67   Patient Position - Orthostatic VS: Lying Sitting           Visual Acuity  Visual Acuity      Flowsheet Row Most Recent Value   L Pupil Size (mm) 3   R Pupil Size (mm) 3            ED Medications  Medications   sodium chloride 0.9 % bolus 1,000 mL (0 mL Intravenous Stopped 7/24/24 1047)       Diagnostic Studies  Results Reviewed       Procedure Component Value Units Date/Time    UA w Reflex to Microscopic w Reflex to Culture [[id number]] Collected: 07/24/24 1110    Lab Status: Final result Specimen: Urine, Clean Catch Updated: 07/24/24 1122     Color, UA Yellow     Clarity, UA Clear     Specific Gravity, UA 1.015     pH, UA 7.5     Leukocytes, UA Negative     Nitrite, UA Negative     Protein, UA Negative mg/dl      Glucose, UA Negative mg/dl      Ketones, UA Negative mg/dl      Urobilinogen, UA <2.0 mg/dl      Bilirubin, UA Negative     Occult Blood, UA Negative    Comprehensive metabolic panel [[id number]]  (Abnormal) Collected: 07/24/24 0924    Lab Status: Final result Specimen: Blood from Arm, Left Updated: 07/24/24 0958     Sodium 137 mmol/L      Potassium 4.1 mmol/L      Chloride 103 mmol/L      CO2 29 mmol/L      ANION GAP 5 mmol/L      BUN 12 mg/dL      Creatinine 0.89 mg/dL      Glucose 95 mg/dL      Calcium 9.4 mg/dL      AST 12 U/L      ALT 15 U/L      Alkaline Phosphatase 60 U/L      Total Protein 7.7 g/dL      Albumin 4.3 g/dL      Total Bilirubin 0.74 mg/dL      eGFR 102 ml/min/1.73sq m     Narrative:      National Kidney  Disease [employer] guidelines for Chronic Kidney Disease (CKD):     Stage 1 with normal or high GFR (GFR > 90 mL/min/1.73 square meters)    Stage 2 Mild CKD (GFR = 60-89 mL/min/1.73 square meters)    Stage 3A Moderate CKD (GFR = 45-59 mL/min/1.73 square meters)    Stage 3B Moderate CKD (GFR = 30-44 mL/min/1.73 square meters)    Stage 4 Severe CKD (GFR = 15-29 mL/min/1.73 square meters)    Stage 5 End Stage CKD (GFR <15 mL/min/1.73 square meters)  Note: GFR calculation is accurate only with a steady state creatinine    Magnesium [[id number]]  (Normal) Collected: 07/24/24 0924    Lab Status: Final result Specimen: Blood from Arm, Left Updated: 07/24/24 0958     Magnesium 2.2 mg/dL     CBC and differential [[id number]]  (Abnormal) Collected: 07/24/24 0924    Lab Status: Final result Specimen: Blood from Arm, Left Updated: 07/24/24 0931     WBC 10.39 Thousand/uL      RBC 5.20 Million/uL      Hemoglobin 15.1 g/dL      Hematocrit 45.7 %      MCV 88 fL      MCH 29.0 pg      MCHC 33.0 g/dL      RDW 12.4 %      MPV 8.6 fL      Platelets 290 Thousands/uL      nRBC 0 /100 WBCs      Segmented % 56 %      Immature Grans % 0 %      Lymphocytes % 34 %      Monocytes % 8 %      Eosinophils Relative 2 %      Basophils Relative 0 %      Absolute Neutrophils 5.86 Thousands/µL      Absolute Immature Grans 0.03 Thousand/uL      Absolute Lymphocytes 3.52 Thousands/µL      Absolute Monocytes 0.78 Thousand/µL      Eosinophils Absolute 0.16 Thousand/µL      Basophils Absolute 0.04 Thousands/µL                    No orders to display              Procedures  Procedures         ED Course                                 SBIRT 22yo+      Flowsheet Row Most Recent Value   Initial Alcohol Screen: US AUDIT-C     1. How often do you have a drink containing alcohol? 0 Filed at: 07/24/2024 0908   2. How many drinks containing alcohol do you have on a typical day you are drinking?  0 Filed at: 07/24/2024 0908   3a. Male UNDER 65: How often do you have  five or more drinks on one occasion? 0 Filed at: 07/24/2024 0908   3b. FEMALE Any Age, or MALE 65+: How often do you have 4 or more drinks on one occassion? 0 Filed at: 07/24/2024 0908   Audit-C Score 0 Filed at: 07/24/2024 0908   ANGELLA: How many times in the past year have you...    Used an illegal drug or used a prescription medication for non-medical reasons? Never Filed at: 07/24/2024 0908                      Medical Decision Making  Labs reviewed.  Mild leukocytosis but otherwise unremarkable.  UA is unremarkable as well.  Patient was given IV normal saline fluid hydration.  States he felt improved.  He is well-appearing and nontoxic.  Suspect this polyarthralgia may be related to his underlying psoriatic arthritis/ankylosing spondylitis.  He was advised he needs to contact his rheumatologist office Dr. Martin to schedule a follow-up in the next several days.  Return precautions were reviewed.    Amount and/or Complexity of Data Reviewed  Labs: ordered.                 Disposition  Final diagnoses:   Polyarthralgia     Time reflects when diagnosis was documented in both MDM as applicable and the Disposition within this note       Time User Action Codes Description Comment    7/24/2024 12:01 PM Jignesh Francis Add [M25.50] Polyarthralgia           ED Disposition       ED Disposition   Discharge    Condition   Stable    Date/Time   Wed Jul 24, 2024 1201    Comment   Asher Medel discharge to home/self care.                   Follow-up Information       Follow up With Specialties Details Why Contact Info Additional Information     [facility] Emergency Medicine   [address] [id number]  [phone] [facility], [address], [place] [address]            Discharge Medication List as of 7/24/2024 12:13 PM        CONTINUE these medications which have NOT CHANGED    Details  "  abatacept (Orencia) 250 mg Infuse 1,000 mg into a venous catheter every 28 days, Historical Med      B-D TB SYRINGE 1CC/27GX1/2\" 27G X 1/2\" 1 ML MISC USE THREE WEEKLY FOR ALLERGY INJECTIONS, Historical Med      clobetasol (TEMOVATE) 0.05 % cream Apply topically 2 (two) times a day, Starting Wed 2/1/2023, Historical Med      EPINEPHrine (EPIPEN) 0.3 mg/0.3 mL SOAJ Inject 0.3 mL (0.3 mg total) into a muscle once for 1 dose, Starting Sun 9/1/2019, Normal      fluticasone (FLONASE) 50 mcg/act nasal spray 1 spray into each nostril 2 (two) times a day, Historical Med      !! GaviLyte-C 240 g solution FOLLOW INSTRUCTIONS FROM P[facility] PREP SHEET, Historical Med      Insulin Syringe-Needle U-100 27.5G X 5/8\" 2 ML MISC 3 syringes weekly for allergy injections, Historical Med      LINZESS 145 MCG CAPS 1 (one) Capsule daily on an empty stomach, at least 30 mn before first meal, Historical Med      !! NON FORMULARY Inject 0.5 mL under the skin once a week Administer 0.5 ml Subq of Serum #1 (Cat, Dog, Cockroach (CDCR)) weekly    Administer 0.5 ml Subq of Serum #2 (Tree, Tree, Weed) (TTW)) weekly, Historical Med      !! NON FORMULARY 0.3 mg Morphine pump, Historical Med      !! PEG 3350-KCl-NaBcb-NaCl-NaSulf (PEG 3350/Electrolytes) 240 g SOLR Historical Med      tiZANidine (ZANAFLEX) 2 mg tablet Take 2 tablets (4 mg total) by mouth 4 (four) times a day as needed for muscle spasms, Starting Wed 8/7/2019, Normal      venlafaxine (EFFEXOR) 37.5 mg tablet Take 1 tablet by mouth once daily, Normal       !! - Potential duplicate medications found. Please discuss with provider.          No discharge procedures on file.    PDMP Review         Value Time User    PDMP Reviewed  Yes 6/13/2022  9:10 AM AVA Zendejas            ED Provider  Electronically Signed by             Jignesh Francis PA-C  07/24/24 3473    "

## 2024-07-24 NOTE — DISCHARGE INSTRUCTIONS
Follow up with your rheumatologist - Dr Martin. Call today to schedule appointment    Stay hydrated. Avoid the heat    Return to ED for new or worsening symptoms

## 2024-08-09 ENCOUNTER — HOSPITAL ENCOUNTER (EMERGENCY)
Facility: HOSPITAL | Age: 49
Discharge: HOME | End: 2024-08-09
Attending: EMERGENCY MEDICINE
Payer: MEDICARE

## 2024-08-09 ENCOUNTER — APPOINTMENT (EMERGENCY)
Dept: RADIOLOGY | Facility: HOSPITAL | Age: 49
End: 2024-08-09
Payer: MEDICARE

## 2024-08-09 VITALS
TEMPERATURE: 99.5 F | SYSTOLIC BLOOD PRESSURE: 145 MMHG | OXYGEN SATURATION: 94 % | DIASTOLIC BLOOD PRESSURE: 91 MMHG | RESPIRATION RATE: 17 BRPM | HEART RATE: 73 BPM

## 2024-08-09 DIAGNOSIS — R50.9 FEVER: Primary | ICD-10-CM

## 2024-08-09 DIAGNOSIS — J32.9 SINUSITIS: ICD-10-CM

## 2024-08-09 LAB
2HR DELTA HS TROPONIN: -1 NG/L
ALBUMIN SERPL BCG-MCNC: 3.8 G/DL (ref 3.5–5)
ALP SERPL-CCNC: 48 U/L (ref 34–104)
ALT SERPL W P-5'-P-CCNC: 18 U/L (ref 7–52)
ANION GAP SERPL CALCULATED.3IONS-SCNC: 9 MMOL/L (ref 4–13)
APTT PPP: 26 SECONDS (ref 23–34)
AST SERPL W P-5'-P-CCNC: 14 U/L (ref 13–39)
ATRIAL RATE: 98 BPM
BACTERIA UR QL AUTO: ABNORMAL /HPF
BASOPHILS # BLD AUTO: 0.04 THOUSANDS/ÂΜL (ref 0–0.1)
BASOPHILS NFR BLD AUTO: 0 % (ref 0–1)
BILIRUB SERPL-MCNC: 0.43 MG/DL (ref 0.2–1)
BILIRUB UR QL STRIP: NEGATIVE
BUN SERPL-MCNC: 14 MG/DL (ref 5–25)
CALCIUM SERPL-MCNC: 8.7 MG/DL (ref 8.4–10.2)
CARDIAC TROPONIN I PNL SERPL HS: 5 NG/L
CARDIAC TROPONIN I PNL SERPL HS: 6 NG/L
CHLORIDE SERPL-SCNC: 103 MMOL/L (ref 96–108)
CLARITY UR: CLEAR
CO2 SERPL-SCNC: 23 MMOL/L (ref 21–32)
COLOR UR: YELLOW
CREAT SERPL-MCNC: 0.91 MG/DL (ref 0.6–1.3)
EOSINOPHIL # BLD AUTO: 0.02 THOUSAND/ÂΜL (ref 0–0.61)
EOSINOPHIL NFR BLD AUTO: 0 % (ref 0–6)
ERYTHROCYTE [DISTWIDTH] IN BLOOD BY AUTOMATED COUNT: 12.7 % (ref 11.6–15.1)
FLUAV RNA RESP QL NAA+PROBE: NEGATIVE
FLUBV RNA RESP QL NAA+PROBE: NEGATIVE
GFR SERPL CREATININE-BSD FRML MDRD: 100 ML/MIN/1.73SQ M
GLUCOSE SERPL-MCNC: 111 MG/DL (ref 65–140)
GLUCOSE UR STRIP-MCNC: NEGATIVE MG/DL
HCT VFR BLD AUTO: 46 % (ref 36.5–49.3)
HGB BLD-MCNC: 16.1 G/DL (ref 12–17)
HGB UR QL STRIP.AUTO: NEGATIVE
IMM GRANULOCYTES # BLD AUTO: 0.06 THOUSAND/UL (ref 0–0.2)
IMM GRANULOCYTES NFR BLD AUTO: 1 % (ref 0–2)
INR PPP: 0.97 (ref 0.85–1.19)
KETONES UR STRIP-MCNC: NEGATIVE MG/DL
LACTATE SERPL-SCNC: 1.4 MMOL/L (ref 0.5–2)
LEUKOCYTE ESTERASE UR QL STRIP: NEGATIVE
LYMPHOCYTES # BLD AUTO: 2.42 THOUSANDS/ÂΜL (ref 0.6–4.47)
LYMPHOCYTES NFR BLD AUTO: 27 % (ref 14–44)
MCH RBC QN AUTO: 30 PG (ref 26.8–34.3)
MCHC RBC AUTO-ENTMCNC: 35 G/DL (ref 31.4–37.4)
MCV RBC AUTO: 86 FL (ref 82–98)
MONOCYTES # BLD AUTO: 0.96 THOUSAND/ÂΜL (ref 0.17–1.22)
MONOCYTES NFR BLD AUTO: 11 % (ref 4–12)
MUCOUS THREADS UR QL AUTO: ABNORMAL
NEUTROPHILS # BLD AUTO: 5.62 THOUSANDS/ÂΜL (ref 1.85–7.62)
NEUTS SEG NFR BLD AUTO: 61 % (ref 43–75)
NITRITE UR QL STRIP: NEGATIVE
NON-SQ EPI CELLS URNS QL MICRO: ABNORMAL /HPF
NRBC BLD AUTO-RTO: 0 /100 WBCS
P AXIS: 29 DEGREES
PH UR STRIP.AUTO: 5.5 [PH]
PLATELET # BLD AUTO: 209 THOUSANDS/UL (ref 149–390)
PMV BLD AUTO: 8.3 FL (ref 8.9–12.7)
POTASSIUM SERPL-SCNC: 3.7 MMOL/L (ref 3.5–5.3)
PR INTERVAL: 132 MS
PROCALCITONIN SERPL-MCNC: 0.26 NG/ML
PROT SERPL-MCNC: 6.9 G/DL (ref 6.4–8.4)
PROT UR STRIP-MCNC: ABNORMAL MG/DL
PROTHROMBIN TIME: 13.4 SECONDS (ref 12.3–15)
QRS AXIS: 30 DEGREES
QRSD INTERVAL: 78 MS
QT INTERVAL: 336 MS
QTC INTERVAL: 428 MS
RBC # BLD AUTO: 5.36 MILLION/UL (ref 3.88–5.62)
RBC #/AREA URNS AUTO: ABNORMAL /HPF
RSV RNA RESP QL NAA+PROBE: NEGATIVE
SARS-COV-2 RNA RESP QL NAA+PROBE: NEGATIVE
SODIUM SERPL-SCNC: 135 MMOL/L (ref 135–147)
SP GR UR STRIP.AUTO: >=1.03 (ref 1–1.03)
T WAVE AXIS: 16 DEGREES
UROBILINOGEN UR STRIP-ACNC: <2 MG/DL
VENTRICULAR RATE: 98 BPM
WBC # BLD AUTO: 9.12 THOUSAND/UL (ref 4.31–10.16)
WBC #/AREA URNS AUTO: ABNORMAL /HPF

## 2024-08-09 PROCEDURE — 83605 ASSAY OF LACTIC ACID: CPT | Performed by: EMERGENCY MEDICINE

## 2024-08-09 PROCEDURE — 81001 URINALYSIS AUTO W/SCOPE: CPT | Performed by: EMERGENCY MEDICINE

## 2024-08-09 PROCEDURE — 36415 COLL VENOUS BLD VENIPUNCTURE: CPT | Performed by: EMERGENCY MEDICINE

## 2024-08-09 PROCEDURE — 96365 THER/PROPH/DIAG IV INF INIT: CPT

## 2024-08-09 PROCEDURE — 84484 ASSAY OF TROPONIN QUANT: CPT | Performed by: EMERGENCY MEDICINE

## 2024-08-09 PROCEDURE — 85610 PROTHROMBIN TIME: CPT | Performed by: EMERGENCY MEDICINE

## 2024-08-09 PROCEDURE — 85730 THROMBOPLASTIN TIME PARTIAL: CPT | Performed by: EMERGENCY MEDICINE

## 2024-08-09 PROCEDURE — 85025 COMPLETE CBC W/AUTO DIFF WBC: CPT | Performed by: EMERGENCY MEDICINE

## 2024-08-09 PROCEDURE — 99284 EMERGENCY DEPT VISIT MOD MDM: CPT

## 2024-08-09 PROCEDURE — 87040 BLOOD CULTURE FOR BACTERIA: CPT | Performed by: EMERGENCY MEDICINE

## 2024-08-09 PROCEDURE — 96361 HYDRATE IV INFUSION ADD-ON: CPT

## 2024-08-09 PROCEDURE — 93010 ELECTROCARDIOGRAM REPORT: CPT | Performed by: INTERNAL MEDICINE

## 2024-08-09 PROCEDURE — 84145 PROCALCITONIN (PCT): CPT | Performed by: EMERGENCY MEDICINE

## 2024-08-09 PROCEDURE — 96375 TX/PRO/DX INJ NEW DRUG ADDON: CPT

## 2024-08-09 PROCEDURE — 0241U HB NFCT DS VIR RESP RNA 4 TRGT: CPT | Performed by: EMERGENCY MEDICINE

## 2024-08-09 PROCEDURE — 71045 X-RAY EXAM CHEST 1 VIEW: CPT

## 2024-08-09 PROCEDURE — 80053 COMPREHEN METABOLIC PANEL: CPT | Performed by: EMERGENCY MEDICINE

## 2024-08-09 PROCEDURE — 93005 ELECTROCARDIOGRAM TRACING: CPT

## 2024-08-09 PROCEDURE — 99285 EMERGENCY DEPT VISIT HI MDM: CPT | Performed by: EMERGENCY MEDICINE

## 2024-08-09 RX ORDER — KETOROLAC TROMETHAMINE 30 MG/ML
15 INJECTION, SOLUTION INTRAMUSCULAR; INTRAVENOUS ONCE
Status: COMPLETED | OUTPATIENT
Start: 2024-08-09 | End: 2024-08-09

## 2024-08-09 RX ORDER — DOXYCYCLINE 100 MG/1
100 CAPSULE ORAL 2 TIMES DAILY
Qty: 14 CAPSULE | Refills: 0 | Status: SHIPPED | OUTPATIENT
Start: 2024-08-09 | End: 2024-08-16

## 2024-08-09 RX ORDER — MAGNESIUM SULFATE HEPTAHYDRATE 40 MG/ML
2 INJECTION, SOLUTION INTRAVENOUS ONCE
Status: COMPLETED | OUTPATIENT
Start: 2024-08-09 | End: 2024-08-09

## 2024-08-09 RX ADMIN — SODIUM CHLORIDE 1000 ML: 0.9 INJECTION, SOLUTION INTRAVENOUS at 06:15

## 2024-08-09 RX ADMIN — MAGNESIUM SULFATE HEPTAHYDRATE 2 G: 40 INJECTION, SOLUTION INTRAVENOUS at 04:41

## 2024-08-09 RX ADMIN — KETOROLAC TROMETHAMINE 15 MG: 30 INJECTION, SOLUTION INTRAMUSCULAR; INTRAVENOUS at 04:41

## 2024-08-09 RX ADMIN — SODIUM CHLORIDE 1000 ML: 0.9 INJECTION, SOLUTION INTRAVENOUS at 04:43

## 2024-08-09 NOTE — ED PROVIDER NOTES
"History  Chief Complaint   Patient presents with    Fever     Patient to the ED reporting fevers 100.0-103.0 and headaches since neck injection on Monday.  Patient reports taking advil to treat fever.  Last dose at 0100.     48 year old male presents for evaluation of headache and fever.  Patient had cervical injection for his chronic pain on Monday.  He developed fever and chest pain on Wednesday.  Chest pain resolved by the next day and he started to feel improved; however, the fever returned again Thursday with onset of frontal headache intermittently throughout the day.  Patient denies cough or congestion.  No nausea, vomiting or diarrhea.  He states he chronically has issues with urination which he attributes to his morphine pump for chronic pain.      Fever      Prior to Admission Medications   Prescriptions Last Dose Informant Patient Reported? Taking?   B-D TB SYRINGE 1CC/27GX1/2\" 27G X 1/2\" 1 ML MISC   Yes No   Sig: USE THREE WEEKLY FOR ALLERGY INJECTIONS   EPINEPHrine (EPIPEN) 0.3 mg/0.3 mL SOAJ   No No   Sig: Inject 0.3 mL (0.3 mg total) into a muscle once for 1 dose   GaviLyte-C 240 g solution   Yes No   Sig: FOLLOW INSTRUCTIONS FROM [facility] PREP SHEET   Patient not taking: Reported on 6/15/2023   Insulin Syringe-Needle U-100 27.5G X 5/8\" 2 ML MISC   Yes No   Sig: 3 syringes weekly for allergy injections   LINZESS 145 MCG CAPS   Yes No   Si (one) Capsule daily on an empty stomach, at least 30 mn before first meal   NON FORMULARY   Yes No   Sig: Inject 0.5 mL under the skin once a week Administer 0.5 ml Subq of Serum #1 (Cat, Dog, Cockroach (CDCR)) weekly    Administer 0.5 ml Subq of Serum #2 (Tree, Tree, Weed) (TTW)) weekly   NON FORMULARY   Yes No   Si.3 mg Morphine pump   PEG 3350-KCl-NaBcb-NaCl-NaSulf (PEG 3350/Electrolytes) 240 g SOLR   Yes No   abatacept (Orencia) 250 mg   Yes No   Sig: Infuse 1,000 mg into a venous catheter every 28 days   clobetasol (TEMOVATE) 0.05 % cream   Yes No   Sig: " Apply topically 2 (two) times a day   Patient not taking: Reported on 6/15/2023   fluticasone (FLONASE) 50 mcg/act nasal spray   Yes No   Si spray into each nostril 2 (two) times a day   tiZANidine (ZANAFLEX) 2 mg tablet   No No   Sig: Take 2 tablets (4 mg total) by mouth 4 (four) times a day as needed for muscle spasms   Patient taking differently: Take 4 mg by mouth as needed for muscle spasms   venlafaxine (EFFEXOR) 37.5 mg tablet   No No   Sig: Take 1 tablet by mouth once daily      Facility-Administered Medications: None       Past Medical History:   Diagnosis Date    Ankylosing spondylitis ([facility])     Arthritis     Chronic pain     DJD (degenerative joint disease)     Herniated disc, cervical     Narcolepsy     Psoriatic arthritis ([facility])     Seizures ([facility])     Sleep apnea     Sleep apnea        Past Surgical History:   Procedure Laterality Date    IMPLANTATION / PLACEMENT EPIDURAL NEUROSTIMULATOR ELECTRODES      INTRATHECAL PUMP IMPLANTATION Left     morphine pump    RHINOPLASTY      TONSILLECTOMY         Family History   Problem Relation Age of Onset    Cancer Mother     Diabetes Father     Cancer Father     Heart disease Father     Narcolepsy Father     Sleep apnea Father     Narcolepsy Paternal Uncle     Narcolepsy Paternal Grandmother      I have reviewed and agree with the history as documented.    E-Cigarette/Vaping     E-Cigarette/Vaping Substances    Nicotine No     THC No     CBD No     Flavoring No     Other No     Unknown No      Social History     Tobacco Use    Smoking status: Never    Smokeless tobacco: Never   Substance Use Topics    Alcohol use: No    Drug use: No       Review of Systems    Physical Exam  Physical Exam  Vitals and nursing note reviewed.   HENT:      Head: Normocephalic and atraumatic.   Cardiovascular:      Rate and Rhythm: Regular rhythm. Tachycardia present.      Pulses: Normal pulses.      Heart sounds: Normal heart sounds.   Pulmonary:      Effort: Pulmonary effort is  normal. No respiratory distress.      Breath sounds: Normal breath sounds.   Abdominal:      General: There is no distension.      Palpations: Abdomen is soft.      Tenderness: There is no abdominal tenderness.   Musculoskeletal:      Cervical back: Full passive range of motion without pain.   Skin:     General: Skin is warm and dry.   Neurological:      Mental Status: He is alert.         Vital Signs  ED Triage Vitals   Temperature Pulse Respirations Blood Pressure SpO2   08/09/24 0429 08/09/24 0429 08/09/24 0429 08/09/24 0429 08/09/24 0429   99.5 °F (37.5 °C) 103 16 132/84 94 %      Temp Source Heart Rate Source Patient Position - Orthostatic VS BP Location FiO2 (%)   08/09/24 0429 08/09/24 0429 -- -- --   Oral Monitor         Pain Score       08/09/24 0441       Med Not Given for Pain - for MAR use only           Vitals:    08/09/24 0730 08/09/24 0800 08/09/24 0905 08/09/24 1000   BP: 139/82 137/81 134/84 145/91   Pulse: 74 80 80 73         Visual Acuity      ED Medications  Medications   magnesium sulfate 2 g/50 mL IVPB (premix) 2 g (0 g Intravenous Stopped 8/9/24 0541)   ketorolac (TORADOL) injection 15 mg (15 mg Intravenous Given 8/9/24 0441)   sodium chloride 0.9 % bolus 1,000 mL (0 mL Intravenous Stopped 8/9/24 0615)   sodium chloride 0.9 % bolus 1,000 mL (0 mL Intravenous Stopped 8/9/24 0715)       Diagnostic Studies  Results Reviewed       Procedure Component Value Units Date/Time    Blood culture #1 [[id number]] Collected: 08/09/24 0435    Lab Status: Preliminary result Specimen: Blood from Arm, Left Updated: 08/11/24 1101     Blood Culture No Growth at 48 hrs.    Blood culture #2 [[id number]] Collected: 08/09/24 0435    Lab Status: Preliminary result Specimen: Blood from Arm, Left Updated: 08/11/24 1101     Blood Culture No Growth at 48 hrs.    Urine Microscopic [[id number]]  (Abnormal) Collected: 08/09/24 0956    Lab Status: Final result Specimen: Urine, Clean Catch Updated: 08/09/24 1027     RBC, UA 0-1  /hpf      WBC, UA 1-2 /hpf      Epithelial Cells None Seen /hpf      Bacteria, UA Occasional /hpf      MUCUS THREADS Moderate    UA w Reflex to Microscopic w Reflex to Culture [[id number]]  (Abnormal) Collected: 08/09/24 0956    Lab Status: Final result Specimen: Urine, Clean Catch Updated: 08/09/24 1027     Color, UA Yellow     Clarity, UA Clear     Specific Gravity, UA >=1.030     pH, UA 5.5     Leukocytes, UA Negative     Nitrite, UA Negative     Protein, UA Trace mg/dl      Glucose, UA Negative mg/dl      Ketones, UA Negative mg/dl      Urobilinogen, UA <2.0 mg/dl      Bilirubin, UA Negative     Occult Blood, UA Negative    HS Troponin I 2hr [[id number]]  (Normal) Collected: 08/09/24 0620    Lab Status: Final result Specimen: Blood from Arm, Left Updated: 08/09/24 0649     hs TnI 2hr 5 ng/L      Delta 2hr hsTnI -1 ng/L     FLU/RSV/COVID - if FLU/RSV clinically relevant [[id number]]  (Normal) Collected: 08/09/24 0435    Lab Status: Final result Specimen: Nares from Nose Updated: 08/09/24 0522     SARS-CoV-2 Negative     INFLUENZA A PCR Negative     INFLUENZA B PCR Negative     RSV PCR Negative    Narrative:      FOR PEDIATRIC PATIENTS - copy/paste COVID Guidelines URL to browser: [email or web address]    SARS-CoV-2 assay is a Nucleic Acid Amplification assay intended for the  qualitative detection of nucleic acid from SARS-CoV-2 in nasopharyngeal  swabs. Results are for the presumptive identification of SARS-CoV-2 RNA.    Positive results are indicative of infection with SARS-CoV-2, the virus  causing COVID-19, but do not rule out bacterial infection or co-infection  with other viruses. Laboratories within the [place] and its  territories are required to report all positive results to the appropriate  public health authorities. Negative results do not preclude SARS-CoV-2  infection and should not be used as the sole basis for treatment or other  patient  management decisions. Negative results must be combined with  clinical observations, patient history, and epidemiological information.  This test has not been FDA cleared or approved.    This test has been authorized by FDA under an Emergency Use Authorization  (EUA). This test is only authorized for the duration of time the  declaration that circumstances exist justifying the authorization of the  emergency use of an in vitro diagnostic tests for detection of SARS-CoV-2  virus and/or diagnosis of COVID-19 infection under section 564(b)(1) of  the Act, 21 U.S.C. 360bbb-3(b)(1), unless the authorization is terminated  or revoked sooner. The test has been validated but independent review by FDA  and CLIA is pending.    Test performed using [employer] GeneXpert: This RT-PCR assay targets N2,  a region unique to SARS-CoV-2. A conserved region in the E-gene was chosen  for pan-Sarbecovirus detection which includes SARS-CoV-2.    According to CMS-2020-01-R, this platform meets the definition of high-throughput technology.    Procalcitonin [[id number]]  (Abnormal) Collected: 08/09/24 0435    Lab Status: Final result Specimen: Blood from Arm, Left Updated: 08/09/24 0510     Procalcitonin 0.26 ng/ml     HS Troponin 0hr (reflex protocol) [[id number]]  (Normal) Collected: 08/09/24 0435    Lab Status: Final result Specimen: Blood from Arm, Left Updated: 08/09/24 0506     hs TnI 0hr 6 ng/L     Lactic acid [[id number]]  (Normal) Collected: 08/09/24 0435    Lab Status: Final result Specimen: Blood from Arm, Left Updated: 08/09/24 0506     LACTIC ACID 1.4 mmol/L     Narrative:      Result may be elevated if tourniquet was used during collection.    Comprehensive metabolic panel [[id number]] Collected: 08/09/24 0435    Lab Status: Final result Specimen: Blood from Arm, Left Updated: 08/09/24 0505     Sodium 135 mmol/L      Potassium 3.7 mmol/L      Chloride 103 mmol/L      CO2 23 mmol/L      ANION GAP 9 mmol/L      BUN 14 mg/dL       Creatinine 0.91 mg/dL      Glucose 111 mg/dL      Calcium 8.7 mg/dL      AST 14 U/L      ALT 18 U/L      Alkaline Phosphatase 48 U/L      Total Protein 6.9 g/dL      Albumin 3.8 g/dL      Total Bilirubin 0.43 mg/dL      eGFR 100 ml/min/1.73sq m     Narrative:      National Kidney Disease Foundation guidelines for Chronic Kidney Disease (CKD):     Stage 1 with normal or high GFR (GFR > 90 mL/min/1.73 square meters)    Stage 2 Mild CKD (GFR = 60-89 mL/min/1.73 square meters)    Stage 3A Moderate CKD (GFR = 45-59 mL/min/1.73 square meters)    Stage 3B Moderate CKD (GFR = 30-44 mL/min/1.73 square meters)    Stage 4 Severe CKD (GFR = 15-29 mL/min/1.73 square meters)    Stage 5 End Stage CKD (GFR <15 mL/min/1.73 square meters)  Note: GFR calculation is accurate only with a steady state creatinine    Protime-INR [[id number]]  (Normal) Collected: 08/09/24 0435    Lab Status: Final result Specimen: Blood from Arm, Left Updated: 08/09/24 0501     Protime 13.4 seconds      INR 0.97    Narrative:      INR Therapeutic Range    Indication                                             INR Range      Atrial Fibrillation                                               2.0-3.0  Hypercoagulable State                                    2.0.2.3  Left Ventricular Asist Device                            2.0-3.0  Mechanical Heart Valve                                  -    Aortic(with afib, MI, embolism, HF, LA enlargement,    and/or coagulopathy)                                     2.0-3.0 (2.5-3.5)     Mitral                                                             2.5-3.5  Prosthetic/Bioprosthetic Heart Valve               2.0-3.0  Venous thromboembolism (VTE: VT, PE        2.0-3.0    APTT [[id number]]  (Normal) Collected: 08/09/24 0435    Lab Status: Final result Specimen: Blood from Arm, Left Updated: 08/09/24 0501     PTT 26 seconds     CBC and differential [[id number]]  (Abnormal) Collected: 08/09/24 0435    Lab Status: Final result  Specimen: Blood from Arm, Left Updated: 08/09/24 0445     WBC 9.12 Thousand/uL      RBC 5.36 Million/uL      Hemoglobin 16.1 g/dL      Hematocrit 46.0 %      MCV 86 fL      MCH 30.0 pg      MCHC 35.0 g/dL      RDW 12.7 %      MPV 8.3 fL      Platelets 209 Thousands/uL      nRBC 0 /100 WBCs      Segmented % 61 %      Immature Grans % 1 %      Lymphocytes % 27 %      Monocytes % 11 %      Eosinophils Relative 0 %      Basophils Relative 0 %      Absolute Neutrophils 5.62 Thousands/µL      Absolute Immature Grans 0.06 Thousand/uL      Absolute Lymphocytes 2.42 Thousands/µL      Absolute Monocytes 0.96 Thousand/µL      Eosinophils Absolute 0.02 Thousand/µL      Basophils Absolute 0.04 Thousands/µL                    XR chest 1 view portable   ED Interpretation by Heidi Shell MD (08/09 0641)   No acute pulmonary pathology      Final Result by Sukh Holcomb MD (08/09 0822)      No acute cardiopulmonary disease.            Workstation performed: [id number]                    Procedures  ECG 12 Lead Documentation Only    Date/Time: 8/9/2024 4:52 AM    Performed by: Heidi Shell MD  Authorized by: Heidi Shell MD    Indications / Diagnosis:  Tachycardia  ECG reviewed by me, the ED Provider: yes    Patient location:  ED  Previous ECG:     Previous ECG:  Compared to current    Comparison ECG info:  6/13/22 normal ekg    Similarity:  No change  Interpretation:     Interpretation: normal    Rate:     ECG rate:  98    ECG rate assessment: normal    Rhythm:     Rhythm: sinus rhythm    Ectopy:     Ectopy: none    QRS:     QRS axis:  Normal    QRS intervals:  Normal  Conduction:     Conduction: normal    ST segments:     ST segments:  Normal  T waves:     T waves: inverted      Inverted:  III           ED Course  ED Course as of 08/11/24 1628   Fri Aug 09, 2024   0611 Headache improved on re-evaluation                                 SBIRT 20yo+      Flowsheet Row Most Recent Value    Initial Alcohol Screen: US AUDIT-C     1. How often do you have a drink containing alcohol? 0 Filed at: 08/09/2024 0447   2. How many drinks containing alcohol do you have on a typical day you are drinking?  0 Filed at: 08/09/2024 0447   3a. Male UNDER 65: How often do you have five or more drinks on one occasion? 0 Filed at: 08/09/2024 0447   Audit-C Score 0 Filed at: 08/09/2024 0447   ANGELLA: How many times in the past year have you...    Used an illegal drug or used a prescription medication for non-medical reasons? Never Filed at: 08/09/2024 0447                      Medical Decision Making  51 year old male presents for evaluation of fever associated with transient chest pain 2 days ago and headaches since yesterday.  Only current SIRS criteria is tachycardia.  Minimal elevation in procalcitonin.  CXR without infiltrate.  UA pending.  Patient signed out to Dr. Frazier.    Amount and/or Complexity of Data Reviewed  Labs: ordered.  Radiology: ordered and independent interpretation performed.    Risk  Prescription drug management.                 Disposition  Final diagnoses:   Fever   Sinusitis     Time reflects when diagnosis was documented in both MDM as applicable and the Disposition within this note       Time User Action Codes Description Comment    8/9/2024 10:29 AM Artis Frazier Add [R50.9] Fever     8/9/2024 10:29 AM Artis Frazier Add [J32.9] Sinusitis           ED Disposition       ED Disposition   Discharge    Condition   Stable    Date/Time   Fri Aug 9, 2024 1029    Comment   Asher PECK Gianfranco discharge to home/self care.                   Follow-up Information       Follow up With Specialties Details Why Contact Info Additional Information    Rabia Del Toro MD  Schedule an appointment as soon as possible for a visit  For further evaluation, if not improved [address] [facility]  [address] [place] [id number]  [phone]        [facility] Emergency Department Emergency Medicine Go to  If  "symptoms worsen [address] [facility] [id number] [phone] [facility] Emergency Department, [address] [place], [place] [address]            Discharge Medication List as of 8/9/2024 10:32 AM        START taking these medications    Details   doxycycline hyclate (VIBRAMYCIN) 100 mg capsule Take 1 capsule (100 mg total) by mouth 2 (two) times a day for 7 days, Starting Fri 8/9/2024, Until Fri 8/16/2024, Normal           CONTINUE these medications which have NOT CHANGED    Details   abatacept (Orencia) 250 mg Infuse 1,000 mg into a venous catheter every 28 days, Historical Med      B-D TB SYRINGE 1CC/27GX1/2\" 27G X 1/2\" 1 ML MISC USE THREE WEEKLY FOR ALLERGY INJECTIONS, Historical Med      clobetasol (TEMOVATE) 0.05 % cream Apply topically 2 (two) times a day, Starting Wed 2/1/2023, Historical Med      EPINEPHrine (EPIPEN) 0.3 mg/0.3 mL SOAJ Inject 0.3 mL (0.3 mg total) into a muscle once for 1 dose, Starting Sun 9/1/2019, Normal      fluticasone (FLONASE) 50 mcg/act nasal spray 1 spray into each nostril 2 (two) times a day, Historical Med      !! GaviLyte-C 240 g solution FOLLOW INSTRUCTIONS FROM [facility] PREP SHEET, Historical Med      Insulin Syringe-Needle U-100 27.5G X 5/8\" 2 ML MISC 3 syringes weekly for allergy injections, Historical Med      LINZESS 145 MCG CAPS 1 (one) Capsule daily on an empty stomach, at least 30 mn before first meal, Historical Med      !! NON FORMULARY Inject 0.5 mL under the skin once a week Administer 0.5 ml Subq of Serum #1 (Cat, Dog, Cockroach (CDCR)) weekly    Administer 0.5 ml Subq of Serum #2 (Tree, Tree, Weed) (TTW)) weekly, Historical Med      !! NON FORMULARY 0.3 mg Morphine pump, Historical Med      !! PEG 3350-KCl-NaBcb-NaCl-NaSulf (PEG 3350/Electrolytes) 240 g SOLR Historical Med      tiZANidine (ZANAFLEX) 2 mg tablet Take 2 tablets (4 mg total) by mouth 4 (four) times a day as needed for muscle spasms, Starting " Wed 8/7/2019, Normal      venlafaxine (EFFEXOR) 37.5 mg tablet Take 1 tablet by mouth once daily, Normal       !! - Potential duplicate medications found. Please discuss with provider.          No discharge procedures on file.    PDMP Review         Value Time User    PDMP Reviewed  Yes 6/13/2022  9:10 AM AVA Zendejas            ED Provider  Electronically Signed by             Heidi Shell MD  08/11/24 5076

## 2024-08-09 NOTE — ED CARE HANDOFF
Emergency Department Sign Out Note        Sign out and transfer of care from Dr. Shell. See Separate Emergency Department note.     The patient, Asher Medel, was evaluated by the previous provider for fever, HA.    Workup Completed:  H&P, labs    ED Course / Workup Pending (followup):  UA    Results for orders placed or performed during the hospital encounter of 08/09/24   FLU/RSV/COVID - if FLU/RSV clinically relevant    Specimen: Nose; Nares   Result Value Ref Range    SARS-CoV-2 Negative Negative    INFLUENZA A PCR Negative Negative    INFLUENZA B PCR Negative Negative    RSV PCR Negative Negative   CBC and differential   Result Value Ref Range    WBC 9.12 4.31 - 10.16 Thousand/uL    RBC 5.36 3.88 - 5.62 Million/uL    Hemoglobin 16.1 12.0 - 17.0 g/dL    Hematocrit 46.0 36.5 - 49.3 %    MCV 86 82 - 98 fL    MCH 30.0 26.8 - 34.3 pg    MCHC 35.0 31.4 - 37.4 g/dL    RDW 12.7 11.6 - 15.1 %    MPV 8.3 (L) 8.9 - 12.7 fL    Platelets 209 149 - 390 Thousands/uL    nRBC 0 /100 WBCs    Segmented % 61 43 - 75 %    Immature Grans % 1 0 - 2 %    Lymphocytes % 27 14 - 44 %    Monocytes % 11 4 - 12 %    Eosinophils Relative 0 0 - 6 %    Basophils Relative 0 0 - 1 %    Absolute Neutrophils 5.62 1.85 - 7.62 Thousands/µL    Absolute Immature Grans 0.06 0.00 - 0.20 Thousand/uL    Absolute Lymphocytes 2.42 0.60 - 4.47 Thousands/µL    Absolute Monocytes 0.96 0.17 - 1.22 Thousand/µL    Eosinophils Absolute 0.02 0.00 - 0.61 Thousand/µL    Basophils Absolute 0.04 0.00 - 0.10 Thousands/µL   Comprehensive metabolic panel   Result Value Ref Range    Sodium 135 135 - 147 mmol/L    Potassium 3.7 3.5 - 5.3 mmol/L    Chloride 103 96 - 108 mmol/L    CO2 23 21 - 32 mmol/L    ANION GAP 9 4 - 13 mmol/L    BUN 14 5 - 25 mg/dL    Creatinine 0.91 0.60 - 1.30 mg/dL    Glucose 111 65 - 140 mg/dL    Calcium 8.7 8.4 - 10.2 mg/dL    AST 14 13 - 39 U/L    ALT 18 7 - 52 U/L    Alkaline Phosphatase 48 34 - 104 U/L    Total Protein 6.9 6.4 - 8.4 g/dL     "Albumin 3.8 3.5 - 5.0 g/dL    Total Bilirubin 0.43 0.20 - 1.00 mg/dL    eGFR 100 ml/min/1.73sq m   Lactic acid   Result Value Ref Range    LACTIC ACID 1.4 0.5 - 2.0 mmol/L   Procalcitonin   Result Value Ref Range    Procalcitonin 0.26 (H) <=0.25 ng/ml   Protime-INR   Result Value Ref Range    Protime 13.4 12.3 - 15.0 seconds    INR 0.97 0.85 - 1.19   APTT   Result Value Ref Range    PTT 26 23 - 34 seconds   UA w Reflex to Microscopic w Reflex to Culture    Specimen: Urine, Clean Catch   Result Value Ref Range    Color, UA Yellow     Clarity, UA Clear     Specific Gravity, UA >=1.030 1.005 - 1.030    pH, UA 5.5 4.5, 5.0, 5.5, 6.0, 6.5, 7.0, 7.5, 8.0    Leukocytes, UA Negative Negative    Nitrite, UA Negative Negative    Protein, UA Trace (A) Negative mg/dl    Glucose, UA Negative Negative mg/dl    Ketones, UA Negative Negative mg/dl    Urobilinogen, UA <2.0 <2.0 mg/dl mg/dl    Bilirubin, UA Negative Negative    Occult Blood, UA Negative Negative   HS Troponin 0hr (reflex protocol)   Result Value Ref Range    hs TnI 0hr 6 \"Refer to ACS Flowchart\"- see link ng/L   HS Troponin I 2hr   Result Value Ref Range    hs TnI 2hr 5 \"Refer to ACS Flowchart\"- see link ng/L    Delta 2hr hsTnI -1 <20 ng/L   Urine Microscopic   Result Value Ref Range    RBC, UA 0-1 None Seen, 0-1, 1-2, 2-4, 0-5 /hpf    WBC, UA 1-2 None Seen, 0-1, 1-2, 0-5, 2-4 /hpf    Epithelial Cells None Seen None Seen, Occasional /hpf    Bacteria, UA Occasional None Seen, Occasional /hpf    MUCUS THREADS Moderate (A) None Seen   ECG 12 lead   Result Value Ref Range    Ventricular Rate 98 BPM    Atrial Rate 98 BPM    IL Interval 132 ms    QRSD Interval 78 ms    QT Interval 336 ms    QTC Interval 428 ms    P [place] 29 degrees    QRS Axis 30 degrees    T Wave Axis 16 degrees       Patient stable upon my reevaluation.  After discussion with the patient.  He states that his headache began after he was working in the dirt outside.  He has had similar episodes where he has " gotten sinusitis from this type of yard work.  Patient already taking nasal steroid spray.  Discussed the use of antibiotics for potential sinusitis as etiology of fever and headaches with otherwise unremarkable workup.    The patient (and any family present) verbalized understanding of the discharge instructions and warnings that would necessitate return to the Emergency Department.    All questions were answered prior to discharge.                                       Procedures  Medical Decision Making  Amount and/or Complexity of Data Reviewed  Labs: ordered.  Radiology: ordered and independent interpretation performed.    Risk  Prescription drug management.            Disposition  Final diagnoses:   Fever   Sinusitis     Time reflects when diagnosis was documented in both MDM as applicable and the Disposition within this note       Time User Action Codes Description Comment    8/9/2024 10:29 AM Artis Frazier Add [R50.9] Fever     8/9/2024 10:29 AM Artis Frazier Add [J32.9] Sinusitis           ED Disposition       ED Disposition   Discharge    Condition   Stable    Date/Time   Fri Aug 9, 2024 10:29 AM    Comment   Asher PECK Gianfranco discharge to home/self care.                   Follow-up Information       Follow up With Specialties Details Why Contact Info Additional Information    Rabia Del Toro MD  Schedule an appointment as soon as possible for a visit  For further evaluation, if not improved [address] [facility]  [address] [place] [address]  [phone]        [facility] Emergency Department Emergency Medicine Go to  If symptoms worsen [address] [facility] [address] [phone] [facility] Emergency Department, [address] [place], [place] [address]          Patient's Medications   Discharge Prescriptions    DOXYCYCLINE HYCLATE (VIBRAMYCIN) 100 MG CAPSULE    Take 1 capsule (100 mg total) by mouth 2 (two) times a day for 7 days        Start Date: 8/9/2024  End Date: 8/16/2024       Order Dose: 100 mg       Quantity: 14 capsule    Refills: 0     No discharge procedures on file.       ED Provider  Electronically Signed by     Artis Frazier DO  08/09/24 1036

## 2024-08-09 NOTE — ED NOTES
Patient unable to provide urine sample at this time. Provider aware.      Daisy Anne RN  08/09/24 08

## 2024-08-11 LAB
BACTERIA BLD CULT: NORMAL
BACTERIA BLD CULT: NORMAL

## 2024-08-14 LAB
BACTERIA BLD CULT: NORMAL
BACTERIA BLD CULT: NORMAL

## 2024-09-11 DIAGNOSIS — G47.411 CATAPLEXY: ICD-10-CM

## 2024-09-11 RX ORDER — VENLAFAXINE 37.5 MG/1
TABLET ORAL
Qty: 30 TABLET | Refills: 0 | Status: SHIPPED | OUTPATIENT
Start: 2024-09-11

## 2024-09-11 NOTE — TELEPHONE ENCOUNTER
Last office visit 2/16/2024.  Next office visit 12/9/2024.    Dr. Macedo, please review Rx request and sign if appropriate.  Thank you.

## 2024-10-08 DIAGNOSIS — G47.411 CATAPLEXY: ICD-10-CM

## 2024-10-08 RX ORDER — VENLAFAXINE 37.5 MG/1
TABLET ORAL
Qty: 30 TABLET | Refills: 5 | Status: SHIPPED | OUTPATIENT
Start: 2024-10-08

## 2024-10-21 NOTE — PROGRESS NOTES
"Ambulatory Visit - New Patient   Name: Asher Medel      : 1975      MRN: [id number]  Encounter Provider: Mindi Dubois PA-C  Encounter Date: 10/23/2024   [facility]    Assessment & Plan  Pelvic floor dysfunction  History of spinal injury (2009)   Chronic back pain and hip injections periodically   The majority of his urinary complaints seem to point towards nerve pain/decreased possible control  Recommend pelvic floor PT for increased strength and better bladder control    Orders:    sildenafil (VIAGRA) 50 MG tablet; Take 1 tablet (50 mg total) by mouth daily as needed for erectile dysfunction    Ambulatory Referral to Physical Therapy; Future    Testosterone, free, total; Future    CT renal protocol; Future    Basic metabolic panel; Future    Erectile dysfunction due to diseases classified elsewhere  Unable to maintain erections, worsening over the last year  Reports numbness during sexual intercourse and pain with ejaculation  Sildenafil 50 Mg tablets sent to patient's pharmacy, take 1 tablet roughly 1 hour prior to sexual activity  Monitor progression of symptoms    Orders:    sildenafil (VIAGRA) 50 MG tablet; Take 1 tablet (50 mg total) by mouth daily as needed for erectile dysfunction    Testosterone, free, total; Future    Dysuria  Worsening dysuria, urinary urgency, urinary frequency, and generalized pelvic floor pain  Likely nerve related secondary to past spinal injury  Possible urethral stricture as he feels urine gets \"stuck in his penis\"  Urine studies 24 - negative fro infection and blood, trace protein   Patient urinated right before coming in, PVR 0 mL today in office, urine sample unable to be collected  Outpatient UA and urine culture will be ordered  CT renal study plus BMP ordered to rule out anatomical irregularities depending on imaging findings, consider cystoscopy in the future for further evaluation    Orders:    UA w " Reflex to Microscopic w Reflex to Culture; Future    Urine culture; Future      History of Present Illness     Asher Medel is a 48 y.o. male who presents to the office as a new patient with multiple ongoing urologic complaints.  He sustained a lower back injury in July 2009 after slipping on ice resulting in 10 herniated disks and shattering his coccyx.  Since then he has dealt with a lot of of muscular and nerve pain throughout the lower back, bilateral hips, and pelvic floor.  He previously followed with urologist roughly 10 years ago after the injury, but has not been seen since.  He frequently experiences sudden onset urgency with a feeling that he cannot fully empty his bladder followed by urinary leakage.  He feels as if the urine is getting stuck in his penis, possible urethral stricture.  He also mentions his father had to have a tube inserted in order to completely empty his bladder, likely Bautista catheter, but this could be due to multiple different etiologies.  He frequently feels as if he has a urinary tract infection, but urine studies continue to return negative.  Most recent urine studies from 8/9/2024 were negative for infection and blood, the only positive was trace protein.  He is unable to provide a urine sample today in office, outpatient UA and urine culture will be ordered for further evaluation.  His classic UTI symptoms are reported as urinary frequency, dysuria, difficulty emptying his bladder, and green tinted urine.  He will drink cranberry juice and the symptoms will resolve.  He currently remains asymptomatic.  He reports 1 episode of visible blood in the urine, but reports that this was due to significant dehydration.  He has never been worked up for microscopic hematuria.  As he is experiencing a variety of symptoms along with urine studies, CT renal protocol plus BMP will be ordered to better evaluate the upper tracts and the bladder, to ensure nothing anatomically incorrect is  visualized.    Patient also reports new onset erectile issues that have gotten worse over the last year.  He states he has never had problems maintaining an erection for reaching climax but has recently been experiencing penile numbness and severe pain with ejaculation.  He is aware this is likely also related to his previous back injury as ejaculation relieves a lot of pelvic pressure but also caused him a lot of pain.  His girlfriend is currently 5 months pregnant which she thinks may also be contributing to his erectile changes.  They are roughly sexually active every 3 weeks, but he would like this to increase if he is able to maintain his erections.  10 tablets of Viagra will be sent to his pharmacy to see if this offers any change.  Testosterone panel will also be ordered as he reports his testicles have gotten smaller over the last few years.  Referral to pelvic floor physical therapy has been placed as it sounds like a lot of his symptoms may be due to pelvic floor stability.  The patient is in agreement, and believes that this would assist with the majority of his concerns.  The patient will follow-up in 3 months and we can discuss his test results and determine neck step planning.  She also has been educated to reach out to the office in the meantime for repeat UTI symptoms or new onset pain.      History obtained from : patient and patient's Significant Other  Review of Systems   Constitutional:  Negative for activity change, chills, fatigue and fever.   Respiratory:  Negative for apnea, cough and shortness of breath.    Cardiovascular:  Negative for chest pain and leg swelling.   Gastrointestinal:  Positive for abdominal pain (generalized). Negative for abdominal distention, constipation, diarrhea, nausea and vomiting.   Genitourinary:  Positive for difficulty urinating (intermittent), dysuria, frequency and urgency. Negative for decreased urine volume, flank pain, hematuria, penile pain and testicular  "pain.   Musculoskeletal:  Positive for back pain (chronic).   Neurological:  Negative for dizziness and headaches.   Psychiatric/Behavioral: Negative.       Medical History Reviewed by provider this encounter:  Allergies  Meds       Current Outpatient Medications on File Prior to Visit   Medication Sig Dispense Refill    abatacept (Orencia) 250 mg Infuse 1,000 mg into a venous catheter every 28 days      B-D TB SYRINGE 1CC/27GX1/2\" 27G X 1/2\" 1 ML MISC USE THREE WEEKLY FOR ALLERGY INJECTIONS      fluticasone (FLONASE) 50 mcg/act nasal spray 1 spray into each nostril 2 (two) times a day      Insulin Syringe-Needle U-100 27.5G X 5/8\" 2 ML MISC 3 syringes weekly for allergy injections      LINZESS 145 MCG CAPS 1 (one) Capsule daily on an empty stomach, at least 30 mn before first meal  0    NON FORMULARY Inject 0.5 mL under the skin once a week Administer 0.5 ml Subq of Serum #1 (Cat, Dog, Cockroach (CDCR)) weekly    Administer 0.5 ml Subq of Serum #2 (Tree, Tree, Weed) (TTW)) weekly      NON FORMULARY 0.3 mg Morphine pump      PEG 3350-KCl-NaBcb-NaCl-NaSulf (PEG 3350/Electrolytes) 240 g SOLR       tiZANidine (ZANAFLEX) 2 mg tablet Take 2 tablets (4 mg total) by mouth 4 (four) times a day as needed for muscle spasms (Patient taking differently: Take 4 mg by mouth as needed for muscle spasms) 240 tablet 4    venlafaxine (EFFEXOR) 37.5 mg tablet Take 1 tablet by mouth once daily 30 tablet 5    clobetasol (TEMOVATE) 0.05 % cream Apply topically 2 (two) times a day (Patient not taking: Reported on 6/15/2023)      EPINEPHrine (EPIPEN) 0.3 mg/0.3 mL SOAJ Inject 0.3 mL (0.3 mg total) into a muscle once for 1 dose (Patient not taking: Reported on 10/23/2024) 0.6 mL 0    GaviLyte-C 240 g solution FOLLOW INSTRUCTIONS FROM PEDRO LUIS PREP SHEET (Patient not taking: Reported on 6/15/2023)       No current facility-administered medications on file prior to visit.      Social History     Tobacco Use    Smoking status: Never    Smokeless " "tobacco: Never   Vaping Use    Vaping status: Never Used   Substance and Sexual Activity    Alcohol use: No    Drug use: No    Sexual activity: Not on file           Objective     There were no vitals taken for this visit.  Physical Exam  Vitals and nursing note reviewed.   Constitutional:       General: He is not in acute distress.     Appearance: Normal appearance. He is well-developed. He is obese. He is not ill-appearing.   HENT:      Head: Normocephalic and atraumatic.   Eyes:      Conjunctiva/sclera: Conjunctivae normal.   Cardiovascular:      Rate and Rhythm: Normal rate and regular rhythm.      Heart sounds: No murmur heard.  Pulmonary:      Effort: Pulmonary effort is normal. No respiratory distress.      Breath sounds: Normal breath sounds.   Abdominal:      General: Abdomen is flat. There is no distension.      Palpations: Abdomen is soft.      Tenderness: There is no abdominal tenderness. There is no right CVA tenderness or left CVA tenderness.   Musculoskeletal:         General: No swelling.      Cervical back: Neck supple.      Comments: Back pain and b/l hip pain    Skin:     General: Skin is warm and dry.      Capillary Refill: Capillary refill takes less than 2 seconds.   Neurological:      Mental Status: He is alert.   Psychiatric:         Mood and Affect: Mood normal.       Results  No results found for: \"PSA\"  Lab Results   Component Value Date    CALCIUM 8.7 08/09/2024    K 3.7 08/09/2024    CO2 23 08/09/2024     08/09/2024    BUN 14 08/09/2024    CREATININE 0.91 08/09/2024     Lab Results   Component Value Date    WBC 9.12 08/09/2024    HGB 16.1 08/09/2024    HCT 46.0 08/09/2024    MCV 86 08/09/2024     08/09/2024       Office Urine Dip  No results found for this or any previous visit (from the past 1 hour(s)).]    Administrative Statements   I have spent a total time of 57 minutes in caring for this patient on the day of the visit/encounter including Diagnostic results, Prognosis, " Risks and benefits of tx options, Instructions for management, Patient and family education, Importance of tx compliance, Risk factor reductions, Impressions, Counseling / Coordination of care, Documenting in the medical record, Reviewing / ordering tests, medicine, procedures  , and Obtaining or reviewing history  .

## 2024-10-23 ENCOUNTER — OFFICE VISIT (OUTPATIENT)
Dept: UROLOGY | Facility: HOSPITAL | Age: 49
End: 2024-10-23
Payer: MEDICARE

## 2024-10-23 VITALS
HEART RATE: 63 BPM | WEIGHT: 250 LBS | HEIGHT: 71 IN | BODY MASS INDEX: 35 KG/M2 | DIASTOLIC BLOOD PRESSURE: 84 MMHG | OXYGEN SATURATION: 100 % | SYSTOLIC BLOOD PRESSURE: 132 MMHG

## 2024-10-23 DIAGNOSIS — N52.1 ERECTILE DYSFUNCTION DUE TO DISEASES CLASSIFIED ELSEWHERE: ICD-10-CM

## 2024-10-23 DIAGNOSIS — R30.0 DYSURIA: ICD-10-CM

## 2024-10-23 DIAGNOSIS — M62.89 PELVIC FLOOR DYSFUNCTION: Primary | ICD-10-CM

## 2024-10-23 PROCEDURE — 99204 OFFICE O/P NEW MOD 45 MIN: CPT

## 2024-10-23 PROCEDURE — 99214 OFFICE O/P EST MOD 30 MIN: CPT

## 2024-10-23 RX ORDER — SILDENAFIL 50 MG/1
50 TABLET, FILM COATED ORAL DAILY PRN
Qty: 10 TABLET | Refills: 0 | Status: SHIPPED | OUTPATIENT
Start: 2024-10-23

## 2024-10-29 ENCOUNTER — HOSPITAL ENCOUNTER (OUTPATIENT)
Dept: CT IMAGING | Facility: HOSPITAL | Age: 49
Discharge: HOME | End: 2024-10-29
Payer: MEDICARE

## 2024-10-29 DIAGNOSIS — M62.89 PELVIC FLOOR DYSFUNCTION: ICD-10-CM

## 2024-10-29 PROCEDURE — 74178 CT ABD&PLV WO CNTR FLWD CNTR: CPT

## 2024-10-29 RX ADMIN — IOHEXOL 100 ML: 350 INJECTION, SOLUTION INTRAVENOUS at 19:00

## 2024-11-05 ENCOUNTER — TELEPHONE (OUTPATIENT)
Dept: UROLOGY | Facility: AMBULATORY SURGERY CENTER | Age: 49
End: 2024-11-05

## 2024-11-05 NOTE — RESULT ENCOUNTER NOTE
CT renal protocol reviewed:     1. No genitourinary system anatomic abnormality.  2. Spinal degenerative changes, grade 2 anterolisthesis with chronic bilateral defects    Ongoing bladder pain and erection changes are likely nerve related in nature from prior back injury as nothing anatomically abnormal was seen on imaging - recommend proceeding with pelvic floor PT and following up as scheduled to see how he feels.

## 2024-11-05 NOTE — TELEPHONE ENCOUNTER
----- Message from Mindi Dubois PA-C sent at 11/5/2024 11:38 AM EST -----  CT renal protocol reviewed:     1. No genitourinary system anatomic abnormality.  2. Spinal degenerative changes, grade 2 anterolisthesis with chronic bilateral defects    Ongoing bladder pain and erection changes are likely nerve related in nature from prior back injury as nothing anatomically abnormal was seen on imaging - recommend proceeding with pelvic floor PT and following up as scheduled to see how he feels.

## 2024-11-05 NOTE — TELEPHONE ENCOUNTER
LM that there are no    My Note Signed 2:48 PM       ----- Message from Mindi Dubois PA-C sent at 11/5/2024 11:38 AM EST -----  CT renal protocol reviewed:      1. No genitourinary system anatomic abnormality.  2. Spinal degenerative changes, grade 2 anterolisthesis with chronic bilateral defects     Ongoing bladder pain and erection changes are likely nerve related in nature from prior back injury as nothing anatomically abnormal was seen on imaging - recommend proceeding with pelvic floor PT and following up as scheduled to see how he feels.

## 2024-12-09 ENCOUNTER — OFFICE VISIT (OUTPATIENT)
Dept: SLEEP CENTER | Facility: CLINIC | Age: 49
End: 2024-12-09
Payer: MEDICARE

## 2024-12-09 ENCOUNTER — APPOINTMENT (OUTPATIENT)
Dept: LAB | Facility: CLINIC | Age: 49
End: 2024-12-09
Payer: MEDICARE

## 2024-12-09 VITALS
WEIGHT: 253 LBS | DIASTOLIC BLOOD PRESSURE: 93 MMHG | BODY MASS INDEX: 35.42 KG/M2 | SYSTOLIC BLOOD PRESSURE: 133 MMHG | HEIGHT: 71 IN | HEART RATE: 80 BPM

## 2024-12-09 DIAGNOSIS — M62.81 MUSCLE WEAKNESS: Primary | ICD-10-CM

## 2024-12-09 DIAGNOSIS — G47.33 OBSTRUCTIVE SLEEP APNEA TREATED WITH CONTINUOUS POSITIVE AIRWAY PRESSURE (CPAP): ICD-10-CM

## 2024-12-09 DIAGNOSIS — M62.81 MUSCLE WEAKNESS: ICD-10-CM

## 2024-12-09 DIAGNOSIS — G47.411 CATAPLEXY: ICD-10-CM

## 2024-12-09 DIAGNOSIS — J98.11 ATELECTASIS: ICD-10-CM

## 2024-12-09 LAB — CK SERPL-CCNC: 98 U/L (ref 39–308)

## 2024-12-09 PROCEDURE — 82550 ASSAY OF CK (CPK): CPT

## 2024-12-09 PROCEDURE — 99214 OFFICE O/P EST MOD 30 MIN: CPT | Performed by: PSYCHIATRY & NEUROLOGY

## 2024-12-09 PROCEDURE — 36415 COLL VENOUS BLD VENIPUNCTURE: CPT

## 2024-12-09 PROCEDURE — 86042 ACETYLCHOLN RCPTR BLCKG ANTB: CPT

## 2024-12-09 PROCEDURE — G2211 COMPLEX E/M VISIT ADD ON: HCPCS | Performed by: PSYCHIATRY & NEUROLOGY

## 2024-12-09 RX ORDER — GABAPENTIN 100 MG/1
100 CAPSULE ORAL 3 TIMES DAILY
COMMUNITY
Start: 2024-11-26

## 2024-12-09 RX ORDER — VENLAFAXINE 37.5 MG/1
37.5 TABLET ORAL DAILY
Qty: 30 TABLET | Refills: 5 | Status: SHIPPED | OUTPATIENT
Start: 2024-12-09

## 2024-12-09 NOTE — PROGRESS NOTES
Name: Asher Medel      : 1975      MRN: [id number]  Encounter Provider: Asif Macedo MD  Encounter Date: 2024   Encounter department: [facility]    :  Assessment & Plan  Obstructive sleep apnea treated with continuous positive airway pressure (CPAP)  -Obstructive sleep apnea seems well treated with CPAP.  However as he has some witnessed apneas at home, we will change his CPAP pressure to 8-12 cm H2O.  I wrote an order for new supplies  Orders:    PAP DME Pressure Change    PAP DME Resupply/Reorder    Cataplexy  -History of possible narcolepsy in the past, had symptoms of possible cataplexy which have abated with venlafaxine  -Venlafaxine is effective, will continue  Orders:    venlafaxine (EFFEXOR) 37.5 mg tablet; Take 1 tablet (37.5 mg total) by mouth daily    Muscle weakness  -He describes episodes of muscle weakness, sometimes with shortness of breath, easy to fatigue.  Will order a battery of test.  Orders:    Acetylcholine Receptor (AChR) Binding Antibodies Complete Panel With Reflex to MuSK Antibodies; Future    Acetylcholine receptor, blocking; Future    EMG Rep Stim; Future    Ambulatory Referral to Neurology; Future    CK; Future    Complete PFT with post bronchodilator; Future    Atelectasis    He had atelectasis noted on a CT scan of his lungs.  Will check PFTs as well and he should follow-up with his primary care physician for this  Orders:    Complete PFT with post bronchodilator; Future      History of Present Illness     HPI    Patient accompanied at this visit by : girlfriend        Mr. Medel returns in a follow-up visit for history of obstructive sleep apnea and possible narcolepsy with cataplexy.  He has mild obstructive sleep apnea, respiratory event index 6.7, diagnosed in .  He has been treated with CPAP.    In the past a diagnose of narcolepsy was considered, he did not have an abnormal MSLT that supported it.  He previous had trials of Xyrem,  "modafinil, and a few other medications without significant benefit.  He has been treated with venlafaxine which has suppressed cataplexy-like episodes.    He notes that he will feel muscle weakness often.  He has a morphine pump.  Sometimes he has weakness around his eyes or episodes of blurry vision.  Sometimes he has random episodes of shortness of breath.    Does not doze often per him but his girlfriend notes he dozes, 4-5 days a week    Sometimes naps when very tired, at least twice a week ; usually feels better    Goes to bed 9 pm, up 730-8 am   No recent sleep paralysis \"not in years\"  No recent sleep hallucinations  Regarding cataplexy- controlled on venlafaxine, last over a year    Per his girlfriend, stops breathing 3-5 times a night when he is supine     CPAP Data  Airsense 11 auto set at 7-9 cm h20  AHI 2.1  Use 9 hr 34 min, used 85/90 nights  Median pressure 7.6 cm h20;  95% pressure 8.7 cm h20     Has a morphine pump, has had this since 2020; just increased a few weeks ago      Has had some dyspnea over the past year  No coughing or wheezing           Sitting and reading: Moderate chance of dozing  Watching TV: Slight chance of dozing  Sitting, inactive in a public place (e.g. a theatre or a meeting): Slight chance of dozing  As a passenger in a car for an hour without a break: Slight chance of dozing  Lying down to rest in the afternoon when circumstances permit: Moderate chance of dozing  Sitting and talking to someone: Slight chance of dozing  Sitting quietly after a lunch without alcohol: Slight chance of dozing  In a car, while stopped for a few minutes in traffic: Would never doze  Total score: 9       Review of Systems  Pertinent positives/negatives included in HPI and also as noted:   No depression or anxiety          Objective   /93 (BP Location: Left arm, Patient Position: Sitting, Cuff Size: Large)   Pulse 80   Ht 5' 11\" (1.803 m)   Wt 115 kg (253 lb)   BMI 35.29 kg/m²      " "  Physical Exam  Visit Vitals  /93 (BP Location: Left arm, Patient Position: Sitting, Cuff Size: Large)   Pulse 80   Ht 5' 11\" (1.803 m)   Wt 115 kg (253 lb)   BMI 35.29 kg/m²   Smoking Status Never   BSA 2.33 m²                                               Seemed to have weakness on repeated deltoid testing  No eye weakness with sustained upgaze but he described possible diplopia     Data  Lab Results   Component Value Date    HGB 16.1 08/09/2024    HCT 46.0 08/09/2024    MCV 86 08/09/2024      Lab Results   Component Value Date    CALCIUM 8.7 08/09/2024    K 3.7 08/09/2024    CO2 23 08/09/2024     08/09/2024    BUN 14 08/09/2024    CREATININE 0.91 08/09/2024     No results found for: \"IRON\", \"TIBC\", \"FERRITIN\"  Lab Results   Component Value Date    AST 14 08/09/2024    ALT 18 08/09/2024             "

## 2024-12-09 NOTE — ASSESSMENT & PLAN NOTE
-Obstructive sleep apnea seems well treated with CPAP.  However as he has some witnessed apneas at home, we will change his CPAP pressure to 8-12 cm H2O.  I wrote an order for new supplies  Orders:    PAP DME Pressure Change    PAP DME Resupply/Reorder

## 2024-12-10 ENCOUNTER — RESULTS FOLLOW-UP (OUTPATIENT)
Dept: SLEEP CENTER | Facility: CLINIC | Age: 49
End: 2024-12-10

## 2024-12-10 ENCOUNTER — TELEPHONE (OUTPATIENT)
Dept: SLEEP CENTER | Facility: CLINIC | Age: 49
End: 2024-12-10

## 2024-12-11 ENCOUNTER — EVALUATION (OUTPATIENT)
Dept: PHYSICAL THERAPY | Facility: CLINIC | Age: 49
End: 2024-12-11
Payer: MEDICARE

## 2024-12-11 DIAGNOSIS — M62.89 PELVIC FLOOR DYSFUNCTION: ICD-10-CM

## 2024-12-11 PROCEDURE — 97530 THERAPEUTIC ACTIVITIES: CPT | Performed by: PHYSICAL THERAPIST

## 2024-12-11 PROCEDURE — 97163 PT EVAL HIGH COMPLEX 45 MIN: CPT | Performed by: PHYSICAL THERAPIST

## 2024-12-11 NOTE — PROGRESS NOTES
PT Evaluation     Today's date: 2024  Patient name: Asher Medel  : 1975  MRN: [id number]  Referring provider: Mindi Dubois PA-C  Dx:   Encounter Diagnosis     ICD-10-CM    1. Pelvic floor dysfunction  M62.89 Ambulatory Referral to Physical Therapy                     Assessment  Impairments: abnormal or restricted ROM, activity intolerance, impaired physical strength, lacks appropriate home exercise program, pain with function, poor posture , poor body mechanics, activity limitations and endurance  Symptom irritability: moderate  Understanding of Dx/Px/POC: good     Prognosis: good    Goals  (Up to 8 weeks)  1. Independent and safe with PF HEP.  2. Independent and safe with postural correction and using a squatty potty at home to improve BM.  3. Independent and safe with body mechanics and PFM activation.  4. PF MMT will increase by 1/2-1 grade t/o.      Plan  Patient would benefit from: skilled physical therapy    Planned therapy interventions: manual therapy, neuromuscular re-education, patient/caregiver education, postural training, strengthening, stretching, therapeutic activities, therapeutic exercise, therapeutic training, home exercise program, graded exercise, graded activity, flexibility, functional ROM exercises, behavior modification, breathing training, activity modification, abdominal trunk stabilization and body mechanics training    Frequency: 1x week  Duration in weeks: 8  Treatment plan discussed with: patient        SL RUI  PT [facility] POST[facility] SUBJECTIVE EVAL:   History Of Present Illness:  Pt is 49 y/o male with complex PMHx. See pt's chart for details. Pt is c/o incomplete bladder emptying, post void dribble, increased urgency and frequency, ED dysfunction and discomfort with intimacy. Symptoms are gradually getting worse. Pt was seen by a urologist and was referred to [facility] for PF evaluation and tx.  Current functional limitations: b/l hip pain, (+) post void  dribble, on/off urgency and frequency to urinate, pain with intimacy, decreased sensation in penis/testicular pain.    As per pt.: (+) morphine pump for pain management in place L LQ, (+) spinal cord stimulator R LB region in place    Treatments:     Previous treatment:  Medication    Current treatment: medication    Patient Goals:     Patient goals for therapy:  Fully empty bladder or bowels, improved bladder or bowel function, improved comfort, improved quality of life, decreased interpersonal problems, improved pain management and decreased pain      Objective     Postural Observations  Seated posture: poor  Standing posture: poor  Correction of posture: has no consistent effect      Active Range of Motion   Cervical/Thoracic Spine       Thoracic    Flexion:  WFL  Extension:  Restriction level: moderate  Left lateral flexion:  Restriction level: moderate  Right lateral flexion:  Restriction level: moderate  Left rotation:  Restriction level: moderate  Right rotation:  Restriction level: moderate    Lumbar   Flexion:  WFL  Extension:  Restriction level: moderate  Left lateral flexion:  Restriction level: moderate  Right lateral flexion:  Restriction level: moderate  Left rotation:  Restriction level: minimal  Right rotation:  Restriction level: minimal    Strength/Myotome Testing     Lumbar   Left   Normal strength    Right   Normal strength      Abdominal Assessment:      Position: supine exam    Diastatis   Diastasis recti present? no  Connective tissue integrity at linea alba: boggy  no tenderness at linea alba  unable to engage transverse abdominis     Skin inspection:   scars present.   Additional skin inspection details: 2 old fully healed scars in L LQ due to morphine pump placement, R LB region due to spinal stimulator.      General Perineum Exam:   Positive for rectal irritation and perianal erythema.     Visual Inspection of Perineum:   Excursion of perineal body in cephalad direction with contraction of  pelvic floor muscles (PFM): unable to isolate without substitution  Excursion of perineal body in caudal direction with relaxation of pelvic floor muscles (PFM): good   Involuntary contraction with coughing: no  Involuntary relaxation with bearing down: no  Cotton swab test: non-tender  Cough reflex: cough reflex  Sphincter Tone Resting: normal  Sphincter Tone Squeeze: normal  Sensation: intact  Perineal body inspection: within normal limits        Pelvic Floor Muscle Exam:     Muscle Contraction: substitution   Breathing pattern with contraction: holding breath   Pelvic floor muscle relaxation is complete.         PERFECT Score   Power right: 2/5   Power left: 2/5   Endurance (seconds to max): 3   Repetitions (before fatigue): 3   Fast flicks (in 10 seconds): 3      Rectal Pelvic Floor Muscle Exam    External anal sphincter: wink reflex intact    pelvic floor exam consent given by patient                  Precautions: not any given      Manuals 12/11            PF MMT 2/5 w decreased prolong hold endurance to 3 sec                                                   Neuro Re-Ed                           PT edu/anatomy/benefits/purpose edu                                                                             Ther Ex             Supine PF/TA demo            Supine bridge PF/TA demo            Supine LE AD's t-ball use PF/TA demo                                                                             Ther Activity             HEP edu/reveiw Given 8'                         Gait Training                                       Modalities

## 2024-12-12 LAB — ACHR BLOCK AB/ACHR TOTAL SFR SER: 18 % (ref 0–25)

## 2024-12-13 LAB
DME PARACHUTE DELIVERY DATE REQUESTED: NORMAL
DME PARACHUTE ITEM DESCRIPTION: NORMAL
DME PARACHUTE ORDER STATUS: NORMAL
DME PARACHUTE SUPPLIER NAME: NORMAL
DME PARACHUTE SUPPLIER PHONE: NORMAL

## 2024-12-17 LAB

## 2024-12-20 ENCOUNTER — OFFICE VISIT (OUTPATIENT)
Dept: PHYSICAL THERAPY | Facility: CLINIC | Age: 49
End: 2024-12-20
Payer: MEDICARE

## 2024-12-20 DIAGNOSIS — M62.89 PELVIC FLOOR DYSFUNCTION: Primary | ICD-10-CM

## 2024-12-20 PROCEDURE — 97112 NEUROMUSCULAR REEDUCATION: CPT | Performed by: PHYSICAL THERAPIST

## 2024-12-20 PROCEDURE — 97110 THERAPEUTIC EXERCISES: CPT | Performed by: PHYSICAL THERAPIST

## 2024-12-20 NOTE — PROGRESS NOTES
"Daily Note     Today's date: 2024  Patient name: Asher Medel  : 1975  MRN: [id number]  Referring provider: Mindi Dubois PA-C  Dx:   Encounter Diagnosis     ICD-10-CM    1. Pelvic floor dysfunction  M62.89                      Subjective: Pt reports performing HEP 1x per day.      Objective: See treatment diary below      Assessment: Tolerated treatment well. Patient demonstrated fatigue post treatment, exhibited good technique with therapeutic exercises, and would benefit from continued PT for further progression of therex/HEP. HEP was updated and reviewed.      Plan: Continue per plan of care.  Progress treatment as tolerated.       Precautions: not any given      Manuals            PF MMT 2/5 w decreased prolong hold endurance to 3 sec                                                   Neuro Re-Ed                           PT edu/anatomy/benefits/purpose edu                         Rec bike/posture/PF  L3 10'                                                  Ther Ex             Supine PF/TA demo 3x3\" reveiw           Supine bridge PF/TA demo 3x3\" reveiw           Supine LE AD's t-ball use PF/TA demo 3x3\" review           S/l PF/TA  5x3\" ea side           Cat/cow PF  5x3\"           Butterfly stretch  3x30\"                                                  Ther Activity             HEP edu/reveiw Given 8' Updated                          Gait Training                                       Modalities                                            "

## 2024-12-24 ENCOUNTER — OFFICE VISIT (OUTPATIENT)
Dept: PHYSICAL THERAPY | Facility: CLINIC | Age: 49
End: 2024-12-24
Payer: MEDICARE

## 2024-12-24 DIAGNOSIS — M62.89 PELVIC FLOOR DYSFUNCTION: Primary | ICD-10-CM

## 2024-12-24 PROCEDURE — 97110 THERAPEUTIC EXERCISES: CPT | Performed by: PHYSICAL THERAPIST

## 2024-12-24 PROCEDURE — 97112 NEUROMUSCULAR REEDUCATION: CPT | Performed by: PHYSICAL THERAPIST

## 2024-12-24 PROCEDURE — 97530 THERAPEUTIC ACTIVITIES: CPT | Performed by: PHYSICAL THERAPIST

## 2024-12-24 NOTE — PROGRESS NOTES
"Daily Note     Today's date: 2024  Patient name: Asher Medel  : 1975  MRN: [id number]  Referring provider: Mindi Dubois PA-C  Dx:   Encounter Diagnosis     ICD-10-CM    1. Pelvic floor dysfunction  M62.89                      Subjective: Pt reports better bladder control, only 1x per night. Performing HEP daily.      Objective: See treatment diary below      Assessment: Tolerated treatment well. Patient demonstrated fatigue post treatment, exhibited good technique with therapeutic exercises, and would benefit from continued PT for further progression of PFM therex and HEP update. HEP was updated and reviewed. Pt is very motivated.      Plan: Continue per plan of care.  Progress treatment as tolerated.       Precautions: not any given      Manuals           PF MMT 2/5 w decreased prolong hold endurance to 3 sec                                                   Neuro Re-Ed                           PT edu/anatomy/benefits/purpose edu                         Rec bike/posture/PF  L3 10' L3 10';                                                 Ther Ex             Supine PF/TA demo 3x3\" reveiw           Supine bridge PF/TA demo 3x3\" reveiw           Supine LE AD's t-ball use PF/TA demo 3x3\" review           S/l PF/TA  5x3\" ea side           Cat/cow PF  5x3\"           Butterfly stretch  3x30\" 3x30\"          SLR/PF/TA   #4 10x2          S/l LE AB's PF/TA   #4 10x2          TKE w PF/TA   #4 10x ea LE                                                 Ther Activity             HEP edu/reveiw Given 8' Updated   8' update/review                       Gait Training                                       Modalities                                              "

## 2025-01-02 ENCOUNTER — OFFICE VISIT (OUTPATIENT)
Dept: PHYSICAL THERAPY | Facility: CLINIC | Age: 50
End: 2025-01-02
Payer: MEDICARE

## 2025-01-02 DIAGNOSIS — M62.89 PELVIC FLOOR DYSFUNCTION: Primary | ICD-10-CM

## 2025-01-02 PROCEDURE — 97110 THERAPEUTIC EXERCISES: CPT | Performed by: PHYSICAL THERAPIST

## 2025-01-02 PROCEDURE — 97112 NEUROMUSCULAR REEDUCATION: CPT | Performed by: PHYSICAL THERAPIST

## 2025-01-02 NOTE — PROGRESS NOTES
"Daily Note     Today's date: 2025  Patient name: Asher Medel  : 1975  MRN: [id number]  Referring provider: Mindi Dubois PA-C  Dx:   Encounter Diagnosis     ICD-10-CM    1. Pelvic floor dysfunction  M62.89                      Subjective: Pt reports performing HEP. Improvement noted with better activation of PFM with ADL's at home, some back discomfort verbalized with use weights last tx session. Some back discomfort today about 4/10, not any worse.      Objective: See treatment diary below      Assessment: Tolerated treatment well. Patient demonstrated fatigue post treatment, exhibited good technique with therapeutic exercises, and would benefit from continued PT for further PF/core strengthening. Today's therex was performed with less weight and less repetitions. No worsening of pain post tx verbalized. HEP was update ad reviewed.      Plan: Continue per plan of care.  Progress treatment as tolerated.       Precautions: not any given      Manuals  1/2         PF MMT 2/5 w decreased prolong hold endurance to 3 sec   next                                                Neuro Re-Ed                           PT edu/anatomy/benefits/purpose edu                         Rec bike/posture/PF  L3 10' L3 10'; L3 10'                                                Ther Ex             Supine PF/TA demo 3x3\" reveiw           Supine bridge PF/TA demo 3x3\" reveiw           Supine LE AD's t-ball use PF/TA demo 3x3\" review           S/l PF/TA  5x3\" ea side           Cat/cow PF  5x3\"           Butterfly stretch  3x30\" 3x30\"          SLR/PF/TA   #4 10x2          S/l LE AB's PF/TA   #4 10x2          TKE w PF/TA   #4 10x ea LE Less #3 5x5\" ea LE         Sit to stand PF/TA    10x3\" VC's/posture         Standing LE AB w PF/TA    #3 5x3\" ea LE/posture         FF mini lunges PF/TA    #3 5x3\" ea LE         Side mini lunges PF/TA    #3 5x3\" ea LE         Mini squats w PF/TA    5x3\"                          "                       Ther Activity             HEP edu/thang Given 8' Updated   8' update/review 8' update/nhungiw                      Gait Training                                       Modalities

## 2025-01-04 LAB — MISCELLANEOUS LAB TEST RESULT: NORMAL

## 2025-01-06 ENCOUNTER — OFFICE VISIT (OUTPATIENT)
Dept: OBGYN CLINIC | Facility: HOSPITAL | Age: 50
End: 2025-01-06
Payer: MEDICARE

## 2025-01-06 ENCOUNTER — HOSPITAL ENCOUNTER (OUTPATIENT)
Dept: RADIOLOGY | Facility: HOSPITAL | Age: 50
Discharge: HOME | End: 2025-01-06
Attending: SURGERY
Payer: MEDICARE

## 2025-01-06 DIAGNOSIS — S61.211A LACERATION OF LEFT INDEX FINGER WITHOUT FOREIGN BODY WITHOUT DAMAGE TO NAIL, INITIAL ENCOUNTER: ICD-10-CM

## 2025-01-06 DIAGNOSIS — M79.642 LEFT HAND PAIN: ICD-10-CM

## 2025-01-06 DIAGNOSIS — M79.642 LEFT HAND PAIN: Primary | ICD-10-CM

## 2025-01-06 DIAGNOSIS — G56.02 CARPAL TUNNEL SYNDROME ON LEFT: ICD-10-CM

## 2025-01-06 DIAGNOSIS — Z01.89 ENCOUNTER FOR UPPER EXTREMITY COMPARISON IMAGING STUDY: ICD-10-CM

## 2025-01-06 PROCEDURE — 99203 OFFICE O/P NEW LOW 30 MIN: CPT | Performed by: SURGERY

## 2025-01-06 PROCEDURE — 73130 X-RAY EXAM OF HAND: CPT

## 2025-01-06 NOTE — PATIENT INSTRUCTIONS
For laceration care:   Wash with warm water, soap pat dry but do not submerge  No Neoprene     Wear the cock up wrist brace only at night for the left hand.

## 2025-01-06 NOTE — PROGRESS NOTES
Soraya Morse M.D.  Attending, Orthopaedic Surgery  Hand, Wrist, and Elbow Surgery  [facility]      ORTHOPAEDIC HAND, WRIST, AND ELBOW OFFICE  VISIT       ASSESSMENT/PLAN:      49-year-old male here for his left index finger laceration over the dorsal middle phalanx, 12/27/2024.  New x-rays were obtained today and reviewed with the patient.  On exam patient has motion at left index finger MP, PIP and DIP joints.  Although he is tight in full composite fist.  Patient complains of numbness in the index and sometimes long and thumb.  Discussed there could be underlying carpal tunnel that was performed to the forefront post injury.  It is reasonable to obtain ultrasounds of the left wrist with the right being comparable.  A cock up wrist brace was provided for the patient to wear only at night.  In regards to care for his laceration, he can wash with warm water, soap and pat dry but do not submerge or place Neosporin on it.  Patient shows understanding and agrees with this plan of care.    The patient verbalized understanding of exam findings and treatment plan. We engaged in the shared decision-making process and treatment options were discussed at length with the patient. Surgical and conservative management discussed today along with risks and benefits.    Diagnoses and all orders for this visit:    Left hand pain  -     XR hand 3+ vw left; Future    Carpal tunnel syndrome on left  -     Cock Up Wrist Splint  -     US MSK limited; Future    Encounter for upper extremity comparison imaging study  -     US MSK limited; Future    Laceration of left index finger without foreign body without damage to nail, initial encounter        Follow Up:  Return for after US of the hands.    To Do Next Visit:  Re-evaluation of current issue      General Discussions:  Carpal Tunnel Syndrome: The anatomy and physiology of carpal tunnel syndrome was discussed with the patient today.  Increase pressure  localized under the transverse carpal ligament can cause pain, numbness, tingling, or dysesthesias within the median nerve distribution as well as feelings of fatigue, clumsiness, or awkwardness.  These symptoms typically occur at night and worse in the morning upon waking.  Eventually, untreated carpal tunnel syndrome can result in weakness and permanent loss of muscle within the thenar compartment of the hand.  Treatment options were discussed with the patient.  Conservative treatment includes nocturnal resting splints to keep the nerve in a neutral position, ergonomic changes within the work or home environment, activity modification, and tendon gliding exercises. Vitamin B6 one tablet daily over the counter may helpful to reduce symptoms.   Steroid injections within the carpal canal can help a majority of patients, however this is often self-limited in a majority of patients.  Surgical intervention to divide the transverse carpal ligament typically results in a long-lasting relief of the patient's complaints, with the recurrence rate of less than 1%.                                                                                                                                                                                   ____________________________________________________________________________________________________________________________________________      CHIEF COMPLAINT:  Chief Complaint   Patient presents with    Left Hand - Pain     Patient cut his finger with A chainsaw he has issues with his finger going numb and cold. He then has pain in the first three fingers as well.        SUBJECTIVE:  Asher Medel is a 49 y.o. year old RHD male who presents today for a left index finger laceration that occurred on 12/27/2024. He reports that he run the finger while cutting wood. He was seen in the ED for triage but didn't wait for 5 hours to be seen. He is not currently on any ABX. He does have numbness  "in the index finger from the DIP joint down. He notes that he has coldness in the finger. He also has some pain in the index, long and thumb.   He has a hx of cervical spine herniation and on morphine pump.          Pain/symptom timing:  Worse during the day when active  Pain/symptom context:  Worse with activites and work  Pain/symptom modifying factors:  Rest makes better, activities make worse  Pain/symptom associated signs/symptoms: none    Prior treatment   NSAIDsNo   Injections No   Bracing/Orthotics No    Physical Therapy No     I have personally reviewed all the relevant PMH, PSH, SH, FH, Medications and allergies      PAST MEDICAL HISTORY:  Past Medical History:   Diagnosis Date    Ankylosing spondylitis (HCC)     Arthritis     Chronic pain     DJD (degenerative joint disease)     Herniated disc, cervical     Narcolepsy     Psoriatic arthritis (HCC)     Seizures ([facility])     Sleep apnea     Sleep apnea        PAST SURGICAL HISTORY:  Past Surgical History:   Procedure Laterality Date    IMPLANTATION / PLACEMENT EPIDURAL NEUROSTIMULATOR ELECTRODES      INTRATHECAL PUMP IMPLANTATION Left     morphine pump    RHINOPLASTY      TONSILLECTOMY         FAMILY HISTORY:  Family History   Problem Relation Age of Onset    Cancer Mother     Diabetes Father     Cancer Father     Heart disease Father     Narcolepsy Father     Sleep apnea Father     Narcolepsy Paternal Uncle     Narcolepsy Paternal Grandmother        SOCIAL HISTORY:  Social History     Tobacco Use    Smoking status: Never    Smokeless tobacco: Never   Vaping Use    Vaping status: Never Used   Substance Use Topics    Alcohol use: No    Drug use: No       MEDICATIONS:    Current Outpatient Medications:     abatacept (Orencia) 250 mg, Infuse 1,000 mg into a venous catheter every 28 days, Disp: , Rfl:     B-D TB SYRINGE 1CC/27GX1/2\" 27G X 1/2\" 1 ML MISC, USE THREE WEEKLY FOR ALLERGY INJECTIONS, Disp: , Rfl:     EPINEPHrine (EPIPEN) 0.3 mg/0.3 mL SOAJ, Inject 0.3 " "mL (0.3 mg total) into a muscle once for 1 dose, Disp: 0.6 mL, Rfl: 0    fluticasone (FLONASE) 50 mcg/act nasal spray, 1 spray into each nostril 2 (two) times a day, Disp: , Rfl:     gabapentin (NEURONTIN) 100 mg capsule, Take 100 mg by mouth 3 (three) times a day, Disp: , Rfl:     Insulin Syringe-Needle U-100 27.5G X 5/8\" 2 ML MISC, 3 syringes weekly for allergy injections, Disp: , Rfl:     LINZESS 145 MCG CAPS, 1 (one) Capsule daily on an empty stomach, at least 30 mn before first meal, Disp: , Rfl: 0    NON FORMULARY, Inject 0.5 mL under the skin once a week Administer 0.5 ml Subq of Serum #1 (Cat, Dog, Cockroach (CDCR)) weekly  Administer 0.5 ml Subq of Serum #2 (Tree, Tree, Weed) (TTW)) weekly, Disp: , Rfl:     NON FORMULARY, 0.3 mg Morphine pump, Disp: , Rfl:     PEG 3350-KCl-NaBcb-NaCl-NaSulf (PEG 3350/Electrolytes) 240 g SOLR, , Disp: , Rfl:     sildenafil (VIAGRA) 50 MG tablet, Take 1 tablet (50 mg total) by mouth daily as needed for erectile dysfunction, Disp: 10 tablet, Rfl: 0    venlafaxine (EFFEXOR) 37.5 mg tablet, Take 1 tablet (37.5 mg total) by mouth daily, Disp: 30 tablet, Rfl: 5    clobetasol (TEMOVATE) 0.05 % cream, Apply topically 2 (two) times a day (Patient not taking: Reported on 12/9/2024), Disp: , Rfl:     GaviLyte-C 240 g solution, FOLLOW INSTRUCTIONS FROM [facility] PREP SHEET (Patient not taking: Reported on 6/15/2023), Disp: , Rfl:     tiZANidine (ZANAFLEX) 2 mg tablet, Take 2 tablets (4 mg total) by mouth 4 (four) times a day as needed for muscle spasms (Patient not taking: Reported on 12/9/2024), Disp: 240 tablet, Rfl: 4    ALLERGIES:  Allergies   Allergen Reactions    Allyl Isothiocyanate Anaphylaxis    Bee Venom Anaphylaxis    Broccoli [Brassica Oleracea - Food Allergy] Anaphylaxis    Diclofenac Other (See Comments)     Pain in leg feels like I have a blood clot pt sttates  Bad stomach pains  Pain in leg feels like I have a blood clot pt sttates  Blood clots in legs painful  Bad stomach " pains  Pain in leg feels like I have a blood clot pt sttates    Medical Tape Other (See Comments)     Skin edema redness    Methotrexate Palpitations     Heart palpatations  Heart palpatations  Heart palpatations    Mustard Seed - Food Allergy Anaphylaxis    Other Rash and Other (See Comments)     ADHESIVE TAPE  Skin edema redness    Acetaminophen Diarrhea, GI Intolerance and Abdominal Pain    Aspirin GI Intolerance     Other reaction(s): Nausea/Vomiting    Celecoxib Other (See Comments)     Muscle cramps  Darrius horses, swelling  Darrius horses, swelling  Muscle cramps  Darrius horses, swelling    Diclofenac Sodium      Bad stomach pains    Methotrexate Derivatives     Infliximab Rash     blindness    Penicillins Rash and Other (See Comments)     Other reaction(s): Unknown Reaction           REVIEW OF SYSTEMS:  Review of Systems    VITALS:  There were no vitals filed for this visit.    LABS:      _____________________________________________________  PHYSICAL EXAMINATION:  General: well developed and well nourished, alert, oriented times 3, and appears comfortable  Psychiatric: Normal  HEENT: Normocephalic, Atraumatic Trachea Midline, No torticollis  Pulmonary: No audible wheezing or respiratory distress   Abdomen/GI: Non tender, non distended   Cardiovascular: No pitting edema, 2+ radial pulse   Skin: No masses, erythema, lacerations, fluctation, ulcerations  Neurovascular: Sensation intact to the Ulnar Nerve, Sensation Intact to the Radial Nerve, Decreased Sensation to  the Median Nerve, Motor Intact to the Median, Ulnar, Radial Nerve, and Pulses Intact  Musculoskeletal: Normal, except as noted in detailed exam and in HPI.      MUSCULOSKELETAL EXAMINATION:    Left index finger:   Healing laceration that is located over the middle phalanx traveling diagonally proximal to the DIP joint ulnarly to the distally to the PIP joint radially  Eschar noted  Flexion and extension is intact at MP, PIP and DIP  "joints  Sensation deficit over the ulnar and radial digital borders and tip of index pad  Brisk capillary refill       Left Carpal Tunnel Exam:    Negative thenar atrophy. Negative phalen's test. Positive carpal tunnel compression. Negative tinels over median nerve at the wrist.  Opposition strength 5/5.  Abduction strength 5/5.        ___________________________________________________  STUDIES REVIEWED:  I have personally reviewed and interpreted  AP lateral and oblique radiographs of xrays of the left hand 3 views which demonstrate no acute fracture dislocation        LABS REVIEWED:    HgA1c: No results found for: \"HGBA1C\"  BMP:   Lab Results   Component Value Date    CALCIUM 8.7 08/09/2024    K 3.7 08/09/2024    CO2 23 08/09/2024     08/09/2024    BUN 14 08/09/2024    CREATININE 0.91 08/09/2024               PROCEDURES PERFORMED:  Procedures  No Procedures performed today    _____________________________________________________      Scribe Attestation      I,:  Val Duvall am acting as a scribe while in the presence of the attending physician.:       I,:  Soraya Morse MD personally performed the services described in this documentation    as scribed in my presence.:                    "

## 2025-01-08 ENCOUNTER — APPOINTMENT (OUTPATIENT)
Dept: PHYSICAL THERAPY | Facility: CLINIC | Age: 50
End: 2025-01-08
Payer: MEDICARE

## 2025-01-09 ENCOUNTER — OFFICE VISIT (OUTPATIENT)
Dept: PHYSICAL THERAPY | Facility: CLINIC | Age: 50
End: 2025-01-09
Payer: MEDICARE

## 2025-01-09 DIAGNOSIS — M62.89 PELVIC FLOOR DYSFUNCTION: Primary | ICD-10-CM

## 2025-01-09 PROCEDURE — 97110 THERAPEUTIC EXERCISES: CPT | Performed by: PHYSICAL THERAPIST

## 2025-01-09 PROCEDURE — 97112 NEUROMUSCULAR REEDUCATION: CPT | Performed by: PHYSICAL THERAPIST

## 2025-01-09 PROCEDURE — 97140 MANUAL THERAPY 1/> REGIONS: CPT | Performed by: PHYSICAL THERAPIST

## 2025-01-09 NOTE — PROGRESS NOTES
"Daily Note     Today's date: 2025  Patient name: Asher Medel  : 1975  MRN: [id number]  Referring provider: Mindi Dubois PA-C  Dx:   Encounter Diagnosis     ICD-10-CM    1. Pelvic floor dysfunction  M62.89                      Subjective: Pt reports better bladder emptying and no post void dribble.      Objective: See treatment diary below    (Met all goals.)  1. Independent and safe with PF HEP.  2. Independent and safe with postural correction and using a squatty potty at home to improve BM.  3. Independent and safe with body mechanics and PFM activation.  4. PF MMT will increase by 1/2-1 grade t/o.      Assessment: Tolerated treatment well. Patient exhibited good technique with therapeutic exercises and HEP review. PF MMT improved significantly. Pt is very motivated and compliant with HEP. No pain voiced.      Plan: Self D/C.     Precautions: not any given      Manuals         PF MMT 2/5 w decreased prolong hold endurance to 3 sec   next 4/5 with good prolong and quick PFM endurnace                                               Neuro Re-Ed                           PT edu/anatomy/benefits/purpose edu                         Rec bike/posture/PF  L3 10' L3 10'; L3 10'                      T'dmill amb/posture     1.8 mph 10'                     Ther Ex             Supine PF/TA demo 3x3\" reveiw           Supine bridge PF/TA demo 3x3\" reveiw           Supine LE AD's t-ball use PF/TA demo 3x3\" review           S/l PF/TA  5x3\" ea side           Cat/cow PF  5x3\"           Butterfly stretch  3x30\" 3x30\"          SLR/PF/TA   #4 10x2          S/l LE AB's PF/TA   #4 10x2          TKE w PF/TA   #4 10x ea LE Less #3 5x5\" ea LE         Sit to stand PF/TA    10x3\" VC's/posture #25 10x5\"        Standing LE AB w PF/TA    #3 5x3\" ea LE/posture         FF mini lunges PF/TA    #3 5x3\" ea LE         Side mini lunges PF/TA    #3 5x3\" ea LE         Mini squats w PF/TA    5x3\"         Leg " "press/PF/TA     L7 10x2, 5\"        [place] low rows/PF     15 lbs 10x2, 5\"        [place] mid row/PF/TA     15 lbs 10x2, 5\"        [place] biceps curls/PF/TA     15 lbs 10x2, 5\"                                                            Ther Activity             HEP edu/thang Given 8' Updated   8' update/review 8' update/nhungiw                      Gait Training                                       Modalities                                                  "

## 2025-01-13 ENCOUNTER — HOSPITAL ENCOUNTER (OUTPATIENT)
Dept: ULTRASOUND IMAGING | Facility: HOSPITAL | Age: 50
Discharge: HOME | End: 2025-01-13
Attending: SURGERY
Payer: MEDICARE

## 2025-01-13 DIAGNOSIS — G56.02 CARPAL TUNNEL SYNDROME ON LEFT: ICD-10-CM

## 2025-01-13 DIAGNOSIS — Z01.89 ENCOUNTER FOR UPPER EXTREMITY COMPARISON IMAGING STUDY: ICD-10-CM

## 2025-01-13 PROCEDURE — 76882 US LMTD JT/FCL EVL NVASC XTR: CPT

## 2025-01-15 ENCOUNTER — APPOINTMENT (OUTPATIENT)
Dept: PHYSICAL THERAPY | Facility: CLINIC | Age: 50
End: 2025-01-15
Payer: MEDICARE

## 2025-01-20 ENCOUNTER — OFFICE VISIT (OUTPATIENT)
Dept: OBGYN CLINIC | Facility: HOSPITAL | Age: 50
End: 2025-01-20
Payer: MEDICARE

## 2025-01-20 VITALS — WEIGHT: 253 LBS | HEIGHT: 71 IN | BODY MASS INDEX: 35.42 KG/M2

## 2025-01-20 DIAGNOSIS — E66.01 OBESITY, MORBID (HCC): ICD-10-CM

## 2025-01-20 DIAGNOSIS — G56.02 CARPAL TUNNEL SYNDROME ON LEFT: ICD-10-CM

## 2025-01-20 DIAGNOSIS — G56.01 CARPAL TUNNEL SYNDROME ON RIGHT: Primary | ICD-10-CM

## 2025-01-20 DIAGNOSIS — M77.11 LATERAL EPICONDYLITIS OF RIGHT ELBOW: ICD-10-CM

## 2025-01-20 DIAGNOSIS — L40.50 PSORIASIS WITH ARTHROPATHY (HCC): ICD-10-CM

## 2025-01-20 PROCEDURE — 99214 OFFICE O/P EST MOD 30 MIN: CPT | Performed by: SURGERY

## 2025-01-20 PROCEDURE — 20526 THER INJECTION CARP TUNNEL: CPT | Performed by: SURGERY

## 2025-01-20 RX ORDER — LIDOCAINE HYDROCHLORIDE 10 MG/ML
1 INJECTION, SOLUTION INFILTRATION; PERINEURAL
Status: COMPLETED | OUTPATIENT
Start: 2025-01-20 | End: 2025-01-20

## 2025-01-20 RX ORDER — METHYLPREDNISOLONE 4 MG/1
TABLET ORAL
Qty: 1 EACH | Refills: 1 | Status: SHIPPED | OUTPATIENT
Start: 2025-01-20

## 2025-01-20 RX ORDER — DEXAMETHASONE SODIUM PHOSPHATE 10 MG/ML
40 INJECTION, SOLUTION INTRAMUSCULAR; INTRAVENOUS
Status: COMPLETED | OUTPATIENT
Start: 2025-01-20 | End: 2025-01-20

## 2025-01-20 RX ADMIN — LIDOCAINE HYDROCHLORIDE 1 ML: 10 INJECTION, SOLUTION INFILTRATION; PERINEURAL at 11:15

## 2025-01-20 RX ADMIN — DEXAMETHASONE SODIUM PHOSPHATE 40 MG: 10 INJECTION, SOLUTION INTRAMUSCULAR; INTRAVENOUS at 11:15

## 2025-01-20 NOTE — PROGRESS NOTES
ASSESSMENT/PLAN:      49-year-old male here for his bilateral carpal tunnel syndrome; right is worse than left for him.  Treatment options were discussed with the patient that included conservative treatments over surgical intervention.  Patient reports that he is awaiting the arrival of his baby at the end of the week and surgical intervention is not in the cards right now.  At this time patient was provided a right carpal tunnel brace to wear only at night.  Explained to the patient that this will help his right carpal tunnel but also with his right lateral epicondylitis.  Patient will wear these braces at night and start occupational therapy for his right lateral epicondylitis.  A Medrol Dosepak was provided for the numbness and tingling.  A right carpal tunnel injection was provided for the patient in the office today, which she tolerated well.  Patient will continue to treat with all the nonoperative treatments and we will follow with the patient as needed.    The patient verbalized understanding of exam findings and treatment plan. We engaged in the shared decision-making process and treatment options were discussed at length with the patient. Surgical and conservative management discussed today along with risks and benefits.    Diagnoses and all orders for this visit:    Carpal tunnel syndrome on right  -     Cock Up Wrist Splint  -     Hand/upper extremity injection: R carpal tunnel    Lateral epicondylitis of right elbow  -     Cock Up Wrist Splint  -     Ambulatory Referral to PT/OT Hand Therapy; Future    Carpal tunnel syndrome on left          Follow Up:  Return if symptoms worsen or fail to improve.      To Do Next Visit:  Re-evaluation of current issue    ____________________________________________________________________________________________________________________________________________      CHIEF COMPLAINT:  Chief Complaint   Patient presents with    Follow-up     MSK reviews  "      SUBJECTIVE:  Asher Medel is a 49 y.o. year old RHD male who presents today to review ultrasound of bilateral wrist due to numbness and tingling in thumb, index and long fingers.  Patient reports that his right side is more problematic for him today.  He has been using the cock-up wrist brace for the left side at night that seems to help some of his symptoms.  Patient does not have a brace for the right.  He also has lateral elbow pain that has been ongoing since the chainsaw injury in December 2024.       I have personally reviewed all the relevant PMH, PSH, SH, FH, Medications and allergies.     PAST MEDICAL HISTORY:  Past Medical History:   Diagnosis Date    Ankylosing spondylitis (HCC)     Arthritis     Chronic pain     DJD (degenerative joint disease)     Herniated disc, cervical     Narcolepsy     Psoriatic arthritis (HCC)     Seizures (HCC)     Sleep apnea     Sleep apnea        PAST SURGICAL HISTORY:  Past Surgical History:   Procedure Laterality Date    IMPLANTATION / PLACEMENT EPIDURAL NEUROSTIMULATOR ELECTRODES      INTRATHECAL PUMP IMPLANTATION Left     morphine pump    RHINOPLASTY      TONSILLECTOMY         FAMILY HISTORY:  Family History   Problem Relation Age of Onset    Cancer Mother     Diabetes Father     Cancer Father     Heart disease Father     Narcolepsy Father     Sleep apnea Father     Narcolepsy Paternal Uncle     Narcolepsy Paternal Grandmother        SOCIAL HISTORY:  Social History     Tobacco Use    Smoking status: Never    Smokeless tobacco: Never   Vaping Use    Vaping status: Never Used   Substance Use Topics    Alcohol use: No    Drug use: No       MEDICATIONS:    Current Outpatient Medications:     abatacept (Orencia) 250 mg, Infuse 1,000 mg into a venous catheter every 28 days, Disp: , Rfl:     B-D TB SYRINGE 1CC/27GX1/2\" 27G X 1/2\" 1 ML MISC, USE THREE WEEKLY FOR ALLERGY INJECTIONS, Disp: , Rfl:     EPINEPHrine (EPIPEN) 0.3 mg/0.3 mL SOAJ, Inject 0.3 mL (0.3 mg total) into a " "muscle once for 1 dose, Disp: 0.6 mL, Rfl: 0    fluticasone (FLONASE) 50 mcg/act nasal spray, 1 spray into each nostril 2 (two) times a day, Disp: , Rfl:     gabapentin (NEURONTIN) 100 mg capsule, Take 100 mg by mouth 3 (three) times a day, Disp: , Rfl:     Insulin Syringe-Needle U-100 27.5G X 5/8\" 2 ML MISC, 3 syringes weekly for allergy injections, Disp: , Rfl:     LINZESS 145 MCG CAPS, 1 (one) Capsule daily on an empty stomach, at least 30 mn before first meal, Disp: , Rfl: 0    NON FORMULARY, Inject 0.5 mL under the skin once a week Administer 0.5 ml Subq of Serum #1 (Cat, Dog, Cockroach (CDCR)) weekly  Administer 0.5 ml Subq of Serum #2 (Tree, Tree, Weed) (TTW)) weekly, Disp: , Rfl:     NON FORMULARY, 0.3 mg Morphine pump, Disp: , Rfl:     PEG 3350-KCl-NaBcb-NaCl-NaSulf (PEG 3350/Electrolytes) 240 g SOLR, , Disp: , Rfl:     sildenafil (VIAGRA) 50 MG tablet, Take 1 tablet (50 mg total) by mouth daily as needed for erectile dysfunction, Disp: 10 tablet, Rfl: 0    venlafaxine (EFFEXOR) 37.5 mg tablet, Take 1 tablet (37.5 mg total) by mouth daily, Disp: 30 tablet, Rfl: 5    clobetasol (TEMOVATE) 0.05 % cream, Apply topically 2 (two) times a day (Patient not taking: Reported on 12/9/2024), Disp: , Rfl:     GaviLyte-C 240 g solution, FOLLOW INSTRUCTIONS FROM [facility] PREP SHEET (Patient not taking: Reported on 6/15/2023), Disp: , Rfl:     tiZANidine (ZANAFLEX) 2 mg tablet, Take 2 tablets (4 mg total) by mouth 4 (four) times a day as needed for muscle spasms (Patient not taking: Reported on 12/9/2024), Disp: 240 tablet, Rfl: 4    ALLERGIES:  Allergies   Allergen Reactions    Allyl Isothiocyanate Anaphylaxis    Bee Venom Anaphylaxis    Broccoli [Brassica Oleracea - Food Allergy] Anaphylaxis    Diclofenac Other (See Comments)     Pain in leg feels like I have a blood clot pt sttates  Bad stomach pains  Pain in leg feels like I have a blood clot pt sttates  Blood clots in legs painful  Bad stomach pains  Pain in leg feels " like I have a blood clot pt sttates    Medical Tape Other (See Comments)     Skin edema redness    Methotrexate Palpitations     Heart palpatations  Heart palpatations  Heart palpatations    Mustard Seed - Food Allergy Anaphylaxis    Other Rash and Other (See Comments)     ADHESIVE TAPE  Skin edema redness    Acetaminophen Diarrhea, GI Intolerance and Abdominal Pain    Aspirin GI Intolerance     Other reaction(s): Nausea/Vomiting    Celecoxib Other (See Comments)     Muscle cramps  Darrius horses, swelling  Darrius horses, swelling  Muscle cramps  Darrius horses, swelling    Diclofenac Sodium      Bad stomach pains    Methotrexate Derivatives     Infliximab Rash     blindness    Penicillins Rash and Other (See Comments)     Other reaction(s): Unknown Reaction       REVIEW OF SYSTEMS:  Review of Systems   Constitutional:  Negative for chills, fever and unexpected weight change.   HENT:  Negative for hearing loss, nosebleeds and sore throat.    Eyes:  Negative for pain, redness and visual disturbance.   Respiratory:  Negative for cough, shortness of breath and wheezing.    Cardiovascular:  Negative for chest pain, palpitations and leg swelling.   Gastrointestinal:  Negative for abdominal pain, nausea and vomiting.   Endocrine: Negative for polydipsia and polyuria.   Genitourinary:  Negative for dysuria and hematuria.   Skin:  Negative for rash and wound.   Neurological:  Positive for numbness. Negative for dizziness, light-headedness and headaches.   Psychiatric/Behavioral:  Negative for decreased concentration, dysphoric mood and suicidal ideas. The patient is not nervous/anxious.        VITALS:  There were no vitals filed for this visit.      _____________________________________________________  PHYSICAL EXAMINATION:  General: well developed and well nourished, alert, oriented times 3, and appears comfortable  Psychiatric: Normal  HEENT: Normocephalic, Atraumatic Trachea Midline, No torticollis  Pulmonary: No  audible wheezing or respiratory distress   Cardiovascular: No pitting edema, 2+ radial pulse   Abdominal/GI: abdomen non tender, non distended   Skin: No masses, erythema, lacerations, fluctation, ulcerations  Neurovascular: Sensation intact to the Ulnar Nerve, Sensation Intact to the Radial Nerve, Decreased Sensation to  the Median Nerve, Motor Intact to the Median, Ulnar, Radial Nerve, and Pulses Intact  Musculoskeletal: Normal, except as noted in detailed exam and in HPI.      MUSCULOSKELETAL EXAMINATION:    Right Elbow:    No swelling or deformity  Full range of motion with flexion, extension, supination and pronation.  Positive tenderness to palpation over the Lateral Epicondyle.  Pain with passive flexion of the wrist with elbow fully extended.  Pain with resisted wrist flexion with elbow fully extended.  Pain with resisted forearm supination with the elbow fully extended.  Negative tinels over the ulnar nerve at the elbow.    Left Carpal Tunnel Exam:     Negative thenar atrophy. Negative phalen's test. Positive carpal tunnel compression. Negative tinels over median nerve at the wrist.  Opposition strength 5/5.  Abduction strength 5/5.       Right Carpal Tunnel Exam:    Negative thenar atrophy. Negative phalen's test. Positive carpal tunnel compression. Positive tinels over median nerve at the wrist.  Opposition strength 5/5.  Abduction strength 5/5.        ___________________________________________________    STUDIES REVIEWED:    I have personally reviewed and interpreted  US of bilateral wrists reviewed from 1/13/2025 which demonstrate IMPRESSION:     RIGHT SIDE: Carpal tunnel syndrome ruled in based on marked abnormal CSA >/= 14 sq mm.   Maximum median nerve cross sectional area within carpal tunnel (CSAc): 20.0 sq mm.   LEFT SIDE: Carpal tunnel syndrome ruled in based on marked abnormal CSA >/= 14 sq mm.   Maximum median nerve cross sectional area within carpal tunnel (CSAc): 23.5 sq mm.       LABS  "REVIEWED:    HgA1c: No results found for: \"HGBA1C\"  BMP:   Lab Results   Component Value Date    CALCIUM 8.7 08/09/2024    K 3.7 08/09/2024    CO2 23 08/09/2024     08/09/2024    BUN 14 08/09/2024    CREATININE 0.91 08/09/2024             PROCEDURES PERFORMED:  Hand/upper extremity injection: R carpal tunnel  [place] Protocol:  Consent: Verbal consent obtained.  Risks and benefits: risks, benefits and alternatives were discussed  Consent given by: patient  Time out: Immediately prior to procedure a \"time out\" was called to verify the correct patient, procedure, equipment, support staff and site/side marked as required.  Timeout called at: 1/20/2025 11:49 AM.  Patient understanding: patient states understanding of the procedure being performed  Site marked: the operative site was marked  Patient identity confirmed: verbally with patient  Supporting Documentation  Indications: tendon swelling and pain   Procedure Details  Condition:carpal tunnel syndrome Site: R carpal tunnel   Preparation: Patient was prepped and draped in the usual sterile fashion  Needle size: 25 G  Ultrasound guidance: no  Approach: volar  Medications administered: 1 mL lidocaine 1 %; 40 mg dexamethasone 100 mg/10 mL  Patient tolerance: patient tolerated the procedure well with no immediate complications  Dressing:  Sterile dressing applied         _____________________________________________________      Scribe Attestation      I,:  Val Duvall am acting as a scribe while in the presence of the attending physician.:       I,:  Soraya Morse MD personally performed the services described in this documentation    as scribed in my presence.:                  "

## 2025-01-20 NOTE — PATIENT INSTRUCTIONS
Medrol dose adrien is a 6 days course:  1st day take 2 tablets with Breakfast, 2 tablets with Lunch and 2 tablets with an afternoon snack. Do not take after 3pm, due to medication can keep you up from sleep.   Each day decrease by 1 tablet from the last dose.   Complete adrien.     Stick to Advil up to 2x/daily

## 2025-01-22 ENCOUNTER — APPOINTMENT (OUTPATIENT)
Dept: PHYSICAL THERAPY | Facility: CLINIC | Age: 50
End: 2025-01-22
Payer: MEDICARE

## 2025-01-29 ENCOUNTER — HOSPITAL ENCOUNTER (OUTPATIENT)
Dept: PULMONOLOGY | Facility: HOSPITAL | Age: 50
Discharge: HOME | End: 2025-01-29
Payer: MEDICARE

## 2025-01-29 DIAGNOSIS — M62.81 MUSCLE WEAKNESS: ICD-10-CM

## 2025-01-29 DIAGNOSIS — J98.11 ATELECTASIS: ICD-10-CM

## 2025-01-29 PROCEDURE — 94729 DIFFUSING CAPACITY: CPT

## 2025-01-29 PROCEDURE — 94726 PLETHYSMOGRAPHY LUNG VOLUMES: CPT | Performed by: INTERNAL MEDICINE

## 2025-01-29 PROCEDURE — 94060 EVALUATION OF WHEEZING: CPT | Performed by: INTERNAL MEDICINE

## 2025-01-29 PROCEDURE — 94060 EVALUATION OF WHEEZING: CPT

## 2025-01-29 PROCEDURE — 94760 N-INVAS EAR/PLS OXIMETRY 1: CPT

## 2025-01-29 PROCEDURE — 94729 DIFFUSING CAPACITY: CPT | Performed by: INTERNAL MEDICINE

## 2025-01-29 PROCEDURE — 94726 PLETHYSMOGRAPHY LUNG VOLUMES: CPT

## 2025-01-29 RX ORDER — ALBUTEROL SULFATE 0.83 MG/ML
2.5 SOLUTION RESPIRATORY (INHALATION) ONCE
Status: COMPLETED | OUTPATIENT
Start: 2025-01-29 | End: 2025-01-29

## 2025-01-29 RX ADMIN — ALBUTEROL SULFATE 2.5 MG: 2.5 SOLUTION RESPIRATORY (INHALATION) at 15:19

## 2025-02-06 NOTE — PROGRESS NOTES
Name: Asher Medel      : 1975      MRN: [id number]  Encounter Provider: Mindi Dubois PA-C  Encounter Date: 2025   Encounter department: [facility]  :  Assessment & Plan  Pelvic floor dysfunction  Initiated PFPT; went for 4-5 weeks of treatment  Helped with more complete bladder emptying  Plans to continue practicing Kegel exercises at home     Orders:    POCT Measure PVR    sildenafil (VIAGRA) 50 MG tablet; Take 2 tablets (100 mg total) by mouth daily as needed for erectile dysfunction    Erectile dysfunction due to diseases classified elsewhere  Initiated on Viagra 50 mg as needed for sexual activity at last appointment   Denies side effects  Noticed some improvement in erections, but decreased sensation is ongoing   Patients wife also began using lubricant which has assisted  Testosterone labs returned within normal limits  Begin taking Viagra 100 mg as needed     Orders:    sildenafil (VIAGRA) 50 MG tablet; Take 2 tablets (100 mg total) by mouth daily as needed for erectile dysfunction    Chronic pain syndrome  History of spinal injury (2009)  Recent admitted - for possible mini-strokes  Initiated on Plavix and atorvastatin   Chronic back pain--spinal stimulator in place  Also has a morphine pump and is getting injections each Wednesday for psoriatic's arthritis/ankylosing spondylitis   Likely etiology of the majority of his bladder symptoms / irritation     Dysuria  AUA score 19 today in office   Reports ongoing dysuria/frequency if he goes a few days without drinking cranberry juice  CT renal protocol (10/29/24) returned unremarkable  Past urine studies were negative for infection and blood  Kidney functions returned within normal limits  PVR 0 ml today in office - no retention   Return to office for cystoscopy with MD as dysuria continues without apparent cause        History of Present Illness   Asher Medel is a 49 y.o. male with chronic pain,  ankylosing spondylitis, recent mini-strokes and neuropathy -- who presents to the office with his wife in follow up to re-discuss his urinary symptoms.  AUA score 19 today in office.  He reports ongoing dysuria and frequency if he goes a few days without drinking Cranberry juice.  Patient did follow-up with pelvic floor physical therapy as recommended and reports improvement in his bladder emptying.  PVR 0 mL today in office.  He plans to continue practicing Kegel exercises at home.  He did undergo CT renal protocol as directed (10/29/2024) which returned entirely unremarkable.  Past urine studies continue to return negative for infection and blood.  His kidney functions also have remained stable over time.  As his symptoms continue despite a specific cause, we recommend he return to the office for cystoscopy with MD.  The patient is aware that his history of chronic pain in multiple spinal issues is likely contributing to a large number of his symptoms.    When he comes the patient's erectile dysfunction and overall penile numbness, he reports Viagra did help him to some degree.  He has been taking 50 mg as needed 1 hour prior to sexual activity.  He reports his wife also recently started using lubrication which has helped significantly.  Patient educated he can take up to 100 mg as needed 1 hour prior to sexual activity to see if this helps.  Again, the patient is aware that a lot of his spinal issues/nerve related conditions could be contributing to his ongoing erectile dysfunction.  His testosterone labs ordered last visit returned within normal limits.    Review of Systems   Constitutional:  Negative for activity change, chills, fatigue and fever.   Respiratory:  Negative for apnea, cough and shortness of breath.    Cardiovascular:  Negative for chest pain and leg swelling.   Gastrointestinal:  Negative for abdominal distention, abdominal pain, constipation, diarrhea, nausea and vomiting.   Genitourinary:   "Positive for dysuria, frequency and urgency. Negative for decreased urine volume, difficulty urinating, flank pain, hematuria, penile pain and testicular pain.   Musculoskeletal:  Positive for arthralgias and back pain.   Neurological:  Negative for dizziness and headaches.   Psychiatric/Behavioral: Negative.       Medical History Reviewed by provider this encounter:  Tobacco  Allergies  Meds  Problems  Med Hx  Surg Hx  Fam Hx     .  Current Outpatient Medications on File Prior to Visit   Medication Sig Dispense Refill    atorvastatin (LIPITOR) 40 mg tablet Take 1 tablet (40 mg total) by mouth every evening 30 tablet 0    clopidogrel (PLAVIX) 75 mg tablet Take 1 tablet (75 mg total) by mouth daily 30 tablet 0    fluticasone (FLONASE) 50 mcg/act nasal spray 1 spray into each nostril 2 (two) times a day      gabapentin (NEURONTIN) 100 mg capsule Take 100 mg by mouth 3 (three) times a day      Insulin Syringe-Needle U-100 27.5G X 5/8\" 2 ML MISC 3 syringes weekly for allergy injections      LINZESS 145 MCG CAPS 1 (one) Capsule daily on an empty stomach, at least 30 mn before first meal  0    NON FORMULARY Inject 0.5 mL under the skin once a week Administer 0.5 ml Subq of Serum #1 (Cat, Dog, Cockroach (CDCR)) weekly    Administer 0.5 ml Subq of Serum #2 (Tree, Tree, Weed) (TTW)) weekly      NON FORMULARY 0.3 mg Morphine pump      PEG 3350-KCl-NaBcb-NaCl-NaSulf (PEG 3350/Electrolytes) 240 g SOLR       sildenafil (VIAGRA) 50 MG tablet Take 1 tablet (50 mg total) by mouth daily as needed for erectile dysfunction 10 tablet 0    venlafaxine (EFFEXOR) 37.5 mg tablet Take 1 tablet (37.5 mg total) by mouth daily 30 tablet 5    abatacept (Orencia) 250 mg Infuse 1,000 mg into a venous catheter every 28 days (Patient not taking: Reported on 2/12/2025)      B-D TB SYRINGE 1CC/27GX1/2\" 27G X 1/2\" 1 ML MISC USE THREE WEEKLY FOR ALLERGY INJECTIONS (Patient not taking: Reported on 2/12/2025)      clobetasol (TEMOVATE) 0.05 % " cream Apply topically 2 (two) times a day (Patient not taking: Reported on 12/9/2024)      EPINEPHrine (EPIPEN) 0.3 mg/0.3 mL SOAJ Inject 0.3 mL (0.3 mg total) into a muscle once for 1 dose (Patient not taking: Reported on 2/12/2025) 0.6 mL 0    methylPREDNISolone 4 MG tablet therapy pack Use as directed on package (Patient not taking: Reported on 2/12/2025) 1 each 1    tiZANidine (ZANAFLEX) 2 mg tablet Take 2 tablets (4 mg total) by mouth 4 (four) times a day as needed for muscle spasms (Patient not taking: Reported on 12/9/2024) 240 tablet 4     Current Facility-Administered Medications on File Prior to Visit   Medication Dose Route Frequency Provider Last Rate Last Admin    [DISCONTINUED] atorvastatin (LIPITOR) tablet 40 mg  40 mg Oral QPM Elicia Victor MD   40 mg at 02/11/25 1710    [DISCONTINUED] clopidogrel (PLAVIX) tablet 75 mg  75 mg Oral Daily Elicia Victor MD   75 mg at 02/11/25 1012    [DISCONTINUED] enoxaparin (LOVENOX) subcutaneous injection 40 mg  40 mg Subcutaneous Daily Elicia Victor MD   40 mg at 02/11/25 1012    [DISCONTINUED] fluticasone (FLONASE) 50 mcg/act nasal spray 1 spray  1 spray Nasal BID Elicia Victor MD   1 spray at 02/11/25 1012    [DISCONTINUED] gabapentin (NEURONTIN) capsule 100 mg  100 mg Oral TID PRN Elicia Victor MD        [DISCONTINUED] lidocaine (PF) (XYLOCAINE-MPF) 1 % injection   Intravenous PRN Curt Murrell CRNA   100 mg at 02/11/25 1315    [DISCONTINUED] ondansetron (ZOFRAN) injection 4 mg  4 mg Intravenous Q4H PRN Elicia Victor MD   4 mg at 02/10/25 1738    [DISCONTINUED] propofol (DIPRIVAN) 1000 mg in 100 mL infusion (premix)   Intravenous Continuous PRN Curt Murrell CRNA   Stopped at 02/11/25 1330    [DISCONTINUED] propofol (DIPRIVAN) 200 MG/20ML bolus injection   Intravenous PRN Curt Murrell CRNA   120 mg at 02/11/25 1315    [DISCONTINUED] sodium chloride 0.9 % infusion   Intravenous Continuous PRN Curt Murrell, CRNA    "Stopped at 02/11/25 1333    [DISCONTINUED] venlafaxine (EFFEXOR) tablet 37.5 mg  37.5 mg Oral Daily Elicia Victor MD   37.5 mg at 02/11/25 1012      Social History     Tobacco Use    Smoking status: Never    Smokeless tobacco: Never   Vaping Use    Vaping status: Never Used   Substance and Sexual Activity    Alcohol use: Never    Drug use: No    Sexual activity: Not on file        Objective   /92 (BP Location: Left arm, Patient Position: Sitting, Cuff Size: Adult)   Pulse 69   Ht 5' 11\" (1.803 m)   Wt 116 kg (255 lb 3.2 oz)   SpO2 99%   BMI 35.59 kg/m²     Physical Exam  Vitals and nursing note reviewed.   Constitutional:       General: He is not in acute distress.     Appearance: Normal appearance. He is well-developed. He is not ill-appearing.   HENT:      Head: Normocephalic and atraumatic.   Eyes:      Conjunctiva/sclera: Conjunctivae normal.   Cardiovascular:      Rate and Rhythm: Normal rate and regular rhythm.      Heart sounds: No murmur heard.  Pulmonary:      Effort: Pulmonary effort is normal. No respiratory distress.      Breath sounds: Normal breath sounds.   Abdominal:      General: Abdomen is flat. There is no distension.      Palpations: Abdomen is soft.      Tenderness: There is no abdominal tenderness. There is no right CVA tenderness or left CVA tenderness.   Musculoskeletal:         General: No swelling.      Cervical back: Neck supple.   Skin:     General: Skin is warm and dry.      Capillary Refill: Capillary refill takes less than 2 seconds.   Neurological:      Mental Status: He is alert.   Psychiatric:         Mood and Affect: Mood normal.        Results  No results found for: \"PSA\"  Lab Results   Component Value Date    CALCIUM 8.5 02/11/2025    K 4.1 02/11/2025    CO2 27 02/11/2025     02/11/2025    BUN 14 02/11/2025    CREATININE 0.82 02/11/2025     Lab Results   Component Value Date    WBC 11.52 (H) 02/11/2025    HGB 13.5 02/11/2025    HCT 40.5 02/11/2025    MCV " 89 02/11/2025     02/11/2025       Office Urine Dip  Recent Results (from the past hour)   POCT Measure PVR    Collection Time: 02/12/25  9:18 AM   Result Value Ref Range    POST-VOID RESIDUAL VOLUME, ML POC 0 mL   ]

## 2025-02-08 ENCOUNTER — APPOINTMENT (EMERGENCY)
Dept: CT IMAGING | Facility: HOSPITAL | Age: 50
DRG: 092 | End: 2025-02-08
Payer: MEDICARE

## 2025-02-08 ENCOUNTER — HOSPITAL ENCOUNTER (INPATIENT)
Facility: HOSPITAL | Age: 50
LOS: 3 days | Discharge: HOME | DRG: 092 | End: 2025-02-11
Attending: EMERGENCY MEDICINE | Admitting: STUDENT IN AN ORGANIZED HEALTH CARE EDUCATION/TRAINING PROGRAM
Payer: MEDICARE

## 2025-02-08 DIAGNOSIS — L40.50 PSORIASIS WITH ARTHROPATHY (HCC): ICD-10-CM

## 2025-02-08 DIAGNOSIS — M45.9 ANKYLOSING SPONDYLITIS (HCC): ICD-10-CM

## 2025-02-08 DIAGNOSIS — R29.90 STROKE-LIKE SYMPTOMS: Primary | ICD-10-CM

## 2025-02-08 DIAGNOSIS — G47.33 OBSTRUCTIVE SLEEP APNEA TREATED WITH CONTINUOUS POSITIVE AIRWAY PRESSURE (CPAP): ICD-10-CM

## 2025-02-08 PROBLEM — E66.812 CLASS 2 OBESITY WITH BODY MASS INDEX (BMI) OF 36.0 TO 36.9 IN ADULT: Status: ACTIVE | Noted: 2024-02-16

## 2025-02-08 LAB
2HR DELTA HS TROPONIN: <-1 NG/L
ANION GAP SERPL CALCULATED.3IONS-SCNC: 6 MMOL/L (ref 4–13)
APTT PPP: 25 SECONDS (ref 23–34)
ATRIAL RATE: 74 BPM
BASOPHILS # BLD AUTO: 0.13 THOUSAND/UL (ref 0–0.1)
BASOPHILS NFR MAR MANUAL: 1 % (ref 0–1)
BUN SERPL-MCNC: 19 MG/DL (ref 5–25)
CALCIUM SERPL-MCNC: 9.3 MG/DL (ref 8.4–10.2)
CARDIAC TROPONIN I PNL SERPL HS: 3 NG/L (ref ?–50)
CARDIAC TROPONIN I PNL SERPL HS: <2 NG/L (ref ?–50)
CHLORIDE SERPL-SCNC: 104 MMOL/L (ref 96–108)
CHOLEST SERPL-MCNC: 219 MG/DL (ref ?–200)
CO2 SERPL-SCNC: 29 MMOL/L (ref 21–32)
CREAT SERPL-MCNC: 1.02 MG/DL (ref 0.6–1.3)
EOSINOPHIL # BLD AUTO: 0.13 THOUSAND/UL (ref 0–0.61)
EOSINOPHIL NFR BLD MANUAL: 1 % (ref 0–6)
ERYTHROCYTE [DISTWIDTH] IN BLOOD BY AUTOMATED COUNT: 12.2 % (ref 11.6–15.1)
GFR SERPL CREATININE-BSD FRML MDRD: 87 ML/MIN/1.73SQ M
GLUCOSE SERPL-MCNC: 107 MG/DL (ref 65–140)
GLUCOSE SERPL-MCNC: 137 MG/DL (ref 65–140)
HCT VFR BLD AUTO: 45.8 % (ref 36.5–49.3)
HDLC SERPL-MCNC: 57 MG/DL
HGB BLD-MCNC: 15 G/DL (ref 12–17)
INR PPP: 0.96 (ref 0.85–1.19)
LDLC SERPL CALC-MCNC: 142 MG/DL (ref 0–100)
LYMPHOCYTES # BLD AUTO: 17 %
LYMPHOCYTES # BLD AUTO: 2.69 THOUSAND/UL (ref 0.6–4.47)
MCH RBC QN AUTO: 28.8 PG (ref 26.8–34.3)
MCHC RBC AUTO-ENTMCNC: 32.8 G/DL (ref 31.4–37.4)
MCV RBC AUTO: 88 FL (ref 82–98)
MONOCYTES # BLD AUTO: 0.67 THOUSAND/UL (ref 0–1.22)
MONOCYTES NFR BLD AUTO: 5 % (ref 4–12)
NEUTS SEG # BLD: 9.83 THOUSAND/UL (ref 1.81–6.82)
NEUTS SEG NFR BLD AUTO: 73 %
P AXIS: 52 DEGREES
PLATELET # BLD AUTO: 309 THOUSANDS/UL (ref 149–390)
PLATELET BLD QL SMEAR: ADEQUATE
PMV BLD AUTO: 8.2 FL (ref 8.9–12.7)
POTASSIUM SERPL-SCNC: 4 MMOL/L (ref 3.5–5.3)
PR INTERVAL: 142 MS
PROTHROMBIN TIME: 13.3 SECONDS (ref 12.3–15)
QRS AXIS: 44 DEGREES
QRSD INTERVAL: 84 MS
QT INTERVAL: 374 MS
QTC INTERVAL: 415 MS
RBC # BLD AUTO: 5.21 MILLION/UL (ref 3.88–5.62)
RBC MORPH BLD: NORMAL
SODIUM SERPL-SCNC: 139 MMOL/L (ref 135–147)
T WAVE AXIS: 26 DEGREES
TOTAL CELLS COUNTED SPEC: 100
TRIGL SERPL-MCNC: 100 MG/DL (ref ?–150)
VARIANT LYMPHS # BLD AUTO: 3 % (ref 0–0)
VENTRICULAR RATE: 74 BPM
WBC # BLD AUTO: 13.46 THOUSAND/UL (ref 4.31–10.16)

## 2025-02-08 PROCEDURE — 99285 EMERGENCY DEPT VISIT HI MDM: CPT

## 2025-02-08 PROCEDURE — 85610 PROTHROMBIN TIME: CPT | Performed by: EMERGENCY MEDICINE

## 2025-02-08 PROCEDURE — 99223 1ST HOSP IP/OBS HIGH 75: CPT | Performed by: NURSE PRACTITIONER

## 2025-02-08 PROCEDURE — 82948 REAGENT STRIP/BLOOD GLUCOSE: CPT

## 2025-02-08 PROCEDURE — 83036 HEMOGLOBIN GLYCOSYLATED A1C: CPT | Performed by: NURSE PRACTITIONER

## 2025-02-08 PROCEDURE — 99291 CRITICAL CARE FIRST HOUR: CPT | Performed by: PSYCHIATRY & NEUROLOGY

## 2025-02-08 PROCEDURE — 93005 ELECTROCARDIOGRAM TRACING: CPT

## 2025-02-08 PROCEDURE — 85027 COMPLETE CBC AUTOMATED: CPT | Performed by: EMERGENCY MEDICINE

## 2025-02-08 PROCEDURE — 70450 CT HEAD/BRAIN W/O DYE: CPT

## 2025-02-08 PROCEDURE — 70496 CT ANGIOGRAPHY HEAD: CPT

## 2025-02-08 PROCEDURE — 80061 LIPID PANEL: CPT | Performed by: NURSE PRACTITIONER

## 2025-02-08 PROCEDURE — 84484 ASSAY OF TROPONIN QUANT: CPT | Performed by: EMERGENCY MEDICINE

## 2025-02-08 PROCEDURE — 36415 COLL VENOUS BLD VENIPUNCTURE: CPT | Performed by: EMERGENCY MEDICINE

## 2025-02-08 PROCEDURE — 96374 THER/PROPH/DIAG INJ IV PUSH: CPT

## 2025-02-08 PROCEDURE — 3E03317 INTRODUCTION OF OTHER THROMBOLYTIC INTO PERIPHERAL VEIN, PERCUTANEOUS APPROACH: ICD-10-PCS | Performed by: PSYCHIATRY & NEUROLOGY

## 2025-02-08 PROCEDURE — 85007 BL SMEAR W/DIFF WBC COUNT: CPT | Performed by: NURSE PRACTITIONER

## 2025-02-08 PROCEDURE — 80048 BASIC METABOLIC PNL TOTAL CA: CPT | Performed by: EMERGENCY MEDICINE

## 2025-02-08 PROCEDURE — 99285 EMERGENCY DEPT VISIT HI MDM: CPT | Performed by: EMERGENCY MEDICINE

## 2025-02-08 PROCEDURE — 94002 VENT MGMT INPAT INIT DAY: CPT

## 2025-02-08 PROCEDURE — 70498 CT ANGIOGRAPHY NECK: CPT

## 2025-02-08 PROCEDURE — 85730 THROMBOPLASTIN TIME PARTIAL: CPT | Performed by: EMERGENCY MEDICINE

## 2025-02-08 PROCEDURE — 94760 N-INVAS EAR/PLS OXIMETRY 1: CPT

## 2025-02-08 RX ORDER — LABETALOL HYDROCHLORIDE 5 MG/ML
10 INJECTION, SOLUTION INTRAVENOUS ONCE
Status: DISCONTINUED | OUTPATIENT
Start: 2025-02-08 | End: 2025-02-09

## 2025-02-08 RX ORDER — ATORVASTATIN CALCIUM 40 MG/1
40 TABLET, FILM COATED ORAL EVERY EVENING
Status: DISCONTINUED | OUTPATIENT
Start: 2025-02-09 | End: 2025-02-11 | Stop reason: HOSPADM

## 2025-02-08 RX ORDER — FLUTICASONE PROPIONATE 50 MCG
1 SPRAY, SUSPENSION (ML) NASAL 2 TIMES DAILY
Status: DISCONTINUED | OUTPATIENT
Start: 2025-02-08 | End: 2025-02-11 | Stop reason: HOSPADM

## 2025-02-08 RX ORDER — GABAPENTIN 100 MG/1
100 CAPSULE ORAL 3 TIMES DAILY PRN
Status: DISCONTINUED | OUTPATIENT
Start: 2025-02-08 | End: 2025-02-11 | Stop reason: HOSPADM

## 2025-02-08 RX ORDER — VENLAFAXINE 37.5 MG/1
37.5 TABLET ORAL DAILY
Status: DISCONTINUED | OUTPATIENT
Start: 2025-02-09 | End: 2025-02-11 | Stop reason: HOSPADM

## 2025-02-08 RX ADMIN — Medication 25 MG: at 17:00

## 2025-02-08 RX ADMIN — IOHEXOL 85 ML: 350 INJECTION, SOLUTION INTRAVENOUS at 16:00

## 2025-02-08 RX ADMIN — FLUTICASONE PROPIONATE 1 SPRAY: 50 SPRAY, METERED NASAL at 22:29

## 2025-02-08 NOTE — ED NOTES
"This RN informed pt that pt is not to get up out of bed due to high risk medication that he received. Pt stated \"oh I don't care; I'll get up anyway\". This RN educated pt on risks and pt stated \"I'll still get up\".      Natalie Alberto RN  02/08/25 5668    "

## 2025-02-08 NOTE — ED PROVIDER NOTES
Time reflects when diagnosis was documented in both MDM as applicable and the Disposition within this note       Time User Action Codes Description Comment    2/8/2025  3:49 PM Preston Ramirez Add [R29.90] Stroke-like symptoms     2/8/2025  6:15 PM Preston Ramirez Add [M45.9] Ankylosing spondylitis (HCC)     2/8/2025  6:15 PM Preston Ramirez Add [G47.33] Obstructive sleep apnea treated with continuous positive airway pressure (CPAP)     2/8/2025  6:15 PM Preston Ramirez Add [L40.50] Psoriasis with arthropathy (HCC)           ED Disposition       ED Disposition   Admit    Condition   Stable    Date/Time   Sat Feb 8, 2025  6:15 PM    Comment   Case was discussed with CC CLARENCE and the patient's admission status was agreed to be Admission Status: inpatient status to the service of Dr. Ortiz .               Assessment & Plan       Medical Decision Making  49-year-old male with history of psoriatic arthritis, STEPHANIE, narcolepsy, obesity states that he has left facial numbness since about 3 PM and numbness of left arm and leg since then.  Concern for hemorrhagic or ischemic stroke, vasculitis, tumor, hypoglycemia, electrolyte abnormality.  Doubt ACS, no indication of aortic dissection etc.  Stroke alert called.     Amount and/or Complexity of Data Reviewed  External Data Reviewed: notes.  Labs: ordered.  Radiology: ordered.  ECG/medicine tests: ordered and independent interpretation performed.  Discussion of management or test interpretation with external provider(s): Stroke neurologist    Risk  Prescription drug management.  Decision regarding hospitalization.        ED Course as of 02/10/25 1339   Sat Feb 08, 2025   1606 Stroke alert was called in at 1650.  Patient to go to imaging, stroke workup etc.  Have not heard from neurology yet.       Medications   atorvastatin (LIPITOR) tablet 40 mg (has no administration in time range)   iohexol (OMNIPAQUE) 350 MG/ML injection (MULTI-DOSE) 100 mL (85 mL Intravenous Given  2/8/25 1600)   tenecteplase (TNKase) injection 25 mg (25 mg Intravenous Given 2/8/25 1700)       ED Risk Strat Scores   HEART Risk Score      Flowsheet Row Most Recent Value   Heart Score Risk Calculator    History 0 Filed at: 02/08/2025 1708   ECG 0 Filed at: 02/08/2025 1708   Age 1 Filed at: 02/08/2025 1708   Risk Factors 0 Filed at: 02/08/2025 1708   Troponin 0 Filed at: 02/08/2025 1708   HEART Score 1 Filed at: 02/08/2025 1708          HEART Risk Score      Flowsheet Row Most Recent Value   Heart Score Risk Calculator    History 0 Filed at: 02/08/2025 1708   ECG 0 Filed at: 02/08/2025 1708   Age 1 Filed at: 02/08/2025 1708   Risk Factors 0 Filed at: 02/08/2025 1708   Troponin 0 Filed at: 02/08/2025 1708   HEART Score 1 Filed at: 02/08/2025 1708                                                History of Present Illness       Chief Complaint   Patient presents with    Facial Droop     Comes to ed c/o facial droop and facial numbness since 30 minutes pta. Patient states he also felt nausea. . No blood thinners per patient       Past Medical History:   Diagnosis Date    Ankylosing spondylitis (HCC)     Arthritis     Chronic pain     DJD (degenerative joint disease)     Herniated disc, cervical     Narcolepsy     Psoriatic arthritis (HCC)     Seizures ([facility])     Sleep apnea     Sleep apnea       Past Surgical History:   Procedure Laterality Date    IMPLANTATION / PLACEMENT EPIDURAL NEUROSTIMULATOR ELECTRODES      INTRATHECAL PUMP IMPLANTATION Left     morphine pump    RHINOPLASTY      TONSILLECTOMY        Family History   Problem Relation Age of Onset    Cancer Mother     Diabetes Father     Cancer Father     Heart disease Father     Narcolepsy Father     Sleep apnea Father     Narcolepsy Paternal Uncle     Narcolepsy Paternal Grandmother       Social History     Tobacco Use    Smoking status: Never    Smokeless tobacco: Never   Vaping Use    Vaping status: Never Used   Substance Use Topics    Alcohol use: Never     Drug use: No      E-Cigarette/Vaping    E-Cigarette Use Never User       E-Cigarette/Vaping Substances    Nicotine No     THC No     CBD No     Flavoring No     Other No     Unknown No       I have reviewed and agree with the history as documented.     49-year-old male with history of psoriatic arthritis, STEPHANIE, narcolepsy, obesity states that around 5 minutes before 3:00 PM today he noted numbness of the left side of his face.  This lasted few seconds and returned about 3 times. Noticed left facial droop at that time.  He also had transient sharp stabbing pain on the left side of his neck.  Symptoms improved.  He came to the hospital by private vehicle. As he arrived he noted some numbness of left arm and left leg but was able to walk normally.  Denies headache, change in vision, palpitations, recent illness or injury.  Denies change in medications.  States he has had many prior neurologic abnormalities and at one time was thought to possibly have MS.  Says that he might have had encephalitis in the past but is not sure.  Says that he was told there were abnormalities of his brain based on imaging likely due to his psoriatic arthritis.  Never had this constellation of symptoms in the past.        Review of Systems   Constitutional:  Negative for fever.   Eyes:  Negative for visual disturbance.   Respiratory:  Negative for shortness of breath.    Cardiovascular:  Negative for chest pain and palpitations.   Neurological:  Negative for dizziness and headaches.           Objective       ED Triage Vitals [02/08/25 1528]   Temperature Pulse Blood Pressure Respirations SpO2 Patient Position - Orthostatic VS   97.6 °F (36.4 °C) 75 (!) 186/98 16 99 % Sitting      Temp Source Heart Rate Source BP Location FiO2 (%) Pain Score    Oral Monitor Right arm -- 6      Vitals      Date and Time Temp Pulse SpO2 Resp BP Pain Score FACES Pain Rating User   02/10/25 1201 98 °F (36.7 °C) 66 94 % 16 152/92 -- -- ST   02/10/25 0907 98 °F  (36.7 °C) 74 95 % 20 150/91 7 -- BLC   02/10/25 0816 -- 74 -- -- 141/83 -- -- RK   02/10/25 0405 97.4 °F (36.3 °C) 74 97 % 28 -- -- -- JR   02/10/25 0400 -- 61 -- 24 141/83 -- -- JA   02/10/25 0359 -- -- 94 % -- -- -- -- PS   02/09/25 2356 98.2 °F (36.8 °C) 63 95 % 15 129/71 -- -- JR   02/09/25 2323 -- -- 94 % -- -- -- -- PS   02/09/25 2116 -- 70 93 % 18 -- -- -- PS   02/09/25 2000 -- 70 93 % 24 167/97 5 -- JA   02/09/25 1600 -- 74 94 % 23 161/91 No Pain -- AJF   02/09/25 1500 98 °F (36.7 °C) 70 93 % 18 147/80 -- -- AN   02/09/25 1400 -- 77 92 % 23 131/72 -- -- AJF   02/09/25 1200 -- 76 95 % 20 145/87 No Pain -- AJF   02/09/25 1100 98 °F (36.7 °C) 72 93 % 19 138/75 -- -- AN   02/09/25 1000 -- 74 94 % 20 134/77 -- -- AJF   02/09/25 0900 -- 77 93 % 20 123/75 -- -- AJF   02/09/25 0800 -- 63 95 % 17 137/82 No Pain -- AJF   02/09/25 0701 98.4 °F (36.9 °C) 66 94 % 20 135/89 -- -- AN   02/09/25 0600 -- 76 95 % 16 161/96 -- -- JA   02/09/25 0500 -- 61 96 % 15 122/81 -- -- JA   02/09/25 0419 -- -- 94 % -- -- -- -- PS   02/09/25 0419 -- 62 -- 16 131/70 -- -- JA   02/09/25 0400 -- 62 95 % 14 111/64 -- -- JA   02/09/25 0300 -- 62 96 % 17 153/71 -- -- JA   02/09/25 0200 -- 62 95 % 14 126/64 -- -- JA   02/09/25 0100 -- 78 95 % 16 156/87 -- -- JA   02/09/25 0000 -- 68 94 % 19 128/76 -- -- JA   02/08/25 2330 -- 64 95 % 19 146/73 -- -- JA   02/08/25 2300 -- 63 94 % 15 147/75 -- -- JA   02/08/25 2231 -- -- 94 % -- -- -- -- PS   02/08/25 2230 -- 67 95 % 26 160/78 -- -- JA   02/08/25 2200 -- 71 95 % 52 164/85 -- -- JA   02/08/25 2130 -- 63 94 % 20 174/95 -- -- JA   02/08/25 2100 -- 84 95 % 20 -- -- -- PS   02/08/25 2100 -- -- -- -- 170/90 -- -- JA   02/08/25 2045 -- 71 95 % 24 174/95 -- -- JA   02/08/25 2005 98 °F (36.7 °C) 63 95 % 19 192/103 -- -- JA   02/08/25 2000 -- -- -- -- -- 3 -- JA   02/08/25 1945 -- 65 95 % 18 147/64 -- -- RN   02/08/25 1930 -- 64 94 % 21 151/87 -- -- RN   02/08/25 1915 -- 63 94 % 19 152/85 -- -- RN   02/08/25  1900 -- 64 94 % 19 154/95 -- -- RN   02/08/25 1845 -- 67 96 % 20 152/98 -- -- MB   02/08/25 1830 -- 63 95 % 16 152/95 -- -- RN   02/08/25 1824 -- 71 95 % 18 -- -- -- RN   02/08/25 1815 -- 65 95 % 16 169/100 -- -- RN   02/08/25 1800 -- 76 95 % 18 169/100 -- -- RN   02/08/25 1730 -- 66 96 % 18 172/102 -- -- RN   02/08/25 1715 -- 68 -- 18 170/90 -- -- RN   02/08/25 1700 -- 65 96 % 20 168/99 -- -- RN   02/08/25 1645 -- 65 95 % 18 152/109 -- -- RN   02/08/25 1630 -- 68 96 % 20 171/97 -- -- RN   02/08/25 1620 -- 69 96 % 20 179/95 -- -- RN   02/08/25 1615 -- 70 96 % 20 200/113 -- -- RN   02/08/25 1600 -- 66 97 % 21 178/88 3 -- RN   02/08/25 1545 -- 73 94 % 18 185/100 -- -- RN   02/08/25 1528 97.6 °F (36.4 °C) 75 99 % 16 186/98 6 -- BY            Physical Exam  Vitals and nursing note reviewed.   Constitutional:       General: He is not in acute distress.     Appearance: He is well-developed and normal weight. He is not ill-appearing or diaphoretic.   HENT:      Head: Normocephalic and atraumatic.      Right Ear: External ear normal.      Left Ear: External ear normal.   Eyes:      General: No scleral icterus.     Extraocular Movements: Extraocular movements intact.      Conjunctiva/sclera: Conjunctivae normal.      Pupils: Pupils are equal, round, and reactive to light.   Neck:      Vascular: No carotid bruit or JVD.   Cardiovascular:      Rate and Rhythm: Normal rate and regular rhythm.      Pulses: Normal pulses.      Heart sounds: Normal heart sounds.   Pulmonary:      Effort: Pulmonary effort is normal. No respiratory distress.      Breath sounds: Normal breath sounds.   Abdominal:      General: Bowel sounds are normal.      Palpations: Abdomen is soft.      Tenderness: There is no abdominal tenderness.   Musculoskeletal:         General: No tenderness. Normal range of motion.      Cervical back: Neck supple. No rigidity.   Skin:     General: Skin is warm and dry.      Findings: No rash.   Neurological:      Mental  Status: He is alert and oriented to person, place, and time.      Cranial Nerves: No cranial nerve deficit.      Sensory: Sensory deficit present.      Motor: Weakness present.      Coordination: Coordination abnormal.      Deep Tendon Reflexes: Reflexes are normal and symmetric.   Psychiatric:         Mood and Affect: Mood normal.         Behavior: Behavior normal.         Results Reviewed       Procedure Component Value Units Date/Time    Hemoglobin A1C w/ EAG Estimation [[id number]]  (Abnormal) Collected: 02/08/25 1556    Lab Status: Final result Specimen: Blood from Arm, Right Updated: 02/09/25 0036     Hemoglobin A1C 5.8 %       mg/dl     Peripheral Smear [[id number]]  (Abnormal) Collected: 02/08/25 1556    Lab Status: Final result Specimen: Blood from Arm, Right Updated: 02/08/25 2022     Total Counted 100     Segmented % 73 %      Lymphocytes % 17 %      Monocytes % 5 %      Eosinophils % 1 %      Basophils % 1 %      Atypical Lymphocytes % 3 %      Absolute Neutrophils 9.83 Thousand/uL      Absolute Lymphocytes 2.69 Thousand/uL      Absolute Monocytes 0.67 Thousand/uL      Absolute Eosinophils 0.13 Thousand/uL      Absolute Basophils 0.13 Thousand/uL      RBC Morphology Normal     Platelet Estimate Adequate    Lipid Panel with Direct LDL reflex [[id number]]  (Abnormal) Collected: 02/08/25 1556    Lab Status: Final result Specimen: Blood from Arm, Right Updated: 02/08/25 1944     Cholesterol 219 mg/dL      Triglycerides 100 mg/dL      HDL, Direct 57 mg/dL      LDL Calculated 142 mg/dL     HS Troponin I 2hr [[id number]]  (Normal) Collected: 02/08/25 1811    Lab Status: Final result Specimen: Blood from Arm, Left Updated: 02/08/25 1841     hs TnI 2hr <2 ng/L      Delta 2hr hsTnI <-1 ng/L     HS Troponin 0hr (reflex protocol) [[id number]]  (Normal) Collected: 02/08/25 1556    Lab Status: Final result Specimen: Blood from Arm, Right Updated: 02/08/25 1625     hs TnI 0hr 3 ng/L     Protime-INR [[id number]]   (Normal) Collected: 02/08/25 1556    Lab Status: Final result Specimen: Blood from Arm, Right Updated: 02/08/25 1620     Protime 13.3 seconds      INR 0.96    Narrative:      INR Therapeutic Range    Indication                                             INR Range      Atrial Fibrillation                                               2.0-3.0  Hypercoagulable State                                    2.0.2.3  Left Ventricular Asist Device                            2.0-3.0  Mechanical Heart Valve                                  -    Aortic(with afib, MI, embolism, HF, LA enlargement,    and/or coagulopathy)                                     2.0-3.0 (2.5-3.5)     Mitral                                                             2.5-3.5  Prosthetic/Bioprosthetic Heart Valve               2.0-3.0  Venous thromboembolism (VTE: VT, PE        2.0-3.0    APTT [[id number]]  (Normal) Collected: 02/08/25 1556    Lab Status: Final result Specimen: Blood from Arm, Right Updated: 02/08/25 1620     PTT 25 seconds     Basic metabolic panel [[id number]] Collected: 02/08/25 1556    Lab Status: Final result Specimen: Blood from Arm, Right Updated: 02/08/25 1617     Sodium 139 mmol/L      Potassium 4.0 mmol/L      Chloride 104 mmol/L      CO2 29 mmol/L      ANION GAP 6 mmol/L      BUN 19 mg/dL      Creatinine 1.02 mg/dL      Glucose 107 mg/dL      Calcium 9.3 mg/dL      eGFR 87 ml/min/1.73sq m     Narrative:      [employer] guidelines for Chronic Kidney Disease (CKD):     Stage 1 with normal or high GFR (GFR > 90 mL/min/1.73 square meters)    Stage 2 Mild CKD (GFR = 60-89 mL/min/1.73 square meters)    Stage 3A Moderate CKD (GFR = 45-59 mL/min/1.73 square meters)    Stage 3B Moderate CKD (GFR = 30-44 mL/min/1.73 square meters)    Stage 4 Severe CKD (GFR = 15-29 mL/min/1.73 square meters)    Stage 5 End Stage CKD (GFR <15 mL/min/1.73 square meters)  Note: GFR calculation is accurate only with a steady state  creatinine    CBC and Platelet [[id number]]  (Abnormal) Collected: 02/08/25 1556    Lab Status: Final result Specimen: Blood from Arm, Right Updated: 02/08/25 1602     WBC 13.46 Thousand/uL      RBC 5.21 Million/uL      Hemoglobin 15.0 g/dL      Hematocrit 45.8 %      MCV 88 fL      MCH 28.8 pg      MCHC 32.8 g/dL      RDW 12.2 %      Platelets 309 Thousands/uL      MPV 8.2 fL     Fingerstick Glucose (POCT) [[id number]]  (Normal) Collected: 02/08/25 1525    Lab Status: Final result Specimen: Blood Updated: 02/08/25 1545     POC Glucose 137 mg/dl             CT head wo contrast   Final Interpretation by Wood Rivas MD (02/09 1723)      No interval change. No CT evidence for a large acute vascular distribution infarct or acute intracranial hemorrhage.                  Resident: STEFFANIE ESTEVEZ I, the attending radiologist, have reviewed the images and agree with the final report above.      Workstation performed: [id number]         CT head without contrast   Final Interpretation by Ebenezer Vasquez MD (02/08 1851)      No acute intracranial abnormality.                  Workstation performed: [id number]         CT stroke alert brain   Final Interpretation by Wood Rivas MD (02/08 1618)      No mass effect, acute intracranial hemorrhage or evidence of recent infarction.      Findings were directly discussed with Adriana Whaley  at approximately 4:10 p.m.      Workstation performed: [id number]         CTA stroke alert (head/neck)   Final Interpretation by Wood Rivas MD (02/08 1617)      No evidence for high-grade stenosis, focal occlusion or vascular aneurysm of the cervical or intracranial vessels.      Questionable small amount of thrombus versus prominent vasculature within the left atrial appendage. Echocardiography is recommended for further evaluation.         Findings were directly discussed with Adriana Whaley  at 4:11 p.m.      Workstation performed: [id number]             ECG 12 Lead Documentation  "Only    Date/Time: 2025 3:36 PM    Performed by: Preston Ramirez DO  Authorized by: Preston Ramirez DO    ECG reviewed by me, the ED Provider: yes    Patient location:  ED  Interpretation:     Interpretation: normal        ED Medication and Procedure Management   Prior to Admission Medications   Prescriptions Last Dose Informant Patient Reported? Taking?   B-D TB SYRINGE 1CC/27GX1/2\" 27G X 1/2\" 1 ML MISC  Self Yes No   Sig: USE THREE WEEKLY FOR ALLERGY INJECTIONS   EPINEPHrine (EPIPEN) 0.3 mg/0.3 mL SOAJ  Self No No   Sig: Inject 0.3 mL (0.3 mg total) into a muscle once for 1 dose   GaviLyte-C 240 g solution  Self Yes No   Sig: FOLLOW INSTRUCTIONS FROM [facility] PREP SHEET   Patient not taking: Reported on 6/15/2023   Insulin Syringe-Needle U-100 27.5G X 5/8\" 2 ML MISC  Self Yes No   Sig: 3 syringes weekly for allergy injections   LINZESS 145 MCG CAPS  Self Yes No   Si (one) Capsule daily on an empty stomach, at least 30 mn before first meal   NON FORMULARY  Self Yes No   Sig: Inject 0.5 mL under the skin once a week Administer 0.5 ml Subq of Serum #1 (Cat, Dog, Cockroach (CDCR)) weekly    Administer 0.5 ml Subq of Serum #2 (Tree, Tree, Weed) (TTW)) weekly   NON FORMULARY  Self Yes No   Si.3 mg Morphine pump   PEG 3350-KCl-NaBcb-NaCl-NaSulf (PEG 3350/Electrolytes) 240 g SOLR  Self Yes No   abatacept (Orencia) 250 mg  Self Yes No   Sig: Infuse 1,000 mg into a venous catheter every 28 days   clobetasol (TEMOVATE) 0.05 % cream  Self Yes No   Sig: Apply topically 2 (two) times a day   Patient not taking: Reported on 2024   fluticasone (FLONASE) 50 mcg/act nasal spray  Self Yes No   Si spray into each nostril 2 (two) times a day   gabapentin (NEURONTIN) 100 mg capsule   Yes No   Sig: Take 100 mg by mouth 3 (three) times a day   methylPREDNISolone 4 MG tablet therapy pack   No No   Sig: Use as directed on package   sildenafil (VIAGRA) 50 MG tablet   No No   Sig: Take 1 tablet (50 mg total) by mouth " "daily as needed for erectile dysfunction   tiZANidine (ZANAFLEX) 2 mg tablet  Self No No   Sig: Take 2 tablets (4 mg total) by mouth 4 (four) times a day as needed for muscle spasms   Patient not taking: Reported on 12/9/2024   venlafaxine (EFFEXOR) 37.5 mg tablet   No No   Sig: Take 1 tablet (37.5 mg total) by mouth daily      Facility-Administered Medications: None     Current Discharge Medication List        CONTINUE these medications which have NOT CHANGED    Details   abatacept (Orencia) 250 mg Infuse 1,000 mg into a venous catheter every 28 days      B-D TB SYRINGE 1CC/27GX1/2\" 27G X 1/2\" 1 ML MISC USE THREE WEEKLY FOR ALLERGY INJECTIONS      clobetasol (TEMOVATE) 0.05 % cream Apply topically 2 (two) times a day      EPINEPHrine (EPIPEN) 0.3 mg/0.3 mL SOAJ Inject 0.3 mL (0.3 mg total) into a muscle once for 1 dose  Qty: 0.6 mL, Refills: 0    Associated Diagnoses: Acute allergic reaction      fluticasone (FLONASE) 50 mcg/act nasal spray 1 spray into each nostril 2 (two) times a day      gabapentin (NEURONTIN) 100 mg capsule Take 100 mg by mouth 3 (three) times a day      !! GaviLyte-C 240 g solution FOLLOW INSTRUCTIONS FROM [facility] PREP SHEET      Insulin Syringe-Needle U-100 27.5G X 5/8\" 2 ML MISC 3 syringes weekly for allergy injections      LINZESS 145 MCG CAPS 1 (one) Capsule daily on an empty stomach, at least 30 mn before first meal  Refills: 0      methylPREDNISolone 4 MG tablet therapy pack Use as directed on package  Qty: 1 each, Refills: 1    Associated Diagnoses: Carpal tunnel syndrome on right; Lateral epicondylitis of right elbow; Carpal tunnel syndrome on left      !! NON FORMULARY Inject 0.5 mL under the skin once a week Administer 0.5 ml Subq of Serum #1 (Cat, Dog, Cockroach (CDCR)) weekly    Administer 0.5 ml Subq of Serum #2 (Tree, Tree, Weed) (TTW)) weekly      !! NON FORMULARY 0.3 mg Morphine pump      !! PEG 3350-KCl-NaBcb-NaCl-NaSulf (PEG 3350/Electrolytes) 240 g SOLR       sildenafil (VIAGRA) " 50 MG tablet Take 1 tablet (50 mg total) by mouth daily as needed for erectile dysfunction  Qty: 10 tablet, Refills: 0    Associated Diagnoses: Pelvic floor dysfunction; Erectile dysfunction due to diseases classified elsewhere      tiZANidine (ZANAFLEX) 2 mg tablet Take 2 tablets (4 mg total) by mouth 4 (four) times a day as needed for muscle spasms  Qty: 240 tablet, Refills: 4    Associated Diagnoses: Chronic pain syndrome      venlafaxine (EFFEXOR) 37.5 mg tablet Take 1 tablet (37.5 mg total) by mouth daily  Qty: 30 tablet, Refills: 5    Associated Diagnoses: Cataplexy       !! - Potential duplicate medications found. Please discuss with provider.        No discharge procedures on file.  ED SEPSIS DOCUMENTATION   Time reflects when diagnosis was documented in both MDM as applicable and the Disposition within this note       Time User Action Codes Description Comment    2/8/2025  3:49 PM Preston Ramirez [R29.90] Stroke-like symptoms     2/8/2025  6:15 PM Preston Ramirez [M45.9] Ankylosing spondylitis (HCC)     2/8/2025  6:15 PM Preston Ramirez [G47.33] Obstructive sleep apnea treated with continuous positive airway pressure (CPAP)     2/8/2025  6:15 PM Preston Ramirez [L40.50] Psoriasis with arthropathy (HCC)                  Preston Ramirez DO  02/10/25 1349

## 2025-02-08 NOTE — ED NOTES
Pt c/o worsening L sided facial droop and worsening L sided numbness. This RN notified ED provider who is present at bedside at this time. This RN also notified neurologist via secure chat.      Natalie Alberto RN  02/08/25 0297

## 2025-02-08 NOTE — CONSULTS
Assessment & Plan  Stroke-like symptoms    Asher Medel is a 49 y.o.  male with hx of chronic pain on morphine pump per patient, herniated discs, psoriatic arthiritis and ankylosing spondilitis follows with [facility]ology , who presents with acute left sided numbness tingling and weakness noted sudden onset starting just after 3 PM.    - CT H stat not acute, CTA with no LVO however questionable REYNA Thrombus vs artifact per radiology. Recommend stat ECHOcardiogram- d/w ED attending physician  - NIHSS 4  - Recommend admission under post thrombolytic stroke protocol  - No AP/AC for at least 24 hours s/p TNKtPA  - For any worse deficits stat CT H no contrast for evaluation of hemorrhagic conversion  - Otherwise repeat CT H no contrast in 24 hours   - SBP goal s/p TNKtPA < 180/105  - Pt cannot have MRI due to old spinal cord stimulator.  - ECHOcardiogram (Per CTA unclear if REYNA thrombus vs artifact)- I notified ED attending immediately.   - Telemetry  - Will follow    Thrombolytic Decision: After a discussion of risks, benefits and alternatives reviewing inclusion and exclusion criteria the decision was made to proceed with thrombolytic therapy. Specifically discussed were the potential benefits, risks, and side effects of the proposed stroke intervention(s) and care; the likelihood of the patient achieving their goals; and any potential problems that might occur as a result of the intervention(s); reasonable alternatives to the proposed stroke intervention(s) and care. The discussion encompasses risks, benefits and side effects related to the alternatives and the risks related to not receiving the proposed stroke intervention(s) and care. Verbal consent was obtained from the patient..  Consent was obtained by myself.    Delay in TNKtpA due to uncontrolled HTN SBP 200s requiring IV medication, to be able to administer TNKtPA safely  Recommendations for outpatient neurological follow up have yet to be  determined.    Total critical care time spent with direct patient care at least 60 minutes today  History of Present Illness     Patient last known well: 3 PM  Stroke alert called: 350 PM  Neurology time of arrival: 354 PM  HPI: Asher Medel is a 49 y.o.  male with hx of chronic pain on morphine pump per patient, herniated discs, psoriatic arthiritis and ankylosing spondilitis follows with [facility] rheumatology , who presents with acute left sided numbness tingling and weakness noted sudden onset starting just after 3 PM.    Patient reports doing yard work outdoors with no difficulty preceding this  Denies any vertigo  Did not have any difficulty with gait during short transfer from bed to CT scan table and back  Pt denies prior hx similar or inciting event  Denies any other recent trauma or bleeding issues or surgeries or known heart disease.  No known clotting disorder  Pt does not use tobacco    Review of Systems  I have reviewed the patient's PMH, PSH, Social History, Family History, Meds, and Allergies    Objective :  Temp:  [97.6 °F (36.4 °C)] 97.6 °F (36.4 °C)  HR:  [66-75] 66  BP: (178-186)/() 178/88  Resp:  [16-21] 21  SpO2:  [94 %-99 %] 97 %  O2 Device: None (Room air)    Physical ExamNeurological Exam  Gen: NAD  HEENT: NCAT, EOMI  Resp: Symmetric chest rise bl  CVS: RRR  Ext: no edema    AO X 4 no aphasia or dysarthria noted  CN 2-12 grossly intact  Motor: 5/5 strength in all ext with mild left pronator drift noted, L FNF ? functional  Sensation: reduced sensation to PP/ temp left arm and leg  Cerebellar: + Left LE > LUE dysmetria  Gait: deferred however during transfer from CT scan table to stretcher no overt sway or ataxia noted    NIHSS:  1a.Level of Consciousness: 0 = Alert   1b. LOC Questions: 0 = Answers both correctly   1c. LOC Commands: 0 = Obeys both correctly   2. Best Gaze: 0 = Normal   3. Visual: 0 = No visual field loss   4. Facial Palsy: 0=Normal symmetric movement   5a. Motor Right  Arm: 0=No drift, limb holds 90 (or 45) degrees for full 10 seconds   5b. Motor Left Arm: 1=Drift, limb holds 90 (or 45) degrees but drifts down before full 10 seconds: does not hit bed   6a. Motor Right Le=No drift, limb holds 90 (or 45) degrees for full 10 seconds   6b. Motor Left Le=No drift, limb holds 90 (or 45) degrees for full 10 seconds   7. Limb Ataxia:  2=Present in two limbs   8. Sensory: 1=Mild to moderate sensory loss; patient feels pinprick is less sharp or is dull on the affected side; there is a loss of superficial pain with pinprick but patient is aware He is being touched   9. Best Language:  0=No aphasia, normal   10. Dysarthria: 0=Normal articulation   11. Extinction and Inattention (formerly Neglect): 0=No abnormality   Total Score: 4     Time NIHSS was completed: 355 PM    Modified [place] Score:  1 (No significant disability. Able to carry out all usual activities, despite some symptoms)      Lab Results: I have reviewed the following results:CBC/BMP:   .     25  1556   WBC 13.46*   HGB 15.0   HCT 45.8      SODIUM 139   K 4.0      CO2 29   BUN 19   CREATININE 1.02   GLUC 107    , PTT/INR:  .     25  1556   PTT 25   INR 0.96        Imaging Results Review: I personally reviewed the following image studies/reports in PACS and discussed pertinent findings with Radiology: CT H. My interpretation of the radiology images/reports is: CT H stat not acute CTA H/N with no LVO.REYNA thrombus vs muscle artifact per radiology  Other Study Results Review: EKG was reviewed. NSR    VTE Prophylaxis: Reason for no pharmacologic prophylaxis s/p TNKtPA    Code Status: Prior  Advance Directive and Living Will:      Power of :    POLST:

## 2025-02-09 ENCOUNTER — APPOINTMENT (INPATIENT)
Dept: CT IMAGING | Facility: HOSPITAL | Age: 50
DRG: 092 | End: 2025-02-09
Payer: MEDICARE

## 2025-02-09 LAB
ANION GAP SERPL CALCULATED.3IONS-SCNC: 8 MMOL/L (ref 4–13)
BASOPHILS # BLD AUTO: 0.04 THOUSANDS/ΜL (ref 0–0.1)
BASOPHILS NFR BLD AUTO: 0 % (ref 0–1)
BUN SERPL-MCNC: 16 MG/DL (ref 5–25)
CALCIUM SERPL-MCNC: 8.8 MG/DL (ref 8.4–10.2)
CHLORIDE SERPL-SCNC: 103 MMOL/L (ref 96–108)
CO2 SERPL-SCNC: 27 MMOL/L (ref 21–32)
CREAT SERPL-MCNC: 0.89 MG/DL (ref 0.6–1.3)
EOSINOPHIL # BLD AUTO: 0.16 THOUSAND/ΜL (ref 0–0.61)
EOSINOPHIL NFR BLD AUTO: 1 % (ref 0–6)
ERYTHROCYTE [DISTWIDTH] IN BLOOD BY AUTOMATED COUNT: 12.8 % (ref 11.6–15.1)
EST. AVERAGE GLUCOSE BLD GHB EST-MCNC: 120 MG/DL
GFR SERPL CREATININE-BSD FRML MDRD: 101 ML/MIN/1.73SQ M
GLUCOSE SERPL-MCNC: 130 MG/DL (ref 65–140)
HBA1C MFR BLD: 5.8 %
HCT VFR BLD AUTO: 40.7 % (ref 36.5–49.3)
HGB BLD-MCNC: 13.7 G/DL (ref 12–17)
IMM GRANULOCYTES # BLD AUTO: 0.03 THOUSAND/UL (ref 0–0.2)
IMM GRANULOCYTES NFR BLD AUTO: 0 % (ref 0–2)
LYMPHOCYTES # BLD AUTO: 3.23 THOUSANDS/ΜL (ref 0.6–4.47)
LYMPHOCYTES NFR BLD AUTO: 27 % (ref 14–44)
MAGNESIUM SERPL-MCNC: 2.1 MG/DL (ref 1.9–2.7)
MCH RBC QN AUTO: 29.7 PG (ref 26.8–34.3)
MCHC RBC AUTO-ENTMCNC: 33.7 G/DL (ref 31.4–37.4)
MCV RBC AUTO: 88 FL (ref 82–98)
MONOCYTES # BLD AUTO: 0.94 THOUSAND/ΜL (ref 0.17–1.22)
MONOCYTES NFR BLD AUTO: 8 % (ref 4–12)
NEUTROPHILS # BLD AUTO: 7.61 THOUSANDS/ΜL (ref 1.85–7.62)
NEUTS SEG NFR BLD AUTO: 64 % (ref 43–75)
NRBC BLD AUTO-RTO: 0 /100 WBCS
PHOSPHATE SERPL-MCNC: 4.2 MG/DL (ref 2.7–4.5)
PLATELET # BLD AUTO: 217 THOUSANDS/UL (ref 149–390)
PMV BLD AUTO: 8.7 FL (ref 8.9–12.7)
POTASSIUM SERPL-SCNC: 3.8 MMOL/L (ref 3.5–5.3)
RBC # BLD AUTO: 4.61 MILLION/UL (ref 3.88–5.62)
SODIUM SERPL-SCNC: 138 MMOL/L (ref 135–147)
WBC # BLD AUTO: 12.01 THOUSAND/UL (ref 4.31–10.16)

## 2025-02-09 PROCEDURE — 85025 COMPLETE CBC W/AUTO DIFF WBC: CPT | Performed by: NURSE PRACTITIONER

## 2025-02-09 PROCEDURE — 90715 TDAP VACCINE 7 YRS/> IM: CPT | Performed by: NURSE PRACTITIONER

## 2025-02-09 PROCEDURE — 94760 N-INVAS EAR/PLS OXIMETRY 1: CPT

## 2025-02-09 PROCEDURE — 99232 SBSQ HOSP IP/OBS MODERATE 35: CPT | Performed by: INTERNAL MEDICINE

## 2025-02-09 PROCEDURE — 99232 SBSQ HOSP IP/OBS MODERATE 35: CPT | Performed by: PSYCHIATRY & NEUROLOGY

## 2025-02-09 PROCEDURE — 83735 ASSAY OF MAGNESIUM: CPT

## 2025-02-09 PROCEDURE — 94003 VENT MGMT INPAT SUBQ DAY: CPT

## 2025-02-09 PROCEDURE — 84100 ASSAY OF PHOSPHORUS: CPT

## 2025-02-09 PROCEDURE — 70450 CT HEAD/BRAIN W/O DYE: CPT

## 2025-02-09 PROCEDURE — 80048 BASIC METABOLIC PNL TOTAL CA: CPT

## 2025-02-09 RX ORDER — ENOXAPARIN SODIUM 100 MG/ML
40 INJECTION SUBCUTANEOUS DAILY
Status: DISCONTINUED | OUTPATIENT
Start: 2025-02-09 | End: 2025-02-11 | Stop reason: HOSPADM

## 2025-02-09 RX ADMIN — ENOXAPARIN SODIUM 40 MG: 40 INJECTION SUBCUTANEOUS at 18:00

## 2025-02-09 RX ADMIN — FLUTICASONE PROPIONATE 1 SPRAY: 50 SPRAY, METERED NASAL at 08:37

## 2025-02-09 RX ADMIN — ATORVASTATIN CALCIUM 40 MG: 40 TABLET, FILM COATED ORAL at 17:55

## 2025-02-09 RX ADMIN — VENLAFAXINE 37.5 MG: 37.5 TABLET ORAL at 08:37

## 2025-02-09 RX ADMIN — FLUTICASONE PROPIONATE 1 SPRAY: 50 SPRAY, METERED NASAL at 17:55

## 2025-02-09 RX ADMIN — TETANUS TOXOID, REDUCED DIPHTHERIA TOXOID AND ACELLULAR PERTUSSIS VACCINE, ADSORBED 0.5 ML: 5; 2.5; 8; 8; 2.5 SUSPENSION INTRAMUSCULAR at 20:38

## 2025-02-09 NOTE — RESPIRATORY THERAPY NOTE
RT Protocol Note  Asher Medel 49 y.o. male MRN: [id number]  Unit/Bed#: -01 Encounter: [id number]    Assessment    Principal Problem:    Stroke-like symptoms  Active Problems:    Obstructive sleep apnea treated with continuous positive airway pressure (CPAP)    Chronic pain    Primary narcolepsy with cataplexy    Psoriasis with arthropathy (HCC)    Ankylosing spondylitis (HCC)    DDD (degenerative disc disease), lumbosacral    Mixed hyperlipidemia    Class 2 obesity with body mass index (BMI) of 36.0 to 36.9 in adult      Home Pulmonary Medications:NONE       Past Medical History:   Diagnosis Date    Ankylosing spondylitis (HCC)     Arthritis     Chronic pain     DJD (degenerative joint disease)     Herniated disc, cervical     Narcolepsy     Psoriatic arthritis (HCC)     Seizures (HCC)     Sleep apnea     Sleep apnea      Social History     Socioeconomic History    Marital status:      Spouse name: None    Number of children: None    Years of education: None    Highest education level: None   Occupational History    None   Tobacco Use    Smoking status: Never    Smokeless tobacco: Never   Vaping Use    Vaping status: Never Used   Substance and Sexual Activity    Alcohol use: Never    Drug use: No    Sexual activity: None   Other Topics Concern    None   Social History Narrative    None     Social Drivers of Health     Financial Resource Strain: Not on file   Food Insecurity: Patient Declined (2/8/2025)    Nursing - Inadequate Food Risk Classification     Worried About Running Out of Food in the Last Year: Not on file     Ran Out of Food in the Last Year: Not on file     Ran Out of Food in the Last Year: Patient declined   Transportation Needs: Patient Declined (2/8/2025)    Nursing - Transportation Risk Classification     Lack of Transportation: Not on file     Lack of Transportation: Patient declined   Physical Activity: Not on file   Stress: Not on file   Social Connections: Not on file   Intimate  "Partner Violence: Patient Declined (2025)    Nursing IPS     Feels Physically and Emotionally Safe: Not on file     Physically Hurt by Someone: Not on file     Humiliated or Emotionally Abused by Someone: Not on file     Physically Hurt by Someone: Patient declined     Hurt or Threatened by Someone: Patient declined   Housing Stability: Patient Declined (2025)    Nursing: Inadequate Housing Risk Classification     Has Housing: Not on file     Worried About Losing Housing: Not on file     Unable to Get Utilities: Not on file     Unable to Pay for Housing in the Last Year: Patient declined     Has Housin       Subjective         Objective    Physical Exam:   Assessment Type: (P) Assess only  General Appearance: (P) Alert, Awake  Respiratory Pattern: (P) Normal  Chest Assessment: (P) Chest expansion symmetrical  Bilateral Breath Sounds: (P) Diminished  Cough: (P) None    Vitals:  Blood pressure (!) 192/103, pulse 84, temperature 98 °F (36.7 °C), temperature source Oral, resp. rate (P) 20, height 5' 11\" (1.803 m), weight 117 kg (257 lb 8 oz), SpO2 95%.          Imaging and other studies:           Plan    Respiratory Plan: (P) No distress/Pulmonary history            "

## 2025-02-09 NOTE — ASSESSMENT & PLAN NOTE
Hx of DDD and chronic pain, see ' Chronic Pain' for more details     Yes - the patient is able to be screened

## 2025-02-09 NOTE — ASSESSMENT & PLAN NOTE
Asher Medel is a 49 y.o.  male with hx of chronic pain on morphine pump per patient, herniated discs, psoriatic arthiritis and ankylosing spondilitis follows with [facility] rheumatology , who presents with acute left sided numbness tingling and weakness noted sudden onset starting just after 3 PM.  - S/p TNKtPA at 1700 yesterday.  - Concern for acute right hemispheric lacunar infarct  - CT H stat not acute, CTA with no LVO however questionable REYNA Thrombus vs artifact per radiology.  - NIHSS 4 on admission currently improved 2, for mild lue drift and lle dysmetria- mild compared to yesterday  - Continue post thrombolytic stroke protocol  - No AP/AC for at least 24 hours s/p TNKtPA  - For any worse deficits stat CT H no contrast for evaluation of hemorrhagic conversion  - Otherwise repeat CT H no contrast in 24 hours   - SBP goal s/p TNKtPA < 180/105  - Pt cannot have MRI due to old spinal cord stimulator.  - ECHOcardiogram (Per CTA unclear if REYNA thrombus vs artifact)- pending. If this shows significant findings/ intracardiac thrombus- will send out thrombosis panel  - Telemetry  - PT/OT evaluation pending  - Further recommendations pending above

## 2025-02-09 NOTE — ASSESSMENT & PLAN NOTE
Presented to the ED with new onset Lt facial droop, Left sided numbness and weakness, and Left neck pain.   CTH neg acute  CTA Neg LVO occlusion or aneurysm; but did show possible small thrombus vs prominent vasculature in Lt atrial appendage  NIHSS 4 for Decreased sensation on Lt, Lt drift, and B/L ataxia  Pt evaluated by neurology and deemed appropriate for TNK therapy  Received TNK at 1700 on 2/8/25  PLAN:   Continue Stroke pathway ordered  ECHO ordered  Plan for repeat CTH today 24 hrs form TNK administration  Continue frequent neuro checks with plan for Stat CTH with any decrease in GCS  SBP goal < 180  F/U A1c, reviewed Lipid panel  Start statin   Can't get MRI due to (+) epidural neurostimulator  PT/OT/PMR/Speech consulted  Delirium precautions  Neurology following

## 2025-02-09 NOTE — PROGRESS NOTES
Progress Note - Neurology   Name: Asher Medel 49 y.o. male I MRN: [id number]  Unit/Bed#: -01 I Date of Admission: 2/8/2025   Date of Service: 2/9/2025 I Hospital Day: 1     Assessment & Plan  Stroke-like symptoms  Asher Medel is a 49 y.o.  male with hx of chronic pain on morphine pump per patient, herniated discs, psoriatic arthiritis and ankylosing spondilitis follows with [facility] rheumatology , who presents with acute left sided numbness tingling and weakness noted sudden onset starting just after 3 PM.  - S/p TNKtPA at 1700 yesterday.  - Concern for acute right hemispheric lacunar infarct  - CT H stat not acute, CTA with no LVO however questionable REYNA Thrombus vs artifact per radiology.  - NIHSS 4 on admission currently improved 2, for mild lue drift and lle dysmetria- mild compared to yesterday  - Continue post thrombolytic stroke protocol  - No AP/AC for at least 24 hours s/p TNKtPA  - For any worse deficits stat CT H no contrast for evaluation of hemorrhagic conversion  - Otherwise repeat CT H no contrast in 24 hours   - SBP goal s/p TNKtPA < 180/105  - Pt cannot have MRI due to old spinal cord stimulator.  - ECHOcardiogram (Per CTA unclear if REYNA thrombus vs artifact)- pending. If this shows significant findings/ intracardiac thrombus- will send out thrombosis panel  - Telemetry  - PT/OT evaluation pending  - Further recommendations pending above  Obstructive sleep apnea treated with continuous positive airway pressure (CPAP)  - Continue CPAP use, further reduced stroke risk  DDD (degenerative disc disease), lumbosacral  - chronic pain and does have morphine pump, also with spinal stimulator- pending removal- MRI incompatible  Mixed hyperlipidemia  - C/w statin    Recommendations for outpatient neurological follow up have yet to be determined.      Subjective   Patient is seen in follow-up, reports improvement today.  Denies any headache or other side effects of TNK-tPA.  Yesterday evening  shortly after TNK did report subjective slurred speech, CT head stat was obtained as patient with subjective worse symptoms and no acute bleed noted    Review of Systems  10 point review of systems completed and negative other than that noted above    Objective :  Temp:  [97.6 °F (36.4 °C)-98.4 °F (36.9 °C)] 98.4 °F (36.9 °C)  HR:  [61-84] 74  BP: (111-200)/() 134/77  Resp:  [14-52] 20  SpO2:  [93 %-99 %] 94 %  O2 Device: None (Room air)    Physical ExamNeurological Exam    Gen: NAD  HEENT: NCAT, EOMI  Resp: Symmetric chest rise bl  CVS: RRR  Ext: no edema    AO X person place time with no aphasia or dysarthria noted  CN 2-12 grossly intact  Motor: 5/5 in all extremities with mild LUE drift noted  Sensation: Intact to LT/temp in all ext today  Cerebellar: No dysmetria b/l FNF, HS testing with milder LLE dysmetria compared to yesterday  Reflexes: neutral plantar responses bl, no clonus at ankle  Gait: deferred      Lab Results: I have reviewed the following results:  Imaging Results Review: I personally reviewed the following image studies/reports in PACS and discussed pertinent findings with Radiology: CT head. My interpretation of the radiology images/reports is: see above, reviewed yesterday.      VTE Pharmacologic Prophylaxis: VTE covered by:    None   S/p tnk tpa     Administrative Statements   I have spent a total time of 35 minutes in caring for this patient on the day of the visit/encounter including Diagnostic results, Risk factor reductions, and Impressions.

## 2025-02-09 NOTE — QUICK NOTE
Critical Care Interval Transfer Note:    Brief Hospital Summary:  48 y/o male came in yesterday with stroke like symptoms and got TNK at 1700 on 2/8. 24 hr repeat CTH normal. His Neuro exam has improved from presentation, improved sensation on Lt side and no left facial droop. He can't get MRI due to a neurostimulator, ECHO is ordered but not done yet. CTA yesterday had shown a LT atrial appendage thrombus vs artifact.     PMHx: chronic pain with neurostimulator (not active) and implanted morphine pump, STEPHANIE on CPAP, narcolepsy with cataplexy, obesity, and herniated discs.     Barriers to discharge:   ECHO pending, PT/OT pending     Consults: IP CONSULT TO NEUROLOGY  IP CONSULT TO PHYSICAL MEDICINE REHAB  IP CONSULT TO CASE MANAGEMENT  IP CONSULT TO NUTRITION SERVICES    Recommended to review admission imaging for incidental findings and document in discharge navigator: Chart reviewed, no known incidental findings noted at this time.  ECHO pending to evaluate further the possible Lt atrial appendage finding on CTA.     Discharge Plan: Anticipate discharge in 24-48 hrs to home with home services.       Patient seen and evaluated by Critical Care today and deemed to be appropriate for transfer to Med Surg with Telemetry. Spoke to from Dr. Carrillo to accept transfer. Critical care can be contacted via SecureChat with any questions or concerns. Please use the Critical Care AP Role in Secure Chat for any provider inquires until the patient is transferred out of the ICU or until tomorrow at 0600.

## 2025-02-09 NOTE — PROGRESS NOTES
Progress Note - Critical Care/ICU   Name: Asher Medel 49 y.o. male I MRN: [id number]  Unit/Bed#: -01 I Date of Admission: 2/8/2025   Date of Service: 2/9/2025 I Hospital Day: 1      Assessment & Plan  Stroke-like symptoms  Presented to the ED with new onset Lt facial droop, Left sided numbness and weakness, and Left neck pain.   CTH neg acute  CTA Neg LVO occlusion or aneurysm; but did show possible small thrombus vs prominent vasculature in Lt atrial appendage  NIHSS 4 for Decreased sensation on Lt, Lt drift, and B/L ataxia  Pt evaluated by neurology and deemed appropriate for TNK therapy  Received TNK at 1700 on 2/8/25  PLAN:   Continue Stroke pathway ordered  ECHO ordered  Plan for repeat CTH today 24 hrs form TNK administration  Continue frequent neuro checks with plan for Stat CTH with any decrease in GCS  SBP goal < 180  F/U A1c, reviewed Lipid panel  Start statin   Can't get MRI due to (+) epidural neurostimulator  PT/OT/PMR/Speech consulted  Delirium precautions  Neurology following  Obstructive sleep apnea treated with continuous positive airway pressure (CPAP)  Pt uses HS CPAP at home, will continue  Resp protocol  IS and OOB with assistance when able   Chronic pain  Pt with a hx of chronic pain. He has a Neurostimulator (which isn't on) and a implanted morphine pump and is on gabapentin 100 TID at home.   Follows with a pain service in [place]  Resume PRN gabapentin    Primary narcolepsy with cataplexy  Supportive care and Bedrest x 24 hrs post TNK   Continue home Effexor   Psoriasis with arthropathy (HCC)  Hx of psoriasis with arthropathy and chronic pain, see ' Chronic Pain' for more details    Ankylosing spondylitis (HCC)  Hx of ankylosing spondylitis and chronic pain, see ' Chronic Pain' for more details    DDD (degenerative disc disease), lumbosacral  Hx of DDD and chronic pain, see ' Chronic Pain' for more details    Mixed hyperlipidemia  Not currently on a statin  Lipid panel  reviewed   Start Lipitor   Class 2 obesity with body mass index (BMI) of 36.0 to 36.9 in adult  Nutrition consulted  Passed swallow eval yesterday and started on cardiac diet    Disposition: Critical care    ICU Core Measures     A: Assess, Prevent, and Manage Pain Has pain been assessed? Yes  Need for changes to pain regimen? No   B: Both SAT/SAT  N/A   C: Choice of Sedation RASS Goal: N/A patient not on sedation  Need for changes to sedation or analgesia regimen? NA   D: Delirium CAM-ICU: Negative   E: Early Mobility  Plan for early mobility? Yes   F: Family Engagement Plan for family engagement today? Yes         Prophylaxis:  VTE Contraindicated secondary to: S/P TNK   Stress Ulcer  not ordered         24 Hour Events : Admitted yesterday with new stroke like symptoms with NIHSS of 4. Received TNK at 1700 on 2/8/25 and admitted to ICU. No issues overnight.     Subjective   Review of Systems: See HPI for Review of Systems    Objective :                   Vitals I/O      Most Recent Min/Max in 24hrs   Temp 98 °F (36.7 °C) Temp  Min: 97.6 °F (36.4 °C)  Max: 98 °F (36.7 °C)   Pulse 68 Pulse  Min: 63  Max: 84   Resp 19 Resp  Min: 15  Max: 52   /76 BP  Min: 128/76  Max: 200/113   O2 Sat 94 % SpO2  Min: 94 %  Max: 99 %    No intake or output data in the 24 hours ending 02/09/25 0014    Diet Cardiovascular; Cardiac    Invasive Monitoring           Physical Exam   Physical Exam  Vitals and nursing note reviewed.   Eyes:      Extraocular Movements: Extraocular movements intact.      Pupils: Pupils are equal, round, and reactive to light.   Skin:     General: Skin is warm and dry.   HENT:      Head: Normocephalic.      Mouth/Throat:      Mouth: Mucous membranes are moist.   Cardiovascular:      Rate and Rhythm: Normal rate and regular rhythm.      Heart sounds: Normal heart sounds.   Musculoskeletal:         General: Normal range of motion.   Abdominal: General: Bowel sounds are normal.      Palpations: Abdomen is  soft.   Constitutional:       Appearance: He is well-developed and well-nourished.   Pulmonary:      Effort: Pulmonary effort is normal.      Breath sounds: Normal breath sounds. No wheezing, rhonchi or rales.   Neurological:      Mental Status: He is alert and oriented to person, place and time. He is calm.      GCS: GCS eye subscore is 4. GCS verbal subscore is 5. GCS motor subscore is 6.      Comments: AAOx 4, following commands. Rt sided strength 5/5; Lt sided strength 4/5. Pt with numbness/decreased sensation from head down to Lt knee.          Diagnostic Studies        Lab Results: I have reviewed the following results:     Medications:  Scheduled PRN   atorvastatin, 40 mg, QPM  fluticasone, 1 spray, BID  labetalol, 10 mg, Once  tetanus-diphtheria-acellular pertussis, 0.5 mL, Once  venlafaxine, 37.5 mg, Daily      gabapentin, 100 mg, TID PRN       Continuous          Labs:   CBC    Recent Labs     02/08/25  1556   WBC 13.46*   HGB 15.0   HCT 45.8        BMP    Recent Labs     02/08/25  1556   SODIUM 139   K 4.0      CO2 29   AGAP 6   BUN 19   CREATININE 1.02   CALCIUM 9.3       Coags    Recent Labs     02/08/25  1556   INR 0.96   PTT 25        Additional Electrolytes  No recent results       Blood Gas    No recent results  No recent results LFTs  No recent results    Infectious  No recent results  Glucose  Recent Labs     02/08/25  1556   GLUC 107

## 2025-02-09 NOTE — PLAN OF CARE
Problem: Potential for Falls  Goal: Patient will remain free of falls  Description: INTERVENTIONS:  - Educate patient/family on patient safety including physical limitations  - Instruct patient to call for assistance with activity   - Consult OT/PT to assist with strengthening/mobility   - Keep Call bell within reach  - Keep bed low and locked with side rails adjusted as appropriate  - Keep care items and personal belongings within reach  - Initiate and maintain comfort rounds  - Make Fall Risk Sign visible to staff  - Offer Toileting every  Hours, in advance of need  - Initiate/Maintain alarm  - Obtain necessary fall risk management equipment:   - Apply yellow socks and bracelet for high fall risk patients  - Consider moving patient to room near nurses station  Outcome: Progressing     Problem: PAIN - ADULT  Goal: Verbalizes/displays adequate comfort level or baseline comfort level  Description: Interventions:  - Encourage patient to monitor pain and request assistance  - Assess pain using appropriate pain scale  - Administer analgesics based on type and severity of pain and evaluate response  - Implement non-pharmacological measures as appropriate and evaluate response  - Consider cultural and social influences on pain and pain management  - Notify physician/advanced practitioner if interventions unsuccessful or patient reports new pain  Outcome: Progressing     Problem: INFECTION - ADULT  Goal: Absence or prevention of progression during hospitalization  Description: INTERVENTIONS:  - Assess and monitor for signs and symptoms of infection  - Monitor lab/diagnostic results  - Monitor all insertion sites, i.e. indwelling lines, tubes, and drains  - Monitor endotracheal if appropriate and nasal secretions for changes in amount and color  - [place] appropriate cooling/warming therapies per order  - Administer medications as ordered  - Instruct and encourage patient and family to use good hand hygiene technique  -  Identify and instruct in appropriate isolation precautions for identified infection/condition  Outcome: Progressing  Goal: Absence of fever/infection during neutropenic period  Description: INTERVENTIONS:  - Monitor WBC    Outcome: Progressing     Problem: SAFETY ADULT  Goal: Patient will remain free of falls  Description: INTERVENTIONS:  - Educate patient/family on patient safety including physical limitations  - Instruct patient to call for assistance with activity   - Consult OT/PT to assist with strengthening/mobility   - Keep Call bell within reach  - Keep bed low and locked with side rails adjusted as appropriate  - Keep care items and personal belongings within reach  - Initiate and maintain comfort rounds  - Make Fall Risk Sign visible to staff  - Offer Toileting every  Hours, in advance of need  - Initiate/Maintain alarm  - Obtain necessary fall risk management equipment:   - Apply yellow socks and bracelet for high fall risk patients  - Consider moving patient to room near nurses station  Outcome: Progressing  Goal: Maintain or return to baseline ADL function  Description: INTERVENTIONS:  -  Assess patient's ability to carry out ADLs; assess patient's baseline for ADL function and identify physical deficits which impact ability to perform ADLs (bathing, care of mouth/teeth, toileting, grooming, dressing, etc.)  - Assess/evaluate cause of self-care deficits   - Assess range of motion  - Assess patient's mobility; develop plan if impaired  - Assess patient's need for assistive devices and provide as appropriate  - Encourage maximum independence but intervene and supervise when necessary  - Involve family in performance of ADLs  - Assess for home care needs following discharge   - Consider OT consult to assist with ADL evaluation and planning for discharge  - Provide patient education as appropriate  Outcome: Progressing  Goal: Maintains/Returns to pre admission functional level  Description:  INTERVENTIONS:  - Perform AM-PAC 6 Click Basic Mobility/ Daily Activity assessment daily.  - Set and communicate daily mobility goal to care team and patient/family/caregiver.   - Collaborate with rehabilitation services on mobility goals if consulted  - Perform Range of Motion  times a day.  - Reposition patient every  hours.  - Dangle patient  times a day  - Stand patient  times a day  - Ambulate patient  times a day  - Out of bed to chair  times a day   - Out of bed for meals  times a day  - Out of bed for toileting  - Record patient progress and toleration of activity level   Outcome: Progressing     Problem: DISCHARGE PLANNING  Goal: Discharge to home or other facility with appropriate resources  Description: INTERVENTIONS:  - Identify barriers to discharge w/patient and caregiver  - Arrange for needed discharge resources and transportation as appropriate  - Identify discharge learning needs (meds, wound care, etc.)  - Arrange for interpretive services to assist at discharge as needed  - Refer to Case Management Department for coordinating discharge planning if the patient needs post-hospital services based on physician/advanced practitioner order or complex needs related to functional status, cognitive ability, or social support system  Outcome: Progressing     Problem: Knowledge Deficit  Goal: Patient/family/caregiver demonstrates understanding of disease process, treatment plan, medications, and discharge instructions  Description: Complete learning assessment and assess knowledge base.  Interventions:  - Provide teaching at level of understanding  - Provide teaching via preferred learning methods  Outcome: Progressing     Problem: NEUROSENSORY - ADULT  Goal: Achieves stable or improved neurological status  Description: INTERVENTIONS  - Monitor and report changes in neurological status  - Monitor vital signs such as temperature, blood pressure, glucose, and any other labs ordered   - Initiate measures to  prevent increased intracranial pressure  - Monitor for seizure activity and implement precautions if appropriate      Outcome: Progressing  Goal: Remains free of injury related to seizures activity  Description: INTERVENTIONS  - Maintain airway, patient safety  and administer oxygen as ordered  - Monitor patient for seizure activity, document and report duration and description of seizure to physician/advanced practitioner  - If seizure occurs,  ensure patient safety during seizure  - Reorient patient post seizure  - Seizure pads on all 4 side rails  - Instruct patient/family to notify RN of any seizure activity including if an aura is experienced  - Instruct patient/family to call for assistance with activity based on nursing assessment  - Administer anti-seizure medications if ordered    Outcome: Progressing  Goal: Achieves maximal functionality and self care  Description: INTERVENTIONS  - Monitor swallowing and airway patency with patient fatigue and changes in neurological status  - Encourage and assist patient to increase activity and self care.   - Encourage visually impaired, hearing impaired and aphasic patients to use assistive/communication devices  Outcome: Progressing     Problem: CARDIOVASCULAR - ADULT  Goal: Maintains optimal cardiac output and hemodynamic stability  Description: INTERVENTIONS:  - Monitor I/O, vital signs and rhythm  - Monitor for S/S and trends of decreased cardiac output  - Administer and titrate ordered vasoactive medications to optimize hemodynamic stability  - Assess quality of pulses, skin color and temperature  - Assess for signs of decreased coronary artery perfusion  - Instruct patient to report change in severity of symptoms  Outcome: Progressing  Goal: Absence of cardiac dysrhythmias or at baseline rhythm  Description: INTERVENTIONS:  - Continuous cardiac monitoring, vital signs, obtain 12 lead EKG if ordered  - Administer antiarrhythmic and heart rate control medications as  ordered  - Monitor electrolytes and administer replacement therapy as ordered  Outcome: Progressing

## 2025-02-09 NOTE — ASSESSMENT & PLAN NOTE
Pt with a hx of chronic pain. He has a Neurostimulator (which isn't on) and a implanted morphine pump and is on gabapentin 100 TID at home.   Follows with a pain service in [place]  Resume PRN gabapentin

## 2025-02-09 NOTE — ASSESSMENT & PLAN NOTE
- chronic pain and does have morphine pump, also with spinal stimulator- pending removal- MRI incompatible

## 2025-02-09 NOTE — H&P
H&P - Critical Care/ICU   Name: Asher Medel 49 y.o. male I MRN: [id number]  Unit/Bed#: -01 I Date of Admission: 2/8/2025   Date of Service: 2/8/2025 I Hospital Day: 0       Assessment & Plan  Stroke-like symptoms  Presented to the ED with new onset Lt facial droop, Left sided numbness and weakness, and Left neck pain.   CTH neg acute  CTA Neg LVO occlusion or aneurysm; but did show possible small thrombus vs prominent vasculature in Lt atrial appendage  NIHSS 4 for Decreased sensation on Lt, Lt drift, and B/L ataxia  Pt evaluated by neurology and deemed appropriate for TNK therapy  Received TNK at 1700 on 2/8/25  PLAN:   Admit to ICU  Stroke pathway ordered  Stat ECHO  Plan for repeat CTH tomorrow 24 hrs form TNK administration  Start frequent neuro checks with plan for Stat CTH with any decrease in GCS  SBP goal < 180  Check Lipid panel and A1c  Start statin when he passes a swallow eval  Can't get MRI due to (+) epidural neurostimulator  PT/OT/PMR/Speech consulted  Delirium precautions  Obstructive sleep apnea treated with continuous positive airway pressure (CPAP)  Pt uses HS CPAP at home, will continue  Resp protocol  IS and OOB with assistance when able  Chronic pain  Pt with a hx of chronic pain. He has a Neurostimulator (which isn't on) and a implanted morphine pump and is on gabapentin 100 TID at home.   Follows with a pain service in [place]  Will resume home PRN gabapentin when he passes swallow eval.     Primary narcolepsy with cataplexy  Supportive care and Bedrest x 24 hrs post TNK  Continue home Effexor   Psoriasis with arthropathy (HCC)  Hx of psoriasis with arthropathy and chronic pain, see ' Chronic Pain' for more details   Ankylosing spondylitis (HCC)  Hx of ankylosing spondylitis and chronic pain, see ' Chronic Pain' for more details   DDD (degenerative disc disease), lumbosacral  Hx of DDD and chronic pain, see ' Chronic Pain' for more details   Mixed hyperlipidemia  Not  "currently on a statin  Check lipid panel  Start Lipitor after passing speech/swallow eval  Class 2 obesity with body mass index (BMI) of 36.0 to 36.9 in adult  Nutrition consulted  NPO until seen by speech  Disposition: Critical care    History of Present Illness   Asher Medel is a 49 y.o. who presents to the ED with new onset Lt facial droop, left sided numbness, Left neck/head pain, and left sided weakness. SBP in ED was 200. CTH Neg acute and CTA neg LVO/aneurysm but did have questionable small thrombus on Lt atrial appendage. Pt received TNK at 1700.   PMHx: chronic pain with neurostimulator (not active) and implanted morphine pump, STEPHANIE on CPAP, narcolepsy with cataplexy, obesity, and herniated discs.     History obtained from chart review and the patient.  Review of Systems: See HPI for Review of Systems    Historical Information   Past Medical History:  No date: Ankylosing spondylitis (HCC)  No date: Arthritis  No date: Chronic pain  No date: DJD (degenerative joint disease)  No date: Herniated disc, cervical  No date: Narcolepsy  No date: Psoriatic arthritis ([facility])  No date: Seizures ([facility])  No date: Sleep apnea  No date: Sleep apnea Past Surgical History:  No date: IMPLANTATION / PLACEMENT EPIDURAL NEUROSTIMULATOR ELECTRODES  No date: INTRATHECAL PUMP IMPLANTATION; Left      Comment:  morphine pump  No date: RHINOPLASTY  No date: TONSILLECTOMY   Current Outpatient Medications   Medication Instructions    B-D TB SYRINGE 1CC/27GX1/2\" 27G X 1/2\" 1 ML MISC USE THREE WEEKLY FOR ALLERGY INJECTIONS    clobetasol (TEMOVATE) 0.05 % cream 2 times daily    EPINEPHrine (EPIPEN) 0.3 mg, Intramuscular, Once    fluticasone (FLONASE) 50 mcg/act nasal spray 1 spray, 2 times daily    gabapentin (NEURONTIN) 100 mg, 3 times daily    GaviLyte-C 240 g solution FOLLOW INSTRUCTIONS FROM P[facility] PREP SHEET    Insulin Syringe-Needle U-100 27.5G X 5/8\" 2 ML MISC 3 syringes weekly for allergy injections    LINZESS 145 MCG CAPS 1 (one) " Capsule daily on an empty stomach, at least 30 mn before first meal    methylPREDNISolone 4 MG tablet therapy pack Use as directed on package    NON FORMULARY 0.5 mL, Weekly    NON FORMULARY 0.3 mg    Orencia 1,000 mg, Every 28 days    PEG 3350-KCl-NaBcb-NaCl-NaSulf (PEG 3350/Electrolytes) 240 g SOLR     sildenafil (VIAGRA) 50 mg, Oral, Daily PRN    tiZANidine (ZANAFLEX) 4 mg, Oral, 4 times daily PRN    venlafaxine (EFFEXOR) 37.5 mg, Oral, Daily    Allergies   Allergen Reactions    Allyl Isothiocyanate Anaphylaxis    Bee Venom Anaphylaxis    Broccoli [Brassica Oleracea - Food Allergy] Anaphylaxis    Diclofenac Other (See Comments)     Pain in leg feels like I have a blood clot pt sttates  Bad stomach pains  Pain in leg feels like I have a blood clot pt sttates  Blood clots in legs painful  Bad stomach pains  Pain in leg feels like I have a blood clot pt sttates    Medical Tape Other (See Comments)     Skin edema redness    Methotrexate Palpitations     Heart palpatations  Heart palpatations  Heart palpatations    Mustard Seed - Food Allergy Anaphylaxis    Other Rash and Other (See Comments)     ADHESIVE TAPE  Skin edema redness    Acetaminophen Diarrhea, GI Intolerance and Abdominal Pain    Aspirin GI Intolerance     Other reaction(s): Nausea/Vomiting    Celecoxib Other (See Comments)     Muscle cramps  Darrius horses, swelling  Darrius horses, swelling  Muscle cramps  Darrius horses, swelling    Diclofenac Sodium      Bad stomach pains    Methotrexate Derivatives     Infliximab Rash     blindness    Penicillins Rash and Other (See Comments)     Other reaction(s): Unknown Reaction      Social History     Tobacco Use    Smoking status: Never    Smokeless tobacco: Never   Vaping Use    Vaping status: Never Used   Substance Use Topics    Alcohol use: Never    Drug use: No    Family History   Problem Relation Age of Onset    Cancer Mother     Diabetes Father     Cancer Father     Heart disease Father     Narcolepsy  Father     Sleep apnea Father     Narcolepsy Paternal Uncle     Narcolepsy Paternal Grandmother           Objective :                   Vitals I/O      Most Recent Min/Max in 24hrs   Temp 98 °F (36.7 °C) Temp  Min: 97.6 °F (36.4 °C)  Max: 98 °F (36.7 °C)   Pulse 63 Pulse  Min: 63  Max: 76   Resp 19 Resp  Min: 16  Max: 21   BP (!) 192/103 BP  Min: 147/64  Max: 200/113   O2 Sat 95 % SpO2  Min: 94 %  Max: 99 %    No intake or output data in the 24 hours ending 02/08/25 2119    Diet Cardiovascular; Cardiac    Invasive Monitoring           Physical Exam   Physical Exam  Vitals and nursing note reviewed.   Eyes:      General: Vision grossly intact.      Extraocular Movements: Extraocular movements intact.      Conjunctiva/sclera: Conjunctivae normal.      Pupils: Pupils are equal, round, and reactive to light.   Skin:     General: Skin is warm and dry.   HENT:      Head: Normocephalic.        Comments: Small cut on forehead, pt reports from a rusted wire while working outside today      Mouth/Throat:      Mouth: Mucous membranes are moist.   Neck:   no midline tenderness Cardiovascular:      Rate and Rhythm: Normal rate and regular rhythm.      Heart sounds: Normal heart sounds.   Musculoskeletal:         General: Normal range of motion.      Right lower leg: No edema.      Left lower leg: No edema.   Abdominal: General: Bowel sounds are normal.      Palpations: Abdomen is soft.   Constitutional:       Appearance: He is well-developed and well-nourished.   Pulmonary:      Effort: Pulmonary effort is normal. No respiratory distress.      Breath sounds: Normal breath sounds. No wheezing, rhonchi or rales.      Comments: On RA with SPO2 97%. LS CTA b/l  Neurological:      Mental Status: He is alert and oriented to person, place and time. He is calm.      GCS: GCS eye subscore is 4. GCS verbal subscore is 5. GCS motor subscore is 6.      Cranial Nerves: No dysarthria or facial asymmetry.      Sensory: Sensory deficit present.       Comments: Lt upper/lower extremities 4/5 strength. Decreased sensation on left side from head down to knee. Reports improved sensation in Lt calf/shin since TNK, reports below knee his sensation is equal.           Diagnostic Studies        Lab Results: I have reviewed the following results:     Medications:  Scheduled PRN   [START ON 2/9/2025] atorvastatin, 40 mg, QPM  fluticasone, 1 spray, BID  labetalol, 10 mg, Once  [START ON 2/9/2025] tetanus-diphtheria-acellular pertussis, 0.5 mL, Once  [START ON 2/9/2025] venlafaxine, 37.5 mg, Daily      gabapentin, 100 mg, TID PRN       Continuous          Labs:   CBC    Recent Labs     02/08/25  1556   WBC 13.46*   HGB 15.0   HCT 45.8        BMP    Recent Labs     02/08/25  1556   SODIUM 139   K 4.0      CO2 29   AGAP 6   BUN 19   CREATININE 1.02   CALCIUM 9.3       Coags    Recent Labs     02/08/25  1556   INR 0.96   PTT 25        Additional Electrolytes  No recent results       Blood Gas    No recent results  No recent results LFTs  No recent results    Infectious  No recent results  Glucose  Recent Labs     02/08/25  1556   GLUC 107

## 2025-02-09 NOTE — ASSESSMENT & PLAN NOTE
Pt with a hx of chronic pain. He has a Neurostimulator (which isn't on) and a implanted morphine pump and is on gabapentin 100 TID at home.   Follows with a pain service in [place]  Will resume home PRN gabapentin when he passes swallow eval.

## 2025-02-09 NOTE — ASSESSMENT & PLAN NOTE
Presented to the ED with new onset Lt facial droop, Left sided numbness and weakness, and Left neck pain.   CTH neg acute  CTA Neg LVO occlusion or aneurysm; but did show possible small thrombus vs prominent vasculature in Lt atrial appendage  NIHSS 4 for Decreased sensation on Lt, Lt drift, and B/L ataxia  Pt evaluated by neurology and deemed appropriate for TNK therapy  Received TNK at 1700 on 2/8/25  PLAN:   Admit to ICU  Stroke pathway ordered  Stat ECHO  Plan for repeat CTH tomorrow 24 hrs form TNK administration  Start frequent neuro checks with plan for Stat CTH with any decrease in GCS  SBP goal < 180  Check Lipid panel and A1c  Start statin when he passes a swallow eval  Can't get MRI due to (+) epidural neurostimulator  PT/OT/PMR/Speech consulted  Delirium precautions

## 2025-02-10 ENCOUNTER — TELEPHONE (OUTPATIENT)
Age: 50
End: 2025-02-10

## 2025-02-10 ENCOUNTER — APPOINTMENT (INPATIENT)
Dept: NON INVASIVE DIAGNOSTICS | Facility: HOSPITAL | Age: 50
DRG: 092 | End: 2025-02-10
Payer: MEDICARE

## 2025-02-10 LAB
ANION GAP SERPL CALCULATED.3IONS-SCNC: 6 MMOL/L (ref 4–13)
AORTIC ROOT: 3.6 CM
ASCENDING AORTA: 3.1 CM
ATRIAL RATE: 74 BPM
BSA FOR ECHO PROCEDURE: 2.31 M2
BUN SERPL-MCNC: 15 MG/DL (ref 5–25)
CALCIUM SERPL-MCNC: 8.7 MG/DL (ref 8.4–10.2)
CHLORIDE SERPL-SCNC: 103 MMOL/L (ref 96–108)
CO2 SERPL-SCNC: 28 MMOL/L (ref 21–32)
CREAT SERPL-MCNC: 0.73 MG/DL (ref 0.6–1.3)
DOP CALC LVOT AREA: 4.15 CM2
DOP CALC LVOT DIAMETER: 2.3 CM
E WAVE DECELERATION TIME: 245 MS
E/A RATIO: 0.81
ERYTHROCYTE [DISTWIDTH] IN BLOOD BY AUTOMATED COUNT: 12.1 % (ref 11.6–15.1)
FRACTIONAL SHORTENING: 29 (ref 28–44)
GFR SERPL CREATININE-BSD FRML MDRD: 110 ML/MIN/1.73SQ M
GLUCOSE SERPL-MCNC: 107 MG/DL (ref 65–140)
HCT VFR BLD AUTO: 42 % (ref 36.5–49.3)
HGB BLD-MCNC: 13.8 G/DL (ref 12–17)
INTERVENTRICULAR SEPTUM IN DIASTOLE (PARASTERNAL SHORT AXIS VIEW): 1.2 CM
INTERVENTRICULAR SEPTUM: 1.2 CM (ref 0.6–1.1)
LEFT ATRIUM AREA SYSTOLE SINGLE PLANE A4C: 16.5 CM2
LEFT ATRIUM SIZE: 3.8 CM
LEFT INTERNAL DIMENSION IN SYSTOLE: 3.5 CM (ref 2.1–4)
LEFT VENTRICLE DIASTOLIC VOLUME (MOD BIPLANE): 145 ML
LEFT VENTRICLE DIASTOLIC VOLUME INDEX (MOD BIPLANE): 62.8 ML/M2
LEFT VENTRICLE SYSTOLIC VOLUME (MOD BIPLANE): 58 ML
LEFT VENTRICLE SYSTOLIC VOLUME INDEX (MOD BIPLANE): 25.1 ML/M2
LEFT VENTRICULAR INTERNAL DIMENSION IN DIASTOLE: 4.9 CM (ref 3.5–6)
LEFT VENTRICULAR POSTERIOR WALL IN END DIASTOLE: 1.3 CM
LEFT VENTRICULAR STROKE VOLUME: 63 ML
LV EF BIPLANE MOD: 60 %
LV EF US.2D.A4C+ESTIMATED: 49 %
LVSV (TEICH): 63 ML
MAGNESIUM SERPL-MCNC: 2.2 MG/DL (ref 1.9–2.7)
MCH RBC QN AUTO: 29.1 PG (ref 26.8–34.3)
MCHC RBC AUTO-ENTMCNC: 32.9 G/DL (ref 31.4–37.4)
MCV RBC AUTO: 89 FL (ref 82–98)
MV E'TISSUE VEL-SEP: 9 CM/S
MV PEAK A VEL: 0.54 M/S
MV PEAK E VEL: 44 CM/S
MV STENOSIS PRESSURE HALF TIME: 71 MS
MV VALVE AREA P 1/2 METHOD: 3.1
P AXIS: 52 DEGREES
PHOSPHATE SERPL-MCNC: 4 MG/DL (ref 2.7–4.5)
PLATELET # BLD AUTO: 269 THOUSANDS/UL (ref 149–390)
PMV BLD AUTO: 8.3 FL (ref 8.9–12.7)
POTASSIUM SERPL-SCNC: 4 MMOL/L (ref 3.5–5.3)
PR INTERVAL: 142 MS
QRS AXIS: 44 DEGREES
QRSD INTERVAL: 84 MS
QT INTERVAL: 374 MS
QTC INTERVAL: 415 MS
RBC # BLD AUTO: 4.74 MILLION/UL (ref 3.88–5.62)
RIGHT ATRIUM AREA SYSTOLE A4C: 11.7 CM2
RIGHT VENTRICLE ID DIMENSION: 3.3 CM
SL CV LV EF: 55
SL CV PED ECHO LEFT VENTRICLE DIASTOLIC VOLUME (MOD BIPLANE) 2D: 113 ML
SL CV PED ECHO LEFT VENTRICLE SYSTOLIC VOLUME (MOD BIPLANE) 2D: 50 ML
SODIUM SERPL-SCNC: 137 MMOL/L (ref 135–147)
T WAVE AXIS: 26 DEGREES
TRICUSPID ANNULAR PLANE SYSTOLIC EXCURSION: 2.5 CM
VENTRICULAR RATE: 74 BPM
WBC # BLD AUTO: 11.04 THOUSAND/UL (ref 4.31–10.16)

## 2025-02-10 PROCEDURE — 93010 ELECTROCARDIOGRAM REPORT: CPT | Performed by: INTERNAL MEDICINE

## 2025-02-10 PROCEDURE — 99232 SBSQ HOSP IP/OBS MODERATE 35: CPT | Performed by: PSYCHIATRY & NEUROLOGY

## 2025-02-10 PROCEDURE — 83735 ASSAY OF MAGNESIUM: CPT | Performed by: INTERNAL MEDICINE

## 2025-02-10 PROCEDURE — 99232 SBSQ HOSP IP/OBS MODERATE 35: CPT | Performed by: INTERNAL MEDICINE

## 2025-02-10 PROCEDURE — 85027 COMPLETE CBC AUTOMATED: CPT | Performed by: INTERNAL MEDICINE

## 2025-02-10 PROCEDURE — 80048 BASIC METABOLIC PNL TOTAL CA: CPT | Performed by: INTERNAL MEDICINE

## 2025-02-10 PROCEDURE — 94760 N-INVAS EAR/PLS OXIMETRY 1: CPT

## 2025-02-10 PROCEDURE — 84100 ASSAY OF PHOSPHORUS: CPT | Performed by: INTERNAL MEDICINE

## 2025-02-10 PROCEDURE — 92610 EVALUATE SWALLOWING FUNCTION: CPT

## 2025-02-10 PROCEDURE — 93306 TTE W/DOPPLER COMPLETE: CPT

## 2025-02-10 PROCEDURE — 94660 CPAP INITIATION&MGMT: CPT

## 2025-02-10 PROCEDURE — 93306 TTE W/DOPPLER COMPLETE: CPT | Performed by: INTERNAL MEDICINE

## 2025-02-10 RX ORDER — ONDANSETRON 2 MG/ML
4 INJECTION INTRAMUSCULAR; INTRAVENOUS EVERY 4 HOURS PRN
Status: DISCONTINUED | OUTPATIENT
Start: 2025-02-10 | End: 2025-02-11 | Stop reason: HOSPADM

## 2025-02-10 RX ORDER — CLOPIDOGREL BISULFATE 75 MG/1
300 TABLET ORAL ONCE
Status: COMPLETED | OUTPATIENT
Start: 2025-02-10 | End: 2025-02-10

## 2025-02-10 RX ORDER — CLOPIDOGREL BISULFATE 75 MG/1
75 TABLET ORAL DAILY
Status: DISCONTINUED | OUTPATIENT
Start: 2025-02-11 | End: 2025-02-11 | Stop reason: HOSPADM

## 2025-02-10 RX ADMIN — ONDANSETRON 4 MG: 2 INJECTION INTRAMUSCULAR; INTRAVENOUS at 17:38

## 2025-02-10 RX ADMIN — FLUTICASONE PROPIONATE 1 SPRAY: 50 SPRAY, METERED NASAL at 09:07

## 2025-02-10 RX ADMIN — CLOPIDOGREL 300 MG: 75 TABLET ORAL at 14:30

## 2025-02-10 RX ADMIN — ATORVASTATIN CALCIUM 40 MG: 40 TABLET, FILM COATED ORAL at 17:42

## 2025-02-10 RX ADMIN — ENOXAPARIN SODIUM 40 MG: 40 INJECTION SUBCUTANEOUS at 09:07

## 2025-02-10 RX ADMIN — FLUTICASONE PROPIONATE 1 SPRAY: 50 SPRAY, METERED NASAL at 17:42

## 2025-02-10 RX ADMIN — VENLAFAXINE 37.5 MG: 37.5 TABLET ORAL at 09:07

## 2025-02-10 NOTE — TELEPHONE ENCOUNTER
STILL ADMITTED:2/8/2025 - present (2 days)  [facility]      [facility] ATTEMPT,     VIA NPTVT     Thank you,     Kat       U/ RADHA UPPER BUCKS/ Stroke-like symptoms     DC-     ----- Message from Maribeth Baird PA-C sent at 2/10/2025 11:12 AM EST -----  Regarding: HFU  Asher Medel will need follow-up in in 6 weeks with neurovascular team for Other = ATTENDING in 60 minute appointment. They will not require outpatient neurological testing.

## 2025-02-10 NOTE — PROGRESS NOTES
Progress Note - Neurology   Name: Asher Medel 49 y.o. male I MRN: [id number]  Unit/Bed#: -01 I Date of Admission: 2/8/2025   Date of Service: 2/10/2025 I Hospital Day: 2     Assessment & Plan  Stroke-like symptoms  48 yo male with hx of chronic pain on morphine pump per patient, herniated discs, psoriatic arthiritis and ankylosing spondilitis follows with [facility] rheumatology , who presented to the hospital on 2/8/2025 with acute onset of left sided numbness, tingling and weakness starting just after 3 PM. Stroke alert was activated in the ED. NIHSS 4 and BP on admission 186/98. Patient is s/p IV TNK.     Neuroimaging  2/8/2025 CTH:   No mass effect, acute intracranial hemorrhage or evidence of recent infarction.   2/8/2025 CTA head and neck with and without contrast:   No evidence for high-grade stenosis, focal occlusion or vascular aneurysm of the cervical or intracranial vessels.   Questionable small amount of thrombus vs prominent vasculature within the left atrial appendage. Echocardiogram is recommended for further evaluation.   2/8/2025 CTH:   No acute intracranial abnormality.   2/9/2025 CTH:  No interval change. No CT evidence for a large acute vascular distribution infarct or acute intracranial hemorrhage.   Plan:   - Continue on acute ischemic stroke pathway s/p IV thrombolytics.   - Echocardiogram pending.   - Monitor on telemetry.   - Hemoglobin A1c reviewed, 5.8  - Lipid panel reviewed, total cholesterol 219, triglycerides 100, HDL 57, .  - Will discuss antiplatelet vs anticoagulation with attending neurologist today.   - Continue on atorvastatin 40 mg QPM.   - Goal normotension, normothermia and euglycemia.  - PT/OT   - Stroke education.   - Monitor exam and notify with changes.  Obstructive sleep apnea treated with continuous positive airway pressure (CPAP)  - Continue CPAP use, further reduced stroke risk  DDD (degenerative disc disease), lumbosacral  - Patient with diagnosis of  chronic pain and does have morphine pump, also with spinal stimulator- pending removal, unfortunately stimulator is MRI incompatible.  Mixed hyperlipidemia  - Lipid panel reviewed as above.   - Continue on atorvastatin 40 mg QPM.    Asher Medel will need follow-up in in 6 weeks with neurovascular team for Other in 60 minute appointment. They will not require outpatient neurological testing.       Subjective   Patient notes that he is feeling well and feels L sided weakness and numbness has resolved and that he has not had any further recurrences of symptoms. He denies any new or worsening symptoms.     Of note, patient reports that he had long term epilepsy monitoring completed in 2022. Per review of notes, he was evaluated with vEEG at that time for evaluation of episodes of symptoms including dizziness, nausea, followed by slurred speech and word finding difficultes but without loss of consciousness or altered awareness. He had previously been maintained on Keppra for these spells but this was weaned off during admission. Per notes, a single mild event was captured and no EEG correlate was noted. He was discharged and remained off of Keppra. Per notes, he had 72 hour ambulatory EEG study completed and several spells were captured including nausea, vomiting, and HA, as well as feeling like he was getting stabbed in the head and no EEG correlate was noted for spells. He was maintained off of AEDs.     He also follows with sleep medicine and there was previous concern noted for cataplexy and narcolepsy but per notes, his symptoms did not seem to be consistent with these diagnoses. He was most recently seen in December 2024 and at that time, he was noting episodes of muscle weakness, with SOB and a battery of testing was ordered including myasthenia gravis panel, EMG rep stim, CK and complete PFTs.     Review of Systems   Constitutional:  Negative for chills, fatigue and fever.   HENT:  Negative for trouble  swallowing.    Eyes:  Negative for visual disturbance.   Respiratory:  Negative for shortness of breath.    Cardiovascular:  Negative for chest pain.   Gastrointestinal:  Negative for abdominal pain, nausea and vomiting.   Genitourinary:  Negative for difficulty urinating and dysuria.   Musculoskeletal:  Negative for back pain, gait problem and neck pain.   Skin:  Negative for rash.   Neurological:  Negative for dizziness, speech difficulty, weakness, light-headedness, numbness and headaches.   Psychiatric/Behavioral:  Negative for confusion.      Medications  Scheduled Meds:  Current Facility-Administered Medications   Medication Dose Route Frequency Provider Last Rate    atorvastatin  40 mg Oral QPM AVA Cast      enoxaparin  40 mg Subcutaneous Daily AVA Cast      fluticasone  1 spray Nasal BID AVA Cast      gabapentin  100 mg Oral TID PRN AVA Cast      venlafaxine  37.5 mg Oral Daily AVA Cast       Continuous Infusions:   PRN Meds:.  gabapentin      Objective :  Temp:  [97.4 °F (36.3 °C)-98.2 °F (36.8 °C)] 98 °F (36.7 °C)  HR:  [61-77] 74  BP: (129-167)/(71-97) 150/91  Resp:  [15-28] 20  SpO2:  [92 %-97 %] 95 %  O2 Device: None (Room air)    Physical Exam  Constitutional:       General: He is not in acute distress.     Appearance: Normal appearance. He is not ill-appearing, toxic-appearing or diaphoretic.   HENT:      Head: Normocephalic and atraumatic.      Mouth/Throat:      Mouth: Mucous membranes are moist.      Pharynx: Oropharynx is clear. No oropharyngeal exudate or posterior oropharyngeal erythema.   Eyes:      General: No scleral icterus.        Right eye: No discharge.         Left eye: No discharge.      Extraocular Movements: EOM normal. No nystagmus.      Conjunctiva/sclera: Conjunctivae normal.   Cardiovascular:      Rate and Rhythm: Normal rate and regular rhythm.   Pulmonary:      Effort: Pulmonary effort is normal. No  respiratory distress.      Breath sounds: Normal breath sounds.   Abdominal:      General: There is no distension.      Palpations: Abdomen is soft.      Tenderness: There is no abdominal tenderness.   Musculoskeletal:         General: Normal range of motion.      Cervical back: Normal range of motion and neck supple.      Right lower leg: No edema.      Left lower leg: No edema.   Skin:     General: Skin is warm and dry.      Findings: No erythema or rash.   Neurological:      Mental Status: He is alert and oriented to person, place, and time.      Deep Tendon Reflexes:      Reflex Scores:       Bicep reflexes are 2+ on the right side and 2+ on the left side.       Brachioradialis reflexes are 2+ on the right side and 2+ on the left side.       Patellar reflexes are 2+ on the right side and 2+ on the left side.       Achilles reflexes are 2+ on the right side and 2+ on the left side.  Psychiatric:         Mood and Affect: Mood normal.         Speech: Speech normal.         Behavior: Behavior normal.   Neurological Exam  Mental Status  Alert. Oriented to person, place, time and situation. Oriented to person, place, and time. Speech is normal. Language is fluent with no aphasia. Attention and concentration are normal.    Cranial Nerves  CN II: Right normal visual field. Left normal visual field.  CN III, IV, VI: Extraocular movements intact bilaterally. No nystagmus. Normal saccades.   Right pupil: 4 mm. Round. Reactive to light.   Left pupil: 4 mm. Round. Reactive to light.  CN V:  Right: Facial sensation is normal.  Left: Facial sensation is normal on the left.  CN VII:  Right: There is no facial weakness.  Left: There is no facial weakness.  CN IX, X: Palate elevates symmetrically  CN XII: Tongue midline without atrophy or fasciculations.    Motor  Normal muscle bulk throughout. No fasciculations present. Normal muscle tone. No abnormal involuntary movements. No pronator drift.  Motor strength 5/5 B/L UE and  LE..    Sensory  Light touch is normal in upper and lower extremities.     Reflexes                                            Right                      Left  Brachioradialis                    2+                         2+  Biceps                                 2+                         2+  Patellar                                2+                         2+  Achilles                                2+                         2+  Right Plantar: downgoing  Left Plantar: downgoing    Coordination  Right: Finger-to-nose normal.Left: Finger-to-nose normal.    Gait    Deferred for safety..        Lab Results: I have reviewed the following results:  Recent Results (from the past 24 hours)   Phosphorus    Collection Time: 02/09/25  5:48 PM   Result Value Ref Range    Phosphorus 4.2 2.7 - 4.5 mg/dL   Magnesium    Collection Time: 02/09/25  5:48 PM   Result Value Ref Range    Magnesium 2.1 1.9 - 2.7 mg/dL   Basic metabolic panel    Collection Time: 02/09/25  5:48 PM   Result Value Ref Range    Sodium 138 135 - 147 mmol/L    Potassium 3.8 3.5 - 5.3 mmol/L    Chloride 103 96 - 108 mmol/L    CO2 27 21 - 32 mmol/L    ANION GAP 8 4 - 13 mmol/L    BUN 16 5 - 25 mg/dL    Creatinine 0.89 0.60 - 1.30 mg/dL    Glucose 130 65 - 140 mg/dL    Calcium 8.8 8.4 - 10.2 mg/dL    eGFR 101 ml/min/1.73sq m   Basic metabolic panel    Collection Time: 02/10/25  3:58 AM   Result Value Ref Range    Sodium 137 135 - 147 mmol/L    Potassium 4.0 3.5 - 5.3 mmol/L    Chloride 103 96 - 108 mmol/L    CO2 28 21 - 32 mmol/L    ANION GAP 6 4 - 13 mmol/L    BUN 15 5 - 25 mg/dL    Creatinine 0.73 0.60 - 1.30 mg/dL    Glucose 107 65 - 140 mg/dL    Calcium 8.7 8.4 - 10.2 mg/dL    eGFR 110 ml/min/1.73sq m   CBC    Collection Time: 02/10/25  3:58 AM   Result Value Ref Range    WBC 11.04 (H) 4.31 - 10.16 Thousand/uL    RBC 4.74 3.88 - 5.62 Million/uL    Hemoglobin 13.8 12.0 - 17.0 g/dL    Hematocrit 42.0 36.5 - 49.3 %    MCV 89 82 - 98 fL    MCH 29.1 26.8 - 34.3  pg    MCHC 32.9 31.4 - 37.4 g/dL    RDW 12.1 11.6 - 15.1 %    Platelets 269 149 - 390 Thousands/uL    MPV 8.3 (L) 8.9 - 12.7 fL   Magnesium    Collection Time: 02/10/25  3:58 AM   Result Value Ref Range    Magnesium 2.2 1.9 - 2.7 mg/dL   Phosphorus    Collection Time: 02/10/25  3:58 AM   Result Value Ref Range    Phosphorus 4.0 2.7 - 4.5 mg/dL   Echo complete w/ contrast if indicated    Collection Time: 02/10/25  8:16 AM   Result Value Ref Range    RAA A4C 11.7 cm2    REYNA A4C 16.5 cm2    LV Diastolic Volume (BP) 145 mL    LV Systolic Volume (BP) 58 mL    MV Peak A Sony 0.54 m/s    MV stenosis pressure 1/2 time 71 ms    MV Peak E Sony 44 cm/s    LVOT diameter 2.3 cm    E wave deceleration time 245 ms    E/A ratio 0.81     MV valve area p 1/2 method 3.10     RVID d 3.3 cm    A4C EF 49 %    Left ventricular stroke volume (2D) 63.00 mL    IVSd 1.20 cm    Tricuspid annular plane systolic excursion 2.50 cm    Ao root 3.60 cm    LVPWd 1.30 cm    LA size 3.8 cm    Asc Ao 3.1 cm    EF 60 %    FS 29 28 - 44    LVIDS 3.50 cm    IVS 1.2 cm    LVIDd 4.90 cm    LEFT VENTRICLE SYSTOLIC VOLUME (MOD BIPLANE) 2D 50 mL    LV DIASTOLIC VOLUME (MOD BIPLANE) 2D 113 mL    MV E' Tissue Velocity Septal 9 cm/s    LVSV, 2D 63 mL    LVOT area 4.15 cm2    BSA 2.31 m2    LV Diastolic Volume Index (BP) 62.8 mL/m2    LV Systolic Volume Index (BP) 25.1 mL/m2    LV EF 55      Imaging  Imaging Results Review: I personally reviewed the following image studies in PACS and associated radiology reports: CTH- No interval change. No CT evidence for large acute vascular distribution infarct or acute intracranial hemorrhage.     VTE Pharmacologic Prophylaxis: Enoxaparin (Lovenox)

## 2025-02-10 NOTE — ASSESSMENT & PLAN NOTE
Presented to the hospital on 2/8/20/2025 with acute onset of left sided weakness, numbness and tingling  NIHSS score of 4 on presentation  Patient status post IV TNK  CT head 2/8 showed no mass effect, acute intracranial hemorrhage or evidence of recent infarction  CTA head and neck showed no high-grade stenosis, focal occlusion or aneurysm.  Questionable small amount of thrombus versus prominent vasculature within the left atrial appendage.   CT head 2/9 showed no interval change, no evidence for a large acute vascular distribution infarct or acute intracranial hemorrhage  Echo today showed LVEF 55 to 60%, normal diastolic dysfunction, left atrial appendage not well-visualized.  Discussed with neurology, will check WHITNEY to rule out left atrial appendage thrombus.  Continue atorvastatin.  Will defer to neurology antiplatelet therapy  Continue stroke pathway

## 2025-02-10 NOTE — ASSESSMENT & PLAN NOTE
"Yulissa Hatfield returns to clinic for continued evaluation of angioedema and chronic rhinitis. She is here alone.    Since her last visit, she has not had any further angioedema. She has not had any urticaria.    She has eaten salt without any difficulty.    She continues to have a sensitive tongue.    She and her  were in [place] at a concert. They stayed in a hotel clear. After the concert she was in her hotel room watching TV. She developed twitching of the lower part of the right side of her face. She said her teeth would clench. She also had a "shooting pain" on the right side of her head. She had some tingling of her tongue. This lasted about a minute before it resolved.    Last night she again had pain on the top of her head and around her eyes. It was better today. She has not had any more muscle twitching.    She denies any weakness.    She has notified her primary care physician and is waiting on a call back.    [facility] ALLERGY QUESTIONNAIRE SHORT 8/15/2017   Are you taking any new medications since your last visit? No   Constitution: No symptoms   Head or facial pain: Headaches   Eyes: No symptoms   Ears: No symptoms   Nose: Sniffling   Throat: No symptoms   Sinuses: No symptoms   Lungs: Apnea or sleep apnea   Skin: No symptoms   Cardiovascular: Leg swelling, High blood pressue   Gastrointestinal: No symptoms   Genital/ urinary No symptoms   Musculoskeletal: Muscle pain, Back pain, Joint pain, Bone pain, Joint swelling   Neurologic: Headaches   Endocrine: No symptoms   Hematologic: No symptoms     Physical Examination:  General: Well-developed, well-nourished, no acute distress. 341 pounds.  Head: No sinus tenderness.  Eyes: Conjunctivae:  No bulbar or palpebral conjunctival injection.  Ears: EAC's clear.  TM's clear.  No pre-auricular nodes.  Nose: Nasal Mucosa:  Pink.  Septum: No apparent deviation.  Turbinates:  No significant edema.  Polyps/Mass:  None visible.  Teeth/Gums:  No bleeding " 48 yo male with hx of chronic pain on morphine pump per patient, herniated discs, psoriatic arthiritis and ankylosing spondilitis follows with [facility] rheumatology , who presented to the hospital on 2/8/2025 with acute onset of left sided numbness, tingling and weakness starting just after 3 PM. Stroke alert was activated in the ED. NIHSS 4 and BP on admission 186/98. Patient is s/p IV TNK.     Neuroimaging  2/8/2025 CTH:   No mass effect, acute intracranial hemorrhage or evidence of recent infarction.   2/8/2025 CTA head and neck with and without contrast:   No evidence for high-grade stenosis, focal occlusion or vascular aneurysm of the cervical or intracranial vessels.   Questionable small amount of thrombus vs prominent vasculature within the left atrial appendage. Echocardiogram is recommended for further evaluation.   2/8/2025 CTH:   No acute intracranial abnormality.   2/9/2025 CTH:  No interval change. No CT evidence for a large acute vascular distribution infarct or acute intracranial hemorrhage.   Plan:   - Continue on acute ischemic stroke pathway s/p IV thrombolytics.   - Echocardiogram pending.   - Monitor on telemetry.   - Hemoglobin A1c reviewed, 5.8  - Lipid panel reviewed, total cholesterol 219, triglycerides 100, HDL 57, .  - Will discuss antiplatelet vs anticoagulation with attending neurologist today.   - Continue on atorvastatin 40 mg QPM.   - Goal normotension, normothermia and euglycemia.  - PT/OT   - Stroke education.   - Monitor exam and notify with changes.   noted.  Oropharynx: No exudates.  Neck: Supple without thyromegaly. No cervical lymphadenopathy.    Respiratory/Chest: Effort: Good.  Auscultation:  Clear bilaterally.  Cardiovascular:  No murmur, rubs, or gallop heard.   GI:  Non-tender.  No masses.  No organomegaly.  Extremities:  No swelling.  No cyanosis, clubbing, or edema.  Skin: Good turgor.  No urticaria or angioedema.  Neuro/Psych: Oriented x 3.    Laboratory 7/25/2017:  IgE level: 97.  ImmunoCAP:  Class III: Dust mites, cat.  Class II: Dog.  Class I: Shrimp.  Pineapple, crab, lobster, crawfish: Negative.  CBC: Normal.  CMP: Normal.  TSH: 1.862.  Antithyroglobulin antibody level: Less than 4.0.  Antimicrosomal antibody level: Less than 6.0.  Anti-IgE receptor antibody level: 5.  Serum tryptase: 4.3.  Vitamin D level: 17.  SPEP: Normal.    Assessment:  1. Angioedema of uncertain etiology, consider associated with thyroid disease.  2. Consider food allergy.  3. Allergic rhinitis.  4. Sleep apnea.  5. Hypothyroidism on replacement.  6. DJD.  7. Hypertension on Diovan.    Recommendations:  1. House dust mite avoidance.  2. Continue to observe for any further urticaria or angioedema.  3. Take pictures and journal all episodes.  4. OTC antihistamines.  5. Consider skin testing off antihistamines.  6. Return to clinic in 2 weeks.    Allergic mechanisms and treatment options were reviewed in detail. House dust mite avoidance was reviewed. Urticaria and angioedema were reviewed.

## 2025-02-10 NOTE — SPEECH THERAPY NOTE
Speech Language/Pathology  Speech-Language Pathology Bedside Swallow Evaluation      Patient Name: Asher Medel    Today's Date: 2/10/2025     Problem List  Principal Problem:    Stroke-like symptoms  Active Problems:    Obstructive sleep apnea treated with continuous positive airway pressure (CPAP)    Chronic pain    Primary narcolepsy with cataplexy    Psoriasis with arthropathy (HCC)    Ankylosing spondylitis (HCC)    DDD (degenerative disc disease), lumbosacral    Mixed hyperlipidemia    Class 2 obesity with body mass index (BMI) of 36.0 to 36.9 in adult      Past Medical History  Past Medical History:   Diagnosis Date    Ankylosing spondylitis (HCC)     Arthritis     Chronic pain     DJD (degenerative joint disease)     Herniated disc, cervical     Narcolepsy     Psoriatic arthritis (HCC)     Seizures (HCC)     Sleep apnea     Sleep apnea        Past Surgical History  Past Surgical History:   Procedure Laterality Date    IMPLANTATION / PLACEMENT EPIDURAL NEUROSTIMULATOR ELECTRODES      INTRATHECAL PUMP IMPLANTATION Left     morphine pump    RHINOPLASTY      TONSILLECTOMY         Summary   Pt presents w/ s/s suggestive of functional oropharyngeal swallow skills.     Complete labial seal for retrieval and containment of all materials, no anterior bolus loss present. Adequate bite strength and timely rotary mastication of regular textures. Complete bolus breakdown and adequate bolus transfer. No oral residue noted. Suspected fairly prompt swallow initiation and fair hyolaryngeal elevation to palpation. No overt s/s of aspiration at this time.     Education provided to pt regarding strategies to optimize swallow safety including frequent/thorough oral care and to notify medical staff if s/s of aspiration arise. Pt verbalized understanding and agreement, denies questions or concerns at this time.     Pt w/ increased risk of aspiration 2/2 current admission c/f CVA. SLP to s/o as no skilled intervention is required,  no further IP ST necessary. Please re-consult if medically indicated.       Recommended Diet: regular diet and thin liquids   Recommended Form of Meds: as tolerated    Aspiration precautions and swallowing strategies: upright posture, only feed when fully alert, slow rate of feeding, and small bites/sips  Other Recommendations: Continue frequent oral care        Current Medical Status  Pt is a 49 y.o. male who presents to the ED with new onset Lt facial droop, left sided numbness, Left neck/head pain, and left sided weakness. SBP in ED was 200. CTH Neg acute and CTA neg LVO/aneurysm but did have questionable small thrombus on Lt atrial appendage. Pt received TNK at 1700.   PMHx: chronic pain with neurostimulator (not active) and implanted morphine pump, STEPHANIE on CPAP, narcolepsy with cataplexy, obesity, and herniated discs.     Current Precautions:  Fall     Allergies:  No known food allergies    Past medical history:  Please see H&P for details    Special Studies:  2/9/25 CT head wo contrast:  No interval change. No CT evidence for a large acute vascular distribution infarct or acute intracranial hemorrhage.     2/8/25 CT head without contrast:  No acute intracranial abnormality.     2/8/25 CTA stroke alert (head/neck):  No evidence for high-grade stenosis, focal occlusion or vascular aneurysm of the cervical or intracranial vessels.     Questionable small amount of thrombus versus prominent vasculature within the left atrial appendage. Echocardiography is recommended for further evaluation.    2/8/25 CT stroke alert brain:  No mass effect, acute intracranial hemorrhage or evidence of recent infarction.     History of VFSS:  N/A     Social/Education/Vocational Hx:  Pt lives with family    Swallow Information   Current Diet: regular diet and thin liquids   Baseline Diet: regular diet and thin liquids per pt report       Baseline Assessment   Behavior/Cognition: alert  Speech/Language Status: able to participate in  conversation and able to follow commands  Patient Positioning: upright in bed  Pain Status/Interventions/Response to Interventions: No report of or nonverbal indications of pain.       Oral Mechanism Exam  Facial: symmetrical  Labial: WFL  Lingual: WFL  Velum: symmetrical  Mandible: adequate ROM  Dentition: adequate  Vocal quality:clear/adequate   Volitional Cough: strong/productive   Respiratory Status: on RA      Consistencies Assessed and Performance   Consistencies Administered: thin liquids and hard solids    Oral Stage:   Complete labial seal for retrieval and containment of all materials, no anterior bolus loss present. Adequate bite strength and timely rotary mastication of regular textures. Complete bolus breakdown and adequate bolus transfer. No oral residue noted.     Pharyngeal Stage:   Suspected fairly prompt swallow initiation and fair hyolaryngeal elevation to palpation. No overt s/s of aspiration at this time.     Esophageal Concerns: none reported    Summary and Recommendations (see above)    Results Reviewed with: patient, RN, and MD     Treatment Recommended: N/A eval only, SLP to s/o as no skilled intervention is required, no further IP ST necessary. Please re-consult if medically indicated.

## 2025-02-10 NOTE — OCCUPATIONAL THERAPY NOTE
Occupational Therapy Screen Note     Patient Name: Asher Medel  Today's Date: 2/10/2025  Problem List  Principal Problem:    Stroke-like symptoms  Active Problems:    Obstructive sleep apnea treated with continuous positive airway pressure (CPAP)    Chronic pain    Primary narcolepsy with cataplexy    Psoriasis with arthropathy (HCC)    Ankylosing spondylitis (HCC)    DDD (degenerative disc disease), lumbosacral    Mixed hyperlipidemia    Class 2 obesity with body mass index (BMI) of 36.0 to 36.9 in adult              02/10/25 1110   OT Last Visit   OT Visit Date 02/10/25   Note Type   Note type Screen   Additional Comments OT orders received, chart reviewed. Pt scored with 24 AMPACs & achieved HLM of 8. No acute OT needs indicated at this time. DC OT orders.       CLAUDIO [place]/L

## 2025-02-10 NOTE — PROGRESS NOTES
Progress Note - Hospitalist   Name: Asher Medel 49 y.o. male I MRN: [id number]  Unit/Bed#: -01 I Date of Admission: 2/8/2025   Date of Service: 2/10/2025 I Hospital Day: 2    Assessment & Plan  Stroke-like symptoms  Presented to the hospital on 2/8/20/2025 with acute onset of left sided weakness, numbness and tingling  NIHSS score of 4 on presentation  Patient status post IV TNK  CT head 2/8 showed no mass effect, acute intracranial hemorrhage or evidence of recent infarction  CTA head and neck showed no high-grade stenosis, focal occlusion or aneurysm.  Questionable small amount of thrombus versus prominent vasculature within the left atrial appendage.   CT head 2/9 showed no interval change, no evidence for a large acute vascular distribution infarct or acute intracranial hemorrhage  Echo today showed LVEF 55 to 60%, normal diastolic dysfunction, left atrial appendage not well-visualized.  Discussed with neurology, will check WHITNEY to rule out left atrial appendage thrombus.  Continue atorvastatin.  Will defer to neurology antiplatelet therapy  Continue stroke pathway  Obstructive sleep apnea treated with continuous positive airway pressure (CPAP)  Continue CPAP  Chronic pain  Patient has a spinal stimulator which is not MRI compatible  Patient does have a morphine pump and he is getting injections each Wednesday for psoriatic's arthritis/ankylosing spondylitis  Continue pain regimen  Primary narcolepsy with cataplexy  Continue home Effexor  DDD (degenerative disc disease), lumbosacral    Mixed hyperlipidemia  Continue Lipitor  Class 2 obesity with body mass index (BMI) of 36.0 to 36.9 in adult      VTE Pharmacologic Prophylaxis: VTE Score: 7 High Risk (Score >/= 5) - Pharmacological DVT Prophylaxis Ordered: enoxaparin (Lovenox). Sequential Compression Devices Ordered.    Mobility:   Basic Mobility Inpatient Raw Score: 24  JH-HLM Goal: 8: Walk 250 feet or more  JH-HLM Achieved: 8: Walk 250 feet ot  more  JH-HLM Goal achieved. Continue to encourage appropriate mobility.    Patient Centered Rounds: I performed bedside rounds with nursing staff today.   Discussions with Specialists or Other Care Team Provider: cm, neurology    Education and Discussions with Family / Patient: Patient declined call to .     Current Length of Stay: 2 day(s)  Current Patient Status: Inpatient   Certification Statement: The patient will continue to require additional inpatient hospital stay due to shant  Discharge Plan: Anticipate discharge in 24-48 hrs to home.    Code Status: Level 1 - Full Code    Subjective     His symptoms resolved. No issues over night     Objective :  Temp:  [97.4 °F (36.3 °C)-98.2 °F (36.8 °C)] 98 °F (36.7 °C)  HR:  [61-77] 66  BP: (129-167)/(71-97) 152/92  Resp:  [15-28] 16  SpO2:  [92 %-97 %] 94 %  O2 Device: None (Room air)    Body mass index is 34.78 kg/m².     Input and Output Summary (last 24 hours):     Intake/Output Summary (Last 24 hours) at 2/10/2025 1231  Last data filed at 2/10/2025 0905  Gross per 24 hour   Intake 1100 ml   Output 775 ml   Net 325 ml       Physical Exam  Vitals and nursing note reviewed.   Constitutional:       General: He is not in acute distress.     Appearance: He is not diaphoretic.   HENT:      Head: Normocephalic.   Eyes:      General:         Right eye: No discharge.         Left eye: No discharge.   Cardiovascular:      Rate and Rhythm: Normal rate and regular rhythm.      Heart sounds: No murmur heard.  Pulmonary:      Effort: Pulmonary effort is normal. No respiratory distress.      Breath sounds: Normal breath sounds. No wheezing, rhonchi or rales.   Abdominal:      General: There is no distension.      Palpations: Abdomen is soft.      Tenderness: There is no abdominal tenderness. There is no guarding or rebound.   Musculoskeletal:      Cervical back: Normal range of motion.      Right lower leg: No edema.      Left lower leg: No edema.   Skin:     General:  Skin is warm.   Neurological:      Mental Status: He is alert and oriented to person, place, and time.   Psychiatric:         Mood and Affect: Mood normal.         Behavior: Behavior normal.         Thought Content: Thought content normal.         Judgment: Judgment normal.           Lines/Drains:        Telemetry:  Telemetry Orders (From admission, onward)               24 Hour Telemetry Monitoring  Continuous x 24 Hours (Telem)        Expiring   Question:  Reason for 24 Hour Telemetry  Answer:  TIA/Suspected CVA/ Confirmed CVA                     Telemetry Reviewed: Normal Sinus Rhythm  Indication for Continued Telemetry Use: Acute CVA               Lab Results: I have reviewed the following results:   Results from last 7 days   Lab Units 02/10/25  0358 02/09/25  0421   WBC Thousand/uL 11.04* 12.01*   HEMOGLOBIN g/dL 13.8 13.7   HEMATOCRIT % 42.0 40.7   PLATELETS Thousands/uL 269 217   SEGS PCT %  --  64   LYMPHO PCT %  --  27   MONO PCT %  --  8   EOS PCT %  --  1     Results from last 7 days   Lab Units 02/10/25  0358   SODIUM mmol/L 137   POTASSIUM mmol/L 4.0   CHLORIDE mmol/L 103   CO2 mmol/L 28   BUN mg/dL 15   CREATININE mg/dL 0.73   ANION GAP mmol/L 6   CALCIUM mg/dL 8.7   GLUCOSE RANDOM mg/dL 107     Results from last 7 days   Lab Units 02/08/25  1556   INR  0.96     Results from last 7 days   Lab Units 02/08/25  1525   POC GLUCOSE mg/dl 137     Results from last 7 days   Lab Units 02/08/25  1556   HEMOGLOBIN A1C % 5.8*           Recent Cultures (last 7 days):         Imaging Results Review: I reviewed radiology reports from this admission including: CT head.  Other Study Results Review: No additional pertinent studies reviewed.    Last 24 Hours Medication List:     Current Facility-Administered Medications:     atorvastatin (LIPITOR) tablet 40 mg, QPM    enoxaparin (LOVENOX) subcutaneous injection 40 mg, Daily    fluticasone (FLONASE) 50 mcg/act nasal spray 1 spray, BID    gabapentin (NEURONTIN) capsule  100 mg, TID PRN    venlafaxine (EFFEXOR) tablet 37.5 mg, Daily    Administrative Statements   Today, Patient Was Seen By: Elicia Victor MD      **Please Note: This note may have been constructed using a voice recognition system.**

## 2025-02-10 NOTE — PLAN OF CARE
Problem: Potential for Falls  Goal: Patient will remain free of falls  Description: INTERVENTIONS:  - Educate patient/family on patient safety including physical limitations  - Instruct patient to call for assistance with activity   - Consult OT/PT to assist with strengthening/mobility   - Keep Call bell within reach  - Keep bed low and locked with side rails adjusted as appropriate  - Keep care items and personal belongings within reach  - Initiate and maintain comfort rounds  - Make Fall Risk Sign visible to staff  - Offer Toileting every  Hours, in advance of need  - Initiate/Maintain alarm  - Obtain necessary fall risk management equipment:   - Apply yellow socks and bracelet for high fall risk patients  - Consider moving patient to room near nurses station  Outcome: Progressing     Problem: PAIN - ADULT  Goal: Verbalizes/displays adequate comfort level or baseline comfort level  Description: Interventions:  - Encourage patient to monitor pain and request assistance  - Assess pain using appropriate pain scale  - Administer analgesics based on type and severity of pain and evaluate response  - Implement non-pharmacological measures as appropriate and evaluate response  - Consider cultural and social influences on pain and pain management  - Notify physician/advanced practitioner if interventions unsuccessful or patient reports new pain  Outcome: Progressing     Problem: INFECTION - ADULT  Goal: Absence or prevention of progression during hospitalization  Description: INTERVENTIONS:  - Assess and monitor for signs and symptoms of infection  - Monitor lab/diagnostic results  - Monitor all insertion sites, i.e. indwelling lines, tubes, and drains  - Monitor endotracheal if appropriate and nasal secretions for changes in amount and color  - [place] appropriate cooling/warming therapies per order  - Administer medications as ordered  - Instruct and encourage patient and family to use good hand hygiene technique  -  Identify and instruct in appropriate isolation precautions for identified infection/condition  Outcome: Progressing  Goal: Absence of fever/infection during neutropenic period  Description: INTERVENTIONS:  - Monitor WBC    Outcome: Progressing     Problem: SAFETY ADULT  Goal: Patient will remain free of falls  Description: INTERVENTIONS:  - Educate patient/family on patient safety including physical limitations  - Instruct patient to call for assistance with activity   - Consult OT/PT to assist with strengthening/mobility   - Keep Call bell within reach  - Keep bed low and locked with side rails adjusted as appropriate  - Keep care items and personal belongings within reach  - Initiate and maintain comfort rounds  - Make Fall Risk Sign visible to staff  - Offer Toileting every  Hours, in advance of need  - Initiate/Maintain alarm  - Obtain necessary fall risk management equipment:   - Apply yellow socks and bracelet for high fall risk patients  - Consider moving patient to room near nurses station  Outcome: Progressing  Goal: Maintain or return to baseline ADL function  Description: INTERVENTIONS:  -  Assess patient's ability to carry out ADLs; assess patient's baseline for ADL function and identify physical deficits which impact ability to perform ADLs (bathing, care of mouth/teeth, toileting, grooming, dressing, etc.)  - Assess/evaluate cause of self-care deficits   - Assess range of motion  - Assess patient's mobility; develop plan if impaired  - Assess patient's need for assistive devices and provide as appropriate  - Encourage maximum independence but intervene and supervise when necessary  - Involve family in performance of ADLs  - Assess for home care needs following discharge   - Consider OT consult to assist with ADL evaluation and planning for discharge  - Provide patient education as appropriate  Outcome: Progressing  Goal: Maintains/Returns to pre admission functional level  Description:  INTERVENTIONS:  - Perform AM-PAC 6 Click Basic Mobility/ Daily Activity assessment daily.  - Set and communicate daily mobility goal to care team and patient/family/caregiver.   - Collaborate with rehabilitation services on mobility goals if consulted  - Perform Range of Motion  times a day.  - Reposition patient every  hours.  - Dangle patient  times a day  - Stand patient  times a day  - Ambulate patient  times a day  - Out of bed to chair  times a day   - Out of bed for meals  times a day  - Out of bed for toileting  - Record patient progress and toleration of activity level   Outcome: Progressing     Problem: DISCHARGE PLANNING  Goal: Discharge to home or other facility with appropriate resources  Description: INTERVENTIONS:  - Identify barriers to discharge w/patient and caregiver  - Arrange for needed discharge resources and transportation as appropriate  - Identify discharge learning needs (meds, wound care, etc.)  - Arrange for interpretive services to assist at discharge as needed  - Refer to Case Management Department for coordinating discharge planning if the patient needs post-hospital services based on physician/advanced practitioner order or complex needs related to functional status, cognitive ability, or social support system  Outcome: Progressing     Problem: Knowledge Deficit  Goal: Patient/family/caregiver demonstrates understanding of disease process, treatment plan, medications, and discharge instructions  Description: Complete learning assessment and assess knowledge base.  Interventions:  - Provide teaching at level of understanding  - Provide teaching via preferred learning methods  Outcome: Progressing     Problem: NEUROSENSORY - ADULT  Goal: Achieves stable or improved neurological status  Description: INTERVENTIONS  - Monitor and report changes in neurological status  - Monitor vital signs such as temperature, blood pressure, glucose, and any other labs ordered   - Initiate measures to  prevent increased intracranial pressure  - Monitor for seizure activity and implement precautions if appropriate      Outcome: Progressing  Goal: Remains free of injury related to seizures activity  Description: INTERVENTIONS  - Maintain airway, patient safety  and administer oxygen as ordered  - Monitor patient for seizure activity, document and report duration and description of seizure to physician/advanced practitioner  - If seizure occurs,  ensure patient safety during seizure  - Reorient patient post seizure  - Seizure pads on all 4 side rails  - Instruct patient/family to notify RN of any seizure activity including if an aura is experienced  - Instruct patient/family to call for assistance with activity based on nursing assessment  - Administer anti-seizure medications if ordered    Outcome: Progressing  Goal: Achieves maximal functionality and self care  Description: INTERVENTIONS  - Monitor swallowing and airway patency with patient fatigue and changes in neurological status  - Encourage and assist patient to increase activity and self care.   - Encourage visually impaired, hearing impaired and aphasic patients to use assistive/communication devices  Outcome: Progressing     Problem: CARDIOVASCULAR - ADULT  Goal: Maintains optimal cardiac output and hemodynamic stability  Description: INTERVENTIONS:  - Monitor I/O, vital signs and rhythm  - Monitor for S/S and trends of decreased cardiac output  - Administer and titrate ordered vasoactive medications to optimize hemodynamic stability  - Assess quality of pulses, skin color and temperature  - Assess for signs of decreased coronary artery perfusion  - Instruct patient to report change in severity of symptoms  Outcome: Progressing  Goal: Absence of cardiac dysrhythmias or at baseline rhythm  Description: INTERVENTIONS:  - Continuous cardiac monitoring, vital signs, obtain 12 lead EKG if ordered  - Administer antiarrhythmic and heart rate control medications as  ordered  - Monitor electrolytes and administer replacement therapy as ordered  Outcome: Progressing

## 2025-02-10 NOTE — ASSESSMENT & PLAN NOTE
- Patient with diagnosis of chronic pain and does have morphine pump, also with spinal stimulator- pending removal, unfortunately stimulator is MRI incompatible.

## 2025-02-10 NOTE — ASSESSMENT & PLAN NOTE
Patient has a spinal stimulator which is not MRI compatible  Patient does have a morphine pump and he is getting injections each Wednesday for psoriatic's arthritis/ankylosing spondylitis  Continue pain regimen

## 2025-02-10 NOTE — PHYSICAL THERAPY NOTE
PHYSICAL THERAPY SCREEN NOTE      Patient Name: Asher Medel  Today's Date: 2/10/2025       02/10/25 1111   PT Last Visit   PT Visit Date 02/10/25   Note Type   Note type Screen   Additional Comments PT orders received, chart review performed. Patient w/ AMPAC of 24, JH-HLM of 8, independently ambulating around room. Pt w/ no acute PT needs, will DC PT       Angelica Khan, PT, DPT

## 2025-02-11 ENCOUNTER — ANESTHESIA EVENT (OUTPATIENT)
Dept: ANESTHESIOLOGY | Facility: HOSPITAL | Age: 50
End: 2025-02-11

## 2025-02-11 ENCOUNTER — ANESTHESIA (INPATIENT)
Dept: NON INVASIVE DIAGNOSTICS | Facility: HOSPITAL | Age: 50
DRG: 092 | End: 2025-02-11
Payer: MEDICARE

## 2025-02-11 ENCOUNTER — ANESTHESIA (OUTPATIENT)
Dept: ANESTHESIOLOGY | Facility: HOSPITAL | Age: 50
End: 2025-02-11

## 2025-02-11 ENCOUNTER — APPOINTMENT (INPATIENT)
Dept: NON INVASIVE DIAGNOSTICS | Facility: HOSPITAL | Age: 50
DRG: 092 | End: 2025-02-11
Attending: INTERNAL MEDICINE
Payer: MEDICARE

## 2025-02-11 VITALS
DIASTOLIC BLOOD PRESSURE: 98 MMHG | HEIGHT: 71 IN | TEMPERATURE: 98 F | HEART RATE: 68 BPM | WEIGHT: 251.32 LBS | BODY MASS INDEX: 35.19 KG/M2 | RESPIRATION RATE: 20 BRPM | SYSTOLIC BLOOD PRESSURE: 160 MMHG | OXYGEN SATURATION: 94 %

## 2025-02-11 LAB
ANION GAP SERPL CALCULATED.3IONS-SCNC: 5 MMOL/L (ref 4–13)
BUN SERPL-MCNC: 14 MG/DL (ref 5–25)
CALCIUM SERPL-MCNC: 8.5 MG/DL (ref 8.4–10.2)
CHLORIDE SERPL-SCNC: 103 MMOL/L (ref 96–108)
CO2 SERPL-SCNC: 27 MMOL/L (ref 21–32)
CREAT SERPL-MCNC: 0.82 MG/DL (ref 0.6–1.3)
ERYTHROCYTE [DISTWIDTH] IN BLOOD BY AUTOMATED COUNT: 12.3 % (ref 11.6–15.1)
GFR SERPL CREATININE-BSD FRML MDRD: 105 ML/MIN/1.73SQ M
GLUCOSE SERPL-MCNC: 104 MG/DL (ref 65–140)
HCT VFR BLD AUTO: 40.5 % (ref 36.5–49.3)
HGB BLD-MCNC: 13.5 G/DL (ref 12–17)
MCH RBC QN AUTO: 29.6 PG (ref 26.8–34.3)
MCHC RBC AUTO-ENTMCNC: 33.3 G/DL (ref 31.4–37.4)
MCV RBC AUTO: 89 FL (ref 82–98)
PLATELET # BLD AUTO: 258 THOUSANDS/UL (ref 149–390)
PMV BLD AUTO: 8.3 FL (ref 8.9–12.7)
POTASSIUM SERPL-SCNC: 4.1 MMOL/L (ref 3.5–5.3)
RBC # BLD AUTO: 4.56 MILLION/UL (ref 3.88–5.62)
SL CV LV EF: 60
SODIUM SERPL-SCNC: 135 MMOL/L (ref 135–147)
WBC # BLD AUTO: 11.52 THOUSAND/UL (ref 4.31–10.16)

## 2025-02-11 PROCEDURE — 85670 THROMBIN TIME PLASMA: CPT | Performed by: PHYSICIAN ASSISTANT

## 2025-02-11 PROCEDURE — 93312 ECHO TRANSESOPHAGEAL: CPT | Performed by: INTERNAL MEDICINE

## 2025-02-11 PROCEDURE — 85613 RUSSELL VIPER VENOM DILUTED: CPT | Performed by: PHYSICIAN ASSISTANT

## 2025-02-11 PROCEDURE — 99239 HOSP IP/OBS DSCHRG MGMT >30: CPT | Performed by: INTERNAL MEDICINE

## 2025-02-11 PROCEDURE — 93325 DOPPLER ECHO COLOR FLOW MAPG: CPT | Performed by: INTERNAL MEDICINE

## 2025-02-11 PROCEDURE — 80048 BASIC METABOLIC PNL TOTAL CA: CPT | Performed by: INTERNAL MEDICINE

## 2025-02-11 PROCEDURE — 93320 DOPPLER ECHO COMPLETE: CPT | Performed by: INTERNAL MEDICINE

## 2025-02-11 PROCEDURE — 81240 F2 GENE: CPT | Performed by: PHYSICIAN ASSISTANT

## 2025-02-11 PROCEDURE — 94760 N-INVAS EAR/PLS OXIMETRY 1: CPT

## 2025-02-11 PROCEDURE — 81241 F5 GENE: CPT | Performed by: PHYSICIAN ASSISTANT

## 2025-02-11 PROCEDURE — 85027 COMPLETE CBC AUTOMATED: CPT | Performed by: INTERNAL MEDICINE

## 2025-02-11 PROCEDURE — 93312 ECHO TRANSESOPHAGEAL: CPT

## 2025-02-11 PROCEDURE — 85705 THROMBOPLASTIN INHIBITION: CPT | Performed by: PHYSICIAN ASSISTANT

## 2025-02-11 PROCEDURE — 94660 CPAP INITIATION&MGMT: CPT

## 2025-02-11 PROCEDURE — B245ZZ4 ULTRASONOGRAPHY OF LEFT HEART, TRANSESOPHAGEAL: ICD-10-PCS | Performed by: STUDENT IN AN ORGANIZED HEALTH CARE EDUCATION/TRAINING PROGRAM

## 2025-02-11 PROCEDURE — 85732 THROMBOPLASTIN TIME PARTIAL: CPT | Performed by: PHYSICIAN ASSISTANT

## 2025-02-11 RX ORDER — SODIUM CHLORIDE 9 MG/ML
INJECTION, SOLUTION INTRAVENOUS CONTINUOUS PRN
Status: DISCONTINUED | OUTPATIENT
Start: 2025-02-11 | End: 2025-02-11

## 2025-02-11 RX ORDER — ATORVASTATIN CALCIUM 40 MG/1
40 TABLET, FILM COATED ORAL EVERY EVENING
Qty: 30 TABLET | Refills: 0 | Status: SHIPPED | OUTPATIENT
Start: 2025-02-11 | End: 2025-02-21 | Stop reason: SDUPTHER

## 2025-02-11 RX ORDER — CLOPIDOGREL BISULFATE 75 MG/1
75 TABLET ORAL DAILY
Qty: 30 TABLET | Refills: 0 | Status: SHIPPED | OUTPATIENT
Start: 2025-02-12 | End: 2025-02-21 | Stop reason: SDUPTHER

## 2025-02-11 RX ORDER — LIDOCAINE HYDROCHLORIDE 10 MG/ML
INJECTION, SOLUTION EPIDURAL; INFILTRATION; INTRACAUDAL; PERINEURAL AS NEEDED
Status: DISCONTINUED | OUTPATIENT
Start: 2025-02-11 | End: 2025-02-11

## 2025-02-11 RX ORDER — PROPOFOL 10 MG/ML
INJECTION, EMULSION INTRAVENOUS AS NEEDED
Status: DISCONTINUED | OUTPATIENT
Start: 2025-02-11 | End: 2025-02-11

## 2025-02-11 RX ORDER — PROPOFOL 10 MG/ML
INJECTION, EMULSION INTRAVENOUS CONTINUOUS PRN
Status: DISCONTINUED | OUTPATIENT
Start: 2025-02-11 | End: 2025-02-11

## 2025-02-11 RX ADMIN — PROPOFOL 120 MG: 10 INJECTION, EMULSION INTRAVENOUS at 13:15

## 2025-02-11 RX ADMIN — CLOPIDOGREL 75 MG: 75 TABLET ORAL at 10:12

## 2025-02-11 RX ADMIN — ATORVASTATIN CALCIUM 40 MG: 40 TABLET, FILM COATED ORAL at 17:10

## 2025-02-11 RX ADMIN — SODIUM CHLORIDE: 9 INJECTION, SOLUTION INTRAVENOUS at 12:55

## 2025-02-11 RX ADMIN — VENLAFAXINE 37.5 MG: 37.5 TABLET ORAL at 10:12

## 2025-02-11 RX ADMIN — FLUTICASONE PROPIONATE 1 SPRAY: 50 SPRAY, METERED NASAL at 10:12

## 2025-02-11 RX ADMIN — ENOXAPARIN SODIUM 40 MG: 40 INJECTION SUBCUTANEOUS at 10:12

## 2025-02-11 RX ADMIN — LIDOCAINE HYDROCHLORIDE 100 MG: 10 INJECTION, SOLUTION EPIDURAL; INFILTRATION; INTRACAUDAL; PERINEURAL at 13:15

## 2025-02-11 RX ADMIN — PROPOFOL 110 MCG/KG/MIN: 10 INJECTION, EMULSION INTRAVENOUS at 13:15

## 2025-02-11 NOTE — DISCHARGE SUMMARY
Discharge Summary - Hospitalist   Name: Asher Medel 49 y.o. male I MRN: [id number]  Unit/Bed#: -01 I Date of Admission: 2/8/2025   Date of Service: 2/11/2025 I Hospital Day: 3     Assessment & Plan  Stroke-like symptoms  Presented to the hospital on 2/8/20/2025 with acute onset of left sided weakness, numbness and tingling  NIHSS score of 4 on presentation  Patient status post IV TNK  CT head 2/8 showed no mass effect, acute intracranial hemorrhage or evidence of recent infarction  CTA head and neck showed no high-grade stenosis, focal occlusion or aneurysm.  Questionable small amount of thrombus versus prominent vasculature within the left atrial appendage.   CT head 2/9 showed no interval change, no evidence for a large acute vascular distribution infarct or acute intracranial hemorrhage  Echo showed LVEF 55 to 60%, normal diastolic dysfunction, left atrial appendage not well-visualized.  WHITNEY showed no clots  Discussed with neurology, patient stable for discharge on Plavix 75 mg daily and atorvastatin.  Follow-up with neurology outpatient  Obstructive sleep apnea treated with continuous positive airway pressure (CPAP)  Continue CPAP  Chronic pain  Patient has a spinal stimulator which is not MRI compatible  Patient does have a morphine pump and he is getting injections each Wednesday for psoriatic's arthritis/ankylosing spondylitis  Continue pain regimen  Primary narcolepsy with cataplexy  Continue home Effexor  DDD (degenerative disc disease), lumbosacral    Mixed hyperlipidemia  Continue Lipitor  Class 2 obesity with body mass index (BMI) of 36.0 to 36.9 in adult    Psoriasis with arthropathy (HCC)    Ankylosing spondylitis (HCC)       Medical Problems       Resolved Problems  Date Reviewed: 2/8/2025   None       Discharging Physician / Practitioner: Elicia Victor MD  PCP: Rabia Del Toro MD  Admission Date:   Admission Orders (From admission, onward)       Ordered        02/08/25 1828  INPATIENT  ADMISSION  Once                          Discharge Date: 02/11/25    Consultations During Hospital Stay:  Neurology, PT/OT    Procedures Performed:   None    Significant Findings / Test Results:   CT head wo contrast  Result Date: 2/9/2025  Impression: No interval change. No CT evidence for a large acute vascular distribution infarct or acute intracranial hemorrhage. Resident: STEFFANIE ESTEVEZ I, the attending radiologist, have reviewed the images and agree with the final report above.    CT head without contrast  Result Date: 2/8/2025  Impression: No acute intracranial abnormality.     CT stroke alert brain  Result Date: 2/8/2025  Impression: No mass effect, acute intracranial hemorrhage or evidence of recent infarction. Findings were directly discussed with Adriana Whaley  at approximately 4:10 p.m.     CTA stroke alert (head/neck)  Result Date: 2/8/2025  Impression: No evidence for high-grade stenosis, focal occlusion or vascular aneurysm of the cervical or intracranial vessels. Questionable small amount of thrombus versus prominent vasculature within the left atrial appendage. Echocardiography is recommended for further evaluation. Findings were directly discussed with Adriana Whaley  at 4:11 p.m.     Incidental Findings:   none     Test Results Pending at Discharge (will require follow up):   Lupus anticoagulant, factor V Leiden, prothrombin gene mutation.     Outpatient Tests Requested:  none    Complications:  none    Reason for Admission: Strokelike symptoms    Hospital Course:   Asher Medel is a 49 y.o. male patient who originally presented to the hospital on 2/8/2025 due to strokelike symptoms.  Patient received TNK in the emergency department.  CT head showed no acute changes, CTA head and neck no significant stenosis, questionable small amount of thrombus versus prominent vasculature within the left atrial appendage.  WHITNEY showed no thrombus.     Patient without any symptoms.  Stable for discharge on  "Plavix and atorvastatin.  Follow-up with neurology outpatient.            Please see above list of diagnoses and related plan for additional information.     Condition at Discharge: good    Discharge Day Visit / Exam:   Subjective: No symptoms  Vitals: Blood Pressure: 160/98 (02/11/25 1453)  Pulse: 68 (02/11/25 1453)  Temperature: 98 °F (36.7 °C) (02/11/25 1453)  Temp Source: Oral (02/11/25 1453)  Respirations: 20 (02/11/25 1453)  Height: 5' 11\" (180.3 cm) (02/11/25 1242)  Weight - Scale: 114 kg (251 lb 5.2 oz) (02/11/25 1242)  SpO2: 94 % (02/11/25 1453)  Physical Exam  Vitals and nursing note reviewed.   Constitutional:       General: He is not in acute distress.     Appearance: He is not diaphoretic.   HENT:      Head: Normocephalic.   Eyes:      General: No scleral icterus.        Right eye: No discharge.         Left eye: No discharge.   Cardiovascular:      Rate and Rhythm: Normal rate and regular rhythm.      Heart sounds: No murmur heard.  Pulmonary:      Effort: Pulmonary effort is normal. No respiratory distress.      Breath sounds: Normal breath sounds. No wheezing, rhonchi or rales.   Abdominal:      General: There is no distension.      Palpations: Abdomen is soft.      Tenderness: There is no abdominal tenderness. There is no guarding or rebound.   Musculoskeletal:      Cervical back: Normal range of motion.      Right lower leg: No edema.      Left lower leg: No edema.   Skin:     General: Skin is warm.   Neurological:      Mental Status: He is alert and oriented to person, place, and time.   Psychiatric:         Mood and Affect: Mood normal.         Behavior: Behavior normal.         Thought Content: Thought content normal.         Judgment: Judgment normal.          Discussion with Family: Patient declined call to .     Discharge instructions/Information to patient and family:   See after visit summary for information provided to patient and family.      Provisions for Follow-Up " Care:  See after visit summary for information related to follow-up care and any pertinent home health orders.      Mobility at time of Discharge:   Basic Mobility Inpatient Raw Score: 24  JH-HLM Goal: 8: Walk 250 feet or more  JH-HLM Achieved: 1: Laying in bed  HLM Goal achieved. Continue to encourage appropriate mobility.     Disposition:   Home    Planned Readmission: no    Discharge Medications:  See after visit summary for reconciled discharge medications provided to patient and/or family.      Administrative Statements   Discharge Statement:  I have spent a total time of 38 minutes in caring for this patient on the day of the visit/encounter. .    **Please Note: This note may have been constructed using a voice recognition system**

## 2025-02-11 NOTE — CASE MANAGEMENT
Case Management Assessment & Discharge Planning Note    Patient name Asher Medel  Location /-01 MRN [id number]  : 1975 Date 2025       Current Admission Date: 2025  Current Admission Diagnosis:Stroke-like symptoms   Patient Active Problem List    Diagnosis Date Noted Date Diagnosed    Carpal tunnel syndrome on right 2025     Lateral epicondylitis of right elbow 2025     Obesity, morbid (HCC) 2025     Carpal tunnel syndrome on left 2025     Class 2 obesity with body mass index (BMI) of 36.0 to 36.9 in adult 2024     Excessive daytime sleepiness 12/15/2023     Stroke-like symptoms 2022     Closed fracture of sesamoid bone of right foot 2019     Primary narcolepsy with cataplexy 2019     Chronic pain 2018     Idiopathic hypersomnia 2018     Obstructive sleep apnea treated with continuous positive airway pressure (CPAP) 2018     Neuropathy, peripheral 2015     Allergic rhinitis 09/15/2013     Fatigue 09/15/2013     Herniated cervical disc 09/15/2013     Obesity 11/15/2012     Psoriasis with arthropathy (HCC) 10/24/2012     Ankylosing spondylitis (HCC) 10/24/2012     Mixed hyperlipidemia 10/24/2012     DDD (degenerative disc disease), lumbosacral 2010       LOS (days): 3  Geometric Mean LOS (GMLOS) (days): 3  Days to GMLOS:0.1     OBJECTIVE:    Risk of Unplanned Readmission Score: 9.78         Current admission status: Inpatient       Preferred Pharmacy:   [id number]  JOYCE MAGALLANES - [address]  [place] [address] [place] [address]  Phone: [phone] Fax: [phone]    Primary Care Provider: Rabia Del Toro MD    Primary Insurance: MEDICARE  Secondary Insurance: [facility] FARM INSURANCE    ASSESSMENT:  Active Health Care Proxies    There are no active Health Care Proxies on file.       Advance Directives  Does patient have a Health Care POA?: No  Was patient offered paperwork?: Yes  Does  patient currently have a Health Care decision maker?: No  Does patient have Advance Directives?: No  Was patient offered paperwork?: Yes              Patient Information  Admitted from:: Home  Mental Status: Alert  During Assessment patient was accompanied by: Not accompanied during assessment  Assessment information provided by:: Patient  Primary Caregiver: Self  Support Systems: Self, Spouse/significant other, Daughter  County of Residence: [place]  What city do you live in?: [place]  Home entry access options. Select all that apply.: Ramp  Type of Current Residence: [facility]  Living Arrangements: Lives w/ Spouse/significant other, Lives w/ Children  Is patient a ?: No    Activities of Daily Living Prior to Admission  Functional Status: Independent  Completes ADLs independently?: Yes  Ambulates independently?: Yes  Does patient use assisted devices?: Yes  Assisted Devices (DME) used: Straight Cane, Wheelchair, Rollator, CPAP, Shower Chair  DME Company Name (respiratory supplies): [employer].  Does patient currently own DME?: Yes  What DME does the patient currently own?: CPAP, Rollator, Shower Chair, Straight Cane, Wheelchair  Does patient have a history of Outpatient Therapy (PT/OT)?: Yes (Currently going.)  Does the patient have a history of Short-Term Rehab?: No  Does patient have a history of HHC?: No  Does patient currently have HHC?: No         Patient Information Continued  Income Source: SSI/SSD  Does patient have prescription coverage?: Yes  Does patient receive dialysis treatments?: No  Does patient have a history of substance abuse?: No  Does patient have a history of Mental Health Diagnosis?: No         Means of Transportation  Means of Transport to Appts:: Drives Self          DISCHARGE DETAILS:    Discharge planning discussed with:: Pt        CM contacted family/caregiver?: No- see comments (Pt is in contact with family.)  Were Treatment Team discharge recommendations reviewed with  patient/caregiver?: Yes  Did patient/caregiver verbalize understanding of patient care needs?: Yes  Were patient/caregiver advised of the risks associated with not following Treatment Team discharge recommendations?: Yes         Requested Home Health Care         Is the patient interested in HHC at discharge?: No    DME Referral Provided  Referral made for DME?: No    Other Referral/Resources/Interventions Provided:  Interventions: None Indicated         Treatment Team Recommendation: Home  Discharge Destination Plan:: Home                                IMM Given (Date):: 02/11/25  IMM Given to:: Patient   IMM reviewed with patient, patient agrees with discharge determination.   Additional Comments: CM met with patient at bedside to discern discharge needs. Pt lives with daughters and SO in a 1sh, ramp to enter, 1st fl setup. Uses and owns CPAP, Rollator, Cane, Shower chair, and grab bars for shower. Reports being independent with ADLs and walking PTA. Currently in outpatient PT. NO STR, HHC, Inpatient psych, or D&A treatment. Does not have medical POA. Declined information on how to obtain one. [facility] [place] is preferred pharamcy. Family is transport.

## 2025-02-11 NOTE — ANESTHESIA POSTPROCEDURE EVALUATION
Post-Op Assessment Note    CV Status:  Stable  Pain Score: 0    Pain management: adequate       Mental Status:  Awake and alert   Hydration Status:  Stable   PONV Controlled:  None   Airway Patency:  Patent     Post Op Vitals Reviewed: Yes    No anethesia notable event occurred.    Staff: CRNA           Last Filed PACU Vitals:  Vitals Value Taken Time   Temp     Pulse  74    /76    Resp 15    SpO2 96%

## 2025-02-11 NOTE — PLAN OF CARE
Problem: Potential for Falls  Goal: Patient will remain free of falls  Description: INTERVENTIONS:  - Educate patient/family on patient safety including physical limitations  - Instruct patient to call for assistance with activity   - Consult OT/PT to assist with strengthening/mobility   - Keep Call bell within reach  - Keep bed low and locked with side rails adjusted as appropriate  - Keep care items and personal belongings within reach  - Initiate and maintain comfort rounds  - Make Fall Risk Sign visible to staff  - Offer Toileting every 2 Hours, in advance of need  - Obtain necessary fall risk management equipment: socks  - Apply yellow socks and bracelet for high fall risk patients  - Consider moving patient to room near nurses station  Outcome: Progressing     Problem: PAIN - ADULT  Goal: Verbalizes/displays adequate comfort level or baseline comfort level  Description: Interventions:  - Encourage patient to monitor pain and request assistance  - Assess pain using appropriate pain scale  - Administer analgesics based on type and severity of pain and evaluate response  - Implement non-pharmacological measures as appropriate and evaluate response  - Consider cultural and social influences on pain and pain management  - Notify physician/advanced practitioner if interventions unsuccessful or patient reports new pain  Outcome: Progressing     Problem: INFECTION - ADULT  Goal: Absence or prevention of progression during hospitalization  Description: INTERVENTIONS:  - Assess and monitor for signs and symptoms of infection  - Monitor lab/diagnostic results  - Monitor all insertion sites, i.e. indwelling lines, tubes, and drains  - Monitor endotracheal if appropriate and nasal secretions for changes in amount and color  - [place] appropriate cooling/warming therapies per order  - Administer medications as ordered  - Instruct and encourage patient and family to use good hand hygiene technique  - Identify and  instruct in appropriate isolation precautions for identified infection/condition  Outcome: Progressing  Goal: Absence of fever/infection during neutropenic period  Description: INTERVENTIONS:  - Monitor WBC    Outcome: Progressing     Problem: SAFETY ADULT  Goal: Patient will remain free of falls  Description: INTERVENTIONS:  - Educate patient/family on patient safety including physical limitations  - Instruct patient to call for assistance with activity   - Consult OT/PT to assist with strengthening/mobility   - Keep Call bell within reach  - Keep bed low and locked with side rails adjusted as appropriate  - Keep care items and personal belongings within reach  - Initiate and maintain comfort rounds  - Make Fall Risk Sign visible to staff  - Offer Toileting every 2 Hours, in advance of need  - Obtain necessary fall risk management equipment: socks  - Apply yellow socks and bracelet for high fall risk patients  - Consider moving patient to room near nurses station  Outcome: Progressing  Goal: Maintain or return to baseline ADL function  Description: INTERVENTIONS:  -  Assess patient's ability to carry out ADLs; assess patient's baseline for ADL function and identify physical deficits which impact ability to perform ADLs (bathing, care of mouth/teeth, toileting, grooming, dressing, etc.)  - Assess/evaluate cause of self-care deficits   - Assess range of motion  - Assess patient's mobility; develop plan if impaired  - Assess patient's need for assistive devices and provide as appropriate  - Encourage maximum independence but intervene and supervise when necessary  - Involve family in performance of ADLs  - Assess for home care needs following discharge   - Consider OT consult to assist with ADL evaluation and planning for discharge  - Provide patient education as appropriate  Outcome: Progressing  Goal: Maintains/Returns to pre admission functional level  Description: INTERVENTIONS:  - Perform AM-PAC 6 Click Basic  Mobility/ Daily Activity assessment daily.  - Set and communicate daily mobility goal to care team and patient/family/caregiver.   - Collaborate with rehabilitation services on mobility goals if consulted  - Perform Range of Motion 3 times a day.  - Reposition patient every 2 hours.  - Dangle patient 3 times a day  - Stand patient 3 times a day  - Ambulate patient 3 times a day  - Out of bed to chair 3 times a day   - Out of bed for meals 3 times a day  - Out of bed for toileting  - Record patient progress and toleration of activity level   Outcome: Progressing     Problem: DISCHARGE PLANNING  Goal: Discharge to home or other facility with appropriate resources  Description: INTERVENTIONS:  - Identify barriers to discharge w/patient and caregiver  - Arrange for needed discharge resources and transportation as appropriate  - Identify discharge learning needs (meds, wound care, etc.)  - Arrange for interpretive services to assist at discharge as needed  - Refer to Case Management Department for coordinating discharge planning if the patient needs post-hospital services based on physician/advanced practitioner order or complex needs related to functional status, cognitive ability, or social support system  Outcome: Progressing     Problem: Knowledge Deficit  Goal: Patient/family/caregiver demonstrates understanding of disease process, treatment plan, medications, and discharge instructions  Description: Complete learning assessment and assess knowledge base.  Interventions:  - Provide teaching at level of understanding  - Provide teaching via preferred learning methods  Outcome: Progressing     Problem: NEUROSENSORY - ADULT  Goal: Achieves stable or improved neurological status  Description: INTERVENTIONS  - Monitor and report changes in neurological status  - Monitor vital signs such as temperature, blood pressure, glucose, and any other labs ordered   - Initiate measures to prevent increased intracranial pressure  -  Monitor for seizure activity and implement precautions if appropriate      Outcome: Progressing  Goal: Remains free of injury related to seizures activity  Description: INTERVENTIONS  - Maintain airway, patient safety  and administer oxygen as ordered  - Monitor patient for seizure activity, document and report duration and description of seizure to physician/advanced practitioner  - If seizure occurs,  ensure patient safety during seizure  - Reorient patient post seizure  - Seizure pads on all 4 side rails  - Instruct patient/family to notify RN of any seizure activity including if an aura is experienced  - Instruct patient/family to call for assistance with activity based on nursing assessment  - Administer anti-seizure medications if ordered    Outcome: Progressing  Goal: Achieves maximal functionality and self care  Description: INTERVENTIONS  - Monitor swallowing and airway patency with patient fatigue and changes in neurological status  - Encourage and assist patient to increase activity and self care.   - Encourage visually impaired, hearing impaired and aphasic patients to use assistive/communication devices  Outcome: Progressing     Problem: CARDIOVASCULAR - ADULT  Goal: Maintains optimal cardiac output and hemodynamic stability  Description: INTERVENTIONS:  - Monitor I/O, vital signs and rhythm  - Monitor for S/S and trends of decreased cardiac output  - Administer and titrate ordered vasoactive medications to optimize hemodynamic stability  - Assess quality of pulses, skin color and temperature  - Assess for signs of decreased coronary artery perfusion  - Instruct patient to report change in severity of symptoms  Outcome: Progressing  Goal: Absence of cardiac dysrhythmias or at baseline rhythm  Description: INTERVENTIONS:  - Continuous cardiac monitoring, vital signs, obtain 12 lead EKG if ordered  - Administer antiarrhythmic and heart rate control medications as ordered  - Monitor electrolytes and  administer replacement therapy as ordered  Outcome: Progressing

## 2025-02-11 NOTE — PLAN OF CARE
Problem: Potential for Falls  Goal: Patient will remain free of falls  Description: INTERVENTIONS:  - Educate patient/family on patient safety including physical limitations  - Instruct patient to call for assistance with activity   - Consult OT/PT to assist with strengthening/mobility   - Keep Call bell within reach  - Keep bed low and locked with side rails adjusted as appropriate  - Keep care items and personal belongings within reach  - Initiate and maintain comfort rounds  - Make Fall Risk Sign visible to staff  - Offer Toileting every 2 Hours, in advance of need  - Initiate/Maintain bed/chair alarm  - Obtain necessary fall risk management equipment: yellow socks/bracelet  - Apply yellow socks and bracelet for high fall risk patients  - Consider moving patient to room near nurses station  Outcome: Progressing     Problem: INFECTION - ADULT  Goal: Absence of fever/infection during neutropenic period  Description: INTERVENTIONS:  - Monitor WBC    Outcome: Progressing

## 2025-02-11 NOTE — ANESTHESIA PREPROCEDURE EVALUATION
Procedure:  WHITNEY    Relevant Problems   CARDIO   (+) Mixed hyperlipidemia      MUSCULOSKELETAL   (+) Ankylosing spondylitis (HCC)   (+) Psoriasis with arthropathy (HCC)      NEURO/PSYCH   (+) Chronic pain (Morphine intrathecal pump, spinal cord stimulator (off))      PULMONARY   (+) Obstructive sleep apnea treated with continuous positive airway pressure (CPAP)   (-) Smoking   (-) URI (upper respiratory infection)      Ear/Nose/Throat   (+) Allergic rhinitis      Neurology/Sleep   (+) Primary narcolepsy with cataplexy    BMI 35    Physical Exam    Airway  Comment: Mildly limited extension  Mallampati score: III  TM Distance: >3 FB       Dental        Cardiovascular      Pulmonary      Other Findings      Lab Results   Component Value Date    WBC 11.52 (H) 02/11/2025    HGB 13.5 02/11/2025     02/11/2025     Lab Results   Component Value Date    SODIUM 135 02/11/2025    K 4.1 02/11/2025    BUN 14 02/11/2025    CREATININE 0.82 02/11/2025    EGFR 105 02/11/2025     Lab Results   Component Value Date    PTT 25 02/08/2025      Lab Results   Component Value Date    INR 0.96 02/08/2025     Lab Results   Component Value Date    HGBA1C 5.8 (H) 02/08/2025     TTE yesterday Interpretation Summary       Left Ventricle: Left ventricular cavity size is normal. Wall thickness is mildly increased. There is concentric remodeling. The left ventricular ejection fraction is 55-60% by biplane measurement. Systolic function is normal. Wall motion is normal. Diastolic function is normal.    Right Ventricle: Right ventricular cavity size is normal. Systolic function is normal.    Left Atrial Appendage: The left atrial appendage is not well visualized.    Anesthesia Plan  ASA Score- 3     Anesthesia Type- IV sedation with anesthesia with ASA Monitors.         Additional Monitors:     Airway Plan:            Plan Factors-Exercise tolerance (METS): >4 METS.    Chart reviewed. EKG reviewed. Imaging results reviewed. Existing labs  reviewed. Patient summary reviewed.    Patient is not a current smoker.              Induction- intravenous.    Postoperative Plan-     Perioperative Resuscitation Plan - Level 1 - Full Code.       Informed Consent- Anesthetic plan and risks discussed with patient.  I personally reviewed this patient with the CRNA. Discussed and agreed on the Anesthesia Plan with the CRNA..    NPO Status:  No vitals data found for the desired time range.

## 2025-02-11 NOTE — ASSESSMENT & PLAN NOTE
Presented to the hospital on 2/8/20/2025 with acute onset of left sided weakness, numbness and tingling  NIHSS score of 4 on presentation  Patient status post IV TNK  CT head 2/8 showed no mass effect, acute intracranial hemorrhage or evidence of recent infarction  CTA head and neck showed no high-grade stenosis, focal occlusion or aneurysm.  Questionable small amount of thrombus versus prominent vasculature within the left atrial appendage.   CT head 2/9 showed no interval change, no evidence for a large acute vascular distribution infarct or acute intracranial hemorrhage  Echo showed LVEF 55 to 60%, normal diastolic dysfunction, left atrial appendage not well-visualized.  WHITNEY showed no clots  Discussed with neurology, patient stable for discharge on Plavix 75 mg daily and atorvastatin.  Follow-up with neurology outpatient

## 2025-02-12 ENCOUNTER — OFFICE VISIT (OUTPATIENT)
Dept: UROLOGY | Facility: HOSPITAL | Age: 50
End: 2025-02-12
Payer: MEDICARE

## 2025-02-12 VITALS
DIASTOLIC BLOOD PRESSURE: 92 MMHG | OXYGEN SATURATION: 99 % | WEIGHT: 255.2 LBS | HEART RATE: 69 BPM | SYSTOLIC BLOOD PRESSURE: 142 MMHG | HEIGHT: 71 IN | BODY MASS INDEX: 35.73 KG/M2

## 2025-02-12 DIAGNOSIS — R30.0 DYSURIA: ICD-10-CM

## 2025-02-12 DIAGNOSIS — M62.89 PELVIC FLOOR DYSFUNCTION: Primary | ICD-10-CM

## 2025-02-12 DIAGNOSIS — N52.1 ERECTILE DYSFUNCTION DUE TO DISEASES CLASSIFIED ELSEWHERE: ICD-10-CM

## 2025-02-12 DIAGNOSIS — G89.4 CHRONIC PAIN SYNDROME: ICD-10-CM

## 2025-02-12 LAB — POST-VOID RESIDUAL VOLUME, ML POC: 0 ML

## 2025-02-12 PROCEDURE — 51798 US URINE CAPACITY MEASURE: CPT

## 2025-02-12 PROCEDURE — 99214 OFFICE O/P EST MOD 30 MIN: CPT

## 2025-02-12 RX ORDER — SILDENAFIL 50 MG/1
100 TABLET, FILM COATED ORAL DAILY PRN
Qty: 10 TABLET | Refills: 0 | Status: SHIPPED | OUTPATIENT
Start: 2025-02-12

## 2025-02-12 NOTE — ASSESSMENT & PLAN NOTE
History of spinal injury (July 2009)  Recent admitted 2/8-2/12 for possible mini-strokes  Initiated on Plavix and atorvastatin   Chronic back pain--spinal stimulator in place  Also has a morphine pump and is getting injections each Wednesday for psoriatic's arthritis/ankylosing spondylitis   Likely etiology of the majority of his bladder symptoms / irritation

## 2025-02-12 NOTE — TELEPHONE ENCOUNTER
Scheduled 7/17/25 2:30 PM Emily Alvarado MD [facility]; placed on wait list within timeframe indicated on DC instructions.  HFU/ SL [employer] BUCKS/ Stroke-like symptoms      DC- 2/11/25-Home     ----- Message from Maribeth Baird PA-C sent at 2/10/2025 11:12 AM EST -----  Regarding: HFU  Asher Medel will need follow-up in in 6 weeks with neurovascular team for Other = ATTENDING in 60 minute appointment. They will not require outpatient neurological testing.

## 2025-02-12 NOTE — ASSESSMENT & PLAN NOTE
AUA score 19 today in office   Reports ongoing dysuria/frequency if he goes a few days without drinking cranberry juice  CT renal protocol (10/29/24) returned unremarkable  Past urine studies were negative for infection and blood  Kidney functions returned within normal limits  PVR 0 ml today in office - no retention   Return to office for cystoscopy with MD as dysuria continues without apparent cause

## 2025-02-12 NOTE — ANESTHESIA POSTPROCEDURE EVALUATION
Post-Op Assessment Note    Last Filed PACU Vitals:  Vitals Value Taken Time   Temp 97 °F (36.1 °C) 02/11/25 1400   Pulse 61 02/11/25 1400   /83 02/11/25 1400   Resp 20 02/11/25 1400   SpO2 95 % 02/11/25 1400

## 2025-02-13 LAB
APTT SCREEN TO CONFIRM RATIO: 1.06 RATIO (ref 0–1.34)
CONFIRM APTT/NORMAL: 42.6 SEC (ref 0–47.6)
LA PPP-IMP: NORMAL
SCREEN APTT: 34.5 SEC (ref 0–43.5)
SCREEN DRVVT: 38.7 SEC (ref 0–47)
THROMBIN TIME: 17.8 SEC (ref 0–23)

## 2025-02-17 LAB
F5 GENE MUT ANL BLD/T: ABNORMAL
Lab: ABNORMAL

## 2025-02-18 ENCOUNTER — RESULTS FOLLOW-UP (OUTPATIENT)
Dept: OTHER | Facility: HOSPITAL | Age: 50
End: 2025-02-18

## 2025-02-18 ENCOUNTER — OFFICE VISIT (OUTPATIENT)
Age: 50
End: 2025-02-18
Payer: MEDICARE

## 2025-02-18 ENCOUNTER — HOSPITAL ENCOUNTER (OUTPATIENT)
Dept: RADIOLOGY | Facility: HOSPITAL | Age: 50
Discharge: HOME | End: 2025-02-18
Payer: MEDICARE

## 2025-02-18 VITALS
OXYGEN SATURATION: 98 % | WEIGHT: 251 LBS | SYSTOLIC BLOOD PRESSURE: 138 MMHG | HEIGHT: 71 IN | BODY MASS INDEX: 35.14 KG/M2 | DIASTOLIC BLOOD PRESSURE: 82 MMHG | TEMPERATURE: 98.6 F | HEART RATE: 78 BPM

## 2025-02-18 DIAGNOSIS — D68.51 FACTOR V LEIDEN MUTATION (HCC): Primary | ICD-10-CM

## 2025-02-18 DIAGNOSIS — J98.11 ATELECTASIS: ICD-10-CM

## 2025-02-18 DIAGNOSIS — J45.20 MILD INTERMITTENT REACTIVE AIRWAY DISEASE: ICD-10-CM

## 2025-02-18 DIAGNOSIS — R06.02 SHORTNESS OF BREATH: Primary | ICD-10-CM

## 2025-02-18 DIAGNOSIS — R06.02 SHORTNESS OF BREATH: ICD-10-CM

## 2025-02-18 DIAGNOSIS — R94.2 ABNORMAL PFT: ICD-10-CM

## 2025-02-18 LAB
F2 GENE MUT ANL BLD/T: NORMAL
Lab: NORMAL

## 2025-02-18 PROCEDURE — 99214 OFFICE O/P EST MOD 30 MIN: CPT | Performed by: PHYSICIAN ASSISTANT

## 2025-02-18 PROCEDURE — 71046 X-RAY EXAM CHEST 2 VIEWS: CPT

## 2025-02-18 PROCEDURE — 95012 NITRIC OXIDE EXP GAS DETER: CPT | Performed by: PHYSICIAN ASSISTANT

## 2025-02-18 RX ORDER — ALBUTEROL SULFATE AND BUDESONIDE 90; 80 UG/1; UG/1
2 AEROSOL, METERED RESPIRATORY (INHALATION) EVERY 6 HOURS PRN
Qty: 5.9 G | Refills: 3 | Status: SHIPPED | OUTPATIENT
Start: 2025-02-18 | End: 2025-02-21

## 2025-02-18 NOTE — TELEPHONE ENCOUNTER
Attempted to contact patient via telephone this evening to review results of Factor V Leiden testing, patient heterozygous for Factor V Leiden mutation. Was unable to reach patient via telephone and no identifying information on voicemail so unable to leave a voice message. Plan to refer patient to be seen by hematology team for further evaluation. Will attempt to contact patient again tomorrow via telephone to review results and discuss recommendations.     2/19/2025 [id number] Addendum: Was able to speak with Mr. Medel via telephone this evening and made him aware of heterozygous Factor V Leiden mutation. Discussed recommendation for hematology evaluation and will place a referral in chart. He expressed understanding regarding findings and did not have any additional questions at this time and was appreciative of phone call regarding results.

## 2025-02-18 NOTE — PROGRESS NOTES
Answers submitted by the patient for this visit:  Pulmonology Questionnaire (Submitted on 2025)  Chief Complaint: Primary symptoms  Do you have difficulty breathing?: Yes  Do you experience frequent throat clearing?: Yes  Chronicity: chronic  When did you first notice your symptoms?: more than 1 month ago  How often do your symptoms occur?: 2 to 4 times per day  Since you first noticed this problem, how has it changed?: gradually worsening  Do you have shortness of breath that occurs with effort or exertion?: Yes  Do you have ear congestion?: No  Do you have heartburn?: No  Do you have fatigue?: Yes  Do you have nasal congestion?: No  Do you have shortness of breath when lying flat?: Yes  Do you have shortness of breath when you wake up?: No  Do you have sweats?: Yes  Have you experienced weight loss?: No  Which of the following makes your symptoms worse?: climbing stairs, minimal activity  Which of the following makes your symptoms better?: nothing    Follow-up  Visit - Pulmonary Medicine   Name: Asher Medel      : 1975      MRN: [id number]  Encounter Provider: Shayla Cleaning PA-C  Encounter Date: 2025   [facility]  :  Assessment & Plan  Shortness of breath  Likely multifactorial  We reviewed his CT renal study from Oct which incidentally showed some very minimal atelectasis. Otherwise what was visualized of his lungs was completely normal.   PFTs fairly unremarkable. Just some restriction. Normal DLCO and no significant BD response.  His FeNo today was intermediate at 32  He has some symptoms of reactive airway disease. In conjunction w this and his FeNo, will try AirSupra to see if he finds benefit with this  Also will have him go for up to date CXR  Discussed with him we could pursue dedicated chest CT in the future depending on CXR results and his symptoms moving forward  I also reviewed his echo which was unremarkable for anything  that would contribute to dyspnea  His pain issues and obesity likely contribute to his shortness of breath as well    Orders:    XR chest pa and lateral; Future    POCT FeNO    Albuterol-Budesonide (Airsupra) 90-80 MCG/ACT AERO; Inhale 2 puffs every 6 (six) hours as needed (shortness of breath or wheezing)    Atelectasis  Minimal, not concerning  Orders:    Ambulatory Referral to Pulmonology    Abnormal PFT  Some restriction, as discussed above. Likely extrathoracic 2/2 ankylosing spondylitis & obesity  Orders:    Ambulatory Referral to Pulmonology    Albuterol-Budesonide (Airsupra) 90-80 MCG/ACT AERO; Inhale 2 puffs every 6 (six) hours as needed (shortness of breath or wheezing)    Mild intermittent reactive airway disease  Trial of ICS/EMILY  Orders:    Albuterol-Budesonide (Airsupra) 90-80 MCG/ACT AERO; Inhale 2 puffs every 6 (six) hours as needed (shortness of breath or wheezing)        Accompanied by his significant other.    Return in about 3 months (around 5/18/2025) for physician.    History of Present Illness   Asher Medel is a 49 y.o. male with PMH including but not limited to psoriatic arthritis, ankylosing spondylitis, STEPHANIE, narcolepsy, obesity who presents with chief complaint of shortness of breath. This has been insidious in onset, over last year or so seems to be getting worse. No clear trigger. Had pneumonia in the past but not in the last year. He notes decreased exercise tolerance. He says sometimes he coughs when he laughs or takes a deep breath. Never had anything like this in the past. Rest makes his breathing better.    Follows w sleep medicine.    Never smoker.       Review of Systems   Constitutional:  Negative for appetite change and fever.   HENT:  Negative for ear pain, postnasal drip, rhinorrhea, sneezing, sore throat and trouble swallowing.    Respiratory:  Positive for cough, shortness of breath and wheezing.    Cardiovascular:  Negative for chest pain.   Musculoskeletal:  Positive for  "myalgias.   Neurological:  Negative for headaches.   All other systems reviewed and are negative.      Aside from what is mentioned in the HPI, ROS is otherwise negative    Past Medical History   Past Medical History:   Diagnosis Date    Ankylosing spondylitis (HCC)     Arthritis     Chronic pain     DJD (degenerative joint disease)     Herniated disc, cervical     Narcolepsy     Psoriatic arthritis (HCC)     Seizures (HCC)     Sleep apnea     Sleep apnea      Past Surgical History:   Procedure Laterality Date    IMPLANTATION / PLACEMENT EPIDURAL NEUROSTIMULATOR ELECTRODES      INTRATHECAL PUMP IMPLANTATION Left     morphine pump    RHINOPLASTY      TONSILLECTOMY       Family History   Problem Relation Age of Onset    Cancer Mother     Diabetes Father     Cancer Father     Heart disease Father     Narcolepsy Father     Sleep apnea Father     Narcolepsy Paternal Uncle     Narcolepsy Paternal Grandmother       reports that he has never smoked. He has never used smokeless tobacco. He reports that he does not drink alcohol and does not use drugs.  Current Outpatient Medications   Medication Instructions    Albuterol-Budesonide (Airsupra) 90-80 MCG/ACT AERO 2 puffs, Inhalation, Every 6 hours PRN    atorvastatin (LIPITOR) 40 mg, Oral, Every evening    B-D TB SYRINGE 1CC/27GX1/2\" 27G X 1/2\" 1 ML MISC USE THREE WEEKLY FOR ALLERGY INJECTIONS    clobetasol (TEMOVATE) 0.05 % cream 2 times daily    clopidogrel (PLAVIX) 75 mg, Oral, Daily    EPINEPHrine (EPIPEN) 0.3 mg, Intramuscular, Once    fluticasone (FLONASE) 50 mcg/act nasal spray 1 spray, 2 times daily    gabapentin (NEURONTIN) 100 mg, 3 times daily    Insulin Syringe-Needle U-100 27.5G X 5/8\" 2 ML MISC 3 syringes weekly for allergy injections    LINZESS 145 MCG CAPS 1 (one) Capsule daily on an empty stomach, at least 30 mn before first meal    methylPREDNISolone 4 MG tablet therapy pack Use as directed on package    NON FORMULARY 0.5 mL, Weekly    NON FORMULARY 0.3 mg    " "Orencia 1,000 mg, Every 28 days    PEG 3350-KCl-NaBcb-NaCl-NaSulf (PEG 3350/Electrolytes) 240 g SOLR     sildenafil (VIAGRA) 100 mg, Oral, Daily PRN    tiZANidine (ZANAFLEX) 4 mg, Oral, 4 times daily PRN    venlafaxine (EFFEXOR) 37.5 mg, Oral, Daily     Allergies   Allergen Reactions    Allyl Isothiocyanate Anaphylaxis    Bee Venom Anaphylaxis    Broccoli [Brassica Oleracea - Food Allergy] Anaphylaxis    Diclofenac Other (See Comments)     Pain in leg feels like I have a blood clot pt sttates  Bad stomach pains  Pain in leg feels like I have a blood clot pt sttates  Blood clots in legs painful  Bad stomach pains  Pain in leg feels like I have a blood clot pt sttates    Medical Tape Other (See Comments)     Skin edema redness    Methotrexate Palpitations     Heart palpatations  Heart palpatations  Heart palpatations    Mustard Seed - Food Allergy Anaphylaxis    Other Rash and Other (See Comments)     ADHESIVE TAPE  Skin edema redness    Acetaminophen Diarrhea, GI Intolerance and Abdominal Pain    Aspirin GI Intolerance     Other reaction(s): Nausea/Vomiting    Celecoxib Other (See Comments)     Muscle cramps  Darrius horses, swelling  Darrius horses, swelling  Muscle cramps  Darrius horses, swelling    Diclofenac Sodium      Bad stomach pains    Methotrexate Derivatives     Infliximab Rash     blindness    Penicillins Rash and Other (See Comments)     Other reaction(s): Unknown Reaction         Medical History Reviewed by provider this encounter:     .    Objective   /82 (BP Location: Left arm, Patient Position: Sitting, Cuff Size: Standard)   Pulse 78   Temp 98.6 °F (37 °C) (Tympanic)   Ht 5' 11\" (1.803 m)   Wt 114 kg (251 lb)   SpO2 98%   BMI 35.01 kg/m²     Physical Exam  Vitals reviewed.   Constitutional:       General: He is not in acute distress.     Appearance: He is not toxic-appearing.   HENT:      Head: Normocephalic and atraumatic.   Eyes:      General: No scleral icterus.  Cardiovascular:      " "Rate and Rhythm: Normal rate and regular rhythm.   Pulmonary:      Effort: Pulmonary effort is normal.      Breath sounds: No wheezing or rhonchi.      Comments: Slightly decreased air movement  Musculoskeletal:         General: No signs of injury.   Skin:     General: Skin is warm and dry.   Neurological:      General: No focal deficit present.      Mental Status: He is alert. Mental status is at baseline.   Psychiatric:         Mood and Affect: Mood normal.         Behavior: Behavior normal.           Diagnostic Data:  Labs: I personally reviewed the most recent laboratory data pertinent to today's visit.  Reviewed CBC w diff - no significant eosinophilia    Radiology results:  Radiology Results Review: I have reviewed radiology reports from prior including: CT renal and chest xray.      PFT/spirometry results:  No results found for: \"FEV1\", \"FVC\", \"MMB5AIX\", \"TLC\", \"DLCO\"   reviewed    Other studies:  Melisa Cleaning PA-C      "

## 2025-02-21 ENCOUNTER — OFFICE VISIT (OUTPATIENT)
Dept: FAMILY MEDICINE CLINIC | Facility: CLINIC | Age: 50
End: 2025-02-21
Payer: MEDICARE

## 2025-02-21 VITALS
BODY MASS INDEX: 34.59 KG/M2 | OXYGEN SATURATION: 96 % | SYSTOLIC BLOOD PRESSURE: 138 MMHG | HEART RATE: 101 BPM | DIASTOLIC BLOOD PRESSURE: 88 MMHG | WEIGHT: 248 LBS

## 2025-02-21 DIAGNOSIS — Z96.89 SPINAL CORD STIMULATOR STATUS: ICD-10-CM

## 2025-02-21 DIAGNOSIS — R29.90 STROKE-LIKE SYMPTOMS: ICD-10-CM

## 2025-02-21 DIAGNOSIS — M45.9 ANKYLOSING SPONDYLITIS, UNSPECIFIED SITE OF SPINE (HCC): ICD-10-CM

## 2025-02-21 DIAGNOSIS — L40.50 PSORIASIS WITH ARTHROPATHY (HCC): ICD-10-CM

## 2025-02-21 DIAGNOSIS — G40.909 SEIZURE DISORDER (HCC): ICD-10-CM

## 2025-02-21 DIAGNOSIS — Z76.89 ENCOUNTER TO ESTABLISH CARE: Primary | ICD-10-CM

## 2025-02-21 PROCEDURE — 99203 OFFICE O/P NEW LOW 30 MIN: CPT

## 2025-02-21 RX ORDER — ATORVASTATIN CALCIUM 40 MG/1
40 TABLET, FILM COATED ORAL EVERY EVENING
Qty: 90 TABLET | Refills: 2 | Status: SHIPPED | OUTPATIENT
Start: 2025-02-21

## 2025-02-21 RX ORDER — CLOPIDOGREL BISULFATE 75 MG/1
75 TABLET ORAL DAILY
Qty: 90 TABLET | Refills: 2 | Status: SHIPPED | OUTPATIENT
Start: 2025-02-21

## 2025-02-21 NOTE — ASSESSMENT & PLAN NOTE
Patient recently hospitalized for CVA. Left sided weakness, numbness, tingling, and facial droop. Rec'd TNK. Imaging results below. Factor V Leiden detected. Was referred to hematology. Started on plavix 75 mg and atorvastatin 40 mg. Follow up with neuro scheduled 7/17/25.    2/9/25 CT wo head-- FINDINGS:     PARENCHYMA:No intracranial mass, mass effect or midline shift. No CT signs of acute infarction.  No acute parenchymal hemorrhage.     VENTRICLES AND EXTRA-AXIAL SPACES:  Normal for the patient's age.     VISUALIZED ORBITS:  Normal visualized orbits.     PARANASAL SINUSES:  Normal visualized paranasal sinuses.     CALVARIUM AND EXTRACRANIAL SOFT TISSUES:  Normal.     IMPRESSION:     No interval change. No CT evidence for a large acute vascular distribution infarct or acute intracranial hemorrhage.    2/8/25 CTA stroke alert--  CTA NECK  ARCH AND GREAT VESSELS: There is a questionable small amount thrombus within the left atrial appendage on series 3, image 16 versus prominent musculature. Visualized arch and great vessels are normal.  VERTEBRAL ARTERIES: Patent extracranial segments.  RIGHT CAROTID: No stenosis.    No dissection.  LEFT CAROTID: No stenosis.    No dissection.  NASCET criteria was used to determine the degree of internal carotid artery diameter stenosis.     CTA BRAIN:  INTERNAL CAROTID ARTERIES: No stenosis or occlusion.  ANTERIOR CEREBRAL ARTERY CIRCULATION:  No stenosis or occlusion.  MIDDLE CEREBRAL ARTERY CIRCULATION:  No stenosis or occlusion.  DISTAL VERTEBRAL ARTERIES:  No stenosis or occlusion.  BASILAR ARTERY:  No stenosis or occlusion.  POSTERIOR CEREBRAL ARTERIES: No stenosis or occlusion.  VENOUS STRUCTURES:  Normal.     NON VASCULAR ANATOMY  BRAIN: Post contrast imaging in the arterial phase is unremarkable.  No delayed imaging of the brain was obtained.     BONY STRUCTURES: There is cervical spondylosis.     SOFT TISSUES OF THE NECK:  Normal.     THORACIC INLET:  Unremarkable.         IMPRESSION:     No evidence for high-grade stenosis, focal occlusion or vascular aneurysm of the cervical or intracranial vessels.     Questionable small amount of thrombus versus prominent vasculature within the left atrial appendage. Echocardiography is recommended for further evaluation.    2/10/25 echo--    Left Ventricle: Left ventricular cavity size is normal. Wall thickness is mildly increased. There is concentric remodeling. The left ventricular ejection fraction is 55-60% by biplane measurement. Systolic function is normal. Wall motion is normal. Diastolic function is normal.    Right Ventricle: Right ventricular cavity size is normal. Systolic function is normal.    Left Atrial Appendage: The left atrial appendage is not well visualized.    2/11/25 WHITNEY--    Left Ventricle: Left ventricular cavity size is normal. Wall thickness is mildly increased. The left ventricular ejection fraction is 60%. Systolic function is normal. Wall motion is normal.    Left Atrium: There is no thrombus.    Atrial Septum: No patent foramen ovale detected, confirmed at rest using agitated saline contrast, confirmed by provocation with abdominal compression, using agitated saline contrast.    Left Atrial Appendage: There is no thrombus.    Mitral Valve: There is trace regurgitation.  Orders:    clopidogrel (PLAVIX) 75 mg tablet; Take 1 tablet (75 mg total) by mouth daily    atorvastatin (LIPITOR) 40 mg tablet; Take 1 tablet (40 mg total) by mouth every evening

## 2025-02-21 NOTE — PROGRESS NOTES
Name: Asher Medel      : 1975      MRN: [id number]  Encounter Provider: AVA Hanson  Encounter Date: 2025   Encounter department: [facility]  :  Assessment & Plan  Stroke-like symptoms  Patient recently hospitalized for CVA. Left sided weakness, numbness, tingling, and facial droop. Rec'd TNK. Imaging results below. Factor V Leiden detected. Was referred to hematology. Started on plavix 75 mg and atorvastatin 40 mg. Follow up with neuro scheduled 25.    25 CT wo head-- FINDINGS:     PARENCHYMA:No intracranial mass, mass effect or midline shift. No CT signs of acute infarction.  No acute parenchymal hemorrhage.     VENTRICLES AND EXTRA-AXIAL SPACES:  Normal for the patient's age.     VISUALIZED ORBITS:  Normal visualized orbits.     PARANASAL SINUSES:  Normal visualized paranasal sinuses.     CALVARIUM AND EXTRACRANIAL SOFT TISSUES:  Normal.     IMPRESSION:     No interval change. No CT evidence for a large acute vascular distribution infarct or acute intracranial hemorrhage.    25 CTA stroke alert--  CTA NECK  ARCH AND GREAT VESSELS: There is a questionable small amount thrombus within the left atrial appendage on series 3, image 16 versus prominent musculature. Visualized arch and great vessels are normal.  VERTEBRAL ARTERIES: Patent extracranial segments.  RIGHT CAROTID: No stenosis.    No dissection.  LEFT CAROTID: No stenosis.    No dissection.  NASCET criteria was used to determine the degree of internal carotid artery diameter stenosis.     CTA BRAIN:  INTERNAL CAROTID ARTERIES: No stenosis or occlusion.  ANTERIOR CEREBRAL ARTERY CIRCULATION:  No stenosis or occlusion.  MIDDLE CEREBRAL ARTERY CIRCULATION:  No stenosis or occlusion.  DISTAL VERTEBRAL ARTERIES:  No stenosis or occlusion.  BASILAR ARTERY:  No stenosis or occlusion.  POSTERIOR CEREBRAL ARTERIES: No stenosis or occlusion.  VENOUS STRUCTURES:  Normal.     NON VASCULAR  ANATOMY  BRAIN: Post contrast imaging in the arterial phase is unremarkable.  No delayed imaging of the brain was obtained.     BONY STRUCTURES: There is cervical spondylosis.     SOFT TISSUES OF THE NECK:  Normal.     THORACIC INLET:  Unremarkable.        IMPRESSION:     No evidence for high-grade stenosis, focal occlusion or vascular aneurysm of the cervical or intracranial vessels.     Questionable small amount of thrombus versus prominent vasculature within the left atrial appendage. Echocardiography is recommended for further evaluation.    2/10/25 echo--    Left Ventricle: Left ventricular cavity size is normal. Wall thickness is mildly increased. There is concentric remodeling. The left ventricular ejection fraction is 55-60% by biplane measurement. Systolic function is normal. Wall motion is normal. Diastolic function is normal.    Right Ventricle: Right ventricular cavity size is normal. Systolic function is normal.    Left Atrial Appendage: The left atrial appendage is not well visualized.    2/11/25 WHITNEY--    Left Ventricle: Left ventricular cavity size is normal. Wall thickness is mildly increased. The left ventricular ejection fraction is 60%. Systolic function is normal. Wall motion is normal.    Left Atrium: There is no thrombus.    Atrial Septum: No patent foramen ovale detected, confirmed at rest using agitated saline contrast, confirmed by provocation with abdominal compression, using agitated saline contrast.    Left Atrial Appendage: There is no thrombus.    Mitral Valve: There is trace regurgitation.  Orders:    clopidogrel (PLAVIX) 75 mg tablet; Take 1 tablet (75 mg total) by mouth daily    atorvastatin (LIPITOR) 40 mg tablet; Take 1 tablet (40 mg total) by mouth every evening    Seizure disorder (HCC)  Reports that he was cleared by neuro several years ago.        Spinal cord stimulator status  States that he has not used his stimulator in years as it exacerbated his pain. Morphine pump  prescribed by pain mgmt. Interested in having stimulator removed.   Orders:    Ambulatory Referral to Neurosurgery; Future    Encounter to establish care  ---states MCW completed with [facility] PCP in August 2024---  ---Reports colonoscopy completed 2 years ago with [facility]--Dr. Saba. Care gap sent.       Ankylosing spondylitis, unspecified site of spine (HCC)  Follows with pain mgmt.       Psoriasis with arthropathy (HCC)  Follows with rheum.              History of Present Illness   Presents to establish care.       Review of Systems   Constitutional:  Negative for activity change, fatigue and fever.   HENT:  Negative for congestion, ear pain, rhinorrhea and sore throat.    Eyes:  Negative for pain.   Respiratory:  Negative for cough, shortness of breath and wheezing.    Cardiovascular:  Negative for chest pain and leg swelling.   Gastrointestinal:  Negative for abdominal pain, diarrhea, nausea and vomiting.   Musculoskeletal:  Positive for arthralgias. Negative for myalgias.   Skin:  Negative for rash.   Neurological:  Negative for dizziness, weakness and numbness.   All other systems reviewed and are negative.      Objective   /88 (BP Location: Left arm, Patient Position: Sitting, Cuff Size: Standard)   Pulse 101   Wt 112 kg (248 lb)   SpO2 96%   BMI 34.59 kg/m²      Physical Exam  Vitals and nursing note reviewed.   Constitutional:       General: He is not in acute distress.     Appearance: He is well-developed.   HENT:      Head: Normocephalic and atraumatic.      Right Ear: Tympanic membrane, ear canal and external ear normal. There is no impacted cerumen.      Left Ear: Tympanic membrane, ear canal and external ear normal. There is no impacted cerumen.      Mouth/Throat:      Mouth: Mucous membranes are moist.      Pharynx: No oropharyngeal exudate or posterior oropharyngeal erythema.   Eyes:      General: No visual field deficit.     Extraocular Movements: Extraocular movements intact.       "Conjunctiva/sclera: Conjunctivae normal.      Pupils: Pupils are equal, round, and reactive to light.   Cardiovascular:      Rate and Rhythm: Normal rate and regular rhythm.      Heart sounds: Normal heart sounds. No murmur heard.  Pulmonary:      Effort: Pulmonary effort is normal. No respiratory distress.      Breath sounds: Normal breath sounds. No wheezing.   Abdominal:      General: Bowel sounds are normal. There is no distension.      Palpations: Abdomen is soft.      Tenderness: There is no abdominal tenderness.   Musculoskeletal:      Cervical back: Neck supple.   Skin:     General: Skin is warm and dry.      Capillary Refill: Capillary refill takes less than 2 seconds.   Neurological:      Mental Status: He is alert and oriented to person, place, and time. Mental status is at baseline.      Cranial Nerves: Cranial nerve deficit (uanble to puff out left cheek. states, \"it feels like I am but I guess it isn't.\") present. No dysarthria.      Sensory: Sensory deficit (unable to discern sharp vs dull on left side of face near mouth) present.      Motor: Weakness (LUE +4 and LLE +4) present.      Coordination: Coordination is intact. Heel to Ojeda Test normal.      Gait: Gait is intact. Gait and tandem walk normal.   Psychiatric:         Mood and Affect: Mood normal.       Administrative Statements   I have spent a total time of 30 minutes in caring for this patient on the day of the visit/encounter including Diagnostic results, Risks and benefits of tx options, Instructions for management, Patient and family education, Importance of tx compliance, Risk factor reductions, Impressions, Documenting in the medical record, Reviewing/placing orders in the medical record (including tests, medications, and/or procedures), and Obtaining or reviewing history  .  "

## 2025-02-24 ENCOUNTER — TELEPHONE (OUTPATIENT)
Dept: ADMINISTRATIVE | Facility: OTHER | Age: 50
End: 2025-02-24

## 2025-02-24 ENCOUNTER — OFFICE VISIT (OUTPATIENT)
Dept: OBGYN CLINIC | Facility: HOSPITAL | Age: 50
End: 2025-02-24
Payer: MEDICARE

## 2025-02-24 VITALS — WEIGHT: 248 LBS | BODY MASS INDEX: 34.72 KG/M2 | HEIGHT: 71 IN

## 2025-02-24 DIAGNOSIS — G56.02 CARPAL TUNNEL SYNDROME ON LEFT: Primary | ICD-10-CM

## 2025-02-24 PROCEDURE — 99213 OFFICE O/P EST LOW 20 MIN: CPT | Performed by: SURGERY

## 2025-02-24 PROCEDURE — 20526 THER INJECTION CARP TUNNEL: CPT | Performed by: SURGERY

## 2025-02-24 RX ORDER — LIDOCAINE HYDROCHLORIDE 10 MG/ML
1 INJECTION, SOLUTION INFILTRATION; PERINEURAL
Status: COMPLETED | OUTPATIENT
Start: 2025-02-24 | End: 2025-02-24

## 2025-02-24 RX ORDER — DEXAMETHASONE SODIUM PHOSPHATE 10 MG/ML
40 INJECTION, SOLUTION INTRAMUSCULAR; INTRAVENOUS
Status: COMPLETED | OUTPATIENT
Start: 2025-02-24 | End: 2025-02-24

## 2025-02-24 RX ADMIN — LIDOCAINE HYDROCHLORIDE 1 ML: 10 INJECTION, SOLUTION INFILTRATION; PERINEURAL at 11:15

## 2025-02-24 RX ADMIN — DEXAMETHASONE SODIUM PHOSPHATE 40 MG: 10 INJECTION, SOLUTION INTRAMUSCULAR; INTRAVENOUS at 11:15

## 2025-02-24 NOTE — TELEPHONE ENCOUNTER
Upon review of the In Basket request we were able to locate, review, and update the patient chart as requested for CRC: Colonoscopy.    Any additional questions or concerns should be emailed to the Practice Liaisons via the appropriate education email address, please do not reply via In Basket.    Thank you  Asif Sesay MA   [facility]

## 2025-02-24 NOTE — PROGRESS NOTES
ASSESSMENT/PLAN:      Assessment & Plan  Carpal tunnel syndrome on left  After review of the patient's past medical history due to strokelike symptoms, we were able to discuss treatment options.  Patient wished to proceed with a cortisone injection into the left carpal tunnel today, which she tolerated well.  Patient will continue to treat nonoperatively with bracing at night.  We will follow with the patient as needed         The patient verbalized understanding of exam findings and treatment plan. We engaged in the shared decision-making process and treatment options were discussed at length with the patient. Surgical and conservative management discussed today along with risks and benefits.    Diagnoses and all orders for this visit:    Carpal tunnel syndrome on left    Other orders  -     Hand/upper extremity injection          Follow Up:  Return if symptoms worsen or fail to improve.      To Do Next Visit:  Re-evaluation of current issue    ____________________________________________________________________________________________________________________________________________      CHIEF COMPLAINT:  Chief Complaint   Patient presents with    Follow-up     Follow up of the the left hand.        SUBJECTIVE:  Asher Medel is a 49 y.o. year old RHD male who presents today for a follow-up for his bilateral carpal tunnel syndrome; right is worse than left for him.  Cock up wrist brace was provided for the patient and cortisone injection was provided on 1/20/2025.  Patient reports he is having numbness and tingling into the median nerve distribution of the left hand along with cramping.  Patient is wearing the cock-up wrist brace for the left side at night along with the right side.  Patient states that he is still recovering from a stroke that affected the left side where he was seen at [facility] on 2/8/2025.        I have personally reviewed all the relevant PMH, PSH, SH, FH, Medications and allergies.     PAST  "MEDICAL HISTORY:  Past Medical History:   Diagnosis Date    Ankylosing spondylitis (HCC)     Arthritis     Chronic pain     DJD (degenerative joint disease)     Herniated disc, cervical     Narcolepsy     Psoriatic arthritis (HCC)     Seizures (HCC)     Sleep apnea     Sleep apnea        PAST SURGICAL HISTORY:  Past Surgical History:   Procedure Laterality Date    IMPLANTATION / PLACEMENT EPIDURAL NEUROSTIMULATOR ELECTRODES      INTRATHECAL PUMP IMPLANTATION Left     morphine pump    RHINOPLASTY      TONSILLECTOMY         FAMILY HISTORY:  Family History   Problem Relation Age of Onset    Cancer Mother     Diabetes Father     Cancer Father     Heart disease Father     Narcolepsy Father     Sleep apnea Father     Narcolepsy Paternal Uncle     Narcolepsy Paternal Grandmother        SOCIAL HISTORY:  Social History     Tobacco Use    Smoking status: Never    Smokeless tobacco: Never   Vaping Use    Vaping status: Never Used   Substance Use Topics    Alcohol use: Never    Drug use: No       MEDICATIONS:    Current Outpatient Medications:     abatacept (Orencia) 250 mg, Infuse 1,000 mg into a venous catheter every 28 days, Disp: , Rfl:     atorvastatin (LIPITOR) 40 mg tablet, Take 1 tablet (40 mg total) by mouth every evening, Disp: 90 tablet, Rfl: 2    clobetasol (TEMOVATE) 0.05 % cream, Apply topically 2 (two) times a day, Disp: , Rfl:     clopidogrel (PLAVIX) 75 mg tablet, Take 1 tablet (75 mg total) by mouth daily, Disp: 90 tablet, Rfl: 2    EPINEPHrine (EPIPEN) 0.3 mg/0.3 mL SOAJ, Inject 0.3 mL (0.3 mg total) into a muscle once for 1 dose, Disp: 0.6 mL, Rfl: 0    fluticasone (FLONASE) 50 mcg/act nasal spray, 1 spray into each nostril 2 (two) times a day, Disp: , Rfl:     Insulin Syringe-Needle U-100 27.5G X 5/8\" 2 ML MISC, 3 syringes weekly for allergy injections, Disp: , Rfl:     LINZESS 145 MCG CAPS, 1 (one) Capsule daily on an empty stomach, at least 30 mn before first meal, Disp: , Rfl: 0    NON FORMULARY, Inject " 0.5 mL under the skin once a week Administer 0.5 ml Subq of Serum #1 (Cat, Dog, Cockroach (CDCR)) weekly  Administer 0.5 ml Subq of Serum #2 (Tree, Tree, Weed) (TTW)) weekly, Disp: , Rfl:     NON FORMULARY, 0.3 mg Morphine pump, Disp: , Rfl:     sildenafil (VIAGRA) 50 MG tablet, Take 2 tablets (100 mg total) by mouth daily as needed for erectile dysfunction, Disp: 10 tablet, Rfl: 0    tiZANidine (ZANAFLEX) 2 mg tablet, Take 2 tablets (4 mg total) by mouth 4 (four) times a day as needed for muscle spasms, Disp: 240 tablet, Rfl: 4    venlafaxine (EFFEXOR) 37.5 mg tablet, Take 1 tablet (37.5 mg total) by mouth daily, Disp: 30 tablet, Rfl: 5    ALLERGIES:  Allergies   Allergen Reactions    Allyl Isothiocyanate Anaphylaxis    Bee Venom Anaphylaxis    Broccoli [Brassica Oleracea - Food Allergy] Anaphylaxis    Diclofenac Other (See Comments)     Pain in leg feels like I have a blood clot pt sttates  Bad stomach pains  Pain in leg feels like I have a blood clot pt sttates  Blood clots in legs painful  Bad stomach pains  Pain in leg feels like I have a blood clot pt sttates    Medical Tape Other (See Comments)     Skin edema redness    Methotrexate Palpitations     Heart palpatations  Heart palpatations  Heart palpatations    Mustard Seed - Food Allergy Anaphylaxis    Other Rash and Other (See Comments)     ADHESIVE TAPE  Skin edema redness    Acetaminophen Diarrhea, GI Intolerance and Abdominal Pain    Aspirin GI Intolerance     Other reaction(s): Nausea/Vomiting    Celecoxib Other (See Comments)     Muscle cramps  Darrius horses, swelling  Darrius horses, swelling  Muscle cramps  Darrius horses, swelling    Diclofenac Sodium      Bad stomach pains    Methotrexate Derivatives     Infliximab Rash     blindness    Penicillins Rash and Other (See Comments)     Other reaction(s): Unknown Reaction       REVIEW OF SYSTEMS:  Review of Systems    VITALS:  There were no vitals filed for this  "visit.      _____________________________________________________  PHYSICAL EXAMINATION:  General: well developed and well nourished, alert, oriented times 3, and appears comfortable  Psychiatric: Normal  HEENT: Normocephalic, Atraumatic Trachea Midline, No torticollis  Pulmonary: No audible wheezing or respiratory distress   Cardiovascular: No pitting edema, 2+ radial pulse   Abdominal/GI: abdomen non tender, non distended   Skin: No masses, erythema, lacerations, fluctation, ulcerations  Neurovascular: Sensation intact to the Ulnar Nerve, Sensation Intact to the Radial Nerve, Decreased Sensation to  the Median Nerve, Motor Intact to the Median, Ulnar, Radial Nerve, and Pulses Intact  Musculoskeletal: Normal, except as noted in detailed exam and in HPI.      MUSCULOSKELETAL EXAMINATION:      ___________________________________________________    STUDIES REVIEWED:      I have personally reviewed and interpreted  US of bilateral wrists reviewed from 1/13/2025 which demonstrate IMPRESSION:     RIGHT SIDE: Carpal tunnel syndrome ruled in based on marked abnormal CSA >/= 14 sq mm.   Maximum median nerve cross sectional area within carpal tunnel (CSAc): 20.0 sq mm.   LEFT SIDE: Carpal tunnel syndrome ruled in based on marked abnormal CSA >/= 14 sq mm.   Maximum median nerve cross sectional area within carpal tunnel (CSAc): 23.5 sq mm.     LABS REVIEWED:    HgA1c:   Lab Results   Component Value Date    HGBA1C 5.8 (H) 02/08/2025     BMP:   Lab Results   Component Value Date    CALCIUM 8.5 02/11/2025    K 4.1 02/11/2025    CO2 27 02/11/2025     02/11/2025    BUN 14 02/11/2025    CREATININE 0.82 02/11/2025             PROCEDURES PERFORMED:  Hand/upper extremity injection: L carpal tunnel  [place] Protocol:  Consent: Verbal consent obtained.  Risks and benefits: risks, benefits and alternatives were discussed  Consent given by: patient  Time out: Immediately prior to procedure a \"time out\" was called to verify the " correct patient, procedure, equipment, support staff and site/side marked as required.  Timeout called at: 2/24/2025 11:36 AM.  Patient understanding: patient states understanding of the procedure being performed  Site marked: the operative site was marked  Patient identity confirmed: verbally with patient  Supporting Documentation  Indications: tendon swelling and pain   Procedure Details  Condition:carpal tunnel syndrome Site: L carpal tunnel   Preparation: Patient was prepped and draped in the usual sterile fashion  Needle size: 25 G  Ultrasound guidance: no  Approach: volar  Medications administered: 1 mL lidocaine 1 %; 40 mg dexamethasone 100 mg/10 mL  Patient tolerance: patient tolerated the procedure well with no immediate complications  Dressing:  Sterile dressing applied       _____________________________________________________      Scribe Attestation      I,:  Val Duvall am acting as a scribe while in the presence of the attending physician.:       I,:  Soraya Morse MD personally performed the services described in this documentation    as scribed in my presence.:

## 2025-02-24 NOTE — ASSESSMENT & PLAN NOTE
After review of the patient's past medical history due to strokelike symptoms, we were able to discuss treatment options.  Patient wished to proceed with a cortisone injection into the left carpal tunnel today, which she tolerated well.  Patient will continue to treat nonoperatively with bracing at night.  We will follow with the patient as needed        Pt presents with right flank pain that started yesterday. He endorses blood in urine. He states he has a hx of kidney stones with his last one being in July and this feels similar. Pt took 2mg of hydromorphone around 00:00 this evening.     In transit to pts room pt mentioned he has a hx of pericarditis and that he is experiencing some mild chest pain. ED tech notified to obtain EKG.

## 2025-02-24 NOTE — TELEPHONE ENCOUNTER
----- Message from Venessa PETER sent at 2/21/2025  1:10 PM EST -----  Regarding: CRC  02/21/25 1:10 PM    Hello, our patient Asher Medel has had CRC: Colonoscopy completed/performed. Please assist in updating the patient chart by making an External outreach to Dr Saba/[facility] located in [place], [place]. The date of service is about 2 years ago.    Thank you,  Venessa Morton MA  [facility]

## 2025-03-01 PROBLEM — D68.51 HETEROZYGOUS FACTOR V LEIDEN MUTATION (HCC): Status: ACTIVE | Noted: 2025-03-01

## 2025-03-01 NOTE — PROGRESS NOTES
"Name: Asher Medel      : 1975      MRN: [id number]  Encounter Provider: Anjana Covarrubias MD  Encounter Date: 3/11/2025   Encounter department[facility]  :  Assessment & Plan  Heterozygous factor V Leiden mutation (HCC)  This is a 49M with recent admission for stroke symptoms without clearly defined VTE/CVA. 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 IN the absence of VTE, there is no indication for anti-coagulation. Will look to rule out APS, otherwise no further heme w/u required. Pt can see us in the heme clinic prn. He is at increased risk for VTE furthermore due to obesity and lifestyle modifications were recommended to achieve a BMI <30  Orders:    CBC and differential; Future  B2 glycoprotein Ab and Cardiolipin Ab to rule out APS  Factor V Leiden mutation (HCC)    Orders:    Ambulatory Referral to Hematology / Oncology    Anjana Covarrubias MD      No follow-ups on file.    History of Present Illness   Chief Complaint   Patient presents with    Consult     2025: WBC 11k, FVL Heterozygous trait, no lupus Antocoagulant found    Pertinent Medical History      25: CVA, HLP     Review of Systems  Denies F/C, N/V, CP, LH, HA, gen weakness, melena, hematuria, hematochezia, falls, diarrhea, or constipation    He has moderate intermittent SOB at rest, PENA: no pattern to it. Seeing Pulm  He has ongoing rash from his psoriasis.       Objective   /80 (BP Location: Left arm, Patient Position: Sitting, Cuff Size: Adult)   Pulse 73   Temp (!) 97.4 °F (36.3 °C) (Temporal)   Resp 16   Ht 5' 11\" (1.803 m)   Wt 112 kg (247 lb)   SpO2 96%   BMI 34.45 kg/m²     Physical Exam      Physical exam:  General:  Appears in no distress, sitting up in bed  Neuro:  Speaks in full sentences, no motor deficits noted. Has diminished fine tough sensation on the L side   Pulmonary:  No " cyanosis, no accessory muscle use  Cardiovascular: Regular rate, no abnormal rhythm noted  GI:  Appears nondistended, no masses noted  Extremities:  No cyanosis  Psychiatry:  Normal mood with congruent affect  Ear nose and throat:  Atraumatic, extraocular muscles intact    ____  Labs: I have reviewed the following labs:  Results for orders placed or performed in visit on 02/12/25   POCT Measure PVR   Result Value Ref Range    POST-VOID RESIDUAL VOLUME, ML POC 0 mL       Administrative Statements   I have spent a total time of 46 minutes in caring for this patient on the day of the visit/encounter including Diagnostic results, Prognosis, Risk factor reductions, Counseling / Coordination of care, Documenting in the medical record, Reviewing/placing orders in the medical record (including tests, medications, and/or procedures), and Obtaining or reviewing history  .

## 2025-03-01 NOTE — ASSESSMENT & PLAN NOTE
This is a 49M with recent admission for stroke symptoms without clearly defined VTE/CVA. 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 IN the absence of VTE, there is no indication for anti-coagulation. Will look to rule out APS, otherwise no further heme w/u required. Pt can see us in the heme clinic prn. He is at increased risk for VTE furthermore due to obesity and lifestyle modifications were recommended to achieve a BMI <30  Orders:    CBC and differential; Future  B2 glycoprotein Ab and Cardiolipin Ab to rule out APS

## 2025-03-11 ENCOUNTER — CONSULT (OUTPATIENT)
Dept: HEMATOLOGY ONCOLOGY | Facility: CLINIC | Age: 50
End: 2025-03-11
Payer: MEDICARE

## 2025-03-11 VITALS
RESPIRATION RATE: 16 BRPM | HEIGHT: 71 IN | DIASTOLIC BLOOD PRESSURE: 80 MMHG | TEMPERATURE: 97.4 F | WEIGHT: 247 LBS | OXYGEN SATURATION: 96 % | BODY MASS INDEX: 34.58 KG/M2 | HEART RATE: 73 BPM | SYSTOLIC BLOOD PRESSURE: 126 MMHG

## 2025-03-11 DIAGNOSIS — I63.9 CEREBROVASCULAR ACCIDENT (CVA), UNSPECIFIED MECHANISM (HCC): ICD-10-CM

## 2025-03-11 DIAGNOSIS — D68.51 HETEROZYGOUS FACTOR V LEIDEN MUTATION (HCC): Primary | ICD-10-CM

## 2025-03-11 DIAGNOSIS — D68.51 FACTOR V LEIDEN MUTATION (HCC): ICD-10-CM

## 2025-03-11 PROCEDURE — 99204 OFFICE O/P NEW MOD 45 MIN: CPT | Performed by: INTERNAL MEDICINE

## 2025-03-17 ENCOUNTER — CONSULT (OUTPATIENT)
Age: 50
End: 2025-03-17
Payer: MEDICARE

## 2025-03-17 VITALS
WEIGHT: 247 LBS | HEART RATE: 75 BPM | OXYGEN SATURATION: 97 % | BODY MASS INDEX: 34.58 KG/M2 | RESPIRATION RATE: 18 BRPM | TEMPERATURE: 98.2 F | SYSTOLIC BLOOD PRESSURE: 144 MMHG | DIASTOLIC BLOOD PRESSURE: 86 MMHG | HEIGHT: 71 IN

## 2025-03-17 DIAGNOSIS — Z01.812 PRE-OPERATIVE LABORATORY EXAMINATION: ICD-10-CM

## 2025-03-17 DIAGNOSIS — Z79.01 LONG TERM (CURRENT) USE OF ANTICOAGULANTS: ICD-10-CM

## 2025-03-17 DIAGNOSIS — Z96.89 SPINAL CORD STIMULATOR STATUS: ICD-10-CM

## 2025-03-17 DIAGNOSIS — T85.192A SPINAL CORD STIMULATOR DYSFUNCTION (HCC): Primary | ICD-10-CM

## 2025-03-17 DIAGNOSIS — J45.20 MILD INTERMITTENT REACTIVE AIRWAY DISEASE: Primary | ICD-10-CM

## 2025-03-17 DIAGNOSIS — Z79.899 ENCOUNTER FOR LONG-TERM (CURRENT) USE OF MEDICATIONS: ICD-10-CM

## 2025-03-17 PROCEDURE — 99204 OFFICE O/P NEW MOD 45 MIN: CPT | Performed by: STUDENT IN AN ORGANIZED HEALTH CARE EDUCATION/TRAINING PROGRAM

## 2025-03-17 RX ORDER — CHLORHEXIDINE GLUCONATE ORAL RINSE 1.2 MG/ML
15 SOLUTION DENTAL ONCE
OUTPATIENT
Start: 2025-03-17 | End: 2025-03-17

## 2025-03-17 RX ORDER — ALBUTEROL SULFATE 90 UG/1
2 INHALANT RESPIRATORY (INHALATION) EVERY 6 HOURS PRN
Qty: 18 G | Refills: 3 | Status: SHIPPED | OUTPATIENT
Start: 2025-03-17

## 2025-03-17 RX ORDER — FLUTICASONE FUROATE 100 UG/1
1 POWDER RESPIRATORY (INHALATION) DAILY
Qty: 30 BLISTER | Refills: 3 | Status: SHIPPED | OUTPATIENT
Start: 2025-03-17 | End: 2025-04-16

## 2025-03-17 NOTE — PROGRESS NOTES
Name: Asher Medel      : 1975      MRN: [id number]  Encounter Provider: Franklin Craven MD  Encounter Date: 3/17/2025   Encounter department: [facility]  :  Assessment & Plan  Spinal cord stimulator dysfunction (HCC)  Asher Medel is a 49 year old male with a history of chronic pain and prior SCS placement (Quentin). I reviewed his most recent chest XR and this appears to be a paddle electrode. He reports minimal efficacy with his system and it has been dead for a number of years. Initially he reported worsening pain with his stimulation and very infrequently used the device. Recently he presented to the hospital with stroke like symptoms and was given TNK with resolution of these symptoms. He was precluded from undergoing an MRI given his non-functioning SCS. As such he is here to discuss removal.    Given that he had prior and potentially ongoing need for MR imaging and that his system is non functional pursuing removal is reasonable. We did discuss risks included injury to the thoracic spinal cord with resultant weakness, CSF leak, retained hardware is scarring precludes removal. The risk of removal in his case may be slightly higher given his prior need for a revision surgery and scarring from this. After discussion of the risks he would like to proceed. He also has an IT pain pump. We will order XR T and L spine today to evaluate the path of the catheter, but I imaging this will be low enough and contra-lateral to not preclude removal of his SCS. He signed consent in clinic today and we will work to establish a surgical date.   Orders:    Case request operating room: Removal of thoracic paddle electrode and right sided SCS battery; Standing    XR spine thoracic 2 vw; Future    X-ray lumbar spine 2 or 3 views; Future        History of Present Illness     HPI   Asher Medel is a 49 year old male with a history of chronic pain and prior SCS placement (  "Quentin). See assessment and plan.   Review of Systems   HENT:  Negative for trouble swallowing.    Genitourinary:  Negative for enuresis.   Musculoskeletal:  Positive for back pain (lbp across the back into hips and intermittent into legs). Negative for gait problem and myalgias.   Neurological:  Negative for weakness (intermittent) and numbness (intermittent).   Hematological:  Bruises/bleeds easily ([id number]).   All other systems reviewed and are negative.    I have personally reviewed the MA's review of systems and made changes as necessary.    Past Medical History   Past Medical History:   Diagnosis Date    Ankylosing spondylitis (HCC)     Arthritis     Chronic pain     DJD (degenerative joint disease)     Herniated disc, cervical     Narcolepsy     Psoriatic arthritis (HCC)     Seizures (HCC)     Sleep apnea     Sleep apnea      Past Surgical History:   Procedure Laterality Date    IMPLANTATION / PLACEMENT EPIDURAL NEUROSTIMULATOR ELECTRODES      INTRATHECAL PUMP IMPLANTATION Left     morphine pump    RHINOPLASTY      TONSILLECTOMY       Family History   Problem Relation Age of Onset    Cancer Mother     Diabetes Father     Cancer Father     Heart disease Father     Narcolepsy Father     Sleep apnea Father     Narcolepsy Paternal Uncle     Narcolepsy Paternal Grandmother      he reports that he has never smoked. He has never been exposed to tobacco smoke. He has never used smokeless tobacco. He reports that he does not drink alcohol and does not use drugs.  Current Outpatient Medications   Medication Instructions    atorvastatin (LIPITOR) 40 mg, Oral, Every evening    clobetasol (TEMOVATE) 0.05 % cream 2 times daily    clopidogrel (PLAVIX) 75 mg, Oral, Daily    EPINEPHrine (EPIPEN) 0.3 mg, Intramuscular, Once    fluticasone (FLONASE) 50 mcg/act nasal spray 1 spray, 2 times daily    Insulin Syringe-Needle U-100 27.5G X 5/8\" 2 ML MISC 3 syringes weekly for allergy injections    LINZESS 145 MCG CAPS 1 (one) " "Capsule daily on an empty stomach, at least 30 mn before first meal    morphine 5 mg/mL 50 mL PCA 1 mg/hr, Continuous    NON FORMULARY 0.5 mL, Weekly    NON FORMULARY 0.3 mg    Orencia 1,000 mg, Every 28 days    sildenafil (VIAGRA) 100 mg, Oral, Daily PRN    tiZANidine (ZANAFLEX) 4 mg, Oral, 4 times daily PRN    venlafaxine (EFFEXOR) 37.5 mg, Oral, Daily     Allergies   Allergen Reactions    Allyl Isothiocyanate Anaphylaxis    Bee Venom Anaphylaxis    Broccoli [Brassica Oleracea - Food Allergy] Anaphylaxis    Diclofenac Other (See Comments)     Pain in leg feels like I have a blood clot pt sttates  Bad stomach pains  Pain in leg feels like I have a blood clot pt sttates  Blood clots in legs painful  Bad stomach pains  Pain in leg feels like I have a blood clot pt sttates    Medical Tape Other (See Comments)     Skin edema redness    Methotrexate Palpitations     Heart palpatations  Heart palpatations  Heart palpatations    Mustard Seed - Food Allergy Anaphylaxis    Other Rash and Other (See Comments)     ADHESIVE TAPE  Skin edema redness    Acetaminophen Diarrhea, GI Intolerance and Abdominal Pain    Aspirin GI Intolerance     Other reaction(s): Nausea/Vomiting    Celecoxib Other (See Comments)     Muscle cramps  Darrius horses, swelling  Darrius horses, swelling  Muscle cramps  Darrius horses, swelling    Diclofenac Sodium      Bad stomach pains    Methotrexate Derivatives     Infliximab Rash     blindness    Penicillins Rash and Other (See Comments)     Other reaction(s): Unknown Reaction      Objective   Resp 18   Ht 5' 11\" (1.803 m)   Wt 112 kg (247 lb)   BMI 34.45 kg/m²     Physical Exam  Neurological Exam  Awake and alert  Oriented and appropriate  Grossly 5/5 throughout            "

## 2025-03-25 ENCOUNTER — TELEPHONE (OUTPATIENT)
Dept: NEUROSURGERY | Facility: CLINIC | Age: 50
End: 2025-03-25

## 2025-03-25 NOTE — TELEPHONE ENCOUNTER
03/28/2025-Called pt, he gave verbal consent to speak and schedule w/significant other (Casie Whitaker). Scheduled sx w/Dr Craven for 05/16/2025.    03/25/2025-CALLED PT, LMOM TO SCHEDULE SX.

## 2025-04-28 ENCOUNTER — APPOINTMENT (OUTPATIENT)
Dept: LAB | Facility: HOSPITAL | Age: 50
End: 2025-04-28
Payer: MEDICARE

## 2025-04-28 ENCOUNTER — HOSPITAL ENCOUNTER (OUTPATIENT)
Dept: RADIOLOGY | Facility: HOSPITAL | Age: 50
Discharge: HOME | End: 2025-04-28
Payer: MEDICARE

## 2025-04-28 ENCOUNTER — APPOINTMENT (OUTPATIENT)
Dept: LAB | Facility: HOSPITAL | Age: 50
End: 2025-04-28
Attending: STUDENT IN AN ORGANIZED HEALTH CARE EDUCATION/TRAINING PROGRAM
Payer: MEDICARE

## 2025-04-28 DIAGNOSIS — Z01.812 PRE-OPERATIVE LABORATORY EXAMINATION: ICD-10-CM

## 2025-04-28 DIAGNOSIS — Z96.89 SPINAL CORD STIMULATOR STATUS: ICD-10-CM

## 2025-04-28 DIAGNOSIS — Z79.01 LONG TERM (CURRENT) USE OF ANTICOAGULANTS: ICD-10-CM

## 2025-04-28 DIAGNOSIS — T85.192A SPINAL CORD STIMULATOR DYSFUNCTION (HCC): ICD-10-CM

## 2025-04-28 DIAGNOSIS — Z79.899 ENCOUNTER FOR LONG-TERM (CURRENT) USE OF MEDICATIONS: ICD-10-CM

## 2025-04-28 LAB
ALBUMIN SERPL BCG-MCNC: 4.1 G/DL (ref 3.5–5)
ALP SERPL-CCNC: 61 U/L (ref 34–104)
ALT SERPL W P-5'-P-CCNC: 17 U/L (ref 7–52)
ANION GAP SERPL CALCULATED.3IONS-SCNC: 7 MMOL/L (ref 4–13)
APTT PPP: 28 SECONDS (ref 23–34)
AST SERPL W P-5'-P-CCNC: 13 U/L (ref 13–39)
ATRIAL RATE: 86 BPM
BASOPHILS # BLD AUTO: 0.07 THOUSANDS/ÂΜL (ref 0–0.1)
BASOPHILS NFR BLD AUTO: 1 % (ref 0–1)
BILIRUB SERPL-MCNC: 0.67 MG/DL (ref 0.2–1)
BILIRUB UR QL STRIP: NEGATIVE
BUN SERPL-MCNC: 11 MG/DL (ref 5–25)
CALCIUM SERPL-MCNC: 9 MG/DL (ref 8.4–10.2)
CHLORIDE SERPL-SCNC: 106 MMOL/L (ref 96–108)
CLARITY UR: CLEAR
CO2 SERPL-SCNC: 26 MMOL/L (ref 21–32)
COLOR UR: YELLOW
CREAT SERPL-MCNC: 0.86 MG/DL (ref 0.6–1.3)
EOSINOPHIL # BLD AUTO: 0.31 THOUSAND/ÂΜL (ref 0–0.61)
EOSINOPHIL NFR BLD AUTO: 3 % (ref 0–6)
ERYTHROCYTE [DISTWIDTH] IN BLOOD BY AUTOMATED COUNT: 12.3 % (ref 11.6–15.1)
EST. AVERAGE GLUCOSE BLD GHB EST-MCNC: 117 MG/DL
GFR SERPL CREATININE-BSD FRML MDRD: 103 ML/MIN/1.73SQ M
GLUCOSE P FAST SERPL-MCNC: 107 MG/DL (ref 65–99)
GLUCOSE UR STRIP-MCNC: NEGATIVE MG/DL
HBA1C MFR BLD: 5.7 %
HCT VFR BLD AUTO: 45.3 % (ref 36.5–49.3)
HGB BLD-MCNC: 14.8 G/DL (ref 12–17)
HGB UR QL STRIP.AUTO: NEGATIVE
IMM GRANULOCYTES # BLD AUTO: 0.05 THOUSAND/UL (ref 0–0.2)
IMM GRANULOCYTES NFR BLD AUTO: 0 % (ref 0–2)
INR PPP: 1 (ref 0.85–1.19)
KETONES UR STRIP-MCNC: NEGATIVE MG/DL
LEUKOCYTE ESTERASE UR QL STRIP: NEGATIVE
LYMPHOCYTES # BLD AUTO: 3.2 THOUSANDS/ÂΜL (ref 0.6–4.47)
LYMPHOCYTES NFR BLD AUTO: 27 % (ref 14–44)
MCH RBC QN AUTO: 28.9 PG (ref 26.8–34.3)
MCHC RBC AUTO-ENTMCNC: 32.7 G/DL (ref 31.4–37.4)
MCV RBC AUTO: 89 FL (ref 82–98)
MONOCYTES # BLD AUTO: 0.79 THOUSAND/ÂΜL (ref 0.17–1.22)
MONOCYTES NFR BLD AUTO: 7 % (ref 4–12)
NEUTROPHILS # BLD AUTO: 7.31 THOUSANDS/ÂΜL (ref 1.85–7.62)
NEUTS SEG NFR BLD AUTO: 62 % (ref 43–75)
NITRITE UR QL STRIP: NEGATIVE
NRBC BLD AUTO-RTO: 0 /100 WBCS
P AXIS: 36 DEGREES
PH UR STRIP.AUTO: 6 [PH]
PLATELET # BLD AUTO: 322 THOUSANDS/UL (ref 149–390)
PMV BLD AUTO: 8.7 FL (ref 8.9–12.7)
POTASSIUM SERPL-SCNC: 3.5 MMOL/L (ref 3.5–5.3)
PR INTERVAL: 144 MS
PROT SERPL-MCNC: 7.3 G/DL (ref 6.4–8.4)
PROT UR STRIP-MCNC: NEGATIVE MG/DL
PROTHROMBIN TIME: 13.7 SECONDS (ref 12.3–15)
QRS AXIS: 41 DEGREES
QRSD INTERVAL: 82 MS
QT INTERVAL: 368 MS
QTC INTERVAL: 441 MS
RBC # BLD AUTO: 5.12 MILLION/UL (ref 3.88–5.62)
SODIUM SERPL-SCNC: 139 MMOL/L (ref 135–147)
SP GR UR STRIP.AUTO: 1.02 (ref 1–1.03)
T WAVE AXIS: 35 DEGREES
UROBILINOGEN UR STRIP-ACNC: <2 MG/DL
VENTRICULAR RATE: 86 BPM
WBC # BLD AUTO: 11.73 THOUSAND/UL (ref 4.31–10.16)

## 2025-04-28 PROCEDURE — 85730 THROMBOPLASTIN TIME PARTIAL: CPT

## 2025-04-28 PROCEDURE — 85025 COMPLETE CBC W/AUTO DIFF WBC: CPT

## 2025-04-28 PROCEDURE — 72100 X-RAY EXAM L-S SPINE 2/3 VWS: CPT

## 2025-04-28 PROCEDURE — 83036 HEMOGLOBIN GLYCOSYLATED A1C: CPT

## 2025-04-28 PROCEDURE — 72070 X-RAY EXAM THORAC SPINE 2VWS: CPT

## 2025-04-28 PROCEDURE — 36415 COLL VENOUS BLD VENIPUNCTURE: CPT

## 2025-04-28 PROCEDURE — 85610 PROTHROMBIN TIME: CPT

## 2025-04-28 PROCEDURE — 93005 ELECTROCARDIOGRAM TRACING: CPT

## 2025-04-28 PROCEDURE — 80053 COMPREHEN METABOLIC PANEL: CPT

## 2025-04-28 PROCEDURE — 93010 ELECTROCARDIOGRAM REPORT: CPT | Performed by: INTERNAL MEDICINE

## 2025-04-28 PROCEDURE — 81003 URINALYSIS AUTO W/O SCOPE: CPT

## 2025-04-29 ENCOUNTER — CONSULT (OUTPATIENT)
Dept: FAMILY MEDICINE CLINIC | Facility: CLINIC | Age: 50
End: 2025-04-29
Payer: MEDICARE

## 2025-04-29 VITALS
HEART RATE: 83 BPM | OXYGEN SATURATION: 96 % | WEIGHT: 250 LBS | BODY MASS INDEX: 34.87 KG/M2 | TEMPERATURE: 98.4 F | DIASTOLIC BLOOD PRESSURE: 86 MMHG | SYSTOLIC BLOOD PRESSURE: 128 MMHG

## 2025-04-29 DIAGNOSIS — Z01.818 PREOPERATIVE CLEARANCE: Primary | ICD-10-CM

## 2025-04-29 DIAGNOSIS — T85.192D MALFUNCTION OF SPINAL CORD STIMULATOR, SUBSEQUENT ENCOUNTER: ICD-10-CM

## 2025-04-29 PROCEDURE — G2211 COMPLEX E/M VISIT ADD ON: HCPCS

## 2025-04-29 PROCEDURE — 99214 OFFICE O/P EST MOD 30 MIN: CPT

## 2025-04-29 NOTE — PROGRESS NOTES
Presurgical Evaluation    Subjective:      Patient ID: Asher Medel is a 49 y.o. male.    Chief Complaint   Patient presents with    Pre-op Exam     Removing spinal cord stimulator, operation is done at [facility]       Pre-op Exam  Surgery: Removal of thoracic paddle electrode and right sided SCS battery  Anticipated Date of Surgery: 5/16/25  Surgeon: Franklin Craven MD    Previous history of bleeding disorders or clots?: Yes  Previous Anesthesia reaction?: No  Prolonged steroid use in the last 6 months?: No    Assessment of Cardiac Risk:   - Unstable or severe angina or MI in the last 6 weeks or history of stent placement in the last year?: No   - Decompensated heart failure (e.g. New onset heart failure, NYHA  Class IV heart failure, or worsening existing heart failure)?: No  - Significant arrhythmias such as high grade AV block, symptomatic ventricular arrhythmia, newly recognized ventricular tachycardia, supraventricular tachycardia with resting heart rate >100, or symptomatic bradycardia?: No  - Severe heart valve disease including aortic stenosis or symptomatic mitral stenosis?: No      Pre-operative Risk Factors:  Elevated-risk surgery: Yes    History of cerebrovascular disease: Yes  History of ischemic heart disease: No  Pre-operative treatment with insulin: No  Pre-operative creatinine >2 mg/dL: No    History of congestive heart failure: No    Medications of Perioperative Concern:   Anti-platelet (aspirin, clopidogrel, ticagrelor, prasugrel, cilostazol, dipyridamole)      The following portions of the patient's history were reviewed and updated as appropriate: allergies, current medications, past family history, past medical history, past social history, past surgical history, and problem list.    Procedure date: May 16, 2025    Surgeon: Franklin Craven MD   Planned procedure:  Removal of thoracic paddle electrode and right sided SCS battery   Diagnosis for procedure:  Spinal cord stimulator dysfunction      Prior anesthesia: Yes   General; Complications:  None / Tolerated well  MAC; Complications:  None / Tolerated well  Local; Complications:  None / Tolerated well    CAD History: None   NOTE: Patient should see Cardiology if time available before surgery, and if appropriate.    Pulmonary History: STEPHANIE (Sleep Apnea)    Renal history: None    Diabetes History:  None     Neurological History: CVA     On Immunosuppressant meds/biologics: Yes      Review of Systems   Constitutional:  Positive for fatigue. Negative for activity change and fever.   HENT:  Negative for congestion, ear pain, rhinorrhea and sore throat.    Eyes:  Negative for pain.   Respiratory:  Negative for cough, shortness of breath and wheezing.    Cardiovascular:  Negative for chest pain and leg swelling.   Gastrointestinal:  Negative for abdominal pain, diarrhea, nausea and vomiting.   Musculoskeletal:  Positive for back pain. Negative for arthralgias and myalgias.   Skin:  Negative for rash.   Neurological:  Negative for dizziness, weakness and numbness.   Psychiatric/Behavioral:  Negative for dysphoric mood and suicidal ideas. The patient is not nervous/anxious.    All other systems reviewed and are negative.        Current Outpatient Medications   Medication Sig Dispense Refill    abatacept (Orencia) 250 mg Infuse 1,000 mg into a venous catheter every 28 days      albuterol (Ventolin HFA) 90 mcg/act inhaler Inhale 2 puffs every 6 (six) hours as needed for wheezing 18 g 3    atorvastatin (LIPITOR) 40 mg tablet Take 1 tablet (40 mg total) by mouth every evening 90 tablet 2    clobetasol (TEMOVATE) 0.05 % cream Apply topically 2 (two) times a day      clopidogrel (PLAVIX) 75 mg tablet Take 1 tablet (75 mg total) by mouth daily 90 tablet 2    EPINEPHrine (EPIPEN) 0.3 mg/0.3 mL SOAJ Inject 0.3 mL (0.3 mg total) into a muscle once for 1 dose (Patient taking differently: Inject 0.3 mg into a muscle as needed) 0.6 mL 0    fluticasone (FLONASE) 50 mcg/act  "nasal spray 1 spray into each nostril 2 (two) times a day      Insulin Syringe-Needle U-100 27.5G X 5/8\" 2 ML MISC 3 syringes weekly for allergy injections      LINZESS 145 MCG CAPS 1 (one) Capsule daily on an empty stomach, at least 30 mn before first meal  0    morphine 5 mg/mL 50 mL PCA Inject 1 mg/hr into a catheter in a vein continuous      NON FORMULARY Inject 0.5 mL under the skin once a week Administer 0.5 ml Subq of Serum #1 (Cat, Dog, Cockroach (CDCR)) weekly    Administer 0.5 ml Subq of Serum #2 (Tree, Tree, Weed) (TTW)) weekly      NON FORMULARY 0.3 mg Morphine pump      sildenafil (VIAGRA) 50 MG tablet Take 2 tablets (100 mg total) by mouth daily as needed for erectile dysfunction 10 tablet 0    tiZANidine (ZANAFLEX) 2 mg tablet Take 2 tablets (4 mg total) by mouth 4 (four) times a day as needed for muscle spasms 240 tablet 4    venlafaxine (EFFEXOR) 37.5 mg tablet Take 1 tablet (37.5 mg total) by mouth daily 30 tablet 5    fluticasone (Arnuity Ellipta) 100 MCG/ACT AEPB inhaler Inhale 1 puff daily Rinse mouth after use. 30 blister 3     No current facility-administered medications for this visit.       Allergies on file:   Allyl isothiocyanate, Bee venom, Broccoli [brassica oleracea - food allergy], Diclofenac, Medical tape, Methotrexate, Mustard seed - food allergy, Other, Acetaminophen, Aspirin, Celecoxib, Diclofenac sodium, Methotrexate derivatives, Infliximab, and Penicillins    Patient Active Problem List   Diagnosis    Idiopathic hypersomnia    Obstructive sleep apnea treated with continuous positive airway pressure (CPAP)    Chronic pain    Primary narcolepsy with cataplexy    Closed fracture of sesamoid bone of right foot    Psoriasis with arthropathy (HCC)    Ankylosing spondylitis (HCC)    Allergic rhinitis    DDD (degenerative disc disease), lumbosacral    Fatigue    Herniated cervical disc    Mixed hyperlipidemia    Neuropathy, peripheral    Obesity    Stroke-like symptoms    Excessive " daytime sleepiness    Class 2 obesity with body mass index (BMI) of 36.0 to 36.9 in adult    Carpal tunnel syndrome on left    Carpal tunnel syndrome on right    Lateral epicondylitis of right elbow    Obesity, morbid (HCC)    Dysuria    Seizure disorder ([facility])    Heterozygous factor V Leiden mutation ([facility])        Past Medical History:   Diagnosis Date    Ankylosing spondylitis ([facility])     Arthritis     Chronic pain     DJD (degenerative joint disease)     Herniated disc, cervical     Narcolepsy     Psoriatic arthritis ([facility])     Seizures ([facility])     Sleep apnea     Sleep apnea        Past Surgical History:   Procedure Laterality Date    IMPLANTATION / PLACEMENT EPIDURAL NEUROSTIMULATOR ELECTRODES      INTRATHECAL PUMP IMPLANTATION Left     morphine pump    RHINOPLASTY      TONSILLECTOMY         Family History   Problem Relation Age of Onset    Cancer Mother     Diabetes Father     Cancer Father     Heart disease Father     Narcolepsy Father     Sleep apnea Father     Narcolepsy Paternal Uncle     Narcolepsy Paternal Grandmother        Social History     Tobacco Use    Smoking status: Never     Passive exposure: Never    Smokeless tobacco: Never   Vaping Use    Vaping status: Never Used   Substance Use Topics    Alcohol use: Never    Drug use: No       Objective:    Vitals:    04/29/25 1041   BP: 128/86   BP Location: Left arm   Patient Position: Sitting   Cuff Size: Standard   Pulse: 83   Temp: 98.4 °F (36.9 °C)   TempSrc: Tympanic   SpO2: 96%   Weight: 113 kg (250 lb)        Physical Exam  Vitals and nursing note reviewed.   Constitutional:       General: He is not in acute distress.     Appearance: Normal appearance. He is not ill-appearing.   HENT:      Head: Normocephalic.      Right Ear: External ear normal.      Left Ear: External ear normal.   Cardiovascular:      Rate and Rhythm: Normal rate and regular rhythm.      Heart sounds: Normal heart sounds. No murmur heard.  Pulmonary:      Effort: Pulmonary effort is  normal. No respiratory distress.      Breath sounds: Normal breath sounds. No wheezing.   Abdominal:      General: Bowel sounds are normal. There is no distension.      Palpations: Abdomen is soft.   Skin:     General: Skin is warm and dry.   Neurological:      General: No focal deficit present.      Mental Status: He is alert and oriented to person, place, and time. Mental status is at baseline.   Psychiatric:         Attention and Perception: Attention and perception normal.         Mood and Affect: Mood and affect normal. Mood is not anxious or depressed.         Speech: Speech normal.         Behavior: Behavior normal. Behavior is cooperative.         Thought Content: Thought content normal. Thought content does not include suicidal ideation. Thought content does not include suicidal plan.           Preop labs/testing available and reviewed: yes    eGFR   Date Value Ref Range Status   04/28/2025 103 ml/min/1.73sq m Final     WBC   Date Value Ref Range Status   04/28/2025 11.73 (H) 4.31 - 10.16 Thousand/uL Final     Hemoglobin   Date Value Ref Range Status   04/28/2025 14.8 12.0 - 17.0 g/dL Final     Hematocrit   Date Value Ref Range Status   04/28/2025 45.3 36.5 - 49.3 % Final     Platelets   Date Value Ref Range Status   04/28/2025 322 149 - 390 Thousands/uL Final     INR   Date Value Ref Range Status   04/28/2025 1.00 0.85 - 1.19 Final       EKG yes    Echo no    Stress test/cath no    PFT/[place] no    Functional capacity: Singles tennis                       7-12 Mets   Pick the highest level patient can comfortably perform   4 mets or greater for surgery    RCRI  High Risk surgery?         1 Point  CAD History:         1 Point   MI; Positive Stress Test; CP due to Mi;  Nitrate Usage to control Angina; Pathologic Q wave on EKG  CHF Active:         1 Point   Pulm Edema; Paroxysmal Nocturnal Dyspnea;  Bibasilar Rales (crackles);S3; CHF on CXR  Cerebrovascular Disease (TIA or CVA):     1 Point  DM on  "Insulin:        1 Point  Serum Creat >2.0 mg/dl:       1 Point          Total Points: 1     Scorin: Class I, Very Low Risk (0.4%)     1: Class II, Low risk (0.9%)     2: Class III Moderate (6.6%)     3: Class IV High (>11%)      CHRISTIANA Risk:  GFR:   eGFR   Date Value Ref Range Status   2025 103 ml/min/1.73sq m Final         For PCP:  If GFR>60, Hold ACE/ARB/Diuretic on the day of surgery, and NSAIDS 10 days before.    If GFR<45, Consider PRE and POST op Nephrology Consult.    If 46 <GFR> 59 : Has Patient had CHRISTIANA in last 6 Months? no   If YES: Preop Nephrology consult   If No:  Post Op Nephrology consult.           Assessment/Plan:    Patient is medically optimized (cleared) for the planned procedure.    Further testing/evaluation is not required.    Postop concerns: no    Problem List Items Addressed This Visit    None  Visit Diagnoses         Preoperative clearance    -  Primary      Malfunction of spinal cord stimulator, subsequent encounter                   Problem List Items Addressed This Visit    None  Visit Diagnoses         Preoperative clearance    -  Primary      Malfunction of spinal cord stimulator, subsequent encounter                  Pre-Surgery Instructions:   Medication Instructions    abatacept (Orencia) 250 mg Has not been taking    albuterol (Ventolin HFA) 90 mcg/act inhaler per anesthesia guidelines     atorvastatin (LIPITOR) 40 mg tablet per anesthesia guidelines     clobetasol (TEMOVATE) 0.05 % cream per anesthesia guidelines     clopidogrel (PLAVIX) 75 mg tablet Stop taking 7 days prior to surgery    EPINEPHrine (EPIPEN) 0.3 mg/0.3 mL SOAJ per anesthesia guidelines     fluticasone (FLONASE) 50 mcg/act nasal spray per anesthesia guidelines     Insulin Syringe-Needle U-100 27.5G X 5/8\" 2 ML MISC per anesthesia guidelines     LINZESS 145 MCG CAPS per anesthesia guidelines     morphine 5 mg/mL 50 mL PCA per anesthesia guidelines     NON FORMULARY per anesthesia guidelines     NON " FORMULARY per anesthesia guidelines     sildenafil (VIAGRA) 50 MG tablet per anesthesia guidelines     tiZANidine (ZANAFLEX) 2 mg tablet per anesthesia guidelines     venlafaxine (EFFEXOR) 37.5 mg tablet per anesthesia guidelines

## 2025-05-05 ENCOUNTER — OFFICE VISIT (OUTPATIENT)
Age: 50
End: 2025-05-05
Payer: MEDICARE

## 2025-05-05 VITALS
HEART RATE: 67 BPM | WEIGHT: 255.4 LBS | TEMPERATURE: 97.9 F | HEIGHT: 71 IN | DIASTOLIC BLOOD PRESSURE: 80 MMHG | OXYGEN SATURATION: 97 % | SYSTOLIC BLOOD PRESSURE: 142 MMHG | BODY MASS INDEX: 35.76 KG/M2

## 2025-05-05 DIAGNOSIS — Z01.811 ENCOUNTER FOR PREOPERATIVE PULMONARY EXAMINATION: ICD-10-CM

## 2025-05-05 DIAGNOSIS — J45.20 MILD INTERMITTENT REACTIVE AIRWAY DISEASE WITHOUT COMPLICATION: ICD-10-CM

## 2025-05-05 DIAGNOSIS — R06.02 SHORTNESS OF BREATH: ICD-10-CM

## 2025-05-05 DIAGNOSIS — G47.33 OSA (OBSTRUCTIVE SLEEP APNEA): ICD-10-CM

## 2025-05-05 DIAGNOSIS — J45.20 MILD INTERMITTENT REACTIVE AIRWAY DISEASE WITHOUT COMPLICATION: Primary | ICD-10-CM

## 2025-05-05 PROBLEM — J45.909 REACTIVE AIRWAY DISEASE WITHOUT COMPLICATION: Status: ACTIVE | Noted: 2025-05-05

## 2025-05-05 PROCEDURE — G2211 COMPLEX E/M VISIT ADD ON: HCPCS

## 2025-05-05 PROCEDURE — 99213 OFFICE O/P EST LOW 20 MIN: CPT

## 2025-05-05 RX ORDER — BUDESONIDE 0.5 MG/2ML
0.5 INHALANT ORAL 2 TIMES DAILY
Qty: 120 ML | Refills: 6 | Status: SHIPPED | OUTPATIENT
Start: 2025-05-05 | End: 2025-05-05 | Stop reason: SDUPTHER

## 2025-05-05 RX ORDER — BUDESONIDE 0.5 MG/2ML
0.5 INHALANT ORAL 2 TIMES DAILY
Qty: 120 ML | Refills: 6 | Status: SHIPPED | OUTPATIENT
Start: 2025-05-05

## 2025-05-05 NOTE — PROGRESS NOTES
Follow-up  Visit - Pulmonary Medicine   Name: Asher Medel      : 1975      MRN: [id number]  Encounter Provider: AVA Richardson  Encounter Date: 2025   Encounter department: [facility]  :  Assessment & Plan  Mild intermittent reactive airway disease without complication  PFTS reviewed with restriction noted. No significant bronchodilator response.    He was recently prescribed Airsupra, however not picked up as he states costs over $500  Has been using Arnuity 1 puff once a day.  He notices significant improvement in his symptoms.  However he does state the inhaler is quite expensive and asking if there is any alternatives.  He states per his insurance Arnuity is best covered ICS inhaler.    We discussed alternate therapy would be nebulized ICS.  He is willing to try this.  I will order Pulmicort nebulized solution and nebulizer machine.  Discussed with patient he should use this twice a day and continue to wash mouth out after use.   Continue albuterol HFA prn   Orders:    budesonide (Pulmicort) 0.5 mg/2 mL nebulizer solution; Take 2 mL (0.5 mg total) by nebulization 2 (two) times a day Rinse mouth after use.    Nebulizer    Nebulizer Supplies    Shortness of breath  Likely multifactorial in setting of reactive airway disease, body habitus, chronic pain due to ankylosing spondylitis  Reviewed his most recent CXR which is unremarkable   If his symptoms worsen, could consider CT chest. Not indicated at this time.        Encounter for preoperative pulmonary examination  He is scheduled for removal of spinal cord stimulator with Dr Craven 25  GAYLA postoperative respiratory failure risk assessment: 0.4% risk of mechanical ventilation > 48 hrs after surgery, or unplanned intubation < 30 days of surgery  ARISCAT score for postoperative pulmonary complication: 24 pts, 1.6% low risk of in-hospital postoperative complications.  Patient is likely at low risk for  pulmonary complications related to surgery given underlying reactive airway disease.  Discussed pulmonary complications with the patient including and not limited to aspiration pneumonia, atelectasis, pleural effusions, pneumothorax, infection, respiratory failure/prolonged intubation, hypoxemia, exacerbation of underlying pulmonary disease.  Discussed decreasing risk for pulmonary complications following the procedure including early ambulation, out of bed to chair postoperatively, and use of incentive spirometer to prevent pneumonia. Please use inhaler morning prior to surgery.       STEPHANIE (obstructive sleep apnea)  Continue CPAP qhs  Sleep med follow up w/ Dr Macedo next month            Return in about 3 months (around 8/5/2025) for Next scheduled follow up with physician.    History of Present Illness   Asher Medel is a 49 y.o. male PMH including psoriatic arthritis, ankylosing spondylitis, STEPHANIE, narcolepsy, obesity who presents for follow up/preop clearance.    He states since starting Arnuity, the chest heaviness has significantly improved. He has also noted improvement in his intermittent shortness of breath. He is compliant with the Arnuity once daily, however does note it costs him several hundred dollars/month and is interested in alternatives. He is using albuterol much less often than before. When he does have shortness of breath it occurs very randomly and has no known trigger.     His wife accompanies him to the visit today and offers portions of history.    Review of Systems   Constitutional:  Negative for chills and fever.   HENT:  Negative for ear pain and sore throat.    Eyes:  Negative for pain and visual disturbance.   Respiratory:  Positive for cough (occasional). Negative for chest tightness and shortness of breath.    Cardiovascular:  Negative for chest pain and palpitations.   Musculoskeletal:  Negative for arthralgias and back pain.   Skin:  Negative for color change and rash.   Neurological:  " Negative for syncope and light-headedness.   All other systems reviewed and are negative.      Aside from what is mentioned in the HPI, ROS is otherwise negative    Medical History Reviewed by provider this encounter:  Tobacco  Allergies  Meds  Problems  Med Hx  Surg Hx  Fam Hx     .  Current Outpatient Medications on File Prior to Visit   Medication Sig Dispense Refill    abatacept (Orencia) 250 mg Infuse 1,000 mg into a venous catheter every 28 days      albuterol (Ventolin HFA) 90 mcg/act inhaler Inhale 2 puffs every 6 (six) hours as needed for wheezing 18 g 3    atorvastatin (LIPITOR) 40 mg tablet Take 1 tablet (40 mg total) by mouth every evening 90 tablet 2    clobetasol (TEMOVATE) 0.05 % cream Apply topically 2 (two) times a day      clopidogrel (PLAVIX) 75 mg tablet Take 1 tablet (75 mg total) by mouth daily 90 tablet 2    EPINEPHrine (EPIPEN) 0.3 mg/0.3 mL SOAJ Inject 0.3 mL (0.3 mg total) into a muscle once for 1 dose (Patient taking differently: Inject 0.3 mg into a muscle as needed) 0.6 mL 0    fluticasone (Arnuity Ellipta) 100 MCG/ACT AEPB inhaler Inhale 1 puff daily Rinse mouth after use. 30 blister 3    fluticasone (FLONASE) 50 mcg/act nasal spray 1 spray into each nostril 2 (two) times a day      Insulin Syringe-Needle U-100 27.5G X 5/8\" 2 ML MISC 3 syringes weekly for allergy injections      LINZESS 145 MCG CAPS 1 (one) Capsule daily on an empty stomach, at least 30 mn before first meal  0    morphine 5 mg/mL 50 mL PCA Inject 1 mg/hr into a catheter in a vein continuous      NON FORMULARY Inject 0.5 mL under the skin once a week Administer 0.5 ml Subq of Serum #1 (Cat, Dog, Cockroach (CDCR)) weekly    Administer 0.5 ml Subq of Serum #2 (Tree, Tree, Weed) (TTW)) weekly      NON FORMULARY 0.3 mg Morphine pump      sildenafil (VIAGRA) 50 MG tablet Take 2 tablets (100 mg total) by mouth daily as needed for erectile dysfunction 10 tablet 0    tiZANidine (ZANAFLEX) 2 mg tablet Take 2 tablets (4 mg " "total) by mouth 4 (four) times a day as needed for muscle spasms 240 tablet 4    venlafaxine (EFFEXOR) 37.5 mg tablet Take 1 tablet (37.5 mg total) by mouth daily 30 tablet 5     No current facility-administered medications on file prior to visit.      Social History     Tobacco Use    Smoking status: Never     Passive exposure: Never    Smokeless tobacco: Never   Vaping Use    Vaping status: Never Used   Substance and Sexual Activity    Alcohol use: Never    Drug use: No    Sexual activity: Not on file        Medical History Reviewed by provider this encounter:  Tobacco  Allergies  Meds  Problems  Med Hx  Surg Hx  Fam Hx     .    Objective   /80 (BP Location: Left arm, Patient Position: Sitting, Cuff Size: Large)   Pulse 67   Temp 97.9 °F (36.6 °C) (Tympanic)   Ht 5' 11\" (1.803 m)   Wt 116 kg (255 lb 6.4 oz)   SpO2 97%   BMI 35.62 kg/m²     Physical Exam  Vitals and nursing note reviewed.   Constitutional:       General: He is not in acute distress.     Appearance: He is well-developed.   HENT:      Head: Normocephalic and atraumatic.      Nose: Nose normal.   Eyes:      Conjunctiva/sclera: Conjunctivae normal.   Cardiovascular:      Rate and Rhythm: Normal rate and regular rhythm.      Heart sounds: No murmur heard.  Pulmonary:      Effort: Pulmonary effort is normal. No respiratory distress.      Breath sounds: Normal breath sounds. No wheezing or rhonchi.   Musculoskeletal:         General: No swelling.      Cervical back: Normal range of motion and neck supple.   Skin:     General: Skin is warm and dry.      Capillary Refill: Capillary refill takes less than 2 seconds.   Neurological:      General: No focal deficit present.      Mental Status: He is alert and oriented to person, place, and time.   Psychiatric:         Mood and Affect: Mood normal.           Diagnostic Data:  Labs: I personally reviewed the most recent laboratory data pertinent to today's visit.      Radiology " results:  Radiology Results Review: I have reviewed radiology reports from 2/18/25 including: chest xray.  Clear lungs. No pneumothorax or pleural effusion.  Normal cardiomediastinal silhouette.  Bones are unremarkable for age. Spinal cord stimulator.      PFT/spirometry results, 1/29/25:  Interpretation:  Normal spirometry.  No post bronchodilator response.  Mild restriction as indicated by the lung volumes.  Normal corrected diffusion capacity.  Normal flow-volume loop  Noted normal MIP but decreased MEP.  In the setting of restrictive lung disease, MEP may be decreased, making this finding of uncertain significance.  Clinical correlation advised.        Nadine Colon, MSN RN FNP-BC  Nurse Practitioner  [facility]

## 2025-05-05 NOTE — ASSESSMENT & PLAN NOTE
PFTS reviewed with restriction noted. No significant bronchodilator response.    He was recently prescribed Airsupra, however not picked up as he states costs over $500  Has been using Arnuity 1 puff once a day.  He notices significant improvement in his symptoms.  However he does state the inhaler is quite expensive and asking if there is any alternatives.  He states per his insurance Arnuity is best covered ICS inhaler.    We discussed alternate therapy would be nebulized ICS.  He is willing to try this.  I will order Pulmicort nebulized solution and nebulizer machine.  Discussed with patient he should use this twice a day and continue to wash mouth out after use.   Continue albuterol HFA prn   Orders:    budesonide (Pulmicort) 0.5 mg/2 mL nebulizer solution; Take 2 mL (0.5 mg total) by nebulization 2 (two) times a day Rinse mouth after use.    Nebulizer    Nebulizer Supplies

## 2025-05-06 ENCOUNTER — TELEPHONE (OUTPATIENT)
Dept: PULMONOLOGY | Facility: CLINIC | Age: 50
End: 2025-05-06

## 2025-05-07 ENCOUNTER — TELEPHONE (OUTPATIENT)
Age: 50
End: 2025-05-07

## 2025-05-07 NOTE — TELEPHONE ENCOUNTER
Gertrude, [facility] called:     Patient was seen on 4/29 for a pre-op clearance (surgery is 5/16). The office note would need to be addended to include the hold time for patient's Plavix medication. It is typically held for 7 days prior, but would need confirmation. Please update office note. Thank you

## 2025-05-08 LAB
DME PARACHUTE DELIVERY DATE ACTUAL: NORMAL
DME PARACHUTE DELIVERY DATE EXPECTED: NORMAL
DME PARACHUTE DELIVERY DATE REQUESTED: NORMAL
DME PARACHUTE ITEM DESCRIPTION: NORMAL
DME PARACHUTE ORDER STATUS: NORMAL
DME PARACHUTE SUPPLIER NAME: NORMAL
DME PARACHUTE SUPPLIER PHONE: NORMAL

## 2025-05-09 ENCOUNTER — TELEPHONE (OUTPATIENT)
Age: 50
End: 2025-05-09

## 2025-05-11 LAB
DME PARACHUTE DELIVERY DATE ACTUAL: NORMAL
DME PARACHUTE DELIVERY DATE REQUESTED: NORMAL
DME PARACHUTE ITEM DESCRIPTION: NORMAL
DME PARACHUTE ORDER STATUS: NORMAL
DME PARACHUTE SUPPLIER NAME: NORMAL
DME PARACHUTE SUPPLIER PHONE: NORMAL

## 2025-05-14 ENCOUNTER — TELEPHONE (OUTPATIENT)
Age: 50
End: 2025-05-14

## 2025-05-14 NOTE — PRE-PROCEDURE INSTRUCTIONS
Pre-Surgery Instructions:   Medication Instructions    albuterol (Ventolin HFA) 90 mcg/act inhaler Uses PRN- OK to take day of surgery    atorvastatin (LIPITOR) 40 mg tablet Take night before surgery    clobetasol (TEMOVATE) 0.05 % cream Hold day of surgery.    clopidogrel (PLAVIX) 75 mg tablet Instructions provided by MD ORTEZ 5/8    EPINEPHrine (EPIPEN) 0.3 mg/0.3 mL SOAJ Hold day of surgery.    fluticasone (Arnuity Ellipta) 100 MCG/ACT AEPB inhaler Take day of surgery.    fluticasone (FLONASE) 50 mcg/act nasal spray Take day of surgery.    LINZESS 145 MCG CAPS Hold day of surgery.    NON FORMULARY Hold day of surgery.allergy shot    NON FORMULARY Take day of surgery. Cont pump    sildenafil (VIAGRA) 50 MG tablet Hold day of surgery.    tiZANidine (ZANAFLEX) 2 mg tablet Uses PRN- OK to take day of surgery    venlafaxine (EFFEXOR) 37.5 mg tablet Take day of surgery.   Medication instructions for day of surgery reviewed. Please take all instructed medications with only a sip of water.       You will receive a call one business day prior to surgery with an arrival time and hospital directions. If your surgery is scheduled on a Monday, the hospital will be calling you on the Friday prior to your surgery. If you have not heard from anyone by 8pm, please call the hospital supervisor through the hospital  at [phone]. ([place] [phone] or [place] [phone]).    Do not eat or drink anything after midnight the night before your surgery, including candy, mints, lifesavers, or chewing gum. Do not drink alcohol 24hrs before your surgery. Try not to smoke at least 24hrs before your surgery.       Follow the pre surgery showering instructions as listed in the “My Surgical Experience Booklet” or otherwise provided by your surgeon's office. Do not use a blade to shave the surgical area 1 week before surgery. It is okay to use a clean electric clippers up to 24 hours before surgery. Do not apply any lotions,  creams, including makeup, cologne, deodorant, or perfumes after showering on the day of your surgery. Do not use dry shampoo, hair spray, hair gel, or any type of hair products.     No contact lenses, eye make-up, or artificial eyelashes. Remove nail polish, including gel polish, and any artificial, gel, or acrylic nails if possible. Remove all jewelry including rings and body piercing jewelry.     Wear causal clothing that is easy to take on and off. Consider your type of surgery.    Keep any valuables, jewelry, piercings at home. Please bring any specially ordered equipment (sling, braces) if indicated.    Arrange for a responsible person to drive you to and from the hospital on the day of your surgery. Please confirm the visitor policy for the day of your procedure when you receive your phone call with an arrival time.     Call the surgeon's office with any new illnesses, exposures, or additional questions prior to surgery.    Please reference your “My Surgical Experience Booklet” for additional information to prepare for your upcoming surgery.

## 2025-05-16 ENCOUNTER — HOSPITAL ENCOUNTER (OUTPATIENT)
Facility: HOSPITAL | Age: 50
Setting detail: OUTPATIENT SURGERY
Discharge: HOME | End: 2025-05-16
Attending: STUDENT IN AN ORGANIZED HEALTH CARE EDUCATION/TRAINING PROGRAM | Admitting: STUDENT IN AN ORGANIZED HEALTH CARE EDUCATION/TRAINING PROGRAM
Payer: MEDICARE

## 2025-05-16 ENCOUNTER — ANESTHESIA (OUTPATIENT)
Dept: PERIOP | Facility: HOSPITAL | Age: 50
End: 2025-05-16
Payer: MEDICARE

## 2025-05-16 ENCOUNTER — ANESTHESIA EVENT (OUTPATIENT)
Dept: PERIOP | Facility: HOSPITAL | Age: 50
End: 2025-05-16
Payer: MEDICARE

## 2025-05-16 VITALS
BODY MASS INDEX: 35.34 KG/M2 | SYSTOLIC BLOOD PRESSURE: 148 MMHG | TEMPERATURE: 98.2 F | RESPIRATION RATE: 17 BRPM | WEIGHT: 252.4 LBS | HEART RATE: 70 BPM | OXYGEN SATURATION: 94 % | DIASTOLIC BLOOD PRESSURE: 81 MMHG | HEIGHT: 71 IN

## 2025-05-16 DIAGNOSIS — T85.192S MALFUNCTION OF SPINAL CORD STIMULATOR, SEQUELA: Primary | ICD-10-CM

## 2025-05-16 PROCEDURE — 63688 REV/RMV IMP SP NPG/R DTCH CN: CPT | Performed by: STUDENT IN AN ORGANIZED HEALTH CARE EDUCATION/TRAINING PROGRAM

## 2025-05-16 PROCEDURE — 99024 POSTOP FOLLOW-UP VISIT: CPT | Performed by: STUDENT IN AN ORGANIZED HEALTH CARE EDUCATION/TRAINING PROGRAM

## 2025-05-16 PROCEDURE — 63661 REMOVE SPINE ELTRD PERQ ARAY: CPT | Performed by: STUDENT IN AN ORGANIZED HEALTH CARE EDUCATION/TRAINING PROGRAM

## 2025-05-16 RX ORDER — LIDOCAINE HYDROCHLORIDE AND EPINEPHRINE 10; 10 MG/ML; UG/ML
INJECTION, SOLUTION INFILTRATION; PERINEURAL AS NEEDED
Status: DISCONTINUED | OUTPATIENT
Start: 2025-05-16 | End: 2025-05-16 | Stop reason: HOSPADM

## 2025-05-16 RX ORDER — CHLORHEXIDINE GLUCONATE ORAL RINSE 1.2 MG/ML
15 SOLUTION DENTAL ONCE
Status: COMPLETED | OUTPATIENT
Start: 2025-05-16 | End: 2025-05-16

## 2025-05-16 RX ORDER — KETAMINE HCL IN NACL, ISO-OSM 100MG/10ML
SYRINGE (ML) INJECTION AS NEEDED
Status: DISCONTINUED | OUTPATIENT
Start: 2025-05-16 | End: 2025-05-16

## 2025-05-16 RX ORDER — HYDROMORPHONE HCL/PF 1 MG/ML
0.5 SYRINGE (ML) INJECTION
Status: DISCONTINUED | OUTPATIENT
Start: 2025-05-16 | End: 2025-05-16 | Stop reason: HOSPADM

## 2025-05-16 RX ORDER — SODIUM CHLORIDE, SODIUM LACTATE, POTASSIUM CHLORIDE, CALCIUM CHLORIDE 600; 310; 30; 20 MG/100ML; MG/100ML; MG/100ML; MG/100ML
INJECTION, SOLUTION INTRAVENOUS CONTINUOUS PRN
Status: DISCONTINUED | OUTPATIENT
Start: 2025-05-16 | End: 2025-05-16

## 2025-05-16 RX ORDER — MIDAZOLAM HYDROCHLORIDE 2 MG/2ML
INJECTION, SOLUTION INTRAMUSCULAR; INTRAVENOUS AS NEEDED
Status: DISCONTINUED | OUTPATIENT
Start: 2025-05-16 | End: 2025-05-16

## 2025-05-16 RX ORDER — PHENYLEPHRINE HCL IN 0.9% NACL 1 MG/10 ML
SYRINGE (ML) INTRAVENOUS AS NEEDED
Status: DISCONTINUED | OUTPATIENT
Start: 2025-05-16 | End: 2025-05-16

## 2025-05-16 RX ORDER — LIDOCAINE HYDROCHLORIDE 20 MG/ML
INJECTION, SOLUTION EPIDURAL; INFILTRATION; INTRACAUDAL; PERINEURAL AS NEEDED
Status: DISCONTINUED | OUTPATIENT
Start: 2025-05-16 | End: 2025-05-16

## 2025-05-16 RX ORDER — FENTANYL CITRATE 50 UG/ML
INJECTION, SOLUTION INTRAMUSCULAR; INTRAVENOUS AS NEEDED
Status: DISCONTINUED | OUTPATIENT
Start: 2025-05-16 | End: 2025-05-16

## 2025-05-16 RX ORDER — ONDANSETRON 2 MG/ML
INJECTION INTRAMUSCULAR; INTRAVENOUS AS NEEDED
Status: DISCONTINUED | OUTPATIENT
Start: 2025-05-16 | End: 2025-05-16

## 2025-05-16 RX ORDER — ROCURONIUM BROMIDE 10 MG/ML
INJECTION, SOLUTION INTRAVENOUS AS NEEDED
Status: DISCONTINUED | OUTPATIENT
Start: 2025-05-16 | End: 2025-05-16

## 2025-05-16 RX ORDER — CLINDAMYCIN PHOSPHATE 900 MG/50ML
900 INJECTION, SOLUTION INTRAVENOUS ONCE
Status: COMPLETED | OUTPATIENT
Start: 2025-05-16 | End: 2025-05-16

## 2025-05-16 RX ORDER — DEXAMETHASONE SODIUM PHOSPHATE 10 MG/ML
INJECTION, SOLUTION INTRAMUSCULAR; INTRAVENOUS AS NEEDED
Status: DISCONTINUED | OUTPATIENT
Start: 2025-05-16 | End: 2025-05-16

## 2025-05-16 RX ORDER — ONDANSETRON 2 MG/ML
4 INJECTION INTRAMUSCULAR; INTRAVENOUS ONCE AS NEEDED
Status: DISCONTINUED | OUTPATIENT
Start: 2025-05-16 | End: 2025-05-16 | Stop reason: HOSPADM

## 2025-05-16 RX ORDER — PROPOFOL 10 MG/ML
INJECTION, EMULSION INTRAVENOUS AS NEEDED
Status: DISCONTINUED | OUTPATIENT
Start: 2025-05-16 | End: 2025-05-16

## 2025-05-16 RX ORDER — SULFAMETHOXAZOLE AND TRIMETHOPRIM 800; 160 MG/1; MG/1
1 TABLET ORAL EVERY 12 HOURS SCHEDULED
Qty: 10 TABLET | Refills: 0 | Status: SHIPPED | OUTPATIENT
Start: 2025-05-16 | End: 2025-05-21

## 2025-05-16 RX ADMIN — FENTANYL CITRATE 100 MCG: 50 INJECTION INTRAMUSCULAR; INTRAVENOUS at 07:32

## 2025-05-16 RX ADMIN — CHLORHEXIDINE GLUCONATE 15 ML: 1.2 SOLUTION ORAL at 07:12

## 2025-05-16 RX ADMIN — SODIUM CHLORIDE, SODIUM LACTATE, POTASSIUM CHLORIDE, AND CALCIUM CHLORIDE: .6; .31; .03; .02 INJECTION, SOLUTION INTRAVENOUS at 07:29

## 2025-05-16 RX ADMIN — ONDANSETRON 4 MG: 2 INJECTION, SOLUTION INTRAMUSCULAR; INTRAVENOUS at 07:45

## 2025-05-16 RX ADMIN — Medication 200 MCG: at 08:02

## 2025-05-16 RX ADMIN — ROCURONIUM BROMIDE 50 MG: 10 INJECTION, SOLUTION INTRAVENOUS at 07:32

## 2025-05-16 RX ADMIN — LIDOCAINE HYDROCHLORIDE 100 MG: 20 INJECTION, SOLUTION EPIDURAL; INFILTRATION; INTRACAUDAL; PERINEURAL at 07:32

## 2025-05-16 RX ADMIN — HYDROMORPHONE HYDROCHLORIDE 0.5 MG: 1 INJECTION, SOLUTION INTRAMUSCULAR; INTRAVENOUS; SUBCUTANEOUS at 09:49

## 2025-05-16 RX ADMIN — SUGAMMADEX 200 MG: 100 INJECTION, SOLUTION INTRAVENOUS at 08:51

## 2025-05-16 RX ADMIN — DEXAMETHASONE SODIUM PHOSPHATE 10 MG: 10 INJECTION, SOLUTION INTRAMUSCULAR; INTRAVENOUS at 07:34

## 2025-05-16 RX ADMIN — Medication 100 MCG: at 07:57

## 2025-05-16 RX ADMIN — PROPOFOL 200 MG: 10 INJECTION, EMULSION INTRAVENOUS at 07:32

## 2025-05-16 RX ADMIN — Medication 100 MCG: at 08:00

## 2025-05-16 RX ADMIN — Medication 25 MG: at 08:10

## 2025-05-16 RX ADMIN — Medication 200 MCG: at 08:17

## 2025-05-16 RX ADMIN — CLINDAMYCIN PHOSPHATE 900 MG: 900 INJECTION, SOLUTION INTRAVENOUS at 07:42

## 2025-05-16 RX ADMIN — MIDAZOLAM 2 MG: 1 INJECTION INTRAMUSCULAR; INTRAVENOUS at 07:29

## 2025-05-16 NOTE — ANESTHESIA POSTPROCEDURE EVALUATION
Post-Op Assessment Note    CV Status:  Stable  Pain Score: 0    Pain management: adequate    Multimodal analgesia used between 6 hours prior to anesthesia start to PACU discharge    Mental Status:  Alert and awake   Hydration Status:  Euvolemic   PONV Controlled:  Controlled   Airway Patency:  Patent     Post Op Vitals Reviewed: Yes    No anethesia notable event occurred.    Staff: CRNA           Last Filed PACU Vitals:  Vitals Value Taken Time   Temp 98    Pulse     /77 05/16/25 09:02   Resp     SpO2 100    Vitals shown include unfiled device data.

## 2025-05-16 NOTE — ANESTHESIA PREPROCEDURE EVALUATION
Procedure:  Removal of thoracic paddle electrode and right sided SCS battery (Right: Spine Cervical)    Relevant Problems   CARDIO   (+) Mixed hyperlipidemia      MUSCULOSKELETAL   (+) Ankylosing spondylitis (HCC)   (+) Psoriasis with arthropathy (HCC)      NEURO/PSYCH   (+) Chronic pain   (+) Seizure disorder (HCC)      PULMONARY   (+) Mild intermittent reactive airway disease without complication   (+) Obstructive sleep apnea treated with continuous positive airway pressure (CPAP)        Left Ventricle: Left ventricular cavity size is normal. Wall thickness is mildly increased. The left ventricular ejection fraction is 60%. Systolic function is normal. Wall motion is normal.    Left Atrium: There is no thrombus.    Atrial Septum: No patent foramen ovale detected, confirmed at rest using agitated saline contrast, confirmed by provocation with abdominal compression, using agitated saline contrast.    Left Atrial Appendage: There is no thrombus.    Mitral Valve: There is trace regurgitation.  Physical Exam    Airway     Mallampati score: III  TM Distance: >3 FB  Neck ROM: full  Mouth opening: >= 4 cm      Cardiovascular  Rhythm: regularNo peripheral edema    Dental   No notable dental hx     Pulmonary   No stridor    Neurological    He appears awake, alert and oriented x3.      Other Findings        Anesthesia Plan  ASA Score- 3     Anesthesia Type- general with ASA Monitors.         Additional Monitors:     Airway Plan: Oral ETT.           Plan Factors-Exercise tolerance (METS): >4 METS.    Chart reviewed. EKG reviewed.  Existing labs reviewed. Patient summary reviewed.                  Induction- intravenous.    Postoperative Plan- Plan for postoperative opioid use.   Monitoring Plan - Monitoring plan - standard ASA monitoring  Post Operative Pain Plan - plan for postoperative opioid use    Perioperative Resuscitation Plan - Level 1 - Full Code.       Informed Consent- Anesthetic plan and risks discussed with  patient.  I personally reviewed this patient with the CRNA. Discussed and agreed on the Anesthesia Plan with the CRNA..      NPO Status:  Vitals Value Taken Time   Date of last liquid 05/15/25 05/16/25 06:55   Time of last liquid 2300 05/16/25 06:55   Date of last solid 05/15/25 05/16/25 06:55   Time of last solid 1900 05/16/25 06:55

## 2025-05-16 NOTE — DISCHARGE INSTR - AVS FIRST PAGE
Post Operative Spinal Cord Stimulator                                                                              Discharge Instructions                                                                                    Franklin Craven MD     Post op Management  After your spinal cord stimulator surgery, you will work closely with your spinal cord stimulator representative in the postoperative course who will be available for any technical questions you may have concerning the management of your new stimulator.    Expected Hospital Stay  Most patients will be expected to leave the hospital the same  day of the surgery. After the procedure, you will stay in the postop unit where you will be monitored closely. Once you receive education on how to use the  and have had the device programmed by your representative, you will then be able to leave the hospital.    Care of Your Incision  You will have two incisions. One located at the center of your spine usually in the mid to upper back region. The other incision will be located in the right or left upper buttock area. The buttock  incision is the site where the battery will be surgically placed, and you will decide prior to surgery which side you prefer for the battery. Both incisions will be closed in the same manner. Your incisions will be closed with buried stitches that will dissolve within a couple of weeks. Strips of adhesive tape called   The surgical dressings should be left on for 2 days after surgery.Then after 2 days, you may shower but you should not scrub your incision or soak in a pool, hot tub or tub bath for at least two  weeks. It is ok to gently pat it dry. Slight drainage the first day or so, limited swelling or mild  bruising is common and usually not of concern. If there is significant leaking or any marked redness or a large amount of swelling you should call the  office.    Post-op Pain Management  Most patients after spinal cord stimulator surgery complain of pain and discomfort at the incisional sites. During the surgery, local anesthetic will be used at the site of the incisions and the pain relief effect should last about 6 hours. It is not uncommon to have incisional pain or discomfort later that evening and especially the day after surgery A postoperative pain medication plan will be discussed at the preoperative appointment. In the post-operative period, heat or ice packs may be used as necessary for incisional discomfort and swelling. However, it is important to understand that post-op pain medications will not take 100% of your pain away, and it is common to still feel discomfort at the incisional sites while taking pain medication.    Activity  Unless you have been instructed otherwise, you should focus on gentle walking the first 2 weeks after the surgery. You should start with brief walks in the house, and gradually increase the time and speed of your walks. It is best to limit stair walking to 1 or 2 times a day for the first week. As you feel better, you should start to take longer walks outside and up inclines. You should avoid driving or being in the car for the first 2 weeks. After that, start with short drives with another person in the car. You should avoid lifting anything heavier than a half gallon of milk for the first 2 weeks. After that, you can start to lift light objects if you are comfortable. Remember-if it hurts, don’t do it! Sexual activity can be resumed when you feel comfortable. You can discuss returning to an exercise regimen with your physical therapist. For most patients, working with a physical therapist after the surgery can help with the recovery process. No bending for 2 weeks.     Diet and medication  You can resume your regular diet immediately after the surgery. Your regular medications may be restarted right away. However, aspirin  is usually started the day after surgery unless otherwise  instructed by your surgeon. If you are taking an anticoagulant or “blood thinner” such as warfarin (coumadin), plavix (clopidogrel), pradaxa (dabigatran), or any other anticoagulant medication,  you will be told when to restart the medication. You should then follow-up with the doctor who prescribes the anticoagulant medicine. Constipation is a common problem after spine surgery.  Over the counter stimulants and stool softeners can be beneficial, along with plenty of fresh water.    Work  Your return to work will be discussed in your office  follow up appointment.  Follow up Appointments  Your first surgical post-op appointment will be 2 weeks after surgery.       What to Watch Out For  These symptoms should cause you to call immediately or dial 911  to come to the emergency department   Paralysis or inability to fully move your legs   Severe chest pain, difficulty breathing   Loss of control of your bowels and bladder.The following symptoms may indicate a problem. You should call the office number listed below.    Fever higher than 101 F Increasing back and/or leg pain   Difficulty passing urine   New numbness or change in symptoms from before surgery   Redness or drainage from the incision   Unusual headache, especially if it is much worse when you  stand up

## 2025-05-16 NOTE — OP NOTE
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    Complications:   None    Procedure and Technique:  The patient was brought to the OR and placed under general anesthesia. He was positioned prone on a Timmy table. The prior midline thoracic and right flank incisions were prepped and draped. These were infiltrated with local anesthetic. The right flank incision was first opened sharply and dissection carried down to the battery. This was removed from the pocket and discarded. Next the midline thoracic incision was opened and dissection carried down to the spinous process of the lamina under which the leads entered the epidural space. Dissection along the leads along with partial laminectomy via kerrison rongeur was able to dissect the distal portion of the paddle free. Using gentle traction the paddle electrode was removed in it's entirety from the epidural space and was discarded.There was no obvious CSF leak and there was good hemostasis. The sites were copiously irrigated. The fascial layer at the thoracic incision was closed with interrupted vicryls. The deep dermal layers at both sites was closed with interupted vicrysl. The skin at both sites was closed with monocryl and skin glue. The drapes were taken down. He was  flipped supine on a stretcher. He was extubated and transferred to PACU in stable condition.     Patient Disposition:  PACU              SIGNATURE: Franklin Craven MD  DATE: May 16, 2025  TIME: 6:33 AM

## 2025-05-16 NOTE — H&P
"Neurosurgery History and Physical    Prior clinic encounter from 3/17/25 reviewed. After examining the patient I find no changes in the patients condition since the encounter.    Patient personally seen and examined.  Neurological examination unchanged compared to last office/progress note, with the following exceptions:    Ht 5' 11\" (1.803 m)   Wt 116 kg (255 lb)   BMI 35.57 kg/m²      Awake and alert.  Regular cardiac rate and rhythm.  No respiratory distress.  Abdomen nontender.  Normocephalic. Extremities warm, well perfused.    Post operative instructions and medications have been reviewed with the patient.    Assessment and Plan:    All questions have been answered to the patient satisfaction.  Plan to proceed with removal of thoracic paddle electrode and right sided SCS battery. They are in agreement with proceeding.     "

## 2025-05-20 ENCOUNTER — TELEPHONE (OUTPATIENT)
Dept: NEUROSURGERY | Facility: CLINIC | Age: 50
End: 2025-05-20

## 2025-05-20 NOTE — ANESTHESIA POSTPROCEDURE EVALUATION
Post-Op Assessment Note    CV Status:  Stable  Pain Score: 1    Pain management: adequate       Mental Status:  Alert and awake   Hydration Status:  Euvolemic   PONV Controlled:  Controlled   Airway Patency:  Patent     Post Op Vitals Reviewed: Yes    No anethesia notable event occurred.    Staff: Anesthesiologist           Last Filed PACU Vitals:  Vitals Value Taken Time   Temp 98 °F (36.7 °C) 05/16/25 09:02   Pulse 64 05/16/25 09:59   /79 05/16/25 09:52   Resp 16 05/16/25 09:59   SpO2 95 % 05/16/25 09:59   Vitals shown include unfiled device data.    Modified Jett:     Vitals Value Taken Time   Activity 0 05/16/25 09:02   Respiration 2 05/16/25 09:02   Circulation 2 05/16/25 09:02   Consciousness 0 05/16/25 09:02   Oxygen Saturation 2 05/16/25 09:02     Modified Jett Score: 6

## 2025-06-02 ENCOUNTER — CLINICAL SUPPORT (OUTPATIENT)
Age: 50
End: 2025-06-02

## 2025-06-02 DIAGNOSIS — Z98.890 STATUS POST SURGERY: Primary | ICD-10-CM

## 2025-06-02 PROCEDURE — 99024 POSTOP FOLLOW-UP VISIT: CPT | Performed by: STUDENT IN AN ORGANIZED HEALTH CARE EDUCATION/TRAINING PROGRAM

## 2025-06-02 NOTE — PATIENT INSTRUCTIONS
Continue incision care as instructed. Cleans incision area(s) with soap and water and pat dry.  Avoid lotions, creams or ointments for two more weeks.  Avoid soaking in water (bath tubs, hot tubs) for two more weeks.    Maintain activity restrictions until cleared by the surgeon. Avoid heavy lifting and frequent bending/twisting.    Call the office immediately with any signs or symptoms of infection including: increasing redness, swelling, drainage or fevers (101 or greater).

## 2025-06-05 VITALS
SYSTOLIC BLOOD PRESSURE: 142 MMHG | OXYGEN SATURATION: 96 % | DIASTOLIC BLOOD PRESSURE: 80 MMHG | TEMPERATURE: 97.2 F | HEART RATE: 78 BPM

## 2025-06-05 NOTE — PROGRESS NOTES
Post-Op Visit- Neurosurgery      Asher PECK Gianfranco 49 y.o. male MRN: [id number]    Chief Complaint:   Patient presents post: Removal of thoracic paddle electrode and right sided SCS battery - Right    History of Present Illness:  Patient presents for 2 week POV for incision check accompanied by mother and ambulating without an assistive device.  Patient reports he is doing well overall and denies any incisional issues or fevers.  He denies any new weakness, numbness or tingling since the surgery and denies any new issues with his bowel or bladder.  Patient admits to surgical pain at this time and rates their pain as a Pain Score:   3/10.         Assessment:  Visit Vitals  /80   Pulse 78   Temp (!) 97.2 °F (36.2 °C)   SpO2 96%   Smoking Status Never        Wound Exam: Incisions are well approximated.  No erythema, edema or drainage present.   location:thoracic and flank   Complications: None.  Incision SMITH.    Discussion/Summary:  Reviewed incision care with patient including daily observation for s/s infection including: increased erythema, edema, drainage, dehiscence of incision or fever >101.  Should these be observed, he understands that he is to call and/or return immediately for reassessment.  Advised patient to continue cleansing area with mild soap and water and pat dry. Not to apply any lotions, creams, or ointments, & not to submerge in any water for two more weeks.  Activity levels were also reviewed with the patient in detail, he is to slowly increase his level of activity and ambulation is encouraged as tolerated. Patient is to follow-up with our office on an as-needed basis at this time.  Encouraged patient to call the office with any further questions or concerns, or if any incisional issues or fevers would arise.    family

## 2025-06-11 LAB
B2 GLYCOPROT1 IGA SER-ACNC: <2 U/ML
B2 GLYCOPROT1 IGG SER-ACNC: <2 U/ML
B2 GLYCOPROT1 IGM SER-ACNC: <2 U/ML
CARDIOLIPIN IGA SER IA-ACNC: <2 APL-U/ML
CARDIOLIPIN IGG SER IA-ACNC: <2 GPL-U/ML
CARDIOLIPIN IGM SER IA-ACNC: <2 MPL-U/ML

## 2025-06-24 ENCOUNTER — OFFICE VISIT (OUTPATIENT)
Dept: SLEEP CENTER | Facility: CLINIC | Age: 50
End: 2025-06-24
Payer: MEDICARE

## 2025-06-24 VITALS
OXYGEN SATURATION: 98 % | SYSTOLIC BLOOD PRESSURE: 142 MMHG | HEIGHT: 71 IN | WEIGHT: 253 LBS | BODY MASS INDEX: 35.42 KG/M2 | DIASTOLIC BLOOD PRESSURE: 86 MMHG | HEART RATE: 72 BPM

## 2025-06-24 DIAGNOSIS — G47.33 OBSTRUCTIVE SLEEP APNEA TREATED WITH CONTINUOUS POSITIVE AIRWAY PRESSURE (CPAP): Primary | ICD-10-CM

## 2025-06-24 DIAGNOSIS — R29.90 STROKE-LIKE SYMPTOMS: ICD-10-CM

## 2025-06-24 DIAGNOSIS — G47.411 CATAPLEXY: ICD-10-CM

## 2025-06-24 PROCEDURE — G2211 COMPLEX E/M VISIT ADD ON: HCPCS | Performed by: PSYCHIATRY & NEUROLOGY

## 2025-06-24 PROCEDURE — 99214 OFFICE O/P EST MOD 30 MIN: CPT | Performed by: PSYCHIATRY & NEUROLOGY

## 2025-06-24 NOTE — Clinical Note
Hi, just want to confirm he can have brain MRI?  (Assume so as neuro stimulator removed),  He wanted me to order prior to his neuro appt

## 2025-06-24 NOTE — Clinical Note
Hi - he has had episodes of random off balance episodes, sleepy, disoriented, using wrong words/jumbled.  Last 20-30 min.  Not convinced this is cataplexy- can you reassess him?

## 2025-06-24 NOTE — ASSESSMENT & PLAN NOTE
-Was hospitalized with strokelike symptoms, is due to see neurology next month.  I ordered a brain MRI.  The patient had his spinal cord stimulator removed and per neurosurgery is okay to have an MRI at this point.  -We discussed the episodes he has of disorientation, feeling of off balance, sleepiness, and other symptoms.  They last 20 to 30 minutes which is not typical for cataplexy.  He previously had seen Dr. Arnett from neurology with a negative workup to date.  I messaged Dr. Arnett as potentially the patient may need to follow-up with him as well.  Orders:  •  MRI brain w wo contrast; Future

## 2025-06-24 NOTE — ASSESSMENT & PLAN NOTE
-Discussed that he had mild obstructive sleep apnea baseline, his AHI with his machine is higher than his baseline AHI.  The machine indicates central apneas.  Of note the patient has a morphine pump which could be contributing.  - Would like him to have a CPAP titration study which was ordered today to refine his treatment  Orders:  •  CPAP Titration Study; Future

## 2025-06-24 NOTE — PROGRESS NOTES
Name: Asher Medel      : 1975      MRN: [id number]  Encounter Provider: Asif Macedo MD  Encounter Date: 2025   Encounter department: [facility] SLEEP [facility]  :  Assessment & Plan  Obstructive sleep apnea treated with continuous positive airway pressure (CPAP)  -Discussed that he had mild obstructive sleep apnea baseline, his AHI with his machine is higher than his baseline AHI.  The machine indicates central apneas.  Of note the patient has a morphine pump which could be contributing.  - Would like him to have a CPAP titration study which was ordered today to refine his treatment  Orders:  •  CPAP Titration Study; Future    Stroke-like symptoms  -Was hospitalized with strokelike symptoms, is due to see neurology next month.  I ordered a brain MRI.  The patient had his spinal cord stimulator removed and per neurosurgery is okay to have an MRI at this point.  -We discussed the episodes he has of disorientation, feeling of off balance, sleepiness, and other symptoms.  They last 20 to 30 minutes which is not typical for cataplexy.  He previously had seen Dr. Arnett from neurology with a negative workup to date.  I messaged Dr. Arnett as potentially the patient may need to follow-up with him as well.  Orders:  •  MRI brain w wo contrast; Future    Cataplexy  -Previously diagnosed with this before he came under my care, has found venlafaxine effective so we will continue  - That said, the diagnose of narcolepsy is not confirmed in his case  - Do not think the episodes described in this note would fit typical cataplexy episodes however         Assessment & Plan        History of Present Illness   History of Present Illness  This is a 49-year-old male who returns for a follow-up visit he has a history of obstructive sleep apnea treated with CPAP, last seen by me 2024.  Based on respiratory vent index of 6.7 diagnosed in .  In the past he was diagnosed with narcolepsy but did not  "have an MSLT that supported this diagnosis.  He had previous trials of Xyrem, modafinil, and few other medications without significant benefit.  He has been treated with Venlafaxine which I prescribed which has suppressed cataplexy like episodes.  At his last visit we discussed that he described episodes of muscle weakness sometimes with shortness of breath and easy to fatigue.  I ordered a battery of tests.  Pulmonary function test showed a normal MIP but decreased MEP and there was mild restrictive lung disease.  An EMG was ordered but not yet completed.  He had many blood tests to assess for myasthenia gravis which were negative with normall acetylcholine Receptor antib antibodies and blocking antibodies.  CK was normal.  He was seen in the office by pulmonary medicine, they reviewed results of testing.  Airsupra was prescribed but cost prohibitive, he has been using Arnuity.  It was felt his shortness of breath was multifactorial.  The patient had removal of a spinal cord stimulator in May 2025.    He is noted to be off balance at times in the day, talks slower like he is \"drunk\". Harder to move his legs.  These episodes last 20-30 minutes, occur daily last week, once this week.  He has some sense this is occurring, he feels off sometimes with these.   These occur randomly, not provoked by emotion. Sometimes speech is garbled with these , is off balance and sleepy as well as disoriented.     He is mouth breathing, chin strap does not work as well and he has interest in a full face mask    CPAP Data reviewed today  Airsense 11 auto set 8-12 cm h20  AHI 7.4- MELVIN 6.4  Median pressure 8.4, 95% pressure 9.8  Use 8 hr 56 min   Used 90/90 days    He goes to bed 9 pm   Falls asleep in 30-60 min  Wakes often due to pain   Up at  830-9 AM  Sleeps 10-12 hours a nights    Naps every other day for less than an hour  Sometimes dozes if inactive    Rarely snores with CPAP     Caffeine-none  No alcohol  Has a morphine pump " "since 2020.       Has 2 episodes of sleep paralysis, no sleep hallucinations  Finds venlafaxine to be quite helpful ;\             Sitting and reading: Moderate chance of dozing  Watching TV: Slight chance of dozing  Sitting, inactive in a public place (e.g. a theatre or a meeting): Slight chance of dozing  As a passenger in a car for an hour without a break: Slight chance of dozing  Lying down to rest in the afternoon when circumstances permit: Moderate chance of dozing  Sitting and talking to someone: Slight chance of dozing  Sitting quietly after a lunch without alcohol: Slight chance of dozing  In a car, while stopped for a few minutes in traffic: Would never doze  Total score: 9     Review of Systems  Pertinent positives/negatives included in HPI and also as noted:       Objective   /86 (BP Location: Right arm, Patient Position: Sitting, Cuff Size: Standard)   Pulse 72   Ht 5' 11\" (1.803 m)   Wt 115 kg (253 lb)   SpO2 98%   BMI 35.29 kg/m²        Physical Exam  Physical Exam    Visit Vitals  /86 (BP Location: Right arm, Patient Position: Sitting, Cuff Size: Standard)   Pulse 72   Ht 5' 11\" (1.803 m)   Wt 115 kg (253 lb)   SpO2 98%   BMI 35.29 kg/m²   Smoking Status Never   BSA 2.33 m²                                               Results    Data  Lab Results   Component Value Date    HGB 14.8 04/28/2025    HCT 45.3 04/28/2025    MCV 89 04/28/2025      Lab Results   Component Value Date    CALCIUM 8.9 06/10/2025    K 3.6 06/10/2025    CO2 27 06/10/2025     06/10/2025    BUN 19 06/10/2025    CREATININE 0.94 06/10/2025     No results found for: \"IRON\", \"TIBC\", \"FERRITIN\"  Lab Results   Component Value Date    AST 16 06/10/2025    ALT 24 06/10/2025         "

## 2025-06-25 DIAGNOSIS — N52.1 ERECTILE DYSFUNCTION DUE TO DISEASES CLASSIFIED ELSEWHERE: ICD-10-CM

## 2025-06-25 DIAGNOSIS — M62.89 PELVIC FLOOR DYSFUNCTION: ICD-10-CM

## 2025-06-25 RX ORDER — SILDENAFIL 50 MG/1
100 TABLET, FILM COATED ORAL DAILY PRN
Qty: 10 TABLET | Refills: 0 | Status: SHIPPED | OUTPATIENT
Start: 2025-06-25

## 2025-06-27 ENCOUNTER — HOSPITAL ENCOUNTER (OUTPATIENT)
Dept: SLEEP CENTER | Facility: CLINIC | Age: 50
Discharge: HOME | End: 2025-06-27
Attending: PSYCHIATRY & NEUROLOGY
Payer: MEDICARE

## 2025-06-27 DIAGNOSIS — G47.33 OBSTRUCTIVE SLEEP APNEA TREATED WITH CONTINUOUS POSITIVE AIRWAY PRESSURE (CPAP): ICD-10-CM

## 2025-06-27 PROCEDURE — 95811 POLYSOM 6/>YRS CPAP 4/> PARM: CPT | Performed by: PSYCHIATRY & NEUROLOGY

## 2025-06-27 PROCEDURE — 95811 POLYSOM 6/>YRS CPAP 4/> PARM: CPT

## 2025-06-28 NOTE — PROGRESS NOTES
Sleep Study Documentation    Pre-Sleep Study       Sleep testing procedure explained to patient:YES    Patient napped prior to study:NO    Caffeine:Dayshift worker after 12PM.  Caffeine use:NO    Alcohol:Dayshift workers after 5PM: Alcohol use:NO    Typical day for patient:YES         Study Documentation    Sleep Study Indications: BMI > 30    Montage used: Standard    Transcutaneous CO2 used: NO    Sleep Study Type:     CPAP Study     Treatment: Mode of Therapy:CPAP  CPAP changed to BiPAP:No    Optimal PAP pressure: 8 cm h20   Leak:Small  Snore:Eliminated  PAP pressure at which snoring was eliminated 8 cm h20       PAP Masks  Mask Choice : [employer] Simplus Full face, Medium  PAP mask type:full face      PAP- Post-Study  PAP treatment:yes: Post PAP treatment patient reports feeling better and  would wear PAP mask at home.      Oxygen Data  Supplemental O2 used: No      EKG/EEG/Abnormal Behaviors   EKG abnormalities: no     EEG abnormalities: no    Were abnormal behaviors in sleep observed:NO      Snoring    Character:  Soft    Frequency: Rare        Is Total Sleep Study Recording Time < 2 hours: N/A    Is Total Sleep Study Recording Time > 2 hours but study is incomplete: N/A    Is Total Sleep Study Recording Time 6 hours or more but sleep was not obtained: NO        Post-Sleep Study    Medication used at bedtime or during sleep study:YES other prescription medications    Patient reports time it took to fall asleep:30 to 60 minutes    Patient reports waking up during study:3 or more times.  Patient reports returning to sleep in 10 to 30 minutes.    Patient reports sleeping 6 to 8 hours without dreaming.    Does the Patient feel this is a typical night of sleep:typical    Patient rated sleepiness: Somewhat sleepy or tired

## 2025-06-30 NOTE — QUICK NOTE
Investigation Report    Device investigated:   Implant Type: Pain pump   :                        [employer]   Model:                    [id number]         Method investigated   imaging report  vendor contacted     EPIC   website used     Time spent investigating in minutes: 15    Investigation findings: conditional    Risk vs Benefit performed.  If so, list physician and outcome: N/A

## 2025-07-01 ENCOUNTER — HOSPITAL ENCOUNTER (OUTPATIENT)
Dept: RADIOLOGY | Facility: HOSPITAL | Age: 50
Discharge: HOME | End: 2025-07-01

## 2025-07-01 DIAGNOSIS — R29.90 STROKE-LIKE SYMPTOMS: ICD-10-CM

## 2025-07-03 ENCOUNTER — OFFICE VISIT (OUTPATIENT)
Dept: OBGYN CLINIC | Facility: HOSPITAL | Age: 50
End: 2025-07-03
Payer: MEDICARE

## 2025-07-03 VITALS — BODY MASS INDEX: 35.42 KG/M2 | HEIGHT: 71 IN | WEIGHT: 253 LBS

## 2025-07-03 DIAGNOSIS — Z01.812 PRE-PROCEDURE LAB EXAM: ICD-10-CM

## 2025-07-03 DIAGNOSIS — G56.02 CARPAL TUNNEL SYNDROME ON LEFT: ICD-10-CM

## 2025-07-03 DIAGNOSIS — G56.01 CARPAL TUNNEL SYNDROME ON RIGHT: Primary | ICD-10-CM

## 2025-07-03 PROCEDURE — 99214 OFFICE O/P EST MOD 30 MIN: CPT | Performed by: SURGERY

## 2025-07-03 PROCEDURE — 20526 THER INJECTION CARP TUNNEL: CPT | Performed by: SURGERY

## 2025-07-03 RX ORDER — CHLORHEXIDINE GLUCONATE ORAL RINSE 1.2 MG/ML
15 SOLUTION DENTAL ONCE
OUTPATIENT
Start: 2025-07-03 | End: 2025-07-03

## 2025-07-03 RX ORDER — DEXAMETHASONE SODIUM PHOSPHATE 10 MG/ML
40 INJECTION, SOLUTION INTRAMUSCULAR; INTRAVENOUS
Status: COMPLETED | OUTPATIENT
Start: 2025-07-03 | End: 2025-07-03

## 2025-07-03 RX ORDER — SODIUM CHLORIDE, SODIUM LACTATE, POTASSIUM CHLORIDE, CALCIUM CHLORIDE 600; 310; 30; 20 MG/100ML; MG/100ML; MG/100ML; MG/100ML
75 INJECTION, SOLUTION INTRAVENOUS CONTINUOUS
OUTPATIENT
Start: 2025-07-03

## 2025-07-03 RX ORDER — CEFAZOLIN SODIUM 2 G/50ML
2000 SOLUTION INTRAVENOUS ONCE
OUTPATIENT
Start: 2025-07-03 | End: 2025-07-03

## 2025-07-03 RX ORDER — LIDOCAINE HYDROCHLORIDE 10 MG/ML
1 INJECTION, SOLUTION INFILTRATION; PERINEURAL
Status: COMPLETED | OUTPATIENT
Start: 2025-07-03 | End: 2025-07-03

## 2025-07-03 RX ADMIN — DEXAMETHASONE SODIUM PHOSPHATE 40 MG: 10 INJECTION, SOLUTION INTRAMUSCULAR; INTRAVENOUS at 09:30

## 2025-07-03 RX ADMIN — LIDOCAINE HYDROCHLORIDE 1 ML: 10 INJECTION, SOLUTION INFILTRATION; PERINEURAL at 09:30

## 2025-07-03 NOTE — PROGRESS NOTES
ASSESSMENT/PLAN:      Assessment & Plan  Carpal tunnel syndrome on right  Ultrasound of right wrist was reviewed.  Patient has tried conservative treatments for his symptoms that include bracing and cortisone injection with no significant relief of his pain discomfort.  The anatomy and physiology of carpal tunnel syndrome was discussed with the patient today.  Increase pressure localized under the transverse carpal ligament can cause pain, numbness, tingling, or dysesthesias within the median nerve distribution as well as feelings of fatigue, clumsiness, or awkwardness.  These symptoms typically occur at night and worse in the morning upon waking.  Eventually, untreated carpal tunnel syndrome can result in weakness and permanent loss of muscle within the thenar compartment of the hand.  Treatment options were discussed with the patient.  Conservative treatment includes nocturnal resting splints to keep the nerve in a neutral position, ergonomic changes within the work or home environment, activity modification, and tendon gliding exercises.  Steroid injections within the carpal canal can help a majority of patients, however this is often self-limited in a majority of patients.  Surgical intervention to divide the transverse carpal ligament typically results in a long-lasting relief of the patient's complaints, with the recurrence rate of less than 1%.  Patient wishes to proceed with a right carpal tunnel release outpatient performed at [facility].  Postop dressing will be on for 5 days postoperatively with no heavy lifting, can perform activities of daily living such as cutting food, brushing teeth and combing hair.  Needs to cover for showering/bathing.  After 5 days postop dressings can come off and patient can wash hands with warm water, soap and pat dry but still no submerging.  Sutures will come out between 10 and 14 days postoperatively.  Please allow additional 3 to 5 days for suture holes to close up before  submerging in any body of water.  Patient experienced pillar pain that is located over the incision area approximately 6 to 8 months after surgery.  To help decrease this pain, recommended to start scar massage after the first postop visit.  Nighttime symptoms usually resolve first with daytime symptoms following behind.  At 18 months the symptoms you has will remain.  Patient shows understanding and agrees with this plan of care.  Patient will follow-up postoperatively          Carpal tunnel syndrome on left  Cortisone injection to left carpal tunnel         The patient verbalized understanding of exam findings and treatment plan. We engaged in the shared decision-making process and treatment options were discussed at length with the patient. Surgical and conservative management discussed today along with risks and benefits.    Diagnoses and all orders for this visit:    Carpal tunnel syndrome on right    Carpal tunnel syndrome on left    Other orders  -     Hand/upper extremity injection: L carpal tunnel          Open Carpal Tunnel Release:   The anatomy and physiology of carpal tunnel syndrome was discussed with the patient today.  Increase pressure localized under the transverse carpal ligament can cause pain, numbness, tingling, or dysesthesias within the median nerve distribution as well as feelings of fatigue, clumsiness, or awkwardness.  These symptoms typically occur at night and worse in the morning upon waking.  Eventually, untreated carpal tunnel syndrome can result in weakness and permanent loss of muscle within the thenar compartment of the hand.  Treatment options were discussed with the patient.  Conservative treatment includes nocturnal resting splints to keep the nerve in a neutral position, ergonomic changes within the work or home environment, activity modification, and tendon gliding exercises.  Vitamin B6 one tablet daily over the counter may helpful to reduce symptoms.  Steroid injections  within the carpal canal can help a majority of patients, however this is often self-limited in a majority of patients.  Surgical intervention to divide the transverse carpal ligament typically results in a long-lasting relief of the patient's complaints, with the recurrence rate of less than 1%. The patient has elected to undergo an open carpal tunnel release.  The palmar incision technique was discussed in the office with the patient today.   In the postoperative period, light activities are allowed immediately, driving is allowed when narcotic medication has stopped, and the bandages may be removed and incision may get wet after 2 days.  Heavy activities (lifting more than approximately 10 pounds) will be allowed after follow up appointment in 1-2 weeks.  While night symptoms (waking from sleep, pain, and discomfort in the hands) generally improves rapidly, the numbness and tingling as well as the strength will slowly improve over weeks to months depending on the chronicity and severity of the carpal tunnel syndrome.  Pillar pain and scar discomfort were discussed with the patient which are self-limiting conditions.  The risks of bleeding and infection from the surgery are less than 1%.  Risk of recurrence is approximately 0.5%.  The risks of nerve injury or nerve damage or damage to the blood vessels is approximately 1 in 1200. The patient has an understanding of the above mentioned discussion.The risks and benefits of the procedure were explained to the patient, which include, but are not limited to: Bleeding, infection, recurrence, pain, scar, damage to tendons, damage to nerves, and damage to blood vessels, failure to give desired results and complications related to anesthesia.  These risks, along with alternative conservative treatment options, and postoperative protocols were voiced back and understood by the patient.  All questions were answered to the patient's satisfaction.  The patient agrees to comply  with a standard postoperative protocol, and is willing to proceed.  Education was provided via written and auditory forms.  There were no barriers to learning. Written handouts regarding wound care, incision and scar care, and general preoperative information was provided to the patient.  Prior to surgery, the patient may be requested to stop all anti-inflammatory medications.  Prophylactic aspirin, Plavix, and Coumadin may be allowed to be continued.  Medications including vitamin E., ginkgo, and fish oil are requested to be stopped approximately one week prior to surgery.  Hypertensive medications and beta blockers, if taken, s    Follow Up:  Return for Follow up post op 10-14 days.      To Do Next Visit:  Re-evaluation of current issue    ____________________________________________________________________________________________________________________________________________      CHIEF COMPLAINT:  Chief Complaint   Patient presents with    Follow-up     Follow up of both hand wants injection. Wants to talk about surgery on the right hand.        SUBJECTIVE:  Asher Medel is a 49 y.o. year old RHD male who presents today for a follow-up for his bilateral carpal tunnel syndrome.  At his last visit on 2/24/2025, patient had the left carpal tunnel injected with the right 1 being on 1/20/2025.  Patient reports that this time he was discussed a right carpal tunnel release.   Patient reports he is having numbness and tingling into the median nerve distribution of the left hand along with cramping. Patient is wearing the cock-up wrist brace for the left side at night along with the right side. 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   Patient is on Plavix.  Patient's last hemoglobin A1c was 5.7 on 4/28/2025  I have personally reviewed all the relevant PMH, PSH, SH, FH, Medications and allergies.     PAST MEDICAL HISTORY:  Past  Medical History[1]    PAST SURGICAL HISTORY:  Past Surgical History[2]    FAMILY HISTORY:  Family History[3]    SOCIAL HISTORY:  Social History[4]    MEDICATIONS:  Current Medications[5]    ALLERGIES:  Allergies[6]    REVIEW OF SYSTEMS:  Review of Systems   Constitutional:  Negative for chills, fever and unexpected weight change.   HENT:  Negative for hearing loss, nosebleeds and sore throat.    Eyes:  Negative for pain, redness and visual disturbance.   Respiratory:  Negative for cough, shortness of breath and wheezing.    Cardiovascular:  Negative for chest pain, palpitations and leg swelling.   Gastrointestinal:  Negative for abdominal pain, nausea and vomiting.   Endocrine: Negative for polydipsia and polyuria.   Genitourinary:  Negative for dysuria and hematuria.   Skin:  Negative for rash and wound.   Neurological:  Positive for numbness. Negative for dizziness, light-headedness and headaches.   Psychiatric/Behavioral:  Negative for decreased concentration, dysphoric mood and suicidal ideas. The patient is not nervous/anxious.        VITALS:  There were no vitals filed for this visit.      _____________________________________________________  PHYSICAL EXAMINATION:  General: well developed and well nourished, alert, oriented times 3, and appears comfortable  Psychiatric: Normal  HEENT: Normocephalic, Atraumatic Trachea Midline, No torticollis  Pulmonary: No audible wheezing or respiratory distress   Cardiovascular: No pitting edema, 2+ radial pulse   Abdominal/GI: abdomen non tender, non distended   Skin: No masses, erythema, lacerations, fluctation, ulcerations  Neurovascular: Sensation intact to the Ulnar Nerve, Sensation Intact to the Radial Nerve, Decreased Sensation to  the Median Nerve, Motor Intact to the Median, Ulnar, Radial Nerve, and Pulses Intact  Musculoskeletal: Normal, except as noted in detailed exam and in HPI.      MUSCULOSKELETAL EXAMINATION:    Right Carpal Tunnel Exam:    Negative thenar  "atrophy. Negative phalen's test. Positive carpal tunnel compression. Positive tinels over median nerve at the wrist.  Opposition strength 5/5.  Abduction strength 5/5.      Left Carpal Tunnel Exam:    Negative thenar atrophy. Negative phalen's test. Positive carpal tunnel compression. Positive tinels over median nerve at the wrist.  Opposition strength 5/5.  Abduction strength 5/5.      ___________________________________________________    STUDIES REVIEWED:    I have personally reviewed and interpreted  US of bilateral wrists reviewed from 1/13/2025 which demonstrate :     RIGHT SIDE: Carpal tunnel syndrome ruled in based on marked abnormal CSA >/= 14 sq mm.   Maximum median nerve cross sectional area within carpal tunnel (CSAc): 20.0 sq mm.   LEFT SIDE: Carpal tunnel syndrome ruled in based on marked abnormal CSA >/= 14 sq mm.   Maximum median nerve cross sectional area within carpal tunnel (CSAc): 23.5 sq mm.   LABS REVIEWED:    HgA1c:   Lab Results   Component Value Date    HGBA1C 5.7 (H) 04/28/2025     BMP:   Lab Results   Component Value Date    CALCIUM 8.9 06/10/2025    K 3.6 06/10/2025    CO2 27 06/10/2025     06/10/2025    BUN 19 06/10/2025    CREATININE 0.94 06/10/2025             PROCEDURES PERFORMED:  Hand/upper extremity injection: L carpal tunnel    [place] Protocol:  Consent: Verbal consent obtained  Risks and benefits: risks, benefits and alternatives were discussed  Consent given by: patient  Time out: Immediately prior to procedure a \"time out\" was called to verify the correct patient, procedure, equipment, support staff and site/side marked as required.  Timeout called at: 7/3/2025 10:13 AM.  Patient understanding: patient states understanding of the procedure being performed  Site marked: the operative site was marked  Patient identity confirmed: verbally with patient  Supporting Documentation  Indications: tendon swelling and pain   Procedure Details  Condition:carpal tunnel syndrome " Site: L carpal tunnel   Preparation: Patient was prepped and draped in the usual sterile fashion  Needle size: 25 G  Ultrasound guidance: no  Approach: volar  Medications administered: 1 mL lidocaine 1 %; 40 mg dexamethasone 100 mg/10 mL  Patient tolerance: patient tolerated the procedure well with no immediate complications  Dressing:  Sterile dressing applied           _____________________________________________________      Scribe Attestation      I,:  Val Duvall am acting as a scribe while in the presence of the attending physician.:       I,:  Soraya Morse MD personally performed the services described in this documentation    as scribed in my presence.:                         [1]   Past Medical History:  Diagnosis Date    Ankylosing spondylitis (HCC)     Arthritis     Chronic pain     low back/ hips/ neck- has Morphine intrathecal cont pump    CPAP (continuous positive airway pressure) dependence     DJD (degenerative joint disease)     Factor 5 Leiden mutation, heterozygous (HCC)     Herniated disc, cervical     Narcolepsy     Psoriatic arthritis (HCC)     Reactive airway disease     Seizures (HCC)     last seizure approx 2023    Sleep apnea     Spinal cord stimulator status     to be removed    TIA (transient ischemic attack)     questionable   [2]   Past Surgical History:  Procedure Laterality Date    COLONOSCOPY      IMPLANTATION / PLACEMENT EPIDURAL NEUROSTIMULATOR ELECTRODES      INTRATHECAL PUMP IMPLANTATION Left     morphine pump    NH RMVL SPINAL NSTIM ELTRD PLATE/PADDLE INCL FLUOR Right 5/16/2025    Procedure: Removal of thoracic paddle electrode and right sided SCS battery;  Surgeon: Franklin Craven MD;  Location:  MAIN OR;  Service: Neurosurgery    RHINOPLASTY      TONSILLECTOMY     [3]   Family History  Problem Relation Name Age of Onset    Cancer Mother      Diabetes Father      Cancer Father      Heart disease Father      Narcolepsy Father      Sleep apnea Father      Narcolepsy  "Paternal Uncle      Narcolepsy Paternal Grandmother     [4]   Social History  Tobacco Use    Smoking status: Never     Passive exposure: Never    Smokeless tobacco: Never   Vaping Use    Vaping status: Never Used   Substance Use Topics    Alcohol use: Never    Drug use: No   [5]   Current Outpatient Medications:     abatacept (Orencia) 250 mg, Inject 1,000 mg into a catheter in a vein every 28 days, Disp: , Rfl:     albuterol (Ventolin HFA) 90 mcg/act inhaler, Inhale 2 puffs every 6 (six) hours as needed for wheezing, Disp: 18 g, Rfl: 3    atorvastatin (LIPITOR) 40 mg tablet, Take 1 tablet (40 mg total) by mouth every evening, Disp: 90 tablet, Rfl: 2    budesonide (Pulmicort) 0.5 mg/2 mL nebulizer solution, Take 2 mL (0.5 mg total) by nebulization 2 (two) times a day Rinse mouth after use., Disp: 120 mL, Rfl: 6    clobetasol (TEMOVATE) 0.05 % cream, Apply topically 2 (two) times a day as needed, Disp: , Rfl:     clopidogrel (PLAVIX) 75 mg tablet, Take 1 tablet (75 mg total) by mouth daily, Disp: 90 tablet, Rfl: 2    EPINEPHrine (EPIPEN) 0.3 mg/0.3 mL SOAJ, Inject 0.3 mL (0.3 mg total) into a muscle once for 1 dose, Disp: 0.6 mL, Rfl: 0    fluticasone (FLONASE) 50 mcg/act nasal spray, 1 spray into each nostril in the morning and 1 spray in the evening., Disp: , Rfl:     Insulin Syringe-Needle U-100 27.5G X 5/8\" 2 ML MISC, , Disp: , Rfl:     LINZESS 145 MCG CAPS, 145 mcg daily as needed, Disp: , Rfl: 0    NON FORMULARY, Inject 0.5 mL under the skin once a week Administer 0.5 ml Subq of Serum #1 (Cat, Dog, Cockroach (CDCR)) weekly  Administer 0.5 ml Subq of Serum #2 (Tree, Tree, Weed) (TTW)) weekly, Disp: , Rfl:     NON FORMULARY, 0.3 mg continuous Morphine pump- intrathecal .1998mg/24 hours- able to bolus prn .0083mg/hr, Disp: , Rfl:     sildenafil (VIAGRA) 50 MG tablet, Take 2 tablets (100 mg total) by mouth daily as needed for erectile dysfunction, Disp: 10 tablet, Rfl: 0    tiZANidine (ZANAFLEX) 2 mg tablet, Take 2 " tablets (4 mg total) by mouth 4 (four) times a day as needed for muscle spasms, Disp: 240 tablet, Rfl: 4    venlafaxine (EFFEXOR) 37.5 mg tablet, Take 1 tablet (37.5 mg total) by mouth daily, Disp: 30 tablet, Rfl: 5    fluticasone (Arnuity Ellipta) 100 MCG/ACT AEPB inhaler, Inhale 1 puff daily Rinse mouth after use. (Patient taking differently: Inhale 1 puff every morning Rinse mouth after use.), Disp: 30 blister, Rfl: 3  [6]   Allergies  Allergen Reactions    Allyl Isothiocyanate Anaphylaxis     Oil of mustard    Bee Venom Anaphylaxis    Broccoli [Brassica Oleracea - Food Allergy] Anaphylaxis    Diclofenac Other (See Comments)     Pain in leg feels like I have a blood clot pt sttates  Bad stomach pains  Pain in leg feels like I have a blood clot pt sttates  Blood clots in legs painful  Bad stomach pains  Pain in leg feels like I have a blood clot pt sttates    Medical Tape Other (See Comments)     Skin edema redness    Methotrexate Palpitations     Heart palpatations  Heart palpatations  Heart palpatations    Mustard Seed - Food Allergy Anaphylaxis    Other Rash and Other (See Comments)     ADHESIVE TAPE  Skin edema redness    Acetaminophen Diarrhea, GI Intolerance and Abdominal Pain    Aspirin GI Intolerance     Other reaction(s): Nausea/Vomiting    Celecoxib Other (See Comments)     Muscle cramps  Darrius horses, swelling  Darrius horses, swelling  Muscle cramps  Darrius horses, swelling    Diclofenac Sodium      Bad stomach pains    Methotrexate Derivatives     Infliximab Rash     blindness    Penicillins Rash and Other (See Comments)     Other reaction(s): Unknown Reaction

## 2025-07-03 NOTE — H&P
ASSESSMENT/PLAN:      Assessment & Plan  Carpal tunnel syndrome on right  Ultrasound of right wrist was reviewed.  Patient has tried conservative treatments for his symptoms that include bracing and cortisone injection with no significant relief of his pain discomfort.  The anatomy and physiology of carpal tunnel syndrome was discussed with the patient today.  Increase pressure localized under the transverse carpal ligament can cause pain, numbness, tingling, or dysesthesias within the median nerve distribution as well as feelings of fatigue, clumsiness, or awkwardness.  These symptoms typically occur at night and worse in the morning upon waking.  Eventually, untreated carpal tunnel syndrome can result in weakness and permanent loss of muscle within the thenar compartment of the hand.  Treatment options were discussed with the patient.  Conservative treatment includes nocturnal resting splints to keep the nerve in a neutral position, ergonomic changes within the work or home environment, activity modification, and tendon gliding exercises.  Steroid injections within the carpal canal can help a majority of patients, however this is often self-limited in a majority of patients.  Surgical intervention to divide the transverse carpal ligament typically results in a long-lasting relief of the patient's complaints, with the recurrence rate of less than 1%.  Patient wishes to proceed with a right carpal tunnel release outpatient performed at [place].  Postop dressing will be on for 5 days postoperatively with no heavy lifting, can perform activities of daily living such as cutting food, brushing teeth and combing hair.  Needs to cover for showering/bathing.  After 5 days postop dressings can come off and patient can wash hands with warm water, soap and pat dry but still no submerging.  Sutures will come out between 10 and 14 days postoperatively.  Please allow additional 3 to 5 days for suture holes to close up before  submerging in any body of water.  Patient experienced pillar pain that is located over the incision area approximately 6 to 8 months after surgery.  To help decrease this pain, recommended to start scar massage after the first postop visit.  Nighttime symptoms usually resolve first with daytime symptoms following behind.  At 18 months the symptoms you has will remain.  Patient shows understanding and agrees with this plan of care.  Patient will follow-up postoperatively          Carpal tunnel syndrome on left  Cortisone injection to left carpal tunnel         The patient verbalized understanding of exam findings and treatment plan. We engaged in the shared decision-making process and treatment options were discussed at length with the patient. Surgical and conservative management discussed today along with risks and benefits.    Diagnoses and all orders for this visit:    Carpal tunnel syndrome on right    Carpal tunnel syndrome on left    Other orders  -     Hand/upper extremity injection: L carpal tunnel          Open Carpal Tunnel Release:   The anatomy and physiology of carpal tunnel syndrome was discussed with the patient today.  Increase pressure localized under the transverse carpal ligament can cause pain, numbness, tingling, or dysesthesias within the median nerve distribution as well as feelings of fatigue, clumsiness, or awkwardness.  These symptoms typically occur at night and worse in the morning upon waking.  Eventually, untreated carpal tunnel syndrome can result in weakness and permanent loss of muscle within the thenar compartment of the hand.  Treatment options were discussed with the patient.  Conservative treatment includes nocturnal resting splints to keep the nerve in a neutral position, ergonomic changes within the work or home environment, activity modification, and tendon gliding exercises.  Vitamin B6 one tablet daily over the counter may helpful to reduce symptoms.  Steroid injections  within the carpal canal can help a majority of patients, however this is often self-limited in a majority of patients.  Surgical intervention to divide the transverse carpal ligament typically results in a long-lasting relief of the patient's complaints, with the recurrence rate of less than 1%. The patient has elected to undergo an open carpal tunnel release.  The palmar incision technique was discussed in the office with the patient today.   In the postoperative period, light activities are allowed immediately, driving is allowed when narcotic medication has stopped, and the bandages may be removed and incision may get wet after 2 days.  Heavy activities (lifting more than approximately 10 pounds) will be allowed after follow up appointment in 1-2 weeks.  While night symptoms (waking from sleep, pain, and discomfort in the hands) generally improves rapidly, the numbness and tingling as well as the strength will slowly improve over weeks to months depending on the chronicity and severity of the carpal tunnel syndrome.  Pillar pain and scar discomfort were discussed with the patient which are self-limiting conditions.  The risks of bleeding and infection from the surgery are less than 1%.  Risk of recurrence is approximately 0.5%.  The risks of nerve injury or nerve damage or damage to the blood vessels is approximately 1 in 1200. The patient has an understanding of the above mentioned discussion.The risks and benefits of the procedure were explained to the patient, which include, but are not limited to: Bleeding, infection, recurrence, pain, scar, damage to tendons, damage to nerves, and damage to blood vessels, failure to give desired results and complications related to anesthesia.  These risks, along with alternative conservative treatment options, and postoperative protocols were voiced back and understood by the patient.  All questions were answered to the patient's satisfaction.  The patient agrees to comply  with a standard postoperative protocol, and is willing to proceed.  Education was provided via written and auditory forms.  There were no barriers to learning. Written handouts regarding wound care, incision and scar care, and general preoperative information was provided to the patient.  Prior to surgery, the patient may be requested to stop all anti-inflammatory medications.  Prophylactic aspirin, Plavix, and Coumadin may be allowed to be continued.  Medications including vitamin E., ginkgo, and fish oil are requested to be stopped approximately one week prior to surgery.  Hypertensive medications and beta blockers, if taken, s    Follow Up:  Return for Follow up post op 10-14 days.      To Do Next Visit:  Re-evaluation of current issue    ____________________________________________________________________________________________________________________________________________      CHIEF COMPLAINT:  Chief Complaint   Patient presents with    Follow-up     Follow up of both hand wants injection. Wants to talk about surgery on the right hand.        SUBJECTIVE:  Asher Medel is a 49 y.o. year old RHD male who presents today for a follow-up for his bilateral carpal tunnel syndrome.  At his last visit on 2/24/2025, patient had the left carpal tunnel injected with the right 1 being on 1/20/2025.  Patient reports that this time he was discussed a right carpal tunnel release.   Patient reports he is having numbness and tingling into the median nerve distribution of the left hand along with cramping. Patient is wearing the cock-up wrist brace for the left side at night along with the right side. 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   Patient is on Plavix.  Patient's last hemoglobin A1c was 5.7 on 4/28/2025  I have personally reviewed all the relevant PMH, PSH, SH, FH, Medications and allergies.     PAST MEDICAL HISTORY:  Past  Medical History[1]    PAST SURGICAL HISTORY:  Past Surgical History[2]    FAMILY HISTORY:  Family History[3]    SOCIAL HISTORY:  Social History[4]    MEDICATIONS:  Current Medications[5]    ALLERGIES:  Allergies[6]    REVIEW OF SYSTEMS:  Review of Systems   Constitutional:  Negative for chills, fever and unexpected weight change.   HENT:  Negative for hearing loss, nosebleeds and sore throat.    Eyes:  Negative for pain, redness and visual disturbance.   Respiratory:  Negative for cough, shortness of breath and wheezing.    Cardiovascular:  Negative for chest pain, palpitations and leg swelling.   Gastrointestinal:  Negative for abdominal pain, nausea and vomiting.   Endocrine: Negative for polydipsia and polyuria.   Genitourinary:  Negative for dysuria and hematuria.   Skin:  Negative for rash and wound.   Neurological:  Positive for numbness. Negative for dizziness, light-headedness and headaches.   Psychiatric/Behavioral:  Negative for decreased concentration, dysphoric mood and suicidal ideas. The patient is not nervous/anxious.        VITALS:  There were no vitals filed for this visit.      _____________________________________________________  PHYSICAL EXAMINATION:  General: well developed and well nourished, alert, oriented times 3, and appears comfortable  Psychiatric: Normal  HEENT: Normocephalic, Atraumatic Trachea Midline, No torticollis  Pulmonary: No audible wheezing or respiratory distress   Cardiovascular: No pitting edema, 2+ radial pulse   Abdominal/GI: abdomen non tender, non distended   Skin: No masses, erythema, lacerations, fluctation, ulcerations  Neurovascular: Sensation intact to the Ulnar Nerve, Sensation Intact to the Radial Nerve, Decreased Sensation to  the Median Nerve, Motor Intact to the Median, Ulnar, Radial Nerve, and Pulses Intact  Musculoskeletal: Normal, except as noted in detailed exam and in HPI.      MUSCULOSKELETAL EXAMINATION:    Right Carpal Tunnel Exam:    Negative thenar  "atrophy. Negative phalen's test. Positive carpal tunnel compression. Positive tinels over median nerve at the wrist.  Opposition strength 5/5.  Abduction strength 5/5.      Left Carpal Tunnel Exam:    Negative thenar atrophy. Negative phalen's test. Positive carpal tunnel compression. Positive tinels over median nerve at the wrist.  Opposition strength 5/5.  Abduction strength 5/5.      ___________________________________________________    STUDIES REVIEWED:    I have personally reviewed and interpreted  US of bilateral wrists reviewed from 1/13/2025 which demonstrate :     RIGHT SIDE: Carpal tunnel syndrome ruled in based on marked abnormal CSA >/= 14 sq mm.   Maximum median nerve cross sectional area within carpal tunnel (CSAc): 20.0 sq mm.   LEFT SIDE: Carpal tunnel syndrome ruled in based on marked abnormal CSA >/= 14 sq mm.   Maximum median nerve cross sectional area within carpal tunnel (CSAc): 23.5 sq mm.   LABS REVIEWED:    HgA1c:   Lab Results   Component Value Date    HGBA1C 5.7 (H) 04/28/2025     BMP:   Lab Results   Component Value Date    CALCIUM 8.9 06/10/2025    K 3.6 06/10/2025    CO2 27 06/10/2025     06/10/2025    BUN 19 06/10/2025    CREATININE 0.94 06/10/2025             PROCEDURES PERFORMED:  Hand/upper extremity injection: L carpal tunnel    [place] Protocol:  Consent: Verbal consent obtained  Risks and benefits: risks, benefits and alternatives were discussed  Consent given by: patient  Time out: Immediately prior to procedure a \"time out\" was called to verify the correct patient, procedure, equipment, support staff and site/side marked as required.  Timeout called at: 7/3/2025 10:13 AM.  Patient understanding: patient states understanding of the procedure being performed  Site marked: the operative site was marked  Patient identity confirmed: verbally with patient  Supporting Documentation  Indications: tendon swelling and pain   Procedure Details  Condition:carpal tunnel syndrome " Site: L carpal tunnel   Preparation: Patient was prepped and draped in the usual sterile fashion  Needle size: 25 G  Ultrasound guidance: no  Approach: volar  Medications administered: 1 mL lidocaine 1 %; 40 mg dexamethasone 100 mg/10 mL  Patient tolerance: patient tolerated the procedure well with no immediate complications  Dressing:  Sterile dressing applied           _____________________________________________________      Scribe Attestation      I,:  Val Duvall am acting as a scribe while in the presence of the attending physician.:       I,:  Soraya Morse MD personally performed the services described in this documentation    as scribed in my presence.:                           [1]   Past Medical History:  Diagnosis Date    Ankylosing spondylitis (HCC)     Arthritis     Chronic pain     low back/ hips/ neck- has Morphine intrathecal cont pump    CPAP (continuous positive airway pressure) dependence     DJD (degenerative joint disease)     Factor 5 Leiden mutation, heterozygous (HCC)     Herniated disc, cervical     Narcolepsy     Psoriatic arthritis (HCC)     Reactive airway disease     Seizures (HCC)     last seizure approx 2023    Sleep apnea     Spinal cord stimulator status     to be removed    TIA (transient ischemic attack)     questionable   [2]   Past Surgical History:  Procedure Laterality Date    COLONOSCOPY      IMPLANTATION / PLACEMENT EPIDURAL NEUROSTIMULATOR ELECTRODES      INTRATHECAL PUMP IMPLANTATION Left     morphine pump    WA RMVL SPINAL NSTIM ELTRD PLATE/PADDLE INCL FLUOR Right 5/16/2025    Procedure: Removal of thoracic paddle electrode and right sided SCS battery;  Surgeon: Franklin Craven MD;  Location:  MAIN OR;  Service: Neurosurgery    RHINOPLASTY      TONSILLECTOMY     [3]   Family History  Problem Relation Name Age of Onset    Cancer Mother      Diabetes Father      Cancer Father      Heart disease Father      Narcolepsy Father      Sleep apnea Father       "Narcolepsy Paternal Uncle      Narcolepsy Paternal Grandmother     [4]   Social History  Tobacco Use    Smoking status: Never     Passive exposure: Never    Smokeless tobacco: Never   Vaping Use    Vaping status: Never Used   Substance Use Topics    Alcohol use: Never    Drug use: No   [5]   Current Outpatient Medications:     abatacept (Orencia) 250 mg, Inject 1,000 mg into a catheter in a vein every 28 days, Disp: , Rfl:     albuterol (Ventolin HFA) 90 mcg/act inhaler, Inhale 2 puffs every 6 (six) hours as needed for wheezing, Disp: 18 g, Rfl: 3    atorvastatin (LIPITOR) 40 mg tablet, Take 1 tablet (40 mg total) by mouth every evening, Disp: 90 tablet, Rfl: 2    budesonide (Pulmicort) 0.5 mg/2 mL nebulizer solution, Take 2 mL (0.5 mg total) by nebulization 2 (two) times a day Rinse mouth after use., Disp: 120 mL, Rfl: 6    clobetasol (TEMOVATE) 0.05 % cream, Apply topically 2 (two) times a day as needed, Disp: , Rfl:     clopidogrel (PLAVIX) 75 mg tablet, Take 1 tablet (75 mg total) by mouth daily, Disp: 90 tablet, Rfl: 2    EPINEPHrine (EPIPEN) 0.3 mg/0.3 mL SOAJ, Inject 0.3 mL (0.3 mg total) into a muscle once for 1 dose, Disp: 0.6 mL, Rfl: 0    fluticasone (FLONASE) 50 mcg/act nasal spray, 1 spray into each nostril in the morning and 1 spray in the evening., Disp: , Rfl:     Insulin Syringe-Needle U-100 27.5G X 5/8\" 2 ML MISC, , Disp: , Rfl:     LINZESS 145 MCG CAPS, 145 mcg daily as needed, Disp: , Rfl: 0    NON FORMULARY, Inject 0.5 mL under the skin once a week Administer 0.5 ml Subq of Serum #1 (Cat, Dog, Cockroach (CDCR)) weekly  Administer 0.5 ml Subq of Serum #2 (Tree, Tree, Weed) (TTW)) weekly, Disp: , Rfl:     NON FORMULARY, 0.3 mg continuous Morphine pump- intrathecal .1998mg/24 hours- able to bolus prn .0083mg/hr, Disp: , Rfl:     sildenafil (VIAGRA) 50 MG tablet, Take 2 tablets (100 mg total) by mouth daily as needed for erectile dysfunction, Disp: 10 tablet, Rfl: 0    tiZANidine (ZANAFLEX) 2 mg " tablet, Take 2 tablets (4 mg total) by mouth 4 (four) times a day as needed for muscle spasms, Disp: 240 tablet, Rfl: 4    venlafaxine (EFFEXOR) 37.5 mg tablet, Take 1 tablet (37.5 mg total) by mouth daily, Disp: 30 tablet, Rfl: 5    fluticasone (Arnuity Ellipta) 100 MCG/ACT AEPB inhaler, Inhale 1 puff daily Rinse mouth after use. (Patient taking differently: Inhale 1 puff every morning Rinse mouth after use.), Disp: 30 blister, Rfl: 3  [6]   Allergies  Allergen Reactions    Allyl Isothiocyanate Anaphylaxis     Oil of mustard    Bee Venom Anaphylaxis    Broccoli [Brassica Oleracea - Food Allergy] Anaphylaxis    Diclofenac Other (See Comments)     Pain in leg feels like I have a blood clot pt sttates  Bad stomach pains  Pain in leg feels like I have a blood clot pt sttates  Blood clots in legs painful  Bad stomach pains  Pain in leg feels like I have a blood clot pt sttates    Medical Tape Other (See Comments)     Skin edema redness    Methotrexate Palpitations     Heart palpatations  Heart palpatations  Heart palpatations    Mustard Seed - Food Allergy Anaphylaxis    Other Rash and Other (See Comments)     ADHESIVE TAPE  Skin edema redness    Acetaminophen Diarrhea, GI Intolerance and Abdominal Pain    Aspirin GI Intolerance     Other reaction(s): Nausea/Vomiting    Celecoxib Other (See Comments)     Muscle cramps  Darrius horses, swelling  Darrius horses, swelling  Muscle cramps  Darrius horses, swelling    Diclofenac Sodium      Bad stomach pains    Methotrexate Derivatives     Infliximab Rash     blindness    Penicillins Rash and Other (See Comments)     Other reaction(s): Unknown Reaction

## 2025-07-03 NOTE — ASSESSMENT & PLAN NOTE
Ultrasound of right wrist was reviewed.  Patient has tried conservative treatments for his symptoms that include bracing and cortisone injection with no significant relief of his pain discomfort.  The anatomy and physiology of carpal tunnel syndrome was discussed with the patient today.  Increase pressure localized under the transverse carpal ligament can cause pain, numbness, tingling, or dysesthesias within the median nerve distribution as well as feelings of fatigue, clumsiness, or awkwardness.  These symptoms typically occur at night and worse in the morning upon waking.  Eventually, untreated carpal tunnel syndrome can result in weakness and permanent loss of muscle within the thenar compartment of the hand.  Treatment options were discussed with the patient.  Conservative treatment includes nocturnal resting splints to keep the nerve in a neutral position, ergonomic changes within the work or home environment, activity modification, and tendon gliding exercises.  Steroid injections within the carpal canal can help a majority of patients, however this is often self-limited in a majority of patients.  Surgical intervention to divide the transverse carpal ligament typically results in a long-lasting relief of the patient's complaints, with the recurrence rate of less than 1%.  Patient wishes to proceed with a right carpal tunnel release outpatient performed at [place].  Postop dressing will be on for 5 days postoperatively with no heavy lifting, can perform activities of daily living such as cutting food, brushing teeth and combing hair.  Needs to cover for showering/bathing.  After 5 days postop dressings can come off and patient can wash hands with warm water, soap and pat dry but still no submerging.  Sutures will come out between 10 and 14 days postoperatively.  Please allow additional 3 to 5 days for suture holes to close up before submerging in any body of water.  Patient experienced pillar pain that is  located over the incision area approximately 6 to 8 months after surgery.  To help decrease this pain, recommended to start scar massage after the first postop visit.  Nighttime symptoms usually resolve first with daytime symptoms following behind.  At 18 months the symptoms you has will remain.  Patient shows understanding and agrees with this plan of care.  Patient will follow-up postoperatively

## 2025-07-07 ENCOUNTER — HOSPITAL ENCOUNTER (OUTPATIENT)
Dept: RADIOLOGY | Facility: HOSPITAL | Age: 50
Discharge: HOME | End: 2025-07-07
Payer: MEDICARE

## 2025-07-07 ENCOUNTER — RESULTS FOLLOW-UP (OUTPATIENT)
Dept: SLEEP CENTER | Facility: CLINIC | Age: 50
End: 2025-07-07

## 2025-07-07 ENCOUNTER — TELEPHONE (OUTPATIENT)
Dept: SLEEP CENTER | Facility: CLINIC | Age: 50
End: 2025-07-07

## 2025-07-07 DIAGNOSIS — R29.90 STROKE-LIKE SYMPTOMS: ICD-10-CM

## 2025-07-07 DIAGNOSIS — G47.33 OBSTRUCTIVE SLEEP APNEA TREATED WITH CONTINUOUS POSITIVE AIRWAY PRESSURE (CPAP): Primary | ICD-10-CM

## 2025-07-07 PROCEDURE — A9585 GADOBUTROL INJECTION: HCPCS | Performed by: PSYCHIATRY & NEUROLOGY

## 2025-07-07 PROCEDURE — 70553 MRI BRAIN STEM W/O & W/DYE: CPT

## 2025-07-07 RX ORDER — GADOBUTROL 604.72 MG/ML
11 INJECTION INTRAVENOUS
Status: COMPLETED | OUTPATIENT
Start: 2025-07-07 | End: 2025-07-07

## 2025-07-07 RX ADMIN — GADOBUTROL 11 ML: 604.72 INJECTION INTRAVENOUS at 13:06

## 2025-07-07 NOTE — TELEPHONE ENCOUNTER
CPAP titration study resulted and shows an effective PAP titration resulting in AHI 0.0. CPAP pressure change ordered.     See new encounter for PAP pressure change to [employer].     Call to patient, reviewed results and PAP pressure change order and next steps.     Compliance appointment scheduled for 9/4/2025 @ 1:20 pm in the [place] office with Dr. Macedo.

## 2025-07-07 NOTE — TELEPHONE ENCOUNTER
----- Message from Asif Macedo MD sent at 7/7/2025 12:30 PM EDT -----  Please see pressure change   ----- Message -----  From: Interface, Radiology Results In  Sent: 6/30/2025   8:28 AM EDT  To: Asif Macedo MD

## 2025-07-17 ENCOUNTER — OFFICE VISIT (OUTPATIENT)
Dept: NEUROLOGY | Facility: CLINIC | Age: 50
End: 2025-07-17
Payer: MEDICARE

## 2025-07-17 VITALS
BODY MASS INDEX: 35.42 KG/M2 | HEIGHT: 71 IN | OXYGEN SATURATION: 97 % | DIASTOLIC BLOOD PRESSURE: 90 MMHG | WEIGHT: 253 LBS | HEART RATE: 85 BPM | TEMPERATURE: 98.6 F | SYSTOLIC BLOOD PRESSURE: 148 MMHG

## 2025-07-17 DIAGNOSIS — R29.90 STROKE-LIKE SYMPTOMS: Primary | ICD-10-CM

## 2025-07-17 PROCEDURE — G2211 COMPLEX E/M VISIT ADD ON: HCPCS | Performed by: PSYCHIATRY & NEUROLOGY

## 2025-07-17 PROCEDURE — 99215 OFFICE O/P EST HI 40 MIN: CPT | Performed by: PSYCHIATRY & NEUROLOGY

## 2025-07-17 NOTE — PROGRESS NOTES
Name: Asher Medel      : 1975      MRN: [id number]  Encounter Provider: Emily Conn MD  Encounter Date: 2025   Encounter department: [facility] [place]  :  Assessment & Plan  Stroke-like symptoms  Patient is a 49-year-old male with history of spondylitis, carpal tunnel syndrome, chronic pain on morphine pump, obesity, degenerative disc disease, heterozygous factor V Leyden mutation, idiopathic hypersomnia, hyperlipidemia, peripheral neuropathy, STEPHANIE on CPAP, narcolepsy with cataplexy, psoriasis with arthropathy, reactive airway disease and seizure disorder who presents as a hospital follow-up.    Patient was seen by inpatient neurology team on 2025. Initial presenting symptom was acute onset left-sided facial droop, numbness/tingling and weakness. BP on arrival 186/98. NIHSS 4.  CT head normal.  CTA without LVO and a questionable thrombus in the left atrial appendage versus prominent vasculature. TNK was administered. Patient was unable to have an MRI at the time due to spinal cord stimulator leads. Patient had serial CT scans and there was no evolution of stroke.  WHITNEY and TTE without evidence of thrombus however this was done after TNK administration.  No PFO present.  Patient was found to be positive for 1 single allele follow-up factor V Leyden gene.  Patient discharged on plavix and statin.     Patient today denies any new strokelike symptoms.  He had his spinal cord stimulator leads removed and MRI done in July. I personally reviewed imaging and there is no evidence of abnormalities on FLAIR sequencing to indicate a prior stroke.  Patient reports that he has a strong family history of clotting and hemophilia in paternal side of family.  He denies usage of drugs or smoking.  Blood pressure today 148/90.  Patient was seen by hematology in March.  At the time they did not recommend any indication for anticoagulation in the absence of VTE.    At this time,  patient likely with stroke aborted by TNK.  Stroke etiology not completely clear.  Risk factors include family history of clotting disorder and a positive single allele for factor V Leiden.  At the time of presentation pertinent positives included blood pressure of 186/98,  LDL of 142 and a questionable thrombus on CTA which was not found on echocardiogram however echo was done post TNK administration.  Prior to presentation patient was antiplatelet naïve.  Overall, no concern for cardioembolic phenomenon.  Hematology feels like anticoagulation is not indicated now as a prevention for thrombophilia given absence of VTE.    Plan:  Will focus on stroke risk prevention  Antiplatelet therapy: Plavix 75 mg  Goal LDL < 70   Continue atorvastatin 40 mg  Monitor blood pressure with goal <130/80  Counseled on lifestyle measures to reduce stroke burden if applicable, such as:  Smoking cessation   Reducing alcohol intake  Encouraging physical activity  Encouraging weight loss  A low-carbohydrate diet reducing the intake of foods rich in carbohydrates, such as bread, pasta, rice, and sugary snacks.Emphasizes foods that are high in protein, healthy fats, and non-starchy vegetables  Regular follow-up with their PCP  If they have any stroke-like symptoms including sudden painless loss of vision or double vision, difficulty speaking or swallowing, vertigo/room spinning that does not quickly resolve, or weakness/numbness affecting 1 side of the face or body he should proceed by ambulance to the nearest emergency room immediately.  Follow-up in 6 months                 History of Present Illness   HPI     Patient is a 49-year-old male with history of spondylitis, carpal tunnel syndrome, chronic pain on morphine pump, obesity, degenerative disc disease, heterozygous factor V Leyden mutation, idiopathic hypersomnia, hyperlipidemia, peripheral neuropathy, STEPHANIE on CPAP, narcolepsy with cataplexy, psoriasis with arthropathy, reactive  "airway disease and seizure disorder who presents as a hospital follow-up.    Patient was seen by inpatient neurology team on 2/8/2025. Initial presenting symptom was acute onset left-sided facial droop, numbness/tingling and weakness. BP on arrival 186/98. NIHSS 4. TNK was administered. Patient was unable to have an MRI at the time due to spinal cord stimulator leads.     Patient discharged on plavix and statin. Patient with an aspirin allergy.     Workup:  CTH: No mass effect, acute intracranial hemorrhage or evidence of recent infarction.     CTA: No evidence for high-grade stenosis, focal occlusion or vascular aneurysm of the cervical or intracranial vessels. Questionable small amount of thrombus versus prominent vasculature within the left atrial appendage. Echocardiography is recommended for further evaluation.    MRI July 2025: No acute infarct, significant mass effect or midline shift.    HBA1C: 5.8    LDL: 142    Echo: EF 55-60%, normal left atrium     WHITNEY: EF 60%. No thrombus in left atrial appendage. No PFO    APS workup negative     Hematology visit 3/11/2025: \"This is a 49M with recent admission for stroke symptoms without clearly defined VTE/CVA. 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 IN the absence of VTE, there is no indication for anti-coagulation. Will look to rule out APS, otherwise no further heme w/u required. Pt can see us in the heme clinic prn. He is at increased risk for VTE furthermore due to obesity and lifestyle modifications were recommended to achieve a BMI <30\"    Interval history:  Following with sleep medicine.   Saw hematology ? Yes, mild increase risk of VTE  No new stroke like symptoms   On plavix and lipitor     Strong history of clotting and hemophilia in paternal side   Non smoker   No drugs   /90      Review of Systems   Constitutional:  Negative for appetite change, fatigue and fever.   HENT: " "Negative.  Negative for hearing loss, tinnitus, trouble swallowing and voice change.    Eyes: Negative.  Negative for photophobia, pain and visual disturbance.   Respiratory: Negative.  Negative for shortness of breath.    Cardiovascular: Negative.  Negative for palpitations.   Gastrointestinal: Negative.  Negative for nausea and vomiting.   Endocrine: Negative.  Negative for cold intolerance.   Genitourinary: Negative.  Negative for dysuria, frequency and urgency.   Musculoskeletal:  Negative for back pain, gait problem, myalgias, neck pain and neck stiffness.   Skin: Negative.  Negative for rash.   Allergic/Immunologic: Negative.    Neurological:  Positive for numbness (Left side of facial). Negative for dizziness, tremors, seizures, syncope, facial asymmetry, speech difficulty, weakness, light-headedness and headaches.   Hematological: Negative.  Does not bruise/bleed easily.   Psychiatric/Behavioral: Negative.  Negative for confusion, hallucinations and sleep disturbance.    All other systems reviewed and are negative.   I have personally reviewed the MA's review of systems and made changes as necessary.         Objective   /90 (BP Location: Left arm, Patient Position: Sitting, Cuff Size: Standard)   Pulse 85   Temp 98.6 °F (37 °C) (Temporal)   Ht 5' 11\" (1.803 m)   Wt 115 kg (253 lb)   SpO2 97%   BMI 35.29 kg/m²     Physical Exam    Eyes:      General: Lids are normal.      Extraocular Movements: Extraocular movements intact.       Neurological:      Deep Tendon Reflexes:      Reflex Scores:       Bicep reflexes are 2+ on the right side and 2+ on the left side.       Brachioradialis reflexes are 2+ on the right side and 2+ on the left side.       Patellar reflexes are 2+ on the right side and 2+ on the left side.    Psychiatric:         Speech: Speech normal.       Neurological Exam  Mental Status  Awake, alert and oriented to person, place and time. Speech is normal. Language is fluent with no " aphasia. Attention and concentration are normal. Fund of knowledge is appropriate for level of education.    Cranial Nerves  CN II: Visual fields full to confrontation.  CN III, IV, VI: Extraocular movements intact bilaterally. Normal lids and orbits bilaterally.  CN V: Facial sensation is normal.  CN VII: Full and symmetric facial movement.  CN VIII: Hearing is normal.  CN IX, X: Palate elevates symmetrically  CN XI: Shoulder shrug strength is normal.  CN XII: Tongue midline without atrophy or fasciculations.    Motor   Strength is 5/5 in all four extremities except as noted.  Very minimal L deltoid and L HF weakness. .    Sensory  Light touch is normal in upper and lower extremities. Temperature is normal in upper and lower extremities. Vibration is normal in upper and lower extremities.     Reflexes                                            Right                      Left  Brachioradialis                    2+                         2+  Biceps                                 2+                         2+  Patellar                                2+                         2+    Coordination    Normal coordination .    Gait  Casual gait is normal including stance, stride, and arm swing.

## 2025-07-19 NOTE — ASSESSMENT & PLAN NOTE
Patient is a 49-year-old male with history of spondylitis, carpal tunnel syndrome, chronic pain on morphine pump, obesity, degenerative disc disease, heterozygous factor V Leyden mutation, idiopathic hypersomnia, hyperlipidemia, peripheral neuropathy, STEPHANIE on CPAP, narcolepsy with cataplexy, psoriasis with arthropathy, reactive airway disease and seizure disorder who presents as a hospital follow-up.    Patient was seen by inpatient neurology team on 2/8/2025. Initial presenting symptom was acute onset left-sided facial droop, numbness/tingling and weakness. BP on arrival 186/98. NIHSS 4.  CT head normal.  CTA without LVO and a questionable thrombus in the left atrial appendage versus prominent vasculature. TNK was administered. Patient was unable to have an MRI at the time due to spinal cord stimulator leads. Patient had serial CT scans and there was no evolution of stroke.  WHITNEY and TTE without evidence of thrombus however this was done after TNK administration.  No PFO present.  Patient was found to be positive for 1 single allele follow-up factor V Leyden gene.  Patient discharged on plavix and statin.     Patient today denies any new strokelike symptoms.  He had his spinal cord stimulator leads removed and MRI done in July. I personally reviewed imaging and there is no evidence of abnormalities on FLAIR sequencing to indicate a prior stroke.  Patient reports that he has a strong family history of clotting and hemophilia in paternal side of family.  He denies usage of drugs or smoking.  Blood pressure today 148/90.  Patient was seen by hematology in March.  At the time they did not recommend any indication for anticoagulation in the absence of VTE.    At this time, patient likely with stroke aborted by TNK.  Stroke etiology not completely clear.  Risk factors include family history of clotting disorder and a positive single allele for factor V Leiden.  At the time of presentation pertinent positives included blood  pressure of 186/98,  LDL of 142 and a questionable thrombus on CTA which was not found on echocardiogram however echo was done post TNK administration.  Prior to presentation patient was antiplatelet naïve.  Overall, no concern for cardioembolic phenomenon.  Hematology feels like anticoagulation is not indicated now as a prevention for thrombophilia given absence of VTE.    Plan:  Will focus on stroke risk prevention  Antiplatelet therapy: Plavix 75 mg  Goal LDL < 70   Continue atorvastatin 40 mg  Monitor blood pressure with goal <130/80  Counseled on lifestyle measures to reduce stroke burden if applicable, such as:  Smoking cessation   Reducing alcohol intake  Encouraging physical activity  Encouraging weight loss  A low-carbohydrate diet reducing the intake of foods rich in carbohydrates, such as bread, pasta, rice, and sugary snacks.Emphasizes foods that are high in protein, healthy fats, and non-starchy vegetables  Regular follow-up with their PCP  If they have any stroke-like symptoms including sudden painless loss of vision or double vision, difficulty speaking or swallowing, vertigo/room spinning that does not quickly resolve, or weakness/numbness affecting 1 side of the face or body he should proceed by ambulance to the nearest emergency room immediately.  Follow-up in 6 months

## 2025-08-03 DIAGNOSIS — G47.411 CATAPLEXY: ICD-10-CM

## 2025-08-04 RX ORDER — VENLAFAXINE 37.5 MG/1
37.5 TABLET ORAL DAILY
Qty: 30 TABLET | Refills: 5 | Status: SHIPPED | OUTPATIENT
Start: 2025-08-04

## 2025-08-05 LAB
BUN SERPL-MCNC: 15 MG/DL (ref 7–25)
BUN/CREAT SERPL: NORMAL (CALC) (ref 6–22)
CALCIUM SERPL-MCNC: 9.2 MG/DL (ref 8.6–10.3)
CHLORIDE SERPL-SCNC: 102 MMOL/L (ref 98–110)
CO2 SERPL-SCNC: 31 MMOL/L (ref 20–32)
CREAT SERPL-MCNC: 0.87 MG/DL (ref 0.6–1.29)
GFR/BSA.PRED SERPLBLD CYS-BASED-ARV: 107 ML/MIN/1.73M2
GLUCOSE SERPL-MCNC: 92 MG/DL (ref 65–99)
POTASSIUM SERPL-SCNC: 3.9 MMOL/L (ref 3.5–5.3)
SODIUM SERPL-SCNC: 140 MMOL/L (ref 135–146)

## 2025-08-08 ENCOUNTER — ANESTHESIA (OUTPATIENT)
Age: 50
End: 2025-08-08

## 2025-08-08 ENCOUNTER — ANESTHESIA EVENT (OUTPATIENT)
Age: 50
End: 2025-08-08

## 2025-08-14 ENCOUNTER — TELEPHONE (OUTPATIENT)
Dept: NEUROLOGY | Facility: CLINIC | Age: 50
End: 2025-08-14

## 2025-08-14 ENCOUNTER — OFFICE VISIT (OUTPATIENT)
Age: 50
End: 2025-08-14
Payer: MEDICARE

## 2025-08-14 PROBLEM — J45.30 MILD PERSISTENT ASTHMA WITHOUT COMPLICATION: Status: ACTIVE | Noted: 2025-08-14

## 2025-08-19 ENCOUNTER — TELEPHONE (OUTPATIENT)
Age: 50
End: 2025-08-19

## (undated) DEVICE — PAD GROUND ELECTROSURGICAL W/CORD

## (undated) DEVICE — DRESSING SPONGE GAUZE 4X4 STER

## (undated) DEVICE — TIBURON SPLIT SHEET: Brand: CONVERTORS

## (undated) DEVICE — DRAPE C-ARM X-RAY EQUIPMENT IMAGE

## (undated) DEVICE — SPONGE LAP 18X18 SAFE-T RFID ENHANCED XRAY

## (undated) DEVICE — SUT SILK 2-0 SH 30 IN K833H

## (undated) DEVICE — SUTURE VICRYL 4-0 J496H 18IN

## (undated) DEVICE — INSULATED BLADE ELECTRODE: Brand: EDGE

## (undated) DEVICE — APPLICATOR CHLORAPREP 26ML ORANGE TINT

## (undated) DEVICE — ANTIBACTERIAL VIOLET BRAIDED (POLYGLACTIN 910), SYNTHETIC ABSORBABLE SUTURE: Brand: COATED VICRYL

## (undated) DEVICE — DRAPE IOBAN

## (undated) DEVICE — 3M™ IOBAN™ 2 ANTIMICROBIAL INCISE DRAPE 6650EZ: Brand: IOBAN™ 2

## (undated) DEVICE — TUBING SMOKE EVAC PENCIL COATED

## (undated) DEVICE — GLOVE EXAM RADIATION RESISTANT 8

## (undated) DEVICE — DRESSING MEPILEX AG BORDER 4 X 4 IN

## (undated) DEVICE — SYRINGE DISP LUER-LOK 10 CC

## (undated) DEVICE — GLOVE INDICATOR PI UNDERGLOVE SZ 7.5 BLUE

## (undated) DEVICE — CATHETER PASSER RESTORE

## (undated) DEVICE — SUT MONOCRYL 5-0 P-3 18 IN Y493G

## (undated) DEVICE — SYRINGE DISP LUER-LOK 20 CC

## (undated) DEVICE — SOLN IRRIG .9%SOD 500ML

## (undated) DEVICE — ***USE 57698*** SLEEVE FLOWTRON DVT CALF SINGLE USE

## (undated) DEVICE — PENCIL ESU HANDSWITCHING W/HOL

## (undated) DEVICE — GLOVE SRG BIOGEL 7.5

## (undated) DEVICE — STAPLER SKIN

## (undated) DEVICE — SUTURE VICRYL 3-0 J427 PS-2 UNDYED

## (undated) DEVICE — SPONGE SCRUB 4 PCT CHLORHEXIDINE

## (undated) DEVICE — SYRINGE DISP LUER-LOK  3 CC

## (undated) DEVICE — DRESSING TEGADERM 4X4 3/4

## (undated) DEVICE — PACK UNIVERSAL

## (undated) DEVICE — PACK RFID MLHS MINOR PROC STDZ

## (undated) DEVICE — DRESSING SPONGE ALL GAUZE 4X4 STER 10PK

## (undated) DEVICE — BLANKET UPPER BODY BAIR HUGGER

## (undated) DEVICE — INTENDED FOR TISSUE SEPARATION, AND OTHER PROCEDURES THAT REQUIRE A SHARP SURGICAL BLADE TO PUNCTURE OR CUT.: Brand: BARD-PARKER ® CARBON RIB-BACK BLADES

## (undated) DEVICE — BINDER ABDOMINAL ONE SIZE 10 HIGH 27-48

## (undated) DEVICE — Device

## (undated) DEVICE — CHLORAPREP HI-LITE 26ML ORANGE

## (undated) DEVICE — JACKSON TABLE FOAM POSITIONING KIT: Brand: CARDINAL HEALTH

## (undated) DEVICE — BRUSH SCRUB WITH HIBICLENS

## (undated) DEVICE — GLOVE SZ 8 PROTEXIS PI

## (undated) DEVICE — MANIFOLD SINGLE PORT NEPTUNE

## (undated) DEVICE — TRAY EPIDURAL WITH CATHETER

## (undated) DEVICE — DRESSING ADAPTIC STERILE 3X3

## (undated) DEVICE — DRESSING ADAPTIC 3INX8IN

## (undated) DEVICE — NEPTUNE E-SEP SMOKE EVACUATION PENCIL, COATED, 70MM BLADE, PUSH BUTTON SWITCH: Brand: NEPTUNE E-SEP

## (undated) DEVICE — TAPE DURAPORE 2IN

## (undated) DEVICE — GOWN SURGICAL REINFORCED X-LAR

## (undated) DEVICE — COVER LIGHTHANDLE

## (undated) DEVICE — ***USE 138530*** SUTURE VICRYL 2-0 J259H CT-1 UNDYED

## (undated) DEVICE — BETHLEHEM UNIVERSAL SPINE, KIT: Brand: CARDINAL HEALTH

## (undated) DEVICE — HEMOSTATIC MATRIX SURGIFLO 8ML W/THROMBIN

## (undated) DEVICE — STERISTRIP 1/2INX4IN

## (undated) DEVICE — GLOVE SURG PROTEXIS PF 8

## (undated) DEVICE — C-ARM: Brand: UNBRANDED

## (undated) DEVICE — ***USE 138531*** SUTURE VICRYL 2-0 J266H CP-1 UNDYED

## (undated) DEVICE — NEEDLE SAFE BLUNT 18G

## (undated) DEVICE — DISPOSABLE EQUIPMENT COVER: Brand: SMALL TOWEL DRAPE

## (undated) DEVICE — EXOFIN PRECISION PEN HIGH VISCOSITY TOPICAL SKIN ADHESIVE: Brand: EXOFIN PRECISION PEN, 1G

## (undated) DEVICE — BINDER ABDOMINAL ONE SIZE 12 HIGH 27-48

## (undated) DEVICE — ELECTROSURGICAL TIP CLEANER

## (undated) DEVICE — NEEDLE DISP HYPO 25GX1-1/2IN